# Patient Record
Sex: FEMALE | Race: WHITE | NOT HISPANIC OR LATINO | Employment: PART TIME | URBAN - METROPOLITAN AREA
[De-identification: names, ages, dates, MRNs, and addresses within clinical notes are randomized per-mention and may not be internally consistent; named-entity substitution may affect disease eponyms.]

---

## 2017-09-14 ENCOUNTER — CONVERSION ENCOUNTER (OUTPATIENT)
Dept: MAMMOGRAPHY | Facility: HOSPITAL | Age: 52
End: 2017-09-14

## 2018-01-17 ENCOUNTER — OFFICE VISIT CONVERTED (OUTPATIENT)
Dept: FAMILY MEDICINE CLINIC | Facility: CLINIC | Age: 53
End: 2018-01-17
Attending: NURSE PRACTITIONER

## 2018-02-21 ENCOUNTER — OFFICE VISIT CONVERTED (OUTPATIENT)
Dept: FAMILY MEDICINE CLINIC | Facility: CLINIC | Age: 53
End: 2018-02-21
Attending: FAMILY MEDICINE

## 2018-02-21 ENCOUNTER — CONVERSION ENCOUNTER (OUTPATIENT)
Dept: FAMILY MEDICINE CLINIC | Facility: CLINIC | Age: 53
End: 2018-02-21

## 2018-03-20 ENCOUNTER — OFFICE VISIT CONVERTED (OUTPATIENT)
Dept: FAMILY MEDICINE CLINIC | Facility: CLINIC | Age: 53
End: 2018-03-20
Attending: FAMILY MEDICINE

## 2018-04-24 ENCOUNTER — OFFICE VISIT CONVERTED (OUTPATIENT)
Dept: FAMILY MEDICINE CLINIC | Facility: CLINIC | Age: 53
End: 2018-04-24
Attending: NURSE PRACTITIONER

## 2018-05-17 ENCOUNTER — OFFICE VISIT CONVERTED (OUTPATIENT)
Dept: FAMILY MEDICINE CLINIC | Facility: CLINIC | Age: 53
End: 2018-05-17
Attending: NURSE PRACTITIONER

## 2018-06-15 ENCOUNTER — OFFICE VISIT CONVERTED (OUTPATIENT)
Dept: FAMILY MEDICINE CLINIC | Facility: CLINIC | Age: 53
End: 2018-06-15
Attending: NURSE PRACTITIONER

## 2018-06-28 ENCOUNTER — OFFICE VISIT CONVERTED (OUTPATIENT)
Dept: FAMILY MEDICINE CLINIC | Facility: CLINIC | Age: 53
End: 2018-06-28
Attending: FAMILY MEDICINE

## 2018-07-16 ENCOUNTER — OFFICE VISIT CONVERTED (OUTPATIENT)
Dept: FAMILY MEDICINE CLINIC | Facility: CLINIC | Age: 53
End: 2018-07-16
Attending: NURSE PRACTITIONER

## 2018-07-16 ENCOUNTER — CONVERSION ENCOUNTER (OUTPATIENT)
Dept: FAMILY MEDICINE CLINIC | Facility: CLINIC | Age: 53
End: 2018-07-16

## 2018-07-24 ENCOUNTER — OFFICE VISIT CONVERTED (OUTPATIENT)
Dept: FAMILY MEDICINE CLINIC | Facility: CLINIC | Age: 53
End: 2018-07-24
Attending: NURSE PRACTITIONER

## 2018-08-21 ENCOUNTER — OFFICE VISIT CONVERTED (OUTPATIENT)
Dept: FAMILY MEDICINE CLINIC | Facility: CLINIC | Age: 53
End: 2018-08-21
Attending: FAMILY MEDICINE

## 2018-09-11 ENCOUNTER — OFFICE VISIT CONVERTED (OUTPATIENT)
Dept: FAMILY MEDICINE CLINIC | Facility: CLINIC | Age: 53
End: 2018-09-11
Attending: FAMILY MEDICINE

## 2018-09-18 ENCOUNTER — OFFICE VISIT CONVERTED (OUTPATIENT)
Dept: FAMILY MEDICINE CLINIC | Facility: CLINIC | Age: 53
End: 2018-09-18
Attending: FAMILY MEDICINE

## 2018-10-06 ENCOUNTER — OFFICE VISIT CONVERTED (OUTPATIENT)
Dept: FAMILY MEDICINE CLINIC | Facility: CLINIC | Age: 53
End: 2018-10-06
Attending: FAMILY MEDICINE

## 2018-10-09 ENCOUNTER — OFFICE VISIT CONVERTED (OUTPATIENT)
Dept: FAMILY MEDICINE CLINIC | Facility: CLINIC | Age: 53
End: 2018-10-09
Attending: FAMILY MEDICINE

## 2018-10-11 ENCOUNTER — OFFICE VISIT CONVERTED (OUTPATIENT)
Dept: GASTROENTEROLOGY | Facility: CLINIC | Age: 53
End: 2018-10-11
Attending: INTERNAL MEDICINE

## 2018-10-15 ENCOUNTER — CONVERSION ENCOUNTER (OUTPATIENT)
Dept: OTHER | Facility: HOSPITAL | Age: 53
End: 2018-10-15

## 2018-10-30 ENCOUNTER — OFFICE VISIT CONVERTED (OUTPATIENT)
Dept: FAMILY MEDICINE CLINIC | Facility: CLINIC | Age: 53
End: 2018-10-30
Attending: FAMILY MEDICINE

## 2018-11-06 ENCOUNTER — OFFICE VISIT CONVERTED (OUTPATIENT)
Dept: FAMILY MEDICINE CLINIC | Facility: CLINIC | Age: 53
End: 2018-11-06
Attending: FAMILY MEDICINE

## 2018-11-19 ENCOUNTER — OFFICE VISIT CONVERTED (OUTPATIENT)
Dept: FAMILY MEDICINE CLINIC | Facility: CLINIC | Age: 53
End: 2018-11-19
Attending: NURSE PRACTITIONER

## 2018-12-04 ENCOUNTER — OFFICE VISIT CONVERTED (OUTPATIENT)
Dept: FAMILY MEDICINE CLINIC | Facility: CLINIC | Age: 53
End: 2018-12-04
Attending: NURSE PRACTITIONER

## 2018-12-20 ENCOUNTER — CONVERSION ENCOUNTER (OUTPATIENT)
Dept: FAMILY MEDICINE CLINIC | Facility: CLINIC | Age: 53
End: 2018-12-20

## 2018-12-20 ENCOUNTER — OFFICE VISIT CONVERTED (OUTPATIENT)
Dept: FAMILY MEDICINE CLINIC | Facility: CLINIC | Age: 53
End: 2018-12-20
Attending: NURSE PRACTITIONER

## 2019-01-02 ENCOUNTER — HOSPITAL ENCOUNTER (OUTPATIENT)
Dept: URGENT CARE | Facility: CLINIC | Age: 54
Discharge: HOME | End: 2019-01-02
Attending: NURSE PRACTITIONER

## 2019-01-04 LAB — BACTERIA SPEC AEROBE CULT: NORMAL

## 2019-01-08 ENCOUNTER — OFFICE VISIT CONVERTED (OUTPATIENT)
Dept: FAMILY MEDICINE CLINIC | Facility: CLINIC | Age: 54
End: 2019-01-08
Attending: FAMILY MEDICINE

## 2019-01-30 ENCOUNTER — HOSPITAL ENCOUNTER (OUTPATIENT)
Dept: PHYSICAL THERAPY | Facility: CLINIC | Age: 54
Setting detail: RECURRING SERIES
Discharge: HOME | End: 2019-01-31
Attending: FAMILY MEDICINE

## 2019-02-14 ENCOUNTER — OFFICE VISIT CONVERTED (OUTPATIENT)
Dept: FAMILY MEDICINE CLINIC | Facility: CLINIC | Age: 54
End: 2019-02-14
Attending: NURSE PRACTITIONER

## 2019-02-19 ENCOUNTER — OFFICE VISIT CONVERTED (OUTPATIENT)
Dept: FAMILY MEDICINE CLINIC | Facility: CLINIC | Age: 54
End: 2019-02-19
Attending: NURSE PRACTITIONER

## 2019-02-19 ENCOUNTER — HOSPITAL ENCOUNTER (OUTPATIENT)
Dept: FAMILY MEDICINE CLINIC | Facility: CLINIC | Age: 54
Discharge: HOME | End: 2019-02-19
Attending: NURSE PRACTITIONER

## 2019-02-19 ENCOUNTER — CONVERSION ENCOUNTER (OUTPATIENT)
Dept: FAMILY MEDICINE CLINIC | Facility: CLINIC | Age: 54
End: 2019-02-19

## 2019-03-25 ENCOUNTER — OFFICE VISIT CONVERTED (OUTPATIENT)
Dept: FAMILY MEDICINE CLINIC | Facility: CLINIC | Age: 54
End: 2019-03-25
Attending: FAMILY MEDICINE

## 2019-03-25 ENCOUNTER — HOSPITAL ENCOUNTER (OUTPATIENT)
Dept: FAMILY MEDICINE CLINIC | Facility: CLINIC | Age: 54
Discharge: HOME | End: 2019-03-25
Attending: FAMILY MEDICINE

## 2019-03-25 ENCOUNTER — CONVERSION ENCOUNTER (OUTPATIENT)
Dept: FAMILY MEDICINE CLINIC | Facility: CLINIC | Age: 54
End: 2019-03-25

## 2019-03-26 LAB
ALBUMIN SERPL-MCNC: 4.3 G/DL (ref 3.5–5)
ALBUMIN/GLOB SERPL: 1.4 {RATIO} (ref 1.4–2.6)
ALP SERPL-CCNC: 46 U/L (ref 53–141)
ALT SERPL-CCNC: 33 U/L (ref 10–40)
ANION GAP SERPL CALC-SCNC: 22 MMOL/L (ref 8–19)
AST SERPL-CCNC: 29 U/L (ref 15–50)
BILIRUB SERPL-MCNC: 0.38 MG/DL (ref 0.2–1.3)
BUN SERPL-MCNC: 12 MG/DL (ref 5–25)
BUN/CREAT SERPL: 19 {RATIO} (ref 6–20)
CALCIUM SERPL-MCNC: 9.6 MG/DL (ref 8.7–10.4)
CHLORIDE SERPL-SCNC: 102 MMOL/L (ref 99–111)
CONV CO2: 22 MMOL/L (ref 22–32)
CONV TOTAL PROTEIN: 7.4 G/DL (ref 6.3–8.2)
CREAT UR-MCNC: 0.62 MG/DL (ref 0.5–0.9)
GFR SERPLBLD BASED ON 1.73 SQ M-ARVRAT: >60 ML/MIN/{1.73_M2}
GLOBULIN UR ELPH-MCNC: 3.1 G/DL (ref 2–3.5)
GLUCOSE SERPL-MCNC: 73 MG/DL (ref 65–99)
OSMOLALITY SERPL CALC.SUM OF ELEC: 284 MOSM/KG (ref 273–304)
POTASSIUM SERPL-SCNC: 7.5 MMOL/L (ref 3.5–5.3)
SODIUM SERPL-SCNC: 138 MMOL/L (ref 135–147)
TSH SERPL-ACNC: 1.41 M[IU]/L (ref 0.27–4.2)

## 2019-04-01 ENCOUNTER — OFFICE VISIT CONVERTED (OUTPATIENT)
Dept: FAMILY MEDICINE CLINIC | Facility: CLINIC | Age: 54
End: 2019-04-01
Attending: NURSE PRACTITIONER

## 2019-04-09 ENCOUNTER — HOSPITAL ENCOUNTER (OUTPATIENT)
Dept: OTHER | Facility: HOSPITAL | Age: 54
Discharge: HOME | End: 2019-04-09
Attending: FAMILY MEDICINE

## 2019-04-11 ENCOUNTER — OFFICE VISIT CONVERTED (OUTPATIENT)
Dept: FAMILY MEDICINE CLINIC | Facility: CLINIC | Age: 54
End: 2019-04-11
Attending: FAMILY MEDICINE

## 2019-04-11 ENCOUNTER — HOSPITAL ENCOUNTER (OUTPATIENT)
Dept: SLEEP MEDICINE | Facility: HOSPITAL | Age: 54
Discharge: HOME | End: 2019-04-11
Attending: INTERNAL MEDICINE

## 2019-04-18 ENCOUNTER — HOSPITAL ENCOUNTER (OUTPATIENT)
Dept: SLEEP MEDICINE | Facility: HOSPITAL | Age: 54
Discharge: HOME | End: 2019-04-18
Attending: INTERNAL MEDICINE

## 2019-04-25 ENCOUNTER — HOSPITAL ENCOUNTER (OUTPATIENT)
Dept: OTHER | Facility: HOSPITAL | Age: 54
Discharge: HOME | End: 2019-04-25
Attending: FAMILY MEDICINE

## 2019-05-02 ENCOUNTER — HOSPITAL ENCOUNTER (OUTPATIENT)
Dept: FAMILY MEDICINE CLINIC | Facility: CLINIC | Age: 54
Discharge: HOME | End: 2019-05-02
Attending: FAMILY MEDICINE

## 2019-05-02 ENCOUNTER — OFFICE VISIT CONVERTED (OUTPATIENT)
Dept: FAMILY MEDICINE CLINIC | Facility: CLINIC | Age: 54
End: 2019-05-02
Attending: FAMILY MEDICINE

## 2019-05-03 LAB
ALBUMIN SERPL-MCNC: 4.2 G/DL (ref 3.5–5)
ALBUMIN/GLOB SERPL: 1.5 {RATIO} (ref 1.4–2.6)
ALP SERPL-CCNC: 45 U/L (ref 53–141)
ALT SERPL-CCNC: 25 U/L (ref 10–40)
ANION GAP SERPL CALC-SCNC: 14 MMOL/L (ref 8–19)
AST SERPL-CCNC: 26 U/L (ref 15–50)
BASOPHILS # BLD AUTO: 0.06 10*3/UL (ref 0–0.2)
BASOPHILS NFR BLD AUTO: 0.8 % (ref 0–3)
BILIRUB SERPL-MCNC: 0.37 MG/DL (ref 0.2–1.3)
BUN SERPL-MCNC: 11 MG/DL (ref 5–25)
BUN/CREAT SERPL: 14 {RATIO} (ref 6–20)
CALCIUM SERPL-MCNC: 9.2 MG/DL (ref 8.7–10.4)
CHLORIDE SERPL-SCNC: 102 MMOL/L (ref 99–111)
CONV ABS IMM GRAN: 0.01 10*3/UL (ref 0–0.2)
CONV CO2: 27 MMOL/L (ref 22–32)
CONV IMMATURE GRAN: 0.1 % (ref 0–1.8)
CONV TOTAL PROTEIN: 7 G/DL (ref 6.3–8.2)
CREAT UR-MCNC: 0.77 MG/DL (ref 0.5–0.9)
DEPRECATED RDW RBC AUTO: 44.2 FL (ref 36.4–46.3)
EOSINOPHIL # BLD AUTO: 0.15 10*3/UL (ref 0–0.7)
EOSINOPHIL # BLD AUTO: 2 % (ref 0–7)
ERYTHROCYTE [DISTWIDTH] IN BLOOD BY AUTOMATED COUNT: 13.7 % (ref 11.7–14.4)
GFR SERPLBLD BASED ON 1.73 SQ M-ARVRAT: >60 ML/MIN/{1.73_M2}
GLOBULIN UR ELPH-MCNC: 2.8 G/DL (ref 2–3.5)
GLUCOSE SERPL-MCNC: 83 MG/DL (ref 65–99)
HBA1C MFR BLD: 13.8 G/DL (ref 12–16)
HCT VFR BLD AUTO: 44.1 % (ref 37–47)
LYMPHOCYTES # BLD AUTO: 3.17 10*3/UL (ref 1–5)
MCH RBC QN AUTO: 27.6 PG (ref 27–31)
MCHC RBC AUTO-ENTMCNC: 31.3 G/DL (ref 33–37)
MCV RBC AUTO: 88.2 FL (ref 81–99)
MONOCYTES # BLD AUTO: 0.65 10*3/UL (ref 0.2–1.2)
MONOCYTES NFR BLD AUTO: 8.6 % (ref 3–10)
NEUTROPHILS # BLD AUTO: 3.55 10*3/UL (ref 2–8)
NEUTROPHILS NFR BLD AUTO: 46.7 % (ref 30–85)
NRBC CBCN: 0 % (ref 0–0.7)
OSMOLALITY SERPL CALC.SUM OF ELEC: 287 MOSM/KG (ref 273–304)
PLATELET # BLD AUTO: 228 10*3/UL (ref 130–400)
PMV BLD AUTO: 12.8 FL (ref 9.4–12.3)
POTASSIUM SERPL-SCNC: 3.9 MMOL/L (ref 3.5–5.3)
RBC # BLD AUTO: 5 10*6/UL (ref 4.2–5.4)
SODIUM SERPL-SCNC: 139 MMOL/L (ref 135–147)
VARIANT LYMPHS NFR BLD MANUAL: 41.8 % (ref 20–45)
WBC # BLD AUTO: 7.59 10*3/UL (ref 4.8–10.8)

## 2019-05-20 ENCOUNTER — HOSPITAL ENCOUNTER (OUTPATIENT)
Dept: FAMILY MEDICINE CLINIC | Facility: CLINIC | Age: 54
Discharge: HOME | End: 2019-05-20
Attending: FAMILY MEDICINE

## 2019-05-20 ENCOUNTER — OFFICE VISIT CONVERTED (OUTPATIENT)
Dept: FAMILY MEDICINE CLINIC | Facility: CLINIC | Age: 54
End: 2019-05-20
Attending: FAMILY MEDICINE

## 2019-05-20 ENCOUNTER — CONVERSION ENCOUNTER (OUTPATIENT)
Dept: FAMILY MEDICINE CLINIC | Facility: CLINIC | Age: 54
End: 2019-05-20

## 2019-05-28 ENCOUNTER — HOSPITAL ENCOUNTER (OUTPATIENT)
Dept: ONCOLOGY | Facility: HOSPITAL | Age: 54
Discharge: HOME | End: 2019-05-28
Attending: THORACIC SURGERY (CARDIOTHORACIC VASCULAR SURGERY)

## 2019-05-28 ENCOUNTER — OFFICE VISIT CONVERTED (OUTPATIENT)
Dept: ONCOLOGY | Facility: HOSPITAL | Age: 54
End: 2019-05-28
Attending: THORACIC SURGERY (CARDIOTHORACIC VASCULAR SURGERY)

## 2019-05-29 ENCOUNTER — OFFICE VISIT CONVERTED (OUTPATIENT)
Dept: GASTROENTEROLOGY | Facility: CLINIC | Age: 54
End: 2019-05-29
Attending: PHYSICIAN ASSISTANT

## 2019-06-03 ENCOUNTER — OFFICE VISIT CONVERTED (OUTPATIENT)
Dept: FAMILY MEDICINE CLINIC | Facility: CLINIC | Age: 54
End: 2019-06-03
Attending: FAMILY MEDICINE

## 2019-06-03 ENCOUNTER — HOSPITAL ENCOUNTER (OUTPATIENT)
Dept: FAMILY MEDICINE CLINIC | Facility: CLINIC | Age: 54
Discharge: HOME | End: 2019-06-03
Attending: FAMILY MEDICINE

## 2019-06-06 LAB — BACTERIA SPEC AEROBE CULT: NORMAL

## 2019-06-10 ENCOUNTER — HOSPITAL ENCOUNTER (OUTPATIENT)
Dept: CARDIOLOGY | Facility: HOSPITAL | Age: 54
Discharge: HOME | End: 2019-06-10
Attending: THORACIC SURGERY (CARDIOTHORACIC VASCULAR SURGERY)

## 2019-06-19 ENCOUNTER — OFFICE VISIT CONVERTED (OUTPATIENT)
Dept: ORTHOPEDIC SURGERY | Facility: CLINIC | Age: 54
End: 2019-06-19
Attending: ORTHOPAEDIC SURGERY

## 2019-06-20 ENCOUNTER — HOSPITAL ENCOUNTER (OUTPATIENT)
Dept: SLEEP MEDICINE | Facility: HOSPITAL | Age: 54
Discharge: HOME | End: 2019-06-20
Attending: INTERNAL MEDICINE

## 2019-07-12 ENCOUNTER — HOSPITAL ENCOUNTER (OUTPATIENT)
Dept: OTHER | Facility: HOSPITAL | Age: 54
Discharge: HOME | End: 2019-07-12
Attending: THORACIC SURGERY (CARDIOTHORACIC VASCULAR SURGERY)

## 2019-07-16 ENCOUNTER — HOSPITAL ENCOUNTER (OUTPATIENT)
Dept: ONCOLOGY | Facility: HOSPITAL | Age: 54
Discharge: HOME | End: 2019-07-16
Attending: THORACIC SURGERY (CARDIOTHORACIC VASCULAR SURGERY)

## 2019-07-16 ENCOUNTER — OFFICE VISIT CONVERTED (OUTPATIENT)
Dept: ONCOLOGY | Facility: HOSPITAL | Age: 54
End: 2019-07-16
Attending: THORACIC SURGERY (CARDIOTHORACIC VASCULAR SURGERY)

## 2019-08-24 ENCOUNTER — OFFICE VISIT CONVERTED (OUTPATIENT)
Dept: FAMILY MEDICINE CLINIC | Facility: CLINIC | Age: 54
End: 2019-08-24
Attending: FAMILY MEDICINE

## 2019-08-24 ENCOUNTER — HOSPITAL ENCOUNTER (OUTPATIENT)
Dept: FAMILY MEDICINE CLINIC | Facility: CLINIC | Age: 54
Discharge: HOME | End: 2019-08-24

## 2019-08-24 LAB
ALBUMIN SERPL-MCNC: 4.4 G/DL (ref 3.5–5)
ALBUMIN/GLOB SERPL: 1.5 {RATIO} (ref 1.4–2.6)
ALP SERPL-CCNC: 53 U/L (ref 53–141)
ALT SERPL-CCNC: 26 U/L (ref 10–40)
ANION GAP SERPL CALC-SCNC: 14 MMOL/L (ref 8–19)
AST SERPL-CCNC: 25 U/L (ref 15–50)
BILIRUB SERPL-MCNC: 0.68 MG/DL (ref 0.2–1.3)
BUN SERPL-MCNC: 9 MG/DL (ref 5–25)
BUN/CREAT SERPL: 16 {RATIO} (ref 6–20)
CALCIUM SERPL-MCNC: 9.6 MG/DL (ref 8.7–10.4)
CHLORIDE SERPL-SCNC: 104 MMOL/L (ref 99–111)
CONV CO2: 26 MMOL/L (ref 22–32)
CONV TOTAL PROTEIN: 7.4 G/DL (ref 6.3–8.2)
CREAT UR-MCNC: 0.56 MG/DL (ref 0.5–0.9)
GFR SERPLBLD BASED ON 1.73 SQ M-ARVRAT: >60 ML/MIN/{1.73_M2}
GLOBULIN UR ELPH-MCNC: 3 G/DL (ref 2–3.5)
GLUCOSE SERPL-MCNC: 82 MG/DL (ref 65–99)
OSMOLALITY SERPL CALC.SUM OF ELEC: 288 MOSM/KG (ref 273–304)
POTASSIUM SERPL-SCNC: 3.7 MMOL/L (ref 3.5–5.3)
SODIUM SERPL-SCNC: 140 MMOL/L (ref 135–147)

## 2019-09-09 ENCOUNTER — OFFICE VISIT CONVERTED (OUTPATIENT)
Dept: FAMILY MEDICINE CLINIC | Facility: CLINIC | Age: 54
End: 2019-09-09
Attending: NURSE PRACTITIONER

## 2019-09-09 ENCOUNTER — HOSPITAL ENCOUNTER (OUTPATIENT)
Dept: FAMILY MEDICINE CLINIC | Facility: CLINIC | Age: 54
Discharge: HOME | End: 2019-09-09
Attending: NURSE PRACTITIONER

## 2019-09-18 ENCOUNTER — HOSPITAL ENCOUNTER (OUTPATIENT)
Dept: URGENT CARE | Facility: CLINIC | Age: 54
Discharge: HOME | End: 2019-09-18

## 2019-09-20 LAB — BACTERIA SPEC AEROBE CULT: NORMAL

## 2019-10-10 ENCOUNTER — HOSPITAL ENCOUNTER (OUTPATIENT)
Dept: FAMILY MEDICINE CLINIC | Facility: CLINIC | Age: 54
Discharge: HOME | End: 2019-10-10
Attending: NURSE PRACTITIONER

## 2019-10-10 ENCOUNTER — OFFICE VISIT CONVERTED (OUTPATIENT)
Dept: FAMILY MEDICINE CLINIC | Facility: CLINIC | Age: 54
End: 2019-10-10
Attending: NURSE PRACTITIONER

## 2019-10-10 ENCOUNTER — CONVERSION ENCOUNTER (OUTPATIENT)
Dept: FAMILY MEDICINE CLINIC | Facility: CLINIC | Age: 54
End: 2019-10-10

## 2019-10-11 ENCOUNTER — HOSPITAL ENCOUNTER (OUTPATIENT)
Dept: FAMILY MEDICINE CLINIC | Facility: CLINIC | Age: 54
Discharge: HOME | End: 2019-10-11
Attending: NURSE PRACTITIONER

## 2019-10-11 LAB
ALBUMIN SERPL-MCNC: 4.6 G/DL (ref 3.5–5)
ALBUMIN/GLOB SERPL: 1.6 {RATIO} (ref 1.4–2.6)
ALP SERPL-CCNC: 53 U/L (ref 53–141)
ALT SERPL-CCNC: 46 U/L (ref 10–40)
ANION GAP SERPL CALC-SCNC: 18 MMOL/L (ref 8–19)
AST SERPL-CCNC: 36 U/L (ref 15–50)
BASOPHILS # BLD AUTO: 0.07 10*3/UL (ref 0–0.2)
BASOPHILS NFR BLD AUTO: 1.1 % (ref 0–3)
BILIRUB SERPL-MCNC: 0.86 MG/DL (ref 0.2–1.3)
BUN SERPL-MCNC: 7 MG/DL (ref 5–25)
BUN/CREAT SERPL: 11 {RATIO} (ref 6–20)
CALCIUM SERPL-MCNC: 9.9 MG/DL (ref 8.7–10.4)
CHLORIDE SERPL-SCNC: 103 MMOL/L (ref 99–111)
CHOLEST SERPL-MCNC: 184 MG/DL (ref 107–200)
CHOLEST/HDLC SERPL: 4.8 {RATIO} (ref 3–6)
CONV ABS IMM GRAN: 0.02 10*3/UL (ref 0–0.2)
CONV CO2: 25 MMOL/L (ref 22–32)
CONV IMMATURE GRAN: 0.3 % (ref 0–1.8)
CONV TOTAL PROTEIN: 7.5 G/DL (ref 6.3–8.2)
CREAT UR-MCNC: 0.66 MG/DL (ref 0.5–0.9)
DEPRECATED RDW RBC AUTO: 41.4 FL (ref 36.4–46.3)
EOSINOPHIL # BLD AUTO: 0.19 10*3/UL (ref 0–0.7)
EOSINOPHIL # BLD AUTO: 2.9 % (ref 0–7)
ERYTHROCYTE [DISTWIDTH] IN BLOOD BY AUTOMATED COUNT: 13.3 % (ref 11.7–14.4)
GFR SERPLBLD BASED ON 1.73 SQ M-ARVRAT: >60 ML/MIN/{1.73_M2}
GLOBULIN UR ELPH-MCNC: 2.9 G/DL (ref 2–3.5)
GLUCOSE SERPL-MCNC: 92 MG/DL (ref 65–99)
HCT VFR BLD AUTO: 45.4 % (ref 37–47)
HDLC SERPL-MCNC: 38 MG/DL (ref 40–60)
HGB BLD-MCNC: 14.7 G/DL (ref 12–16)
LDLC SERPL CALC-MCNC: 83 MG/DL (ref 70–100)
LYMPHOCYTES # BLD AUTO: 2.05 10*3/UL (ref 1–5)
LYMPHOCYTES NFR BLD AUTO: 30.9 % (ref 20–45)
MCH RBC QN AUTO: 27.5 PG (ref 27–31)
MCHC RBC AUTO-ENTMCNC: 32.4 G/DL (ref 33–37)
MCV RBC AUTO: 85 FL (ref 81–99)
MONOCYTES # BLD AUTO: 0.61 10*3/UL (ref 0.2–1.2)
MONOCYTES NFR BLD AUTO: 9.2 % (ref 3–10)
NEUTROPHILS # BLD AUTO: 3.69 10*3/UL (ref 2–8)
NEUTROPHILS NFR BLD AUTO: 55.6 % (ref 30–85)
NRBC CBCN: 0 % (ref 0–0.7)
OSMOLALITY SERPL CALC.SUM OF ELEC: 292 MOSM/KG (ref 273–304)
PLATELET # BLD AUTO: 234 10*3/UL (ref 130–400)
PMV BLD AUTO: 11.4 FL (ref 9.4–12.3)
POTASSIUM SERPL-SCNC: 3.9 MMOL/L (ref 3.5–5.3)
RBC # BLD AUTO: 5.34 10*6/UL (ref 4.2–5.4)
SODIUM SERPL-SCNC: 142 MMOL/L (ref 135–147)
T4 FREE SERPL-MCNC: 1.3 NG/DL (ref 0.9–1.8)
TRIGL SERPL-MCNC: 316 MG/DL (ref 40–150)
TSH SERPL-ACNC: 0.39 M[IU]/L (ref 0.27–4.2)
VLDLC SERPL-MCNC: 63 MG/DL (ref 5–37)
WBC # BLD AUTO: 6.63 10*3/UL (ref 4.8–10.8)

## 2019-10-12 LAB — HCV AB SER DONR QL: 0.2 S/CO RATIO (ref 0–0.9)

## 2019-10-15 LAB
CONV LAST MENSTURAL PERIOD: NORMAL
SPECIMEN SOURCE: NORMAL
SPECIMEN SOURCE: NORMAL
THIN PREP CVX: NORMAL

## 2019-10-17 LAB — HPV HYBRID CAPTURE HIGH RISK: NEGATIVE

## 2019-10-23 ENCOUNTER — OFFICE VISIT CONVERTED (OUTPATIENT)
Dept: FAMILY MEDICINE CLINIC | Facility: CLINIC | Age: 54
End: 2019-10-23
Attending: NURSE PRACTITIONER

## 2019-10-24 ENCOUNTER — HOSPITAL ENCOUNTER (OUTPATIENT)
Dept: OTHER | Facility: HOSPITAL | Age: 54
Discharge: HOME | End: 2019-10-24
Attending: NURSE PRACTITIONER

## 2019-10-31 ENCOUNTER — HOSPITAL ENCOUNTER (OUTPATIENT)
Dept: OTHER | Facility: HOSPITAL | Age: 54
Discharge: HOME | End: 2019-10-31
Attending: NURSE PRACTITIONER

## 2019-11-07 ENCOUNTER — HOSPITAL ENCOUNTER (OUTPATIENT)
Dept: OTHER | Facility: HOSPITAL | Age: 54
Discharge: HOME | End: 2019-11-07
Attending: FAMILY MEDICINE

## 2019-11-14 ENCOUNTER — CONVERSION ENCOUNTER (OUTPATIENT)
Dept: FAMILY MEDICINE CLINIC | Facility: CLINIC | Age: 54
End: 2019-11-14

## 2019-11-14 ENCOUNTER — OFFICE VISIT CONVERTED (OUTPATIENT)
Dept: FAMILY MEDICINE CLINIC | Facility: CLINIC | Age: 54
End: 2019-11-14
Attending: NURSE PRACTITIONER

## 2019-12-19 ENCOUNTER — OFFICE VISIT CONVERTED (OUTPATIENT)
Dept: GASTROENTEROLOGY | Facility: CLINIC | Age: 54
End: 2019-12-19
Attending: PHYSICIAN ASSISTANT

## 2019-12-19 ENCOUNTER — CONVERSION ENCOUNTER (OUTPATIENT)
Dept: GASTROENTEROLOGY | Facility: CLINIC | Age: 54
End: 2019-12-19

## 2019-12-19 ENCOUNTER — HOSPITAL ENCOUNTER (OUTPATIENT)
Dept: SLEEP MEDICINE | Facility: HOSPITAL | Age: 54
Discharge: HOME | End: 2019-12-19
Attending: INTERNAL MEDICINE

## 2019-12-19 ENCOUNTER — HOSPITAL ENCOUNTER (OUTPATIENT)
Dept: OTHER | Facility: HOSPITAL | Age: 54
Discharge: HOME | End: 2019-12-19
Attending: PHYSICIAN ASSISTANT

## 2019-12-19 LAB
ALBUMIN SERPL-MCNC: 4.6 G/DL (ref 3.5–5)
ALBUMIN/GLOB SERPL: 1.3 {RATIO} (ref 1.4–2.6)
ALP SERPL-CCNC: 61 U/L (ref 53–141)
ALT SERPL-CCNC: 41 U/L (ref 10–40)
ANION GAP SERPL CALC-SCNC: 15 MMOL/L (ref 8–19)
AST SERPL-CCNC: 32 U/L (ref 15–50)
BASOPHILS # BLD AUTO: 0.05 10*3/UL (ref 0–0.2)
BASOPHILS NFR BLD AUTO: 0.8 % (ref 0–3)
BILIRUB SERPL-MCNC: 0.51 MG/DL (ref 0.2–1.3)
BUN SERPL-MCNC: 8 MG/DL (ref 5–25)
BUN/CREAT SERPL: 12 {RATIO} (ref 6–20)
CALCIUM SERPL-MCNC: 10 MG/DL (ref 8.7–10.4)
CHLORIDE SERPL-SCNC: 101 MMOL/L (ref 99–111)
CONV ABS IMM GRAN: 0.01 10*3/UL (ref 0–0.2)
CONV CO2: 27 MMOL/L (ref 22–32)
CONV IMMATURE GRAN: 0.2 % (ref 0–1.8)
CONV TOTAL PROTEIN: 8.2 G/DL (ref 6.3–8.2)
CREAT UR-MCNC: 0.66 MG/DL (ref 0.5–0.9)
DEPRECATED RDW RBC AUTO: 38.7 FL (ref 36.4–46.3)
EOSINOPHIL # BLD AUTO: 0.14 10*3/UL (ref 0–0.7)
EOSINOPHIL # BLD AUTO: 2.2 % (ref 0–7)
ERYTHROCYTE [DISTWIDTH] IN BLOOD BY AUTOMATED COUNT: 13 % (ref 11.7–14.4)
FERRITIN SERPL-MCNC: 79 NG/ML (ref 10–200)
GFR SERPLBLD BASED ON 1.73 SQ M-ARVRAT: >60 ML/MIN/{1.73_M2}
GLOBULIN UR ELPH-MCNC: 3.6 G/DL (ref 2–3.5)
GLUCOSE SERPL-MCNC: 93 MG/DL (ref 65–99)
HCT VFR BLD AUTO: 48.2 % (ref 37–47)
HGB BLD-MCNC: 16 G/DL (ref 12–16)
IRON SATN MFR SERPL: 19 % (ref 20–55)
IRON SERPL-MCNC: 78 UG/DL (ref 60–170)
LYMPHOCYTES # BLD AUTO: 2.11 10*3/UL (ref 1–5)
LYMPHOCYTES NFR BLD AUTO: 33.3 % (ref 20–45)
MCH RBC QN AUTO: 27.5 PG (ref 27–31)
MCHC RBC AUTO-ENTMCNC: 33.2 G/DL (ref 33–37)
MCV RBC AUTO: 82.8 FL (ref 81–99)
MONOCYTES # BLD AUTO: 0.38 10*3/UL (ref 0.2–1.2)
MONOCYTES NFR BLD AUTO: 6 % (ref 3–10)
NEUTROPHILS # BLD AUTO: 3.65 10*3/UL (ref 2–8)
NEUTROPHILS NFR BLD AUTO: 57.5 % (ref 30–85)
NRBC CBCN: 0 % (ref 0–0.7)
OSMOLALITY SERPL CALC.SUM OF ELEC: 286 MOSM/KG (ref 273–304)
PLATELET # BLD AUTO: 256 10*3/UL (ref 130–400)
PMV BLD AUTO: 10.6 FL (ref 9.4–12.3)
POTASSIUM SERPL-SCNC: 4.1 MMOL/L (ref 3.5–5.3)
RBC # BLD AUTO: 5.82 10*6/UL (ref 4.2–5.4)
SODIUM SERPL-SCNC: 139 MMOL/L (ref 135–147)
TIBC SERPL-MCNC: 405 UG/DL (ref 245–450)
TRANSFERRIN SERPL-MCNC: 283 MG/DL (ref 250–380)
WBC # BLD AUTO: 6.34 10*3/UL (ref 4.8–10.8)

## 2019-12-20 LAB
A1AT SERPL-MCNC: 130 MG/DL (ref 101–187)
CERULOPLASMIN SERPL-MCNC: 26.2 MG/DL (ref 19–39)
CONV HEPATITIS B SURFACE AG W CONFIRMATION RE: NEGATIVE
DEPRECATED MITOCHONDRIA M2 IGG SER-ACNC: <20 UNITS (ref 0–20)
DSDNA AB SER-ACNC: NEGATIVE [IU]/ML
ENA AB SER IA-ACNC: NEGATIVE {RATIO}
HAV IGM SERPL QL IA: NEGATIVE
HBV CORE IGM SERPL QL IA: NEGATIVE
HCV AB SER DONR QL: 0.2 S/CO RATIO (ref 0–0.9)
SMOOTH MUSCLE F-ACTIN AB IGG: 16 UNITS (ref 0–19)

## 2020-01-06 ENCOUNTER — OFFICE VISIT CONVERTED (OUTPATIENT)
Dept: NEUROSURGERY | Facility: CLINIC | Age: 55
End: 2020-01-06
Attending: PHYSICIAN ASSISTANT

## 2020-02-04 ENCOUNTER — CONVERSION ENCOUNTER (OUTPATIENT)
Dept: FAMILY MEDICINE CLINIC | Facility: CLINIC | Age: 55
End: 2020-02-04

## 2020-02-04 ENCOUNTER — OFFICE VISIT CONVERTED (OUTPATIENT)
Dept: FAMILY MEDICINE CLINIC | Facility: CLINIC | Age: 55
End: 2020-02-04
Attending: NURSE PRACTITIONER

## 2020-02-29 ENCOUNTER — HOSPITAL ENCOUNTER (OUTPATIENT)
Dept: URGENT CARE | Facility: CLINIC | Age: 55
Discharge: HOME | End: 2020-02-29

## 2020-03-02 LAB — BACTERIA SPEC AEROBE CULT: NORMAL

## 2020-03-09 ENCOUNTER — OFFICE VISIT CONVERTED (OUTPATIENT)
Dept: NEUROSURGERY | Facility: CLINIC | Age: 55
End: 2020-03-09
Attending: PHYSICIAN ASSISTANT

## 2020-03-19 ENCOUNTER — OFFICE VISIT CONVERTED (OUTPATIENT)
Dept: FAMILY MEDICINE CLINIC | Facility: CLINIC | Age: 55
End: 2020-03-19
Attending: NURSE PRACTITIONER

## 2020-03-20 ENCOUNTER — HOSPITAL ENCOUNTER (OUTPATIENT)
Dept: FAMILY MEDICINE CLINIC | Facility: CLINIC | Age: 55
Discharge: HOME | End: 2020-03-20
Attending: NURSE PRACTITIONER

## 2020-03-20 LAB
ALBUMIN SERPL-MCNC: 4.4 G/DL (ref 3.5–5)
ALBUMIN/GLOB SERPL: 1.4 {RATIO} (ref 1.4–2.6)
ALP SERPL-CCNC: 48 U/L (ref 53–141)
ALT SERPL-CCNC: 27 U/L (ref 10–40)
ANION GAP SERPL CALC-SCNC: 17 MMOL/L (ref 8–19)
AST SERPL-CCNC: 24 U/L (ref 15–50)
BILIRUB SERPL-MCNC: 0.47 MG/DL (ref 0.2–1.3)
BUN SERPL-MCNC: 13 MG/DL (ref 5–25)
BUN/CREAT SERPL: 20 {RATIO} (ref 6–20)
CALCIUM SERPL-MCNC: 9.5 MG/DL (ref 8.7–10.4)
CHLORIDE SERPL-SCNC: 105 MMOL/L (ref 99–111)
CHOLEST SERPL-MCNC: 210 MG/DL (ref 107–200)
CHOLEST/HDLC SERPL: 5.1 {RATIO} (ref 3–6)
CONV CO2: 23 MMOL/L (ref 22–32)
CONV TOTAL PROTEIN: 7.6 G/DL (ref 6.3–8.2)
CREAT UR-MCNC: 0.66 MG/DL (ref 0.5–0.9)
GFR SERPLBLD BASED ON 1.73 SQ M-ARVRAT: >60 ML/MIN/{1.73_M2}
GLOBULIN UR ELPH-MCNC: 3.2 G/DL (ref 2–3.5)
GLUCOSE SERPL-MCNC: 94 MG/DL (ref 65–99)
HDLC SERPL-MCNC: 41 MG/DL (ref 40–60)
LDLC SERPL CALC-MCNC: 120 MG/DL (ref 70–100)
OSMOLALITY SERPL CALC.SUM OF ELEC: 292 MOSM/KG (ref 273–304)
POTASSIUM SERPL-SCNC: 4.3 MMOL/L (ref 3.5–5.3)
SODIUM SERPL-SCNC: 141 MMOL/L (ref 135–147)
T4 FREE SERPL-MCNC: 1.2 NG/DL (ref 0.9–1.8)
TRIGL SERPL-MCNC: 243 MG/DL (ref 40–150)
TSH SERPL-ACNC: 2.29 M[IU]/L (ref 0.27–4.2)
VLDLC SERPL-MCNC: 49 MG/DL (ref 5–37)

## 2020-04-03 ENCOUNTER — OFFICE VISIT CONVERTED (OUTPATIENT)
Dept: FAMILY MEDICINE CLINIC | Facility: CLINIC | Age: 55
End: 2020-04-03
Attending: NURSE PRACTITIONER

## 2020-04-09 ENCOUNTER — TELEMEDICINE CONVERTED (OUTPATIENT)
Dept: FAMILY MEDICINE CLINIC | Facility: CLINIC | Age: 55
End: 2020-04-09
Attending: NURSE PRACTITIONER

## 2020-05-08 ENCOUNTER — OFFICE VISIT CONVERTED (OUTPATIENT)
Dept: FAMILY MEDICINE CLINIC | Facility: CLINIC | Age: 55
End: 2020-05-08
Attending: NURSE PRACTITIONER

## 2020-06-15 ENCOUNTER — HOSPITAL ENCOUNTER (OUTPATIENT)
Dept: SURGERY | Facility: CLINIC | Age: 55
Discharge: HOME | End: 2020-06-15
Attending: UROLOGY

## 2020-06-15 ENCOUNTER — OFFICE VISIT CONVERTED (OUTPATIENT)
Dept: UROLOGY | Facility: CLINIC | Age: 55
End: 2020-06-15
Attending: UROLOGY

## 2020-06-17 LAB — BACTERIA UR CULT: NORMAL

## 2020-06-22 ENCOUNTER — HOSPITAL ENCOUNTER (OUTPATIENT)
Dept: PREADMISSION TESTING | Facility: HOSPITAL | Age: 55
Discharge: HOME | End: 2020-06-22
Attending: UROLOGY

## 2020-06-24 ENCOUNTER — HOSPITAL ENCOUNTER (OUTPATIENT)
Dept: PERIOP | Facility: HOSPITAL | Age: 55
Setting detail: HOSPITAL OUTPATIENT SURGERY
Discharge: HOME | End: 2020-06-24
Attending: UROLOGY

## 2020-06-24 LAB — SARS-COV-2 RNA SPEC QL NAA+PROBE: NOT DETECTED

## 2020-07-09 ENCOUNTER — HOSPITAL ENCOUNTER (OUTPATIENT)
Dept: OTHER | Facility: HOSPITAL | Age: 55
Discharge: HOME | End: 2020-07-09
Attending: FAMILY MEDICINE

## 2020-07-09 LAB
CREAT BLD-MCNC: 0.7 MG/DL (ref 0.6–1.4)
GFR SERPLBLD BASED ON 1.73 SQ M-ARVRAT: >60 ML/MIN/{1.73_M2}

## 2020-07-17 ENCOUNTER — HOSPITAL ENCOUNTER (OUTPATIENT)
Dept: ULTRASOUND IMAGING | Facility: HOSPITAL | Age: 55
Discharge: HOME | End: 2020-07-17
Attending: UROLOGY

## 2020-07-21 ENCOUNTER — HOSPITAL ENCOUNTER (OUTPATIENT)
Dept: URGENT CARE | Facility: CLINIC | Age: 55
Discharge: HOME | End: 2020-07-21

## 2020-07-27 ENCOUNTER — TELEPHONE CONVERTED (OUTPATIENT)
Dept: UROLOGY | Facility: CLINIC | Age: 55
End: 2020-07-27
Attending: UROLOGY

## 2020-09-10 ENCOUNTER — HOSPITAL ENCOUNTER (OUTPATIENT)
Dept: SLEEP MEDICINE | Facility: HOSPITAL | Age: 55
Discharge: HOME | End: 2020-09-10
Attending: INTERNAL MEDICINE

## 2020-09-24 ENCOUNTER — OFFICE VISIT CONVERTED (OUTPATIENT)
Dept: FAMILY MEDICINE CLINIC | Facility: CLINIC | Age: 55
End: 2020-09-24
Attending: NURSE PRACTITIONER

## 2020-10-19 ENCOUNTER — CONVERSION ENCOUNTER (OUTPATIENT)
Dept: FAMILY MEDICINE CLINIC | Facility: CLINIC | Age: 55
End: 2020-10-19

## 2020-10-19 ENCOUNTER — HOSPITAL ENCOUNTER (OUTPATIENT)
Dept: FAMILY MEDICINE CLINIC | Facility: CLINIC | Age: 55
Discharge: HOME | End: 2020-10-19
Attending: NURSE PRACTITIONER

## 2020-10-19 ENCOUNTER — OFFICE VISIT CONVERTED (OUTPATIENT)
Dept: FAMILY MEDICINE CLINIC | Facility: CLINIC | Age: 55
End: 2020-10-19
Attending: NURSE PRACTITIONER

## 2020-10-20 LAB
ALBUMIN SERPL-MCNC: 4.2 G/DL (ref 3.5–5)
ALBUMIN/GLOB SERPL: 1.4 {RATIO} (ref 1.4–2.6)
ALP SERPL-CCNC: 50 U/L (ref 53–141)
ALT SERPL-CCNC: 28 U/L (ref 10–40)
ANION GAP SERPL CALC-SCNC: 14 MMOL/L (ref 8–19)
AST SERPL-CCNC: 27 U/L (ref 15–50)
BASOPHILS # BLD AUTO: 0.05 10*3/UL (ref 0–0.2)
BASOPHILS NFR BLD AUTO: 0.7 % (ref 0–3)
BILIRUB SERPL-MCNC: 0.61 MG/DL (ref 0.2–1.3)
BUN SERPL-MCNC: 8 MG/DL (ref 5–25)
BUN/CREAT SERPL: 11 {RATIO} (ref 6–20)
CALCIUM SERPL-MCNC: 9.4 MG/DL (ref 8.7–10.4)
CHLORIDE SERPL-SCNC: 103 MMOL/L (ref 99–111)
CHOLEST SERPL-MCNC: 177 MG/DL (ref 107–200)
CHOLEST/HDLC SERPL: 4.3 {RATIO} (ref 3–6)
CONV ABS IMM GRAN: 0.02 10*3/UL (ref 0–0.2)
CONV CO2: 26 MMOL/L (ref 22–32)
CONV IMMATURE GRAN: 0.3 % (ref 0–1.8)
CONV TOTAL PROTEIN: 7.2 G/DL (ref 6.3–8.2)
CREAT UR-MCNC: 0.75 MG/DL (ref 0.5–0.9)
DEPRECATED RDW RBC AUTO: 40.8 FL (ref 36.4–46.3)
EOSINOPHIL # BLD AUTO: 0.14 10*3/UL (ref 0–0.7)
EOSINOPHIL # BLD AUTO: 2 % (ref 0–7)
ERYTHROCYTE [DISTWIDTH] IN BLOOD BY AUTOMATED COUNT: 13.3 % (ref 11.7–14.4)
GFR SERPLBLD BASED ON 1.73 SQ M-ARVRAT: >60 ML/MIN/{1.73_M2}
GLOBULIN UR ELPH-MCNC: 3 G/DL (ref 2–3.5)
GLUCOSE SERPL-MCNC: 89 MG/DL (ref 65–99)
HCT VFR BLD AUTO: 43.6 % (ref 37–47)
HDLC SERPL-MCNC: 41 MG/DL (ref 40–60)
HGB BLD-MCNC: 14.2 G/DL (ref 12–16)
LDLC SERPL CALC-MCNC: 88 MG/DL (ref 70–100)
LYMPHOCYTES # BLD AUTO: 2.44 10*3/UL (ref 1–5)
LYMPHOCYTES NFR BLD AUTO: 34.6 % (ref 20–45)
MCH RBC QN AUTO: 27.4 PG (ref 27–31)
MCHC RBC AUTO-ENTMCNC: 32.6 G/DL (ref 33–37)
MCV RBC AUTO: 84 FL (ref 81–99)
MONOCYTES # BLD AUTO: 0.65 10*3/UL (ref 0.2–1.2)
MONOCYTES NFR BLD AUTO: 9.2 % (ref 3–10)
NEUTROPHILS # BLD AUTO: 3.75 10*3/UL (ref 2–8)
NEUTROPHILS NFR BLD AUTO: 53.2 % (ref 30–85)
NRBC CBCN: 0 % (ref 0–0.7)
OSMOLALITY SERPL CALC.SUM OF ELEC: 286 MOSM/KG (ref 273–304)
PLATELET # BLD AUTO: 259 10*3/UL (ref 130–400)
PMV BLD AUTO: 11.3 FL (ref 9.4–12.3)
POTASSIUM SERPL-SCNC: 3.9 MMOL/L (ref 3.5–5.3)
RBC # BLD AUTO: 5.19 10*6/UL (ref 4.2–5.4)
SODIUM SERPL-SCNC: 139 MMOL/L (ref 135–147)
T4 FREE SERPL-MCNC: 1.3 NG/DL (ref 0.9–1.8)
TRIGL SERPL-MCNC: 239 MG/DL (ref 40–150)
TSH SERPL-ACNC: 2.22 M[IU]/L (ref 0.27–4.2)
VLDLC SERPL-MCNC: 48 MG/DL (ref 5–37)
WBC # BLD AUTO: 7.05 10*3/UL (ref 4.8–10.8)

## 2020-10-26 ENCOUNTER — HOSPITAL ENCOUNTER (OUTPATIENT)
Dept: OTHER | Facility: HOSPITAL | Age: 55
Discharge: HOME | End: 2020-10-26
Attending: NURSE PRACTITIONER

## 2020-10-27 LAB
CONV LAST MENSTURAL PERIOD: NORMAL
HPV HYBRID CAPTURE HIGH RISK: NEGATIVE
SPECIMEN SOURCE: NORMAL
SPECIMEN SOURCE: NORMAL
THIN PREP CVX: NORMAL

## 2020-11-09 ENCOUNTER — OFFICE VISIT CONVERTED (OUTPATIENT)
Dept: NEUROSURGERY | Facility: CLINIC | Age: 55
End: 2020-11-09
Attending: PHYSICIAN ASSISTANT

## 2020-11-10 ENCOUNTER — OFFICE VISIT CONVERTED (OUTPATIENT)
Dept: NEUROLOGY | Facility: CLINIC | Age: 55
End: 2020-11-10
Attending: NURSE PRACTITIONER

## 2020-11-13 ENCOUNTER — HOSPITAL ENCOUNTER (OUTPATIENT)
Dept: OTHER | Facility: HOSPITAL | Age: 55
Discharge: HOME | End: 2020-11-13
Attending: PHYSICIAN ASSISTANT

## 2020-11-23 ENCOUNTER — OFFICE VISIT CONVERTED (OUTPATIENT)
Dept: OTOLARYNGOLOGY | Facility: CLINIC | Age: 55
End: 2020-11-23
Attending: NURSE PRACTITIONER

## 2020-11-23 ENCOUNTER — CONVERSION ENCOUNTER (OUTPATIENT)
Dept: OTOLARYNGOLOGY | Facility: CLINIC | Age: 55
End: 2020-11-23

## 2020-11-30 ENCOUNTER — OFFICE VISIT CONVERTED (OUTPATIENT)
Dept: FAMILY MEDICINE CLINIC | Facility: CLINIC | Age: 55
End: 2020-11-30
Attending: FAMILY MEDICINE

## 2020-11-30 ENCOUNTER — HOSPITAL ENCOUNTER (OUTPATIENT)
Dept: FAMILY MEDICINE CLINIC | Facility: CLINIC | Age: 55
Discharge: HOME | End: 2020-11-30
Attending: FAMILY MEDICINE

## 2020-11-30 LAB — VIT B12 SERPL-MCNC: 570 PG/ML (ref 211–911)

## 2020-11-30 PROCEDURE — 82746 ASSAY OF FOLIC ACID SERUM: CPT

## 2020-12-01 ENCOUNTER — LAB REQUISITION (OUTPATIENT)
Dept: LAB | Facility: HOSPITAL | Age: 55
End: 2020-12-01

## 2020-12-01 DIAGNOSIS — Z00.00 ROUTINE GENERAL MEDICAL EXAMINATION AT A HEALTH CARE FACILITY: ICD-10-CM

## 2020-12-01 LAB — FOLATE SERPL-MCNC: >20 NG/ML (ref 4.78–24.2)

## 2020-12-08 ENCOUNTER — HOSPITAL ENCOUNTER (OUTPATIENT)
Dept: GENERAL RADIOLOGY | Facility: HOSPITAL | Age: 55
Discharge: HOME | End: 2020-12-08
Attending: NURSE PRACTITIONER

## 2020-12-08 LAB
CREAT BLD-MCNC: 0.8 MG/DL (ref 0.6–1.4)
GFR SERPLBLD BASED ON 1.73 SQ M-ARVRAT: >60 ML/MIN/{1.73_M2}

## 2020-12-14 ENCOUNTER — OFFICE VISIT CONVERTED (OUTPATIENT)
Dept: ORTHOPEDIC SURGERY | Facility: CLINIC | Age: 55
End: 2020-12-14
Attending: ORTHOPAEDIC SURGERY

## 2020-12-16 ENCOUNTER — CONVERSION ENCOUNTER (OUTPATIENT)
Dept: OTHER | Facility: HOSPITAL | Age: 55
End: 2020-12-16

## 2020-12-28 ENCOUNTER — OFFICE VISIT CONVERTED (OUTPATIENT)
Dept: NEUROLOGY | Facility: CLINIC | Age: 55
End: 2020-12-28
Attending: PSYCHIATRY & NEUROLOGY

## 2021-01-04 ENCOUNTER — OFFICE VISIT CONVERTED (OUTPATIENT)
Dept: ORTHOPEDIC SURGERY | Facility: CLINIC | Age: 56
End: 2021-01-04
Attending: PHYSICIAN ASSISTANT

## 2021-01-21 ENCOUNTER — HOSPITAL ENCOUNTER (OUTPATIENT)
Dept: OTHER | Facility: HOSPITAL | Age: 56
Discharge: HOME | End: 2021-01-21
Attending: FAMILY MEDICINE

## 2021-02-10 ENCOUNTER — OFFICE VISIT CONVERTED (OUTPATIENT)
Dept: NEUROLOGY | Facility: CLINIC | Age: 56
End: 2021-02-10
Attending: NURSE PRACTITIONER

## 2021-02-24 ENCOUNTER — OFFICE VISIT CONVERTED (OUTPATIENT)
Dept: FAMILY MEDICINE CLINIC | Facility: CLINIC | Age: 56
End: 2021-02-24
Attending: NURSE PRACTITIONER

## 2021-03-10 ENCOUNTER — HOSPITAL ENCOUNTER (OUTPATIENT)
Dept: FAMILY MEDICINE CLINIC | Facility: CLINIC | Age: 56
Discharge: HOME | End: 2021-03-10
Attending: FAMILY MEDICINE

## 2021-03-10 ENCOUNTER — OFFICE VISIT CONVERTED (OUTPATIENT)
Dept: FAMILY MEDICINE CLINIC | Facility: CLINIC | Age: 56
End: 2021-03-10
Attending: FAMILY MEDICINE

## 2021-03-18 ENCOUNTER — OFFICE VISIT CONVERTED (OUTPATIENT)
Dept: NEUROSURGERY | Facility: CLINIC | Age: 56
End: 2021-03-18
Attending: PHYSICIAN ASSISTANT

## 2021-04-02 ENCOUNTER — OFFICE VISIT CONVERTED (OUTPATIENT)
Dept: FAMILY MEDICINE CLINIC | Facility: CLINIC | Age: 56
End: 2021-04-02
Attending: NURSE PRACTITIONER

## 2021-04-02 ENCOUNTER — HOSPITAL ENCOUNTER (OUTPATIENT)
Dept: FAMILY MEDICINE CLINIC | Facility: CLINIC | Age: 56
Discharge: HOME | End: 2021-04-02
Attending: NURSE PRACTITIONER

## 2021-04-02 LAB
25(OH)D3 SERPL-MCNC: 24.4 NG/ML (ref 30–100)
ALBUMIN SERPL-MCNC: 4.2 G/DL (ref 3.5–5)
ALBUMIN/GLOB SERPL: 1.3 {RATIO} (ref 1.4–2.6)
ALP SERPL-CCNC: 62 U/L (ref 53–141)
ALT SERPL-CCNC: 28 U/L (ref 10–40)
ANION GAP SERPL CALC-SCNC: 15 MMOL/L (ref 8–19)
AST SERPL-CCNC: 28 U/L (ref 15–50)
BASOPHILS # BLD AUTO: 0.08 10*3/UL (ref 0–0.2)
BASOPHILS NFR BLD AUTO: 1.1 % (ref 0–3)
BILIRUB SERPL-MCNC: 0.37 MG/DL (ref 0.2–1.3)
BUN SERPL-MCNC: 12 MG/DL (ref 5–25)
BUN/CREAT SERPL: 16 {RATIO} (ref 6–20)
CALCIUM SERPL-MCNC: 9.6 MG/DL (ref 8.7–10.4)
CHLORIDE SERPL-SCNC: 104 MMOL/L (ref 99–111)
CONV ABS IMM GRAN: 0.01 10*3/UL (ref 0–0.2)
CONV CO2: 24 MMOL/L (ref 22–32)
CONV IMMATURE GRAN: 0.1 % (ref 0–1.8)
CONV TOTAL PROTEIN: 7.4 G/DL (ref 6.3–8.2)
CREAT UR-MCNC: 0.74 MG/DL (ref 0.5–0.9)
DEPRECATED RDW RBC AUTO: 41.2 FL (ref 36.4–46.3)
EOSINOPHIL # BLD AUTO: 0.18 10*3/UL (ref 0–0.7)
EOSINOPHIL # BLD AUTO: 2.5 % (ref 0–7)
ERYTHROCYTE [DISTWIDTH] IN BLOOD BY AUTOMATED COUNT: 13.3 % (ref 11.7–14.4)
FOLATE SERPL-MCNC: >20 NG/ML (ref 4.8–20)
GFR SERPLBLD BASED ON 1.73 SQ M-ARVRAT: >60 ML/MIN/{1.73_M2}
GLOBULIN UR ELPH-MCNC: 3.2 G/DL (ref 2–3.5)
GLUCOSE SERPL-MCNC: 106 MG/DL (ref 65–99)
HCT VFR BLD AUTO: 47.3 % (ref 37–47)
HGB BLD-MCNC: 15.5 G/DL (ref 12–16)
LYMPHOCYTES # BLD AUTO: 2.48 10*3/UL (ref 1–5)
LYMPHOCYTES NFR BLD AUTO: 34.6 % (ref 20–45)
MCH RBC QN AUTO: 27.5 PG (ref 27–31)
MCHC RBC AUTO-ENTMCNC: 32.8 G/DL (ref 33–37)
MCV RBC AUTO: 83.9 FL (ref 81–99)
MONOCYTES # BLD AUTO: 0.63 10*3/UL (ref 0.2–1.2)
MONOCYTES NFR BLD AUTO: 8.8 % (ref 3–10)
NEUTROPHILS # BLD AUTO: 3.79 10*3/UL (ref 2–8)
NEUTROPHILS NFR BLD AUTO: 52.9 % (ref 30–85)
NRBC CBCN: 0 % (ref 0–0.7)
OSMOLALITY SERPL CALC.SUM OF ELEC: 288 MOSM/KG (ref 273–304)
PLATELET # BLD AUTO: 307 10*3/UL (ref 130–400)
PMV BLD AUTO: 11.3 FL (ref 9.4–12.3)
POTASSIUM SERPL-SCNC: 4.1 MMOL/L (ref 3.5–5.3)
RBC # BLD AUTO: 5.64 10*6/UL (ref 4.2–5.4)
SODIUM SERPL-SCNC: 139 MMOL/L (ref 135–147)
TSH SERPL-ACNC: 1.37 M[IU]/L (ref 0.27–4.2)
VIT B12 SERPL-MCNC: 644 PG/ML (ref 211–911)
WBC # BLD AUTO: 7.17 10*3/UL (ref 4.8–10.8)

## 2021-04-04 LAB
AMPICILLIN SUSC ISLT: <=0.25
BACTERIA UR CULT: ABNORMAL
CEFOTAXIME SUSC ISLT: <=0.12
CEFTRIAXONE SUSC ISLT: <=0.12
LEVOFLOXACIN SUSC ISLT: 1
PENICILLIN G SUSC ISLT: <=0.06
TETRACYCLINE SUSC ISLT: >=16
TIGECYCLINE SUSC ISLT: <=0.06
VANCOMYCIN SUSC ISLT: 0.5

## 2021-05-07 NOTE — PROGRESS NOTES
Progress Note      Patient Name: Valentina Stevens   Patient ID: [id number]   Sex: Female   YOB: 1965    Referring Provider: Rosalva BENZ    Visit Date: January 8, 2019    Provider: Eduardo Adames MD   Location: [facility]   Location Address: [address] [place] [address]  [id number]   Location Phone: [phone]          Chief Complaint     cough, congestion, headache, fever, started yesterday.       History Of Present Illness  Valentina Stevens is a 53 year old /White female who presents for evaluation and treatment of:      cold symptoms x 2 days- sudden onset- worsening symptoms- otc meds not helping       Past Medical History  Disease Name Date Onset Notes   Depression --  --    Diverticulitis Of Colon --  --    Dysuria --  --    External hemorrhoid --  --    GERD (gastroesophageal reflux disease) --  --    Hearing loss --  --    Hematuria --  --    Hypothyroidism --  --    Menopause --  --    Murmur --  --    Osteoporosis --  --    Rash of Skin --  --    Scoliosis --  --    Vitamin D Deficiency --  --          Past Surgical History  Procedure Name Date Notes   Tubal ligation --  --          Medication List  Name Date Started Instructions   Calcium 600 + D(3) 600 mg(1,500mg) -400 unit oral tablet 07/16/2018 take 1 tablet by oral route 2 times a day   ferrous sulfate 325 mg (65 mg iron) oral tablet 09/18/2018 take 1 tablet (325 mg) by oral route once daily for 30 days   levothyroxine 75 mcg oral tablet 11/13/2018 TAKE ONE TABLET BY MOUTH DAILY   Metamucil oral  --    metaxalone 400 mg oral tablet 07/20/2018 TAKE ONE TO TWO TABLETS BY MOUTH THREE TIMES A DAY AFTER MEALS   metronidazole 500 mg oral tablet  take 1 tablet (500 mg) by oral route 2 times per day for 7 days   multivitamin oral tablet  --    Voltaren 1 % topical gel 11/06/2018 apply small amount to affected area TID PRN pain         Allergy List  Allergen Name Date Reaction Notes   Cephalexin  adverse reaction --  --  --    PENICILLINS --  --  --    SULFA (SULFONAMIDES) --  --  --          Social History  Finding Status Start/Stop Quantity Notes   Alcohol use --  --/-- --  --    Tobacco Never --/-- --  NEVER SMOKER         Immunizations  NameDate Admin Mfg Trade Name Lot Number Route Inj VIS Given VIS Publication   HepA12/20/2018 MSD VAQTA Adult [id number] IM RA 12/20/2018 01/01/2016   Comments: 1058-8852-13   HepA05/17/2018 SKB Havrix Adult [id number] IM  05/17/2018 07/21/2016   Comments: [id number]   Vbidngijb95/09/2018 PMC Fluzone Quadrivalent [id number] IM  10/09/2018 08/07/2015   Comments: NDC [id number]   Ekvobqhbl65/17/2018 UNK Unknown TradeName [id number] IM  01/17/2018 08/07/2015   Comments: ndc [id number]   Tdap03/20/2018 PMC ADACEL [id number] IM  03/20/2018 02/24/2015   Comments: ndc [id number]         Review of Systems  · Constitutional  o Admits  o : fever, chills, body aches  · HENT  o Admits  o : nasal congestion, nasal discharge, sore throat, ear pain  · Cardiovascular  o Denies  o : chest pain, palpitations  · Respiratory  o Admits  o : cough  o Denies  o : shortness of breath  · Gastrointestinal  o Denies  o : nausea, vomiting, diarrhea      Vitals  Date Time BP Position Site L\R Cuff Size HR RR TEMP(F) WT  HT  BMI kg/m2 BSA m2 O2 Sat        01/08/2019 02:47 /72 Sitting    124 - R  99.2 141lbs 2oz    97 %           Physical Examination  · Constitutional  o Appearance  o : well developed, well-nourished, in no acute distress  · Head and Face  o HEENT  o : erythema of throat, uvula midline, throat open, no abscesses seen  · Respiratory  o Respiratory Effort  o : breathing unlabored  o Auscultation of Lungs  o : clear to ascultation  · Cardiovascular  o Heart  o :   § Auscultation of Heart  § : regular rate and rhythm  o Peripheral Vascular System  o :   § Extremities  § : no edema  · Gastrointestinal  o Abdomen  o : soft, non-tender, non-distended, + bowel sounds, no hepatosplenomegaly, no  masses palpated  · Musculoskeletal  o General  o :   § General Musculoskeletal  § : No joint swelling or deformity., Muscle tone, strength, and development grossly normal.  · Neurologic  o Gait and Station  o :   § Gait Screening  § : normal gait  · Psychiatric  o Mood and Affect  o : mood normal, affect appropriate          Assessment  · URI (upper respiratory infection)     465.9/J06.9      Plan  · Orders  o ACO-39: Current medications updated and reviewed () - - 01/08/2019  o IOP - Influenza A/B Test (27705) - 465.9/J06.9 - 01/08/2019   NEGATIVE  · Medications  o doxycycline monohydrate 100 mg oral tablet   SIG: take 1 tablet (100 mg) by oral route 2 times per day for 7 days   DISP: (14) tablets with 0 refills  Prescribed on 01/08/2019     o Medrol (Pablo) 4 mg oral tablets,dose pack   SIG: take as directed for 5 days   DISP: (1) 21 ct dose-pack with 0 refills  Prescribed on 01/08/2019     · Instructions  o Patient was educated/instructed on their diagnosis, treatment and medications prior to discharge from the clinic today.            Electronically Signed by: Eduardo Adames MD -Author on January 8, 2019 03:28:29 PM

## 2021-05-07 NOTE — PROGRESS NOTES
Progress Note      Patient Name: Valentina Stevens   Patient ID: [id number]   Sex: Female   YOB: 1965    Referring Provider: Rosalva BENZ    Visit Date: February 14, 2019    Provider: DEMAR Waggoner   Location: [facility]   Location Address: [address] [place] [address]  [id number]   Location Phone: [phone]          Chief Complaint     Waking herself up snoring x 3 weeks       History Of Present Illness  Valentina Stevens is a 53 year old /White female who presents for evaluation and treatment of:      waking up snoring x 3 weeks - her  said once while she was pregnant (21 years ago) she stopped breathing while sleeping - denies recent weight gain - sister & brother with hx of sleep apnea - denies waking up feeling tired, but states she will wake every 1-2 hours while sleeping       Past Medical History  Disease Name Date Onset Notes   Depression --  --    Diverticulitis Of Colon --  --    Dysuria --  --    External hemorrhoid --  --    GERD (gastroesophageal reflux disease) --  --    Hearing loss --  --    Hematuria --  --    Hypothyroidism --  --    Menopause --  --    Murmur --  --    Osteoporosis --  --    Rash of Skin --  --    Scoliosis --  --    Vitamin D Deficiency --  --          Past Surgical History  Procedure Name Date Notes   Tubal ligation --  --          Medication List  Name Date Started Instructions   Calcium 600 + D(3) 600 mg(1,500mg) -400 unit oral tablet 07/16/2018 take 1 tablet by oral route 2 times a day   ferrous sulfate 325 mg (65 mg iron) oral tablet 09/18/2018 take 1 tablet (325 mg) by oral route once daily for 30 days   levothyroxine 75 mcg oral tablet 11/13/2018 TAKE ONE TABLET BY MOUTH DAILY   Metamucil oral  --    metaxalone 400 mg oral tablet 07/20/2018 TAKE ONE TO TWO TABLETS BY MOUTH THREE TIMES A DAY AFTER MEALS   metronidazole 500 mg oral tablet  take 1 tablet (500 mg) by oral route 2 times per day for 7  days   multivitamin oral tablet  --    Voltaren 1 % topical gel 11/06/2018 apply small amount to affected area TID PRN pain         Allergy List  Allergen Name Date Reaction Notes   Cephalexin adverse reaction --  --  --    PENICILLINS --  --  --    SULFA (SULFONAMIDES) --  --  --          Social History  Finding Status Start/Stop Quantity Notes   Alcohol use --  --/-- --  --    Tobacco Never --/-- --  NEVER SMOKER         Immunizations  NameDate Admin Mfg Trade Name Lot Number Route Inj VIS Given VIS Publication   HepA12/20/2018 MSD VAQTA Adult [id number] IM RA 12/20/2018 01/01/2016   Comments: 9184-5233-82   HepA05/17/2018 SKB Havrix Adult [id number] IM  05/17/2018 07/21/2016   Comments: [id number]   Iifqgfouf65/09/2018 [facility] Fluzone Quadrivalent [id number] IM  10/09/2018 08/07/2015   Comments: NDC [id number]   Eqszmbxmo65/17/2018 UNK Unknown TradeName [id number] IM  01/17/2018 08/07/2015   Comments: ndc 92377-285-44   Tdap03/20/2018 PMC ADACEL [id number] IM  03/20/2018 02/24/2015   Comments: ndc [id number]         Review of Systems  · Constitutional  o Denies  o : fever, chills, body aches  · HENT  o Denies  o : nasal congestion, nasal discharge  · Cardiovascular  o Denies  o : chest pain, palpitations  · Respiratory  o Denies  o : shortness of breath, wheezing, cough      Vitals  Date Time BP Position Site L\R Cuff Size HR RR TEMP(F) WT  HT  BMI kg/m2 BSA m2 O2 Sat        02/14/2019 03:56 /70 Sitting    110 - R  98.2 144lbs 0oz    97 %           Physical Examination  · Constitutional  o Appearance  o : well developed, well-nourished, in no acute distress  · Head and Face  o HEENT  o : narrow posterior oropharynx  · Eyes  o Conjunctivae  o : conjunctivae normal  o Pupils and Irises  o : pupils equal and round, pupils reactive to light bilaterally  · Neck  o Inspection/Palpation  o : supple  o Thyroid  o : no thyromegaly  · Respiratory  o Respiratory Effort  o : breathing unlabored  o Auscultation of Lungs  o : clear to  ascultation  · Cardiovascular  o Heart  o :   § Auscultation of Heart  § : regular rate and rhythm  o Peripheral Vascular System  o :   § Extremities  § : no edema  · Lymphatic  o Neck  o : no lymphadenopathy present  · Musculoskeletal  o General  o :   § General Musculoskeletal  § : No joint swelling or deformity. Muscle tone, strength, and development grossly normal.  · Skin and Subcutaneous Tissue  o General Inspection  o : NL tone  · Neurologic  o Gait and Station  o :   § Gait Screening  § : normal gait  · Psychiatric  o Mood and Affect  o : mood normal, affect appropriate              Assessment  · Snoring     786.09/R06.83      Plan  · Orders  o ACO-39: Current medications updated and reviewed () - - 02/14/2019  o Sleep Disorder Clinic Consultation (SLEEP) - 786.09/R06.83 - 02/14/2019  · Instructions  o Patient was educated/instructed on their diagnosis, treatment and medications prior to discharge from the clinic today.  · Disposition  o Follow up as needed.            Electronically Signed by: DEMAR Waggoner -Author on February 14, 2019 04:12:38 PM

## 2021-05-07 NOTE — PROGRESS NOTES
"   Progress Note      Patient Name: Valentina Stevens   Patient ID: [id number]   Sex: Female   YOB: 1965        Visit Date: February 21, 2018    Provider: Jaymie Castaneda MD   Location: [facility]   Location Address: [address] [place] [address]  [id number]   Location Phone: [phone]          Chief Complaint     Follow up from [facility] needs referral to Cardiology       History Of Present Illness  Valentina Stevens is a 52 year old /White female who presents for evaluation and treatment of:      She has had SOB and was seen in the ER.  She hurt between her shoulder blades.  They recommended that she see Dr. Otto.    She gets SOB when she sweeps the floor.  She has to sit down and rest.   She doesn't usually get chest pain but she gets the pain between her shoulder blades.  This happened on 2/11/18. She had a CBC, CXR and EKG which showed NSR.  She did not show any lab abnormalties.  FH-Mom had a MI and had a series of strokes and bled out internally  Dad-HTN  She doesn't think she is depressed right now.  Life is going better than it has in awhile.  She is able to pay her bills.  She thinks her daughter is doing better.  Since she was at the ER she has avoided heavy lifting.  Mentions that she thinks there is a ghost in her house because one day she heard someone say \"mama\" and she rolled over in the bed and saw a little girl.  She had already put her children on the bus so she just rolled back over and went to sleep. Her children also think they see things in there house at times.       Past Medical History  Disease Name Date Onset Notes   Depression --  --    Diverticulitis Of Colon --  --    Dysuria --  --    External hemorrhoid --  --    GERD (gastroesophageal reflux disease) --  --    Hearing loss --  --    Hematuria --  --    Hypothyroidism --  --    Menopause --  --    Murmur --  --    Osteoporosis --  --    Rash of Skin --  --    Scoliosis --  --  "   Vitamin D Deficiency --  --          Past Surgical History  Procedure Name Date Notes   Tubal ligation --  --          Medication List  Name Date Started Instructions   alendronate 70 mg oral tablet  take 1 tablet (70 mg) by oral route once weekly in the morning, at least 30 min before first food, beverage, or medication of day   levothyroxine 50 mcg oral tablet 01/29/2018 TAKE ONE TABLET BY MOUTH EVERY MORNING ON AN EMPTY STOMACH FOR THYROID   metaxalone 400 mg oral tablet  take 2 tablets (800 mg) by oral route 3 times per day as needed   naproxen 500 mg oral tablet  take 1 tablet (500 mg) by oral route 2 times per day with food   ondansetron 4 mg oral tablet,disintegrating 01/17/2018 Dissolve 1-2 tablets by oral route every 6-8 hours as needed for nausea/vomiting         Allergy List  Allergen Name Date Reaction Notes   Cephalexin adverse reaction --  --  --    PENICILLINS --  --  --    SULFA (SULFONAMIDES) --  --  --          Social History  Finding Status Start/Stop Quantity Notes   Alcohol use --  --/-- --  --    Tobacco Never --/-- --  --          Immunizations  NameDate Admin Mfg Trade Name Lot Number Route Inj VIS Given VIS Publication   Zbepcuffh91/17/2018 UNK Unknown TradeName [id number] IM  01/17/2018 08/07/2015   Comments: ndc 62412-213-91         Vitals  Date Time BP Position Site L\R Cuff Size HR RR TEMP(F) WT  HT  BMI kg/m2 BSA m2 O2 Sat HC       02/21/2018 04:10 /70 Sitting    73 - R  97.3 141lbs 0oz    99 %           Physical Examination  · Constitutional  o Appearance  o : well developed, well-nourished, she looks tired Her color is a little pale  · Eyes  o Conjunctivae  o : conjunctivae normal  · Neck  o Inspection/Palpation  o : supple  o Thyroid  o : no thyromegaly  · Respiratory  o Respiratory Effort  o : breathing unlabored  o Auscultation of Lungs  o : clear to ascultation  · Cardiovascular  o Heart  o :   § Auscultation of Heart  § : regular rate and rhythm  o Peripheral Vascular  System  o :   § Extremities  § : no edema  · Lymphatic  o Neck  o : no lymphadenopathy present  · Musculoskeletal  o General  o :   § General Musculoskeletal  § : No joint swelling or deformit  · Neurologic  o Gait and Station  o :   § Gait Screening  § : normal gait  · Psychiatric  o Mood and Affect  o : mood normal, affect appropriate          Assessment  · Dyspnea on exertion     786.09/R06.09      Plan  · Orders  o ACO-39: Current medications updated and reviewed () - - 02/21/2018  o CARDIOLOGY CONSULTATION (CARDI) - - 02/21/2018  · Instructions  o Patient was educated/instructed on their diagnosis, treatment and medications prior to discharge from the clinic today.            Electronically Signed by: Jaymie Castaneda MD -Author on February 21, 2018 05:01:47 PM

## 2021-05-07 NOTE — PROGRESS NOTES
Progress Note      Patient Name: Valentina Stevens   Patient ID: [id number]   Sex: Female   YOB: 1965    Referring Provider: Rosalva BENZ    Visit Date: April 24, 2018    Provider: DEMAR Maldonado   Location: [facility]   Location Address: [address] [place] [address]  [id number]   Location Phone: [phone]          Chief Complaint     headache for 3 days.       History Of Present Illness  Valentina Stevens is a 52 year old /White female who presents for evaluation and treatment of:      headaches for 3 days.     She states she woke up with the headache on Friday. She has taken Aleve and IBU, alternating them. The headache started over the left eye. At their worst her headaches are a 10--when it is like that she can't even move without puking--this one hasn't been more than a 2 or 3. No sensitivity to light or sound. No loss of balance. It is just there--and now the back of her neck hurts as well. The neck started hurting yesterday--like it is tight. On a 1-10 scale the neck pain is a 4. She can handle the headache, but not the neck.       Past Medical History  Disease Name Date Onset Notes   Depression --  --    Diverticulitis Of Colon --  --    Dysuria --  --    External hemorrhoid --  --    GERD (gastroesophageal reflux disease) --  --    Hearing loss --  --    Hematuria --  --    Hypothyroidism --  --    Menopause --  --    Murmur --  --    Osteoporosis --  --    Rash of Skin --  --    Scoliosis --  --    Vitamin D Deficiency --  --          Past Surgical History  Procedure Name Date Notes   Tubal ligation --  --          Medication List  Name Date Started Instructions   alendronate 70 mg oral tablet  take 1 tablet (70 mg) by oral route once weekly in the morning, at least 30 min before first food, beverage, or medication of day   levothyroxine 50 mcg oral tablet 03/20/2018 TAKE ONE TABLET BY MOUTH EVERY MORNING ON AN EMPTY STOMACH FOR THYROID  for 30 days   metaxalone 400 mg oral tablet  take 2 tablets (800 mg) by oral route 3 times per day as needed   naproxen 500 mg oral tablet  take 1 tablet (500 mg) by oral route 2 times per day with food   ondansetron 4 mg oral tablet,disintegrating 01/17/2018 Dissolve 1-2 tablets by oral route every 6-8 hours as needed for nausea/vomiting         Allergy List  Allergen Name Date Reaction Notes   Cephalexin adverse reaction --  --  --    PENICILLINS --  --  --    SULFA (SULFONAMIDES) --  --  --          Social History  Finding Status Start/Stop Quantity Notes   Alcohol use --  --/-- --  --    Tobacco Never --/-- --  --          Immunizations  NameDate Admin Mfg Trade Name Lot Number Route Inj VIS Given VIS Publication   Htjowgalz78/17/2018 UNK Unknown TradeName [id number] IM  01/17/2018 08/07/2015   Comments: ndc [id number]   Tdap03/20/2018 [facility] ADACEL [id number] IM  03/20/2018 02/24/2015   Comments: ndc [id number]         Review of Systems  · Constitutional  o Admits  o : good general health lately  o Denies  o : fatigue, fever, chills  · Eyes  o Admits  o : eye pain--over the left eye  o Denies  o : double vision, blurred vision  · HENT  o Admits  o : headaches, neck stiffness, neck pain  o Denies  o : vertigo, lightheadedness  · Cardiovascular  o Denies  o : chest pain, irregular heart beats, syncope, dyspnea on exertion  · Respiratory  o Denies  o : shortness of breath, wheezing, cough  · Gastrointestinal  o Denies  o : nausea, vomiting, diarrhea, abdominal pain  · Integument  o Denies  o : pigmentation changes  · Neurologic  o Admits  o : seizures--hx of in childhood  o Denies  o : altered mental status, incoordination, memory difficulties, speech difficulties, loss of balance      Vitals  Date Time BP Position Site L\R Cuff Size HR RR TEMP(F) WT  HT  BMI kg/m2 BSA m2 O2 Sat        04/24/2018 03:14 /66 Sitting    88 - R  97.8 142lbs 4oz    97 %           Physical Examination  · Constitutional  o Appearance  o :  well developed, well-nourished, in no acute distress  · Eyes  o Conjunctivae  o : conjunctivae normal, no exudates present  o Pupils and Irises  o : pupils equal and round, pupils reactive to light bilaterally  · Respiratory  o Respiratory Effort  o : breathing unlabored  o Auscultation of Lungs  o : clear to ascultation  · Cardiovascular  o Heart  o :   § Auscultation of Heart  § : regular rate and rhythm  o Peripheral Vascular System  o :   § Extremities  § : no edema  · Lymphatic  o Neck  o : no lymphadenopathy present  · Musculoskeletal  o General  o :   § General Musculoskeletal  § : No joint swelling or deformity. Muscle tone, strength, and development grossly normal.  · Skin and Subcutaneous Tissue  o General Inspection  o : no lesions present, no areas of discoloration, skin turgor normal, texture normal  · Neurologic  o Motor Examination  o :   § RUE Strength  § : strength normal  § RUE Motor Function  § : tone normal, muscle bulk normal, no abnormal movements noted  § LUE Strength  § : strength normal  § LUE Motor Function  § : tone normal, muscle bulk normal, no abnormal movements noted  § RLE Strength  § : strength normal, no atrophy of limb muscles  § RLE Motor Function  § : tone normal, no atrophy, no abnormal movements noted  § LLE Strength  § : strength normal, no atrophy of limb muscles  § LLE Motor Function  § : tone normal, no atrophy, no abnormal movements noted  o Gait and Station  o :   § Gait Screening  § : normal gait  o Cerebellar Function  o : finger-to-nose-to-finger testing normal bilaterally, heel-shin cerebellar test within normal limits bilaterally, heel-to-toe straight line walking normal  · Psychiatric  o Mood and Affect  o : mood normal, affect appropriate              Assessment  · Increased frequency of headaches     784.0/R51  · Musculoskeletal neck pain     723.1/M54.2      Plan  · Orders  o ACO-39: Current medications updated and reviewed () - - 04/24/2018  o CT Head/Brain  without IV Contrast [facility] ([id number]) - 784.0/R51 - 04/24/2018   Would like at [facility] diagnostic CTR on Tuesday--Tuesday of next week okay.  o IM - Injection Fee [facility] ([id number]) - 784.0/R51, 723.1/M54.2 - 04/24/2018  o 2.00 - Toradol Injection 30mg (-1) - 784.0/R51, 723.1/M54.2 - 04/24/2018   Injection - Ketorolac 30mg; Dose: 1mL; Site: Right Gluteus; Route: intramuscular; Date: 04/24/2018 16:15:08; Exp: 11/28/2019; Lot: [id number]; Mfg: [id number]; TradeName: Toradol; Location: [facility]; Administered By: Yanique Hawley MA; Comment: ndc# [id number]  o NEUROLOGY CONSULTATION. (NEURO) - 784.0/R51 - 04/24/2018   Abbie only, please  · Instructions  o Patient was educated/instructed on their diagnosis, treatment and medications prior to discharge from the clinic today. She has a history of chronic headaches, as well as history of head trauma as a child with epilepsy--headaches are seemingly increasing in frequency and not responding to her standard treatment. Will give Toradol today and order CT head, as well as neuro consult. Follow up if no improvement; consider ER eval with worsening symptoms.   · Disposition  o Call or Return if symptoms worsen or persist.            Electronically Signed by: DEMAR Maldonado -Author on April 27, 2018 08:22:55 AM

## 2021-05-07 NOTE — PROGRESS NOTES
Progress Note      Patient Name: Valentina Stevens   Patient ID: [id number]   Sex: Female   YOB: 1965    Referring Provider: Rosalva BENZ    Visit Date: April 1, 2019    Provider: DEMAR Tillman   Location: [facility]   Location Address: [address] [place] [address]  [id number]   Location Phone: [phone]          Chief Complaint     right knee pain, some swelling, started 2 days ago       History Of Present Illness  Valentina Stevens is a 53 year old /White female who presents for evaluation and treatment of:      right knee pain x 3 days    Denies injury or increase in activity. Has not been using topical voltaren given for hands. has noticed swelling after work- works at [employer]. Standing 7-8 hours 5 days week.  Hurts ,most to walk or stans for extended time. Has not given out- sometimes pops.       Past Medical History  Disease Name Date Onset Notes   Depression --  --    Diverticulitis Of Colon --  --    Dysuria --  --    External hemorrhoid --  --    GERD (gastroesophageal reflux disease) --  --    Hearing loss --  --    Hematuria --  --    Hypothyroidism --  --    Menopause --  --    Murmur --  --    Osteoporosis --  --    Rash of Skin --  --    Scoliosis --  --    Vitamin D Deficiency --  --          Past Surgical History  Procedure Name Date Notes   Tubal ligation --  --          Medication List  Name Date Started Instructions   Calcium 600 + D(3) 600 mg(1,500mg) -400 unit oral tablet 07/16/2018 take 1 tablet by oral route 2 times a day   ferrous sulfate 325 mg (65 mg iron) oral tablet 09/18/2018 take 1 tablet (325 mg) by oral route once daily for 30 days   levothyroxine 75 mcg oral tablet 03/18/2019 TAKE ONE TABLET BY MOUTH DAILY   Metamucil oral  --    metaxalone 400 mg oral tablet 07/20/2018 TAKE ONE TO TWO TABLETS BY MOUTH THREE TIMES A DAY AFTER MEALS   metronidazole 500 mg oral tablet  take 1 tablet (500 mg) by oral route 2  "times per day for 7 days   multivitamin oral tablet  --    Probiotic 20 billion cell oral capsule 03/25/2019 take 1 capsule by oral route daily for 10 days   Voltaren 1 % topical gel 11/06/2018 apply small amount to affected area TID PRN pain   Zofran 4 mg oral tablet 03/25/2019 take 1 tablet by oral route 4 times a day as needed for 7 days nausea         Allergy List  Allergen Name Date Reaction Notes   Cephalexin adverse reaction --  --  --    PENICILLINS --  --  --    SULFA (SULFONAMIDES) --  --  --          Social History  Finding Status Start/Stop Quantity Notes   Alcohol use --  --/-- --  --    Tobacco Never --/-- --  NEVER SMOKER         Immunizations  NameDate Admin Mfg Trade Name Lot Number Route Inj VIS Given VIS Publication   HepA12/20/2018 MSD VAQTA-ADULT [id number] [facility] 12/20/2018 01/01/2016   Comments: 2935-9024-80   HepA05/17/2018 SKB HAVRIX-ADULT [id number] IM  05/17/2018 07/21/2016   Comments: [id number]   Ylovdhkjf20/09/2018 PMC FLUARIX [id number]   10/09/2018 08/07/2015   Comments: NDC [id number]   Wzmpmywys90/17/2018 UNK Unknown TradeName [id number]   01/17/2018 08/07/2015   Comments: ndc 74025-976-63   Tdap03/20/2018 PMC ADACEL [id number] IM  03/20/2018 02/24/2015   Comments: ndc [id number]         Review of Systems  · Constitutional  o Admits  o : good general health lately  · Cardiovascular  o Denies  o : chest pain, palpitations  · Respiratory  o Denies  o : shortness of breath  · Gastrointestinal  o Denies  o : abdominal pain  · Musculoskeletal  o Admits  o : knee pain      Vitals  Date Time BP Position Site L\R Cuff Size HR RR TEMP (F) WT  HT  BMI kg/m2 BSA m2 O2 Sat        04/01/2019 11:39 /80 Sitting    105 - R  98.5 145lbs 4oz 5'  3\" 25.73 1.71 98 %          Physical Examination  · Constitutional  o Appearance  o : well developed, well-nourished, in no acute distress  · Head and Face  o HEENT  o : Unremarkable  · Eyes  o Conjunctivae  o : conjunctivae normal  o Pupils and Irises  o : pupils " equal and round  · Ears, Nose, Mouth and Throat  o Ears  o :   § External Ears  § : appearance within normal limits, no lesions present  o Nose  o :   § External Nose  § : appearance normal  o Oral Cavity  o :   § Oral Mucosa  § : oral mucosa normal  § Lips  § : lip appearance normal  · Neck  o Inspection/Palpation  o : supple  · Respiratory  o Respiratory Effort  o : breathing unlabored  o Auscultation of Lungs  o : clear to ascultation  · Cardiovascular  o Heart  o :   § Auscultation of Heart  § : regular rate and rhythm  · Musculoskeletal  o General  o :   § General Musculoskeletal  § : Muscle tone, strength, and development grossly normal.  o Extremeties/Joint  o : Right knee: no swelling, tenderness, + crepitus.  · Skin and Subcutaneous Tissue  o General Inspection  o : warm and dry, not obvious abnormal lesions  · Neurologic  o Gait and Station  o :   § Gait Screening  § : normal gait  · Psychiatric  o Mood and Affect  o : mood normal, affect appropriate          Assessment  · Right knee pain     719.46/M25.561      Plan  · Orders  o ACO-39: Current medications updated and reviewed () - - 04/01/2019  · Medications  o ibuprofen 800 mg oral tablet   SIG: take 1 tablet (800 mg) by oral route 3 times per day with food for 14 days   DISP: (42) tablets with 0 refills  Prescribed on 04/01/2019     o Probiotic 20 billion cell oral capsule   SIG: take 1 capsule by oral route daily for 10 days   DISP: (10) capsules with 0 refills  Discontinued on 04/01/2019     o Voltaren 1 % topical gel   SIG: apply small amount to affected area TID PRN pain   DISP: (1) Tube with 1 refills  Discontinued on 04/01/2019     o Zofran 4 mg oral tablet   SIG: take 1 tablet by oral route 4 times a day as needed for 7 days nausea   DISP: (28) tablets with 0 refills  Discontinued on 04/01/2019     · Instructions  o Take all medications as prescribed/directed.  o Patient was educated/instructed on their diagnosis, treatment and medications  prior to discharge from the clinic today.  · Disposition  o Call or Return if symptoms worsen or persist.            Electronically Signed by: DEMAR Tillman -Author on April 1, 2019 12:03:50 PM

## 2021-05-07 NOTE — PROGRESS NOTES
Progress Note      Patient Name: Valentina Stevens   Patient ID: [id number]   Sex: Female   YOB: 1965    Referring Provider: Rosalva BENZ    Visit Date: December 4, 2018    Provider: DEMAR Waggoner   Location: [facility]   Location Address: [address] [place] [address]  [id number]   Location Phone: [phone]          Chief Complaint     Follow up from [facility] for abdominal pain       History Of Present Illness  Valentina Stevens is a 53 year old /White female who presents for evaluation and treatment of:      ER follow-up; she was having abdominal pain and went to [facility] ER and had a CT abd & pelvis and was dx with diverticulitis, pt has hx of diverticulitis - Cipro & Flagyl prescribed, pt states that her pain is improving, pain is about 20% improved - she has not taking anything for the pain - stool is looser than her normal and decreased frequency  Denies n/v       Past Medical History  Disease Name Date Onset Notes   Depression --  --    Diverticulitis Of Colon --  --    Dysuria --  --    External hemorrhoid --  --    GERD (gastroesophageal reflux disease) --  --    Hearing loss --  --    Hematuria --  --    Hypothyroidism --  --    Menopause --  --    Murmur --  --    Osteoporosis --  --    Rash of Skin --  --    Scoliosis --  --    Vitamin D Deficiency --  --          Past Surgical History  Procedure Name Date Notes   Tubal ligation --  --          Medication List  Name Date Started Instructions   Calcium 600 + D(3) 600 mg(1,500mg) -400 unit oral tablet 07/16/2018 take 1 tablet by oral route 2 times a day   Cipro  mg oral tablet, ER multiphase 24 hr  take 1 tablet (500 mg) by oral route once daily   ferrous sulfate 325 mg (65 mg iron) oral tablet 09/18/2018 take 1 tablet (325 mg) by oral route once daily for 30 days   levothyroxine 75 mcg oral tablet 11/13/2018 TAKE ONE TABLET BY MOUTH DAILY   Medrol (Pablo) 4 mg oral tablets,dose pack 11/19/2018  take as directed   Metamucil oral  --    metaxalone 400 mg oral tablet 07/20/2018 TAKE ONE TO TWO TABLETS BY MOUTH THREE TIMES A DAY AFTER MEALS   metronidazole 500 mg oral tablet  take 1 tablet (500 mg) by oral route 2 times per day for 7 days   multivitamin oral tablet  --    Voltaren 1 % topical gel 11/06/2018 apply small amount to affected area TID PRN pain         Allergy List  Allergen Name Date Reaction Notes   Cephalexin adverse reaction --  --  --    PENICILLINS --  --  --    SULFA (SULFONAMIDES) --  --  --          Social History  Finding Status Start/Stop Quantity Notes   Alcohol use --  --/-- --  --    Tobacco Never --/-- --  NEVER SMOKER         Immunizations  NameDate Admin Mfg Trade Name Lot Number Route Inj VIS Given VIS Publication   HepA05/17/2018 SKB Havrix Adult [id number]  05/17/2018 07/21/2016   Comments: [id number]   Tfyrzooro40/09/2018 [facility] Fluzone Quadrivalent [id number]   10/09/2018 08/07/2015   Comments: NDC [id number]   Fadhzikcn64/17/2018 UNK Unknown TradeName [id number] IM  01/17/2018 08/07/2015   Comments: ndc 09847-757-17   Tdap03/20/2018 [facility] ADACEL [id number] IM  03/20/2018 02/24/2015   Comments: ndc [id number]         Review of Systems  · Constitutional  o Admits  o : headache  o Denies  o : fever, chills, body aches  · Cardiovascular  o Denies  o : chest pain, palpitations  · Gastrointestinal  o Admits  o : abdominal pain  o Denies  o : heartburn, reflux  · Genitourinary  o Denies  o : urgency, frequency, dysuria, urinary retention      Vitals  Date Time BP Position Site L\R Cuff Size HR RR TEMP(F) WT  HT  BMI kg/m2 BSA m2 O2 Sat HC       12/04/2018 11:36 /80 Sitting    95 - R  98.2 139lbs 0oz    99 %           Physical Examination  · Constitutional  o Appearance  o : well developed, well-nourished, in no acute distress  · Eyes  o Conjunctivae  o : conjunctivae normal  o Pupils and Irises  o : pupils equal and round, pupils reactive to light  bilaterally  · Neck  o Inspection/Palpation  o : supple  o Thyroid  o : no thyromegaly  · Respiratory  o Respiratory Effort  o : breathing unlabored  o Auscultation of Lungs  o : clear to ascultation  · Cardiovascular  o Heart  o :   § Auscultation of Heart  § : regular rate and rhythm  o Peripheral Vascular System  o :   § Extremities  § : no edema  · Gastrointestinal  o Abdominal Examination  o :   § Abdomen  § : soft, bowel sounds hyperactive, left mid lateral tenderness with palpation; negative rebound tenderness.   · Lymphatic  o Neck  o : no lymphadenopathy present  · Musculoskeletal  o General  o :   § General Musculoskeletal  § : No joint swelling or deformity. Muscle tone, strength, and development grossly normal.  · Skin and Subcutaneous Tissue  o General Inspection  o : NL tone  · Neurologic  o Gait and Station  o :   § Gait Screening  § : normal gait  · Psychiatric  o Mood and Affect  o : mood normal, affect appropriate              Assessment  · Diverticulitis     562.11/K57.92      Plan  · Orders  o ACO-39: Current medications updated and reviewed () - - 12/04/2018  · Medications  o Medrol (Pablo) 4 mg oral tablets,dose pack   SIG: take as directed   DISP: (1) 21 ct dose-pack with 0 refills  Discontinued on 12/04/2018     · Instructions  o Take all medications as prescribed/directed.  o Patient was educated/instructed on their diagnosis, treatment and medications prior to discharge from the clinic today.  o May return to work tomorrow as planned.   · Disposition  o Follow up as needed.            Electronically Signed by: DEMAR Waggoner -Author on December 4, 2018 05:48:02 PM

## 2021-05-07 NOTE — PROGRESS NOTES
Progress Note      Patient Name: Valentina Stevens   Patient ID: [id number]   Sex: Female   YOB: 1965    Referring Provider: Rosalva BENZ    Visit Date: February 4, 2020    Provider: DEMAR Waggoner   Location: [facility]   Location Address: [address] [place] [address]   [place]  [id number]   Location Phone: [phone]          Chief Complaint     spot on left collar bone, sometimes it itches       History Of Present Illness  Valentina Stevens is a 54 year old /White female who presents for evaluation and treatment of:      area to left clavicle that itches intermittently - pt states it has been there for awhile, she thought it was a skin tag initially but will bother her - it is scabbed today - denies other rashes or painful lesions       Past Medical History  Disease Name Date Onset Notes   Depression --  --    Diverticulitis Of Colon --  --    Dysuria --  --    External hemorrhoid --  --    GERD (gastroesophageal reflux disease) --  --    Hearing loss --  --    Hematuria --  --    Hypothyroidism --  --    Menopause --  --    Murmur --  --    Osteoporosis --  --    Rash of Skin --  --    Scoliosis --  --    Vitamin D Deficiency --  --          Past Surgical History  Procedure Name Date Notes   Thymectomy --  --    Tubal ligation --  --          Medication List  Name Date Started Instructions   calcium carbonate-vitamin D3 600 mg(1,500mg) -400 unit oral tablet 10/10/2019 TAKE ONE TABLET BY MOUTH TWICE A DAY   levothyroxine 75 mcg oral tablet 10/10/2019 TAKE ONE TABLET BY MOUTH DAILY   multivitamin oral tablet  --    Tylenol 325 mg oral capsule  --          Allergy List  Allergen Name Date Reaction Notes   Cephalexin adverse reaction --  --  --    indomethacin --  --  --    PENICILLINS --  --  --    SULFA (SULFONAMIDES) --  --  --          Social History  Finding Status Start/Stop Quantity Notes   Alcohol use --  --/-- --  --    Tobacco Never --/-- --  NEVER  "SMOKER         Immunizations  NameDate Admin Mfg Trade Name Lot Number Route Inj VIS Given VIS Publication   HepA12/20/2018 MSD VAQTA-ADULT [id number] IM RA 12/20/2018 01/01/2016   Comments: [id number]   HepA05/17/2018 SKB HAVRIX-ADULT [id number] IM  05/17/2018 07/21/2016   Comments: [id number]   Rhysrkrkw33/11/2019 PMC Fluzone, quadrivalent, preservative free [id number] IM [place] 10/11/2019    Comments: NDC: 56857-136-20   Ccwquoanp16/09/2018 PMC FLUARIX [id number] IM  10/09/2018 08/07/2015   Comments: NDC [id number]   Kjqajlwsj17/17/2018 UNK Unknown TradeName [id number] IM  01/17/2018 08/07/2015   Comments: ndc 22445-324-54   Tdap03/20/2018 PMC ADACEL [id number] IM  03/20/2018 02/24/2015   Comments: ndc [id number]         Review of Systems  · Constitutional  o Denies  o : fever, chills, body aches  · Cardiovascular  o Denies  o : chest pain, palpitations  · Integument  o * See HPI      Vitals  Date Time BP Position Site L\R Cuff Size HR RR TEMP (F) WT  HT  BMI kg/m2 BSA m2 O2 Sat        02/04/2020 10:42 /78 Sitting    89 - R  97.9 144lbs 6oz 5'  3.5\" 25.17 1.71 98 %          Physical Examination  · Constitutional  o Appearance  o : well developed, well-nourished, in no acute distress  · Eyes  o Conjunctivae  o : conjunctivae normal  o Pupils and Irises  o : pupils equal and round, pupils reactive to light bilaterally  · Respiratory  o Respiratory Effort  o : breathing unlabored  o Auscultation of Lungs  o : clear to ascultation  · Cardiovascular  o Heart  o :   § Auscultation of Heart  § : regular rate and rhythm  · Musculoskeletal  o General  o :   § General Musculoskeletal  § : No joint swelling or deformity. Muscle tone, strength, and development grossly normal.  · Skin and Subcutaneous Tissue  o General Inspection  o : small black scab from a flesh colored dry scaling 2mm lesion  · Neurologic  o Gait and Station  o :   § Gait Screening  § : normal gait  · Psychiatric  o Mood and Affect  o : mood normal, affect " appropriate              Assessment  · Skin lesion     709.9/L98.9    Problems Reconciled  Plan  · Orders  o ACO-39: Current medications updated and reviewed () - - 02/04/2020  · Medications  o triamcinolone acetonide 0.1 % topical cream   SIG: apply a thin layer to the affected area(s) by topical route 3 times per day for 30 days   DISP: (1) 15 gm tube with 0 refills  Prescribed on 02/04/2020     o Medications have been Reconciled  o Transition of Care or Provider Policy  · Instructions  o Patient was educated/instructed on their diagnosis, treatment and medications prior to discharge from the clinic today.  o Use steroid cream - will cryo the area with no improvement.   · Disposition  o Follow up as needed.            Electronically Signed by: DEMAR Waggoner -Author on February 4, 2020 10:57:47 AM

## 2021-05-07 NOTE — PROGRESS NOTES
Quick Note      Patient Name: Valentina Stevens   Patient ID: [id number]   Sex: Female   YOB: 1965    Referring Provider: Rosalva BENZ    Visit Date: April 9, 2020    Provider: DEMAR Grey   Location: [facility]   Location Address: [address] [place] [address] [place]  [address]   Location Phone: [phone]          History Of Present Illness  TELEHEALTH VISIT  Chief Complaint: refills   Valentina Stevens is a 54 year old /White female who is presenting for evaluation via telephone telehealth visit. Verbal consent obtained before beginning visit.   Provider spent 16 minutes with the patient during telephone telehealth visit.   The following staff were present during this visit: working from home, myself only; and in pt's home, reported: patient and 1.4 y/o granddaughter   Past Medical History/Overview of Patient Symptoms     visit start time:851am  visit end time:907am     pt needs refills today--    thyroid--stable -just did labs last month    HTN--stable pt monitors BID and remains 113-130's/60-70's--just did labs for this as well last month     this was just a F/U on the BP     also we reviewed her labs as well just done last month     cholesterol was elevated--but pt is working on the diet first              Physical Examination     ROS:  no flushing no HAs no CP no SOB-  no cough--no flu-like s/s     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--taking all the precautions-pt only taking the orders on the headset     PE:  A&OX3  no breathlessness during the visit  good historian  answers the questions appropriately           Assessment  · Essential hypertension     401.9/I10  · Hyperlipidemia     272.4/E78.5  · Hypothyroidism     244.9/E03.9  · Follow up     V67.9/Z09    Problems Reconciled  Plan  · Medications  o calcium carbonate-vitamin D3 600 mg(1,500mg) -400 unit oral tablet   SIG: TAKE ONE TABLET BY MOUTH TWICE A DAY    DISP: (60) Tablet with 5 refills  Adjusted on 04/09/2020     o levothyroxine 75 mcg oral tablet   SIG: TAKE ONE TABLET BY MOUTH DAILY   DISP: (30) Tablet with 5 refills  Adjusted on 04/09/2020     o lisinopril 2.5 mg oral tablet   SIG: take 1 tablet (2.5 mg) by oral route once daily for 30 days   DISP: (30) tablets with 5 refills  Adjusted on 04/09/2020     o Medications have been Reconciled  o Transition of Care or Provider Policy  · Instructions  o Patient advised to monitor blood pressure (B/P) at home and journal readings. Patient informed that a B/P reading at home of more than 130/80 is considered hypertension. For readings greater hzvo389/90 or higher patient is advised to follow up in the office with readings for management. Patient advised to limit sodium intake.  o Recommended exercise program to assist with cholesterol, weight loss and overall health improvement.  o Advised that cheeses and other sources of dairy fats, animal fats, fast food, and the extras (candy, pastries, pies, doughnuts and cookies) all contain LDL raising nutrients. Advised to increase fruits, vegetables, whole grains, and to monitor portion sizes.   o Plan Of Care: med refills and the chronic co-morbids  o Chronic conditions reviewed and taken into consideration for today's treatment plan.  o Patient instructed to seek medical attention urgently for new or worsening symptoms.  o Patient was educated/instructed on their diagnosis, treatment and medications prior to discharge from the telephone telehealth today.  o Patient is taking medications as prescribed and doing well.   o Take all medications as prescribed/directed.  o Call the office with any concerns or questions.  o Risks, benefits, and alternatives were discussed with the patient. The patient is aware of risks associated with:  · Disposition  o Call or Return if symptoms worsen or persist.  o Return Visit Request in/on 5 months +/- 2 days (6106).            Electronically  Signed by: Sera Loza, APRN -Author on April 9, 2020 09:11:34 AM

## 2021-05-07 NOTE — PROGRESS NOTES
Progress Note      Patient Name: Valentina Stevens   Patient ID: [id number]   Sex: Female   YOB: 1965    Referring Provider: Rosalva BENZ    Visit Date: May 20, 2019    Provider: Eduardo Adames MD   Location: [facility]   Location Address: [address] [place] [address]  [id number]   Location Phone: [phone]          Chief Complaint     Rt knee is still hurting, but now it is burning also. hurts all the time now. wearing the brace. No recent injury.       History Of Present Illness  Valentina Stevens is a 53 year old /White female who presents for evaluation and treatment of:      right knee pain x 1+ months- pain is worsening with burning now, sudden onset- no known injury- knee sleeve not helping, meds not helping    xrays:no fxs       Past Medical History  Disease Name Date Onset Notes   Depression --  --    Diverticulitis Of Colon --  --    Dysuria --  --    External hemorrhoid --  --    GERD (gastroesophageal reflux disease) --  --    Hearing loss --  --    Hematuria --  --    Hypothyroidism --  --    Menopause --  --    Murmur --  --    Osteoporosis --  --    Rash of Skin --  --    Scoliosis --  --    Vitamin D Deficiency --  --          Past Surgical History  Procedure Name Date Notes   Tubal ligation --  --          Medication List  Name Date Started Instructions   Calcium 600 + D(3) 600 mg(1,500mg) -400 unit oral tablet 07/16/2018 take 1 tablet by oral route 2 times a day   levothyroxine 75 mcg oral tablet 03/18/2019 TAKE ONE TABLET BY MOUTH DAILY   multivitamin oral tablet  --    Tylenol 325 mg oral capsule  --          Allergy List  Allergen Name Date Reaction Notes   Cephalexin adverse reaction --  --  --    PENICILLINS --  --  --    SULFA (SULFONAMIDES) --  --  --          Social History  Finding Status Start/Stop Quantity Notes   Alcohol use --  --/-- --  --    Tobacco Never --/-- --  NEVER SMOKER         Immunizations  NameDate Admin [facility]  "Trade Name Lot Number Route Inj VIS Given VIS Publication   HepA12/20/2018 MSD VAQTA-ADULT [id number] IM RA 12/20/2018 01/01/2016   Comments: [id number]   HepA05/17/2018 SKB HAVRIX-ADULT [id number] IM  05/17/2018 07/21/2016   Comments: [id number]   Jmvsmhyfe37/09/2018 PMC FLUARIX [id number] IM  10/09/2018 08/07/2015   Comments: NDC [id number]   Dwbyhbbtw17/17/2018 UNK Unknown TradeName [id number] IM  01/17/2018 08/07/2015   Comments: ndc 65266-187-58   Tdap03/20/2018 PMC ADACEL [id number] IM  03/20/2018 02/24/2015   Comments: ndc [id number]         Review of Systems  · Constitutional  o Denies  o : fatigue, fever  · Cardiovascular  o Denies  o : chest pain, palpitations  · Respiratory  o Denies  o : shortness of breath, cough  · Musculoskeletal  o Admits  o : knee pain      Vitals  Date Time BP Position Site L\R Cuff Size HR RR TEMP (F) WT  HT  BMI kg/m2 BSA m2 O2 Sat        05/20/2019 06:02 /80 Sitting    88 - R  98 146lbs 0oz 5'  3\" 25.86 1.72 98 %          Physical Examination  · Constitutional  o Appearance  o : well developed, well-nourished, in no acute distress  · Respiratory  o Respiratory Effort  o : breathing unlabored  o Auscultation of Lungs  o : clear to auscultation  · Cardiovascular  o Heart  o :   § Auscultation of Heart  § : regular rate and rhythm  · Musculoskeletal  o General  o :   § General Musculoskeletal  § : No joint swelling or deformity., Muscle tone, strength, and development grossly normal. Right medial knee tenderness, mild swelling, normal ROM, stable, pos crepitus, no redness, no warmth.  · Skin and Subcutaneous Tissue  o General Inspection  o : normal tone  · Neurologic  o Mental Status Examination  o :   § Orientation  § : grossly oriented to person, place and time  · Psychiatric  o General  o : normal affect and calm          Assessment  · Right knee pain     719.46/M25.561      Plan  · Orders  o ACO-39: Current medications updated and reviewed () - - 05/20/2019  o Xray knee right " [facility] ([id number]) - 719.46/M25.561 - 05/20/2019   right knee pain x 1+ months- pain is worsening with burning now, sudden onset- no known injury- knee sleeve not helping, meds not helping  o ORTHOPEDIC CONSULTATION (ORTHO) - 719.46/M25.561 - 05/20/2019  · Medications  o Mobic 15 mg oral tablet   SIG: take 1 tablet (15 mg) by oral route once daily for 10 days   DISP: (10) tablets with 0 refills  Prescribed on 05/20/2019     · Instructions  o Patient was educated/instructed on their diagnosis, treatment and medications prior to discharge from the clinic today.  o Gave script for hinged brace.            Electronically Signed by: Eduardo Adames MD -Author on May 20, 2019 06:48:47 PM

## 2021-05-07 NOTE — PROGRESS NOTES
Progress Note      Patient Name: Valentina Stevens   Patient ID: [id number]   Sex: Female   YOB: 1965    Referring Provider: Rosalva BENZ    Visit Date: November 19, 2018    Provider: DEMAR Tillman   Location: [facility]   Location Address: [address] [place] [address]  [id number]   Location Phone: [phone]          Chief Complaint     left hip pain x 3 days, no injury       History Of Present Illness  Valentina Stevens is a 53 year old /White female who presents for evaluation and treatment of:      left hip pain  Reports left hip pain for 3 days. No increased exercise/ activity, no fall. Hurts most to stand- Works as  at [employer].  Used muscle relaxant and Naproxen. No improvement. Sleeping only slightly disturbed.       Past Medical History  Disease Name Date Onset Notes   Depression --  --    Diverticulitis Of Colon --  --    Dysuria --  --    External hemorrhoid --  --    GERD (gastroesophageal reflux disease) --  --    Hearing loss --  --    Hematuria --  --    Hypothyroidism --  --    Menopause --  --    Murmur --  --    Osteoporosis --  --    Rash of Skin --  --    Scoliosis --  --    Vitamin D Deficiency --  --          Past Surgical History  Procedure Name Date Notes   Tubal ligation --  --          Medication List  Name Date Started Instructions   Calcium 600 + D(3) 600 mg(1,500mg) -400 unit oral tablet 07/16/2018 take 1 tablet by oral route 2 times a day   ferrous sulfate 325 mg (65 mg iron) oral tablet 09/18/2018 take 1 tablet (325 mg) by oral route once daily for 30 days   levothyroxine 75 mcg oral tablet 11/13/2018 TAKE ONE TABLET BY MOUTH DAILY   Metamucil oral  --    metaxalone 400 mg oral tablet 07/20/2018 TAKE ONE TO TWO TABLETS BY MOUTH THREE TIMES A DAY AFTER MEALS   multivitamin oral tablet  --    Voltaren 1 % topical gel 11/06/2018 apply small amount to affected area TID PRN pain         Allergy List  Allergen  "Name Date Reaction Notes   Cephalexin adverse reaction --  --  --    PENICILLINS --  --  --    SULFA (SULFONAMIDES) --  --  --          Social History  Finding Status Start/Stop Quantity Notes   Alcohol use --  --/-- --  --    Tobacco Never --/-- --  NEVER SMOKER         Immunizations  NameDate Admin Mfg Trade Name Lot Number Route Inj VIS Given VIS Publication   HepA05/17/2018 SKB Havrix Adult [id number] IM  05/17/2018 07/21/2016   Comments: [id number]   Tblrdkpnv75/09/2018 PMC Fluzone Quadrivalent [id number] IM  10/09/2018 08/07/2015   Comments: NDC [id number]   Zfmtptfwr87/17/2018 UNK Unknown TradeName [id number] IM  01/17/2018 08/07/2015   Comments: ndc 12243-563-56   Tdap03/20/2018 [facility] ADACEL [id number] IM  03/20/2018 02/24/2015   Comments: ndc [id number]         Review of Systems  · Constitutional  o Denies  o : fever  · HENT  o Denies  o : headaches  · Cardiovascular  o Denies  o : chest pain, palpitations  · Respiratory  o Denies  o : cough  · Gastrointestinal  o Denies  o : nausea or vomiting  · Musculoskeletal  o Admits  o : hip pain      Vitals  Date Time BP Position Site L\R Cuff Size HR RR TEMP(F) WT  HT  BMI kg/m2 BSA m2 O2 Sat        11/19/2018 03:45 /70 Sitting    101 - R  97.8 147lbs 8oz 5'  4.5\" 24.93 1.74 98 %           Physical Examination  · Constitutional  o Appearance  o : well developed, well-nourished, in no acute distress  · Head and Face  o HEENT  o : Unremarkable  · Eyes  o Conjunctivae  o : conjunctivae normal  o Pupils and Irises  o : pupils equal and round  · Ears, Nose, Mouth and Throat  o Ears  o :   § External Ears  § : appearance within normal limits, no lesions present  o Nose  o :   § External Nose  § : appearance normal  o Oral Cavity  o :   § Oral Mucosa  § : oral mucosa normal  § Lips  § : lip appearance normal  · Neck  o Inspection/Palpation  o : supple  · Respiratory  o Respiratory Effort  o : breathing unlabored  o Auscultation of Lungs  o : clear to " ascultation  · Cardiovascular  o Heart  o :   § Auscultation of Heart  § : regular rate and rhythm  · Musculoskeletal  o General  o :   § General Musculoskeletal  § : Muscle tone, strength, and development grossly normal.  o Left Upper Extremity  o :   § Inspection/Palpation  § : no tenderness to palpation, no edema present, no tenderness or discomfort to ROM noted  § Range of Motion  § : range of motion normal  · Skin and Subcutaneous Tissue  o General Inspection  o : warm and dry, not obvious abnormal lesions  · Neurologic  o Gait and Station  o :   § Gait Screening  § : normal gait  · Psychiatric  o Mood and Affect  o : mood normal, affect appropriate                Assessment  · Hip pain, left     719.45/M25.552      Plan  · Orders  o ACO-39: Current medications updated and reviewed () - - 11/19/2018  · Medications  o Medrol (Pablo) 4 mg oral tablets,dose pack   SIG: take as directed   DISP: (1) 21 ct dose-pack with 0 refills  Prescribed on 11/19/2018     · Instructions  o Patient was educated/instructed on their diagnosis, treatment and medications prior to discharge from the clinic today.  o Continue NSAID as needed, heat.  · Disposition  o Call or Return if symptoms worsen or persist.            Electronically Signed by: DEMAR Tillman -Author on November 19, 2018 04:16:36 PM

## 2021-05-07 NOTE — PROGRESS NOTES
Progress Note      Patient Name: Valentina Stevens   Patient ID: [id number]   Sex: Female   YOB: 1965    Referring Provider: Eduardo Adames MD    Visit Date: June 28, 2018    Provider: Eduardo Adames MD   Location: [facility]   Location Address: [address] [place] [address]  [id number]   Location Phone: [phone]          Chief Complaint     Diverticulitis issue       History Of Present Illness  Valentina Stevens is a 52 year old /White female who presents for evaluation and treatment of:      diverticulitis  symptoms x 3 days- worsening symptoms- sudden onset- pt has had constipation    CBC machine is down in office       Past Medical History  Disease Name Date Onset Notes   Depression --  --    Diverticulitis Of Colon --  --    Dysuria --  --    External hemorrhoid --  --    GERD (gastroesophageal reflux disease) --  --    Hearing loss --  --    Hematuria --  --    Hypothyroidism --  --    Menopause --  --    Murmur --  --    Osteoporosis --  --    Rash of Skin --  --    Scoliosis --  --    Vitamin D Deficiency --  --          Past Surgical History  Procedure Name Date Notes   Tubal ligation --  --          Medication List  Name Date Started Instructions   buspirone 5 mg oral tablet 06/13/2018 TAKE ONE TABLET BY MOUTH TWICE A DAY FOR ANXIETY   levothyroxine 50 mcg oral tablet 03/20/2018 TAKE ONE TABLET BY MOUTH EVERY MORNING ON AN EMPTY STOMACH FOR THYROID for 30 days   naproxen sodium 550 mg oral tablet 06/15/2018 take 1 tablet (550 mg) by oral route every 12 hours as needed for 30 days         Allergy List  Allergen Name Date Reaction Notes   Cephalexin adverse reaction --  --  --    PENICILLINS --  --  --    SULFA (SULFONAMIDES) --  --  --          Social History  Finding Status Start/Stop Quantity Notes   Alcohol use --  --/-- --  --    Tobacco Never --/-- --  NEVER SMOKER         Immunizations  NameDate Admin Mfg Trade Name Lot Number Route Inj VIS Given  VIS Publication   HepA05/17/2018 SKB Havrix Adult [id number] IM  05/17/2018 07/21/2016   Comments: [id number]   Ygrmukwdq16/17/2018 UNK Unknown TradeName [id number] IM  01/17/2018 08/07/2015   Comments: ndc 88369-483-69   Tdap03/20/2018 PMC ADACEL [id number] IM  03/20/2018 02/24/2015   Comments: ndc [id number]         Review of Systems  · Constitutional  o Denies  o : fatigue, fever  · Cardiovascular  o Denies  o : chest pain, palpitations  · Respiratory  o Denies  o : shortness of breath, cough  · Gastrointestinal  o Admits  o : abdominal pain  o Denies  o : nausea, vomiting, diarrhea      Vitals  Date Time BP Position Site L\R Cuff Size HR RR TEMP(F) WT  HT  BMI kg/m2 BSA m2 O2 Sat        06/28/2018 06:30 /76 Sitting    96 - R  97.8 144lbs 0oz    97 %           Physical Examination  · Constitutional  o Appearance  o : well developed, well-nourished, in no acute distress  · Respiratory  o Respiratory Effort  o : breathing unlabored  o Auscultation of Lungs  o : clear to ascultation  · Cardiovascular  o Heart  o :   § Auscultation of Heart  § : regular rate and rhythm  · Gastrointestinal  o Abdomen  o : soft, non-tender, non-distended, + bowel sounds, no hepatosplenomegaly, no masses palpated, no CVA tenderness, no guarding or rebound tenderness.  · Musculoskeletal  o General  o :   § General Musculoskeletal  § : No joint swelling or deformity., Muscle tone, strength, and development grossly normal.  · Neurologic  o Mental Status Examination  o :   § Orientation  § : grossly oriented to person, place and time  o Gait and Station  o :   § Gait Screening  § : normal gait              Assessment  · Abdominal pain     789.00/R10.9  · Diverticulitis     562.11/K57.92      Plan  · Orders  o CBC with Auto Diff [facility] ([id number]) - 789.00/R10.9 - 06/28/2018  o CMP [facility] (51980) - 789.00/R10.9 - 06/28/2018  o IOP - Urinalysis without Microscopy (Clinitek) [facility] ([id number]) - 789.00/R10.9 - 06/28/2018   GLU-NEG DAVI-NEG KET-NEG SG-1.020  BLO-TRACE-LYSED PH-6.0 PRO-NEG URO-0.2 NIT-NEG AGUSTIN-NEG   o ACO-39: Current medications updated and reviewed () - - 06/28/2018  · Medications  o Flagyl 500 mg oral tablet   SIG: take 1 tablet by oral route 3 times a day for 7 days   DISP: (21) tablets with 0 refills  Prescribed on 06/28/2018     · Instructions  o Instructed to seek medical attention urgently for new or worsening symptoms.  o Patient was educated/instructed on their diagnosis, treatment and medications prior to discharge from the clinic today.            Electronically Signed by: Eduardo Adames MD -Author on July 3, 2018 04:26:22 PM

## 2021-05-07 NOTE — PROGRESS NOTES
Progress Note      Patient Name: Valentina Stevens   Patient ID: [id number]   Sex: Female   YOB: 1965    Referring Provider: Rosalva BENZ    Visit Date: March 19, 2020    Provider: DEMAR Grey   Location: [facility]   Location Address: [address] [place] [address]   [place]  [id number]   Location Phone: [phone]          Chief Complaint     follow up on blood pressure.       History Of Present Illness  Valentina Stevens is a 54 year old /White female who presents for evaluation and treatment of:      pt is here for the F/U on her BP--pt has been monitoring since [facility] reported to her it was 144/80--and pt never had Hx of HTN--    since then pt been monitoring BID and then running from 116/72 to 147/88--then today is 140/80    father with the Hx of HTN as well pt just started noticing the HAs   no dizziness no lightheadedness and no blurred vision   no SOA no CP     and pt uses her CPAP for DEVENDRA nightly--faithfully     pt also was told to start a 400IU Vit E daily for the fatty liver--and they thought the trigs were elevated BC of that       Past Medical History  Disease Name Date Onset Notes   Depression --  --    Diverticulitis Of Colon --  --    Dysuria --  --    External hemorrhoid --  --    GERD (gastroesophageal reflux disease) --  --    Hearing loss --  --    Hematuria --  --    Hypothyroidism --  --    Menopause --  --    Murmur --  --    Osteoporosis --  --    Rash of Skin --  --    Scoliosis --  --    Vitamin D Deficiency --  --          Past Surgical History  Procedure Name Date Notes   Thymectomy --  --    Tubal ligation --  --          Medication List  Name Date Started Instructions   calcium carbonate-vitamin D3 600 mg(1,500mg) -400 unit oral tablet 10/10/2019 TAKE ONE TABLET BY MOUTH TWICE A DAY   levothyroxine 75 mcg oral tablet 10/10/2019 TAKE ONE TABLET BY MOUTH DAILY   multivitamin oral tablet  --    triamcinolone acetonide 0.1 %  topical cream 02/04/2020 apply a thin layer to the affected area(s) by topical route 3 times per day for 30 days   Tylenol 325 mg oral capsule  --          Allergy List  Allergen Name Date Reaction Notes   Cephalexin adverse reaction --  --  --    indomethacin --  --  --    PENICILLINS --  --  --    SULFA (SULFONAMIDES) --  --  --          Social History  Finding Status Start/Stop Quantity Notes   Alcohol use --  --/-- --  --    Tobacco Never --/-- --  NEVER SMOKER         Immunizations  NameDate Admin Mfg Trade Name Lot Number Route Inj VIS Given VIS Publication   HepA12/20/2018 MSD VAQTA-ADULT [id number] IM RA 12/20/2018 01/01/2016   Comments: [id number]   HepA05/17/2018 SKB HAVRIX-ADULT [id number] IM  05/17/2018 07/21/2016   Comments: [id number]   Aqstxcsfc39/11/2019 [facility] Fluzone, quadrivalent, preservative free [id number] [facility] 10/11/2019    Comments: NDC: [id number]   Qzrkmuoyt23/09/2018 PMC FLUARIX [id number] IM  10/09/2018 08/07/2015   Comments: NDC [id number]   Pohqgjxau93/17/2018 UNK Unknown TradeName [id number] IM  01/17/2018 08/07/2015   Comments: ndc 28612-720-36   Tdap03/20/2018 [facility] ADACEL [id number] IM  03/20/2018 02/24/2015   Comments: ndc [id number]         Review of Systems  · Constitutional  o Admits  o : fatigue, headache  o Denies  o : fever  · HENT  o Admits  o : headaches  o Denies  o : vertigo, lightheadedness, hearing loss or ringing, chronic sinus problem, swollen glands in neck  · Cardiovascular  o Denies  o : chest pain, palpitations (fast, fluttering, or skipping beats), swelling (feet, ankles, hands), shortness of breath while walking or lying flat  · Respiratory  o Denies  o : shortness of breath, asthma or wheezing, COPD  · Gastrointestinal  o Denies  o : ulcers, nausea or vomiting  · Musculoskeletal  o Admits  o : back pain  o Denies  o : joint pain  · Heme-Lymph  o Denies  o : bleeding or bruising tendency, anemia      Vitals  Date Time BP Position Site L\R Cuff Size HR RR TEMP (F) WT  HT  BMI  kg/m2 BSA m2 O2 Sat        03/19/2020 12:46 /80 Sitting    108 - R  98 150lbs 9oz    98 %          Physical Examination  · Constitutional  o Appearance  o : well developed, well-nourished, in no acute distress  · Head and Face  o HEENT  o : Unremarkable  · Eyes  o Conjunctivae  o : conjunctivae normal  · Neck  o Inspection/Palpation  o : supple  o Thyroid  o : no thyromegaly  · Respiratory  o Respiratory Effort  o : breathing unlabored  o Auscultation of Lungs  o : clear to ascultation  · Cardiovascular  o Heart  o :   § Auscultation of Heart  § : regular rate and rhythm  o Peripheral Vascular System  o :   § Extremities  § : no edema  · Gastrointestinal  o Abdominal Examination  o :   § Abdomen  § : soft  · Lymphatic  o Neck  o : no lymphadenopathy present  · Musculoskeletal  o General  o :   § General Musculoskeletal  § : No joint swelling or deformity., Muscle tone, strength, and development grossly normal.  · Skin and Subcutaneous Tissue  o General Inspection  o : skin turgor normal, texture normal  · Neurologic  o Gait and Station  o :   § Gait Screening  § : normal gait  · Psychiatric  o Mood and Affect  o : mood normal, affect appropriate          Results     reviewed the BP readings BID pt had on her phone       Assessment  · Essential hypertension     401.9/I10  · Hyperlipidemia     272.4/E78.5  · Hypothyroidism     244.9/E03.9    Problems Reconciled  Plan  · Orders  o HTN/Lipid Panel (CMP, Lipid) [facility] (10644, 19703) - 401.9/I10 - 03/19/2020  o Thyroid Profile (THYII) - 244.9/E03.9 - 03/19/2020  o ACO-39: Current medications updated and reviewed () - - 03/19/2020  · Medications  o lisinopril 2.5 mg oral tablet   SIG: take 1 tablet (2.5 mg) by oral route once daily for 30 days   DISP: (30) tablets with 0 refills  Prescribed on 03/19/2020     o Medications have been Reconciled  o Transition of Care or Provider Policy  · Instructions  o Patient advised to monitor blood pressure (B/P) at home and  journal readings. Patient informed that a B/P reading at home of more than 135/85 is considered hypertension. For readings greater idpw181/90 or higher patient is advised to follow up in the office with readings for management. Patient advised to limit sodium intake.  o Recommended exercise program to assist with cholesterol, weight loss and overall health improvement.  o Advised that cheeses and other sources of dairy fats, animal fats, fast food, and the extras (candy, pasteries, pies, doughnuts and cookies) all contain LDL raising nutrients. Advised to increase fruits, vegetables, whole grains, and to monitor portion sizes.   o Take all medications as prescribed/directed.  o Patient was educated/instructed on their diagnosis, treatment and medications prior to discharge from the clinic today.  o Patient instructed to seek medical attention urgently for new or worsening symptoms.  o Call the office with any concerns or questions.  o Risks, benefits, and alternatives were discussed with the patient. The patient is aware of risks associated with: ACEI   o Chronic conditions reviewed and taken into consideration for today's treatment plan.  · Disposition  o Call or Return if symptoms worsen or persist.  o Return Visit Request in/on 3 weeks +/- 2 days (6809).            Electronically Signed by: DEMAR Grey -Author on March 19, 2020 01:07:34 PM

## 2021-05-07 NOTE — PROGRESS NOTES
Progress Note      Patient Name: Valentina Stevens   Patient ID: [id number]   Sex: Female   YOB: 1965    Referring Provider: Rosalva BENZ    Visit Date: July 24, 2018    Provider: DEMAR Waggoner   Location: [facility]   Location Address: [address] [place] [address]  [id number]   Location Phone: [phone]          Chief Complaint     she thinks she has divirticulitis, started a few days ago, it is getting worse       History Of Present Illness  Valentina Stevens is a 52 year old /White female who presents for evaluation and treatment of:      left lower quadrant pain - pt states she is having bowel movements with pain - pain x 3 days and worsening today - pt had similar symptoms about 1 month ago and symptoms improved but have returned - pt states she follows the diverticulosis diet - does not take any fiber supplements.     left arm tenderness       Past Medical History  Disease Name Date Onset Notes   Depression --  --    Diverticulitis Of Colon --  --    Dysuria --  --    External hemorrhoid --  --    GERD (gastroesophageal reflux disease) --  --    Hearing loss --  --    Hematuria --  --    Hypothyroidism --  --    Menopause --  --    Murmur --  --    Osteoporosis --  --    Rash of Skin --  --    Scoliosis --  --    Vitamin D Deficiency --  --          Past Surgical History  Procedure Name Date Notes   Tubal ligation --  --          Medication List  Name Date Started Instructions   buspirone 5 mg oral tablet 07/14/2018 TAKE ONE TABLET BY MOUTH TWICE A DAY FOR ANXIETY   Calcium 600 + D(3) 600 mg(1,500mg) -400 unit oral tablet 07/16/2018 take 1 tablet by oral route 2 times a day   levothyroxine 50 mcg oral tablet 07/16/2018 TAKE ONE TABLET BY MOUTH EVERY MORNING ON AN EMPTY STOMACH FOR THYROID for 30 days   metaxalone 400 mg oral tablet 07/20/2018 TAKE ONE TO TWO TABLETS BY MOUTH THREE TIMES A DAY AFTER MEALS   naproxen sodium 550 mg oral tablet  "06/15/2018 take 1 tablet (550 mg) by oral route every 12 hours as needed for 30 days   Wart Remover 40 % topical adhesive patch,medicated 07/16/2018 apply as directed 1 patch by transdermal route Q24-48H for 14 days         Allergy List  Allergen Name Date Reaction Notes   Cephalexin adverse reaction --  --  --    PENICILLINS --  --  --    SULFA (SULFONAMIDES) --  --  --          Social History  Finding Status Start/Stop Quantity Notes   Alcohol use --  --/-- --  --    Tobacco Never --/-- --  NEVER SMOKER         Immunizations  NameDate Admin Mfg Trade Name Lot Number Route Inj VIS Given VIS Publication   HepA05/17/2018 SKB Havrix Adult [id number] IM  05/17/2018 07/21/2016   Comments: [id number]   Lfmwbezxx97/17/2018 UNK Unknown TradeName [id number]   01/17/2018 08/07/2015   Comments: ndc [id number]   Tdap03/20/2018 PMC ADACEL [id number] IM  03/20/2018 02/24/2015   Comments: ndc [id number]         Review of Systems  · Constitutional  o Denies  o : fever, chills, body aches  · Cardiovascular  o Denies  o : chest pain, palpitations  · Respiratory  o Denies  o : shortness of breath, wheezing, cough  · Gastrointestinal  o Admits  o : constipation, abdominal pain  o Denies  o : nausea, vomiting, diarrhea, blood in stools      Vitals  Date Time BP Position Site L\R Cuff Size HR RR TEMP(F) WT  HT  BMI kg/m2 BSA m2 O2 Sat        07/24/2018 10:44 /80 Sitting    101 - R  98.4 144lbs 4oz 5'  5\" 24 1.73 97 %           Physical Examination  · Constitutional  o Appearance  o : well developed, well-nourished, in no acute distress  · Eyes  o Conjunctivae  o : conjunctivae normal  o Pupils and Irises  o : pupils equal and round, pupils reactive to light bilaterally  · Neck  o Inspection/Palpation  o : supple  o Thyroid  o : no thyromegaly  · Respiratory  o Respiratory Effort  o : breathing unlabored  o Auscultation of Lungs  o : clear to ascultation  · Cardiovascular  o Heart  o :   § Auscultation of Heart  § : regular rate and " rhythm  o Peripheral Vascular System  o :   § Extremities  § : no edema  · Gastrointestinal  o Abdominal Examination  o :   § Abdomen  § : NL bowel sounds, soft, LLQ tenderness, negative rebound tenderness to RLQ  · Lymphatic  o Neck  o : no lymphadenopathy present  · Musculoskeletal  o General  o :   § General Musculoskeletal  § : No joint swelling or deformity. Muscle tone, strength, and development grossly normal.  · Skin and Subcutaneous Tissue  o General Inspection  o : NL tone  · Neurologic  o Gait and Station  o :   § Gait Screening  § : normal gait  · Psychiatric  o Mood and Affect  o : mood normal, affect appropriate              Assessment  · Abdominal pain     789.00/R10.9  · Diverticulitis     562.11/K57.92  · Leukocytes in urine     791.7/R82.99      Plan  · Orders  o Amylase and lipase panel (22061, 73522) - 789.00/R10.9 - 07/24/2018  o CBC with Auto Diff [facility] (19442) - 789.00/R10.9 - 07/24/2018  o CMP [facility] (51827) - 789.00/R10.9 - 07/24/2018  o Gastroenterology Consultation (GASTR) - 789.00/R10.9, 562.11/K57.92 - 07/24/2018  o IOP - Urinalysis without Microscopy (Clinitek) [facility] (76341) - 789.00/R10.9 - 07/24/2018   GLU-NEG DAVI-SMALL KET-TRACE SG-1.020 BLO-SMALL pH-6.0 PRO-NEG URO-0.2 NIT-NEG AGUSTIN-TRACE  o Urine culture (59796, 10470) - 791.7/R82.99 - 07/24/2018  o ACO-39: Current medications updated and reviewed () - - 07/24/2018  o CT ABD&PELV with and without contrast (50657) - 789.00/R10.9, 562.11/K57.92 - 07/24/2018  · Medications  o Flagyl 500 mg oral tablet   SIG: take 1 tablet by oral route 3 times a day for 10 days   DISP: (30) tablets with 0 refills  Prescribed on 07/24/2018     o Cipro 500 mg oral tablet   SIG: take 1 tablet (500 mg) by oral route 2 times per day for 10 days   DISP: (20) tablets with 0 refills  Prescribed on 07/24/2018     · Instructions  o Instructed to seek medical attention urgently for new or worsening symptoms.  o Patient was educated/instructed on their diagnosis,  treatment and medications prior to discharge from the clinic today.  o Pt to have CT abd/pelvis - pt to follow up with GI and treat for diverticulitis.  · Disposition  o Call or Return if symptoms worsen or persist.            Electronically Signed by: DEMAR Waggoner -Author on July 24, 2018 12:55:24 PM

## 2021-05-07 NOTE — PROGRESS NOTES
Progress Note      Patient Name: Valentina Stevens   Patient ID: [id number]   Sex: Female   YOB: 1965    Referring Provider: Rosalva BENZ    Visit Date: September 9, 2019    Provider: DEMAR Waggoner   Location: [facility]   Location Address: [address] [place] [address] [place]  [id number]   Location Phone: [phone]          Chief Complaint     PATIENT IS HERE BECAUSE 5 DAYS A GO SHE POPPED HER NECK AND SHE HAS HAD A DULL HEADACHE EVER SINCE IT HAPPENED.  SHE JUST WANTS TO MAKE SURE EVERYTHING IS OK.       History Of Present Illness  Valentina Stevens is a 53 year old /White female who presents for evaluation and treatment of:      headache and neck pain x 5 days following popping her neck - wants to make sure everything is okay - she was at work and she turned to look at the screen and her right side of the neck popped - states prior to the neck popping she had a shooting pain in the right side of the head -     She has not taken any Tylenol or Ibuprofen - states it usually doesn't help       Past Medical History  Disease Name Date Onset Notes   Depression --  --    Diverticulitis Of Colon --  --    Dysuria --  --    External hemorrhoid --  --    GERD (gastroesophageal reflux disease) --  --    Hearing loss --  --    Hematuria --  --    Hypothyroidism --  --    Menopause --  --    Murmur --  --    Osteoporosis --  --    Rash of Skin --  --    Scoliosis --  --    Vitamin D Deficiency --  --          Past Surgical History  Procedure Name Date Notes   Thymectomy --  --    Tubal ligation --  --          Medication List  Name Date Started Instructions   Calcium 600 + D(3) 600 mg(1,500mg) -400 unit oral tablet 07/16/2018 take 1 tablet by oral route 2 times a day   calcium carbonate-vitamin D3 600 mg(1,500mg) -400 unit oral tablet 07/16/2019 TAKE ONE TABLET BY MOUTH TWICE A DAY   indomethacin 25 mg oral capsule 08/24/2019 take 1 capsule (25 mg) by oral route 3  "times per day with food for 10 days   levothyroxine 75 mcg oral tablet 06/17/2019 TAKE ONE TABLET BY MOUTH DAILY   multivitamin oral tablet  --    Tylenol 325 mg oral capsule  --          Allergy List  Allergen Name Date Reaction Notes   Cephalexin adverse reaction --  --  --    PENICILLINS --  --  --    SULFA (SULFONAMIDES) --  --  --          Social History  Finding Status Start/Stop Quantity Notes   Alcohol use --  --/-- --  --    Tobacco Never --/-- --  NEVER SMOKER         Immunizations  NameDate Admin Mfg Trade Name Lot Number Route Inj VIS Given VIS Publication   HepA12/20/2018 MSD VAQTA-ADULT [id number] IM  12/20/2018 01/01/2016   Comments: [id number]   HepA05/17/2018 SKB HAVRIX-ADULT [id number] IM  05/17/2018 07/21/2016   Comments: [id number]   Kooskcwdm56/09/2018 PMC FLUARIX [id number] IM  10/09/2018 08/07/2015   Comments: NDC [id number]   Kkacyyejj49/17/2018 UNK Unknown TradeName [id number] IM  01/17/2018 08/07/2015   Comments: ndc [id number]   Tdap03/20/2018 PMC ADACEL [id number] IM  03/20/2018 02/24/2015   Comments: ndc [id number]         Review of Systems  · Constitutional  o Admits  o : headache  · HENT  o Admits  o : neck pain  o Denies  o : nasal congestion, nasal discharge, sore throat  · Cardiovascular  o Denies  o : chest pain, palpitations  · Respiratory  o Denies  o : shortness of breath, wheezing, cough  · Gastrointestinal  o Denies  o : nausea, vomiting, diarrhea  · Neurologic  o Admits  o : headaches  o Denies  o : dizziness      Vitals  Date Time BP Position Site L\R Cuff Size HR RR TEMP (F) WT  HT  BMI kg/m2 BSA m2 O2 Sat        09/09/2019 10:57 /70 Sitting    87 - R  97.9 141lbs 5oz 5'  3\" 25.03 1.69 96 %          Physical Examination  · Constitutional  o Appearance  o : well developed, well-nourished, in no acute distress  · Eyes  o Conjunctivae  o : conjunctivae normal  o Pupils and Irises  o : pupils equal and round, pupils reactive to light " bilaterally  · Neck  o Inspection/Palpation  o : supple  o Thyroid  o : no thyromegaly  · Respiratory  o Respiratory Effort  o : breathing unlabored  o Auscultation of Lungs  o : clear to ascultation  · Cardiovascular  o Heart  o :   § Auscultation of Heart  § : regular rate and rhythm  o Peripheral Vascular System  o :   § Extremities  § : no edema  · Lymphatic  o Neck  o : no lymphadenopathy present  · Musculoskeletal  o General  o :   § General Musculoskeletal  § : Neck pain with looking down and increased headache with looking up. NL ROM. No joint swelling or deformity. Muscle tone, strength, and development grossly normal.  · Skin and Subcutaneous Tissue  o General Inspection  o : NL tone  · Neurologic  o Gait and Station  o :   § Gait Screening  § : normal gait  · Psychiatric  o Mood and Affect  o : mood normal, affect appropriate              Assessment  · Headache     784.0/R51  · Neck pain     723.1/M54.2      Plan  · Orders  o ACO-39: Current medications updated and reviewed () - - 09/09/2019  o Cervical Spine Complete [facility] (25751) - 784.0/R51, 723.1/M54.2 - 09/09/2019  · Medications  o Zanaflex 4 mg oral capsule   SIG: take 1 capsule (4 mg) by oral route every 8 hours as needed for 10 days   DISP: (30) capsules with 0 refills  Prescribed on 09/09/2019     o diclofenac sodium 75 mg oral tablet,delayed release (DR/EC)   SIG: take 1 tablet (75 mg) by oral route 2 times per day for 10 days   DISP: (20) tablets with 0 refills  Prescribed on 09/09/2019     o indomethacin 50 mg oral capsule   SIG: take 1 capsule (50 mg) by oral route 2 times per day with food for 10 days   DISP: (20) capsules with 0 refills  Discontinued on 09/09/2019     · Instructions  o Patient was educated/instructed on their diagnosis, treatment and medications prior to discharge from the clinic today.            Electronically Signed by: DEMAR Waggoner -Author on September 9, 2019 11:51:09 AM

## 2021-05-07 NOTE — PROGRESS NOTES
Progress Note      Patient Name: Valentina Stevens   Patient ID: [id number]   Sex: Female   YOB: 1965    Referring Provider: Rosalva BENZ    Visit Date: February 19, 2019    Provider: DEMAR Maldonado   Location: [facility]   Location Address: [address] [place] [address]  [id number]   Location Phone: [phone]          Chief Complaint     KNOT ON BACK       History Of Present Illness  Valentina Stevens is a 53 year old /White female who presents for evaluation and treatment of:      knot on her back--it was kind of tender on Saturday; had her daughter look at it on Sunday and told her there was a knot, then on Monday she looked and it was reportedly bigger.       Past Medical History  Disease Name Date Onset Notes   Depression --  --    Diverticulitis Of Colon --  --    Dysuria --  --    External hemorrhoid --  --    GERD (gastroesophageal reflux disease) --  --    Hearing loss --  --    Hematuria --  --    Hypothyroidism --  --    Menopause --  --    Murmur --  --    Osteoporosis --  --    Rash of Skin --  --    Scoliosis --  --    Vitamin D Deficiency --  --          Past Surgical History  Procedure Name Date Notes   Tubal ligation --  --          Medication List  Name Date Started Instructions   Calcium 600 + D(3) 600 mg(1,500mg) -400 unit oral tablet 07/16/2018 take 1 tablet by oral route 2 times a day   ferrous sulfate 325 mg (65 mg iron) oral tablet 09/18/2018 take 1 tablet (325 mg) by oral route once daily for 30 days   levothyroxine 75 mcg oral tablet 11/13/2018 TAKE ONE TABLET BY MOUTH DAILY   Metamucil oral  --    metaxalone 400 mg oral tablet 07/20/2018 TAKE ONE TO TWO TABLETS BY MOUTH THREE TIMES A DAY AFTER MEALS   metronidazole 500 mg oral tablet  take 1 tablet (500 mg) by oral route 2 times per day for 7 days   multivitamin oral tablet  --    Voltaren 1 % topical gel 11/06/2018 apply small amount to affected area TID PRN pain  "        Allergy List  Allergen Name Date Reaction Notes   Cephalexin adverse reaction --  --  --    PENICILLINS --  --  --    SULFA (SULFONAMIDES) --  --  --          Social History  Finding Status Start/Stop Quantity Notes   Alcohol use --  --/-- --  --    Tobacco Never --/-- --  NEVER SMOKER         Immunizations  NameDate Admin Mfg Trade Name Lot Number Route Inj VIS Given VIS Publication   HepA12/20/2018 MSD VAQTA Adult [id number] IM RA 12/20/2018 01/01/2016   Comments: [id number]   HepA05/17/2018 SKB Havrix Adult [id number] IM  05/17/2018 07/21/2016   Comments: [id number]   Tkjgoajak09/09/2018 PMC Fluzone Quadrivalent [id number] IM  10/09/2018 08/07/2015   Comments: NDC [id number]   Vpynzgixq09/17/2018 UNK Unknown TradeName [id number] IM  01/17/2018 08/07/2015   Comments: ndc [id number]   Tdap03/20/2018 PMC ADACEL [id number] IM  03/20/2018 02/24/2015   Comments: ndc [id number]         Review of Systems  · Constitutional  o Admits  o : good general health lately  · Cardiovascular  o Denies  o : chest pain, palpitations  · Respiratory  o Denies  o : shortness of breath, cough  · Gastrointestinal  o Denies  o : nausea, vomiting, diarrhea, abdominal pain  · Integument  o Denies  o : pigmentation changes  · Neurologic  o Denies  o : tingling or numbness  · Musculoskeletal  o Admits  o : back pain--mid-back, left side  o Denies  o : limitation of motion      Vitals  Date Time BP Position Site L\R Cuff Size HR RR TEMP(F) WT  HT  BMI kg/m2 BSA m2 O2 Sat        02/19/2019 09:09 /80 Sitting    83 - R  97.6 145lbs 0oz 5'  3\" 25.69 1.71 99 %           Physical Examination  · Constitutional  o Appearance  o : well developed, well-nourished, in no acute distress  · Respiratory  o Respiratory Effort  o : breathing unlabored  o Auscultation of Lungs  o : clear to ascultation  · Cardiovascular  o Heart  o :   § Auscultation of Heart  § : regular rate and rhythm  o Peripheral Vascular System  o :   § Extremities  § : no " "edema  · Musculoskeletal  o General  o :   § General Musculoskeletal  § : Muscle tone, strength, and development grossly normal. There is normal spinal alignment--the midline spine is NTTP--there is a fullness about the left paraspinal region that is not apparent on the right--this area it TTP. It does not appear fluctuant like an abscess. No erythema noted.  · Skin and Subcutaneous Tissue  o General Inspection  o : no lesions present, no areas of discoloration, skin turgor normal, texture normal  · Neurologic  o Gait and Station  o :   § Gait Screening  § : normal gait  · Psychiatric  o Mood and Affect  o : mood normal, affect appropriate              Assessment  · Thoracic back pain     724.1/M54.6      Plan  · Orders  o ACO-39: Current medications updated and reviewed () - - 02/19/2019  o Thoracic Spine (3 view) [facility] ([id number]) - 724.1/M54.6 - 02/19/2019   Dextrocurvature in the mid thoracic spine and levo curvature at the lower thoracic and upper lumbar spine.  · Instructions  o Take all medications as prescribed/directed.  o Patient was educated/instructed on their diagnosis, treatment and medications prior to discharge from the clinic today. She has a muscle relaxer at home that she is going to use in conjunction with Tylenol. Recommended OTC Aleve PRN--sparingly--for pain. She doesn't use NSAIDS d/t \"kidney s/e\". Follow up if no improvement. consider PT referral d/t spine curvature.  o Chronic conditions reviewed and taken into consideration for today's treatment plan.  · Disposition  o Call or Return if symptoms worsen or persist.            Electronically Signed by: DEMAR Maldonado -Author on February 19, 2019 09:54:15 AM  "

## 2021-05-07 NOTE — PROGRESS NOTES
Progress Note      Patient Name: Valentina Stevens   Patient ID: [id number]   Sex: Female   YOB: 1965    Referring Provider: Rosalva BENZ    Visit Date: October 6, 2018    Provider: Jaymie Castaneda MD   Location: [facility]   Location Address: [address] [place] [address]  [id number]   Location Phone: [phone]          Chief Complaint     PATIENT IS HERE WITH DIARRHEA, VOMITING, TIRED, THIS ALL STARTED YESTERDAY, SHE SAID IT IS PROBABLY JUST A VIRUS, SOME PEOPLE AT HER WORK HAVE HAD THIS.  HER EMPLOYER TOLD HER IF SHE MISSED WORK SHE HAD TO BE CHECKED OUT.       History Of Present Illness  Valentina Stevens is a 52 year old /White female who presents for evaluation and treatment of:      Her daughter had the virus that is going around.  Her diarrhea started yesterday. Sometimes she has been to the [place] and has to turn around and go back.  She was throwing up and having diarrhea at the same time.  She had a low grade fever like 99.  No cramps.  She has had blood in her stools for awhile.  She has an appointment with GI on 10/11/18.    She has a little cough when she takes a deep breath.       Past Medical History  Disease Name Date Onset Notes   Depression --  --    Diverticulitis Of Colon --  --    Dysuria --  --    External hemorrhoid --  --    GERD (gastroesophageal reflux disease) --  --    Hearing loss --  --    Hematuria --  --    Hypothyroidism --  --    Menopause --  --    Murmur --  --    Osteoporosis --  --    Rash of Skin --  --    Scoliosis --  --    Vitamin D Deficiency --  --          Past Surgical History  Procedure Name Date Notes   Tubal ligation --  --          Medication List  Name Date Started Instructions   Calcium 600 + D(3) 600 mg(1,500mg) -400 unit oral tablet 07/16/2018 take 1 tablet by oral route 2 times a day   capsaicin 0.025 % topical cream 09/18/2018 apply to the affected area(s) by topical route 3 times per day for 14 days  "  ferrous sulfate 325 mg (65 mg iron) oral tablet 09/18/2018 take 1 tablet (325 mg) by oral route once daily for 30 days   levothyroxine 75 mcg oral tablet 09/18/2018 take 1 tablet (75 mcg) by oral route once daily for 30 days   metaxalone 400 mg oral tablet 07/20/2018 TAKE ONE TO TWO TABLETS BY MOUTH THREE TIMES A DAY AFTER MEALS   naproxen sodium 550 mg oral tablet 06/15/2018 take 1 tablet (550 mg) by oral route every 12 hours as needed for 30 days   Zofran 4 mg oral tablet 09/18/2018 take 1 tablet by oral route 4 times a day as needed for 7 days nausea         Allergy List  Allergen Name Date Reaction Notes   Cephalexin adverse reaction --  --  --    PENICILLINS --  --  --    SULFA (SULFONAMIDES) --  --  --          Social History  Finding Status Start/Stop Quantity Notes   Alcohol use --  --/-- --  --    Tobacco Never --/-- --  NEVER SMOKER         Immunizations  NameDate Admin Mfg Trade Name Lot Number Route Inj VIS Given VIS Publication   HepA05/17/2018 SKB Havrix Adult [id number] IM  05/17/2018 07/21/2016   Comments: [id number]   Gxhfwueod75/17/2018 UNK Unknown TradeName [id number] IM  01/17/2018 08/07/2015   Comments: ndc [id number]   Tdap03/20/2018 [facility] ADACEL [id number] IM  03/20/2018 02/24/2015   Comments: ndc [id number]         Vitals  Date Time BP Position Site L\R Cuff Size HR RR TEMP(F) WT  HT  BMI kg/m2 BSA m2 O2 Sat        10/06/2018 11:25 /58 Sitting    91 - R  97.6 143lbs 6oz 5'  5\" 23.86 1.73 98 %           Physical Examination  · Constitutional  o Appearance  o : well developed, well-nourished, in no acute distress  · Head and Face  o HEENT  o : TM's clear Throat ok Tongue geographic tongue small area Her mouth is a little dry  · Eyes  o Conjunctivae  o : conjunctivae normal  · Neck  o Inspection/Palpation  o : supple  o Thyroid  o : no thyromegaly  · Respiratory  o Respiratory Effort  o : breathing unlabored  o Auscultation of Lungs  o : clear to ascultation  · Cardiovascular  o Heart  o : "   § Auscultation of Heart  § : regular rate and rhythm I may hear a very mild murmur  o Peripheral Vascular System  o :   § Extremities  § : no edema  · Gastrointestinal  o Abdominal Examination  o :   § Abdomen  § : soft non-tender normal bowel sounds  · Lymphatic  o Neck  o : no lymphadenopathy present  · Musculoskeletal  o General  o :   § General Musculoskeletal  § : grossly normal.  · Skin and Subcutaneous Tissue  o General Inspection  o : normal  · Neurologic  o Gait and Station  o :   § Gait Screening  § : normal gait  · Psychiatric  o Mood and Affect  o : mood normal, affect appropriate          Assessment  · Gastroenteritis     558.9/K52.9      Plan  · Orders  o ACO-39: Current medications updated and reviewed () - - 10/06/2018  · Medications  o Zofran 4 mg oral tablet   SIG: take 1 tablet by oral route 4 times a day as needed for 7 days nausea   DISP: (21) tablets with 1 refills  Adjusted on 10/06/2018     · Instructions  o Patient was educated/instructed on their diagnosis, treatment and medications prior to discharge from the clinic today.            Electronically Signed by: Jaymie Castaneda MD -Author on October 6, 2018 11:54:27 AM

## 2021-05-07 NOTE — PROGRESS NOTES
Progress Note      Patient Name: Valentina Stevens   Patient ID: [id number]   Sex: Female   YOB: 1965    Referring Provider: Rosalva BENZ    Visit Date: March 25, 2019    Provider: Eduardo Adames MD   Location: [facility]   Location Address: [address] [place] [address]  [id number]   Location Phone: [phone]          Chief Complaint     Diarrhea yesterday, constipation today, vomited right before she came here. now she has a headache.       History Of Present Illness  Valentina Stevens is a 53 year old /White female who presents for evaluation and treatment of:      symptoms x 2 days- sudden onset- worsening symptoms- otc meds not helping- has had intermittent watery brown diarrhea yesterday, today constipation, nausea, vomiting x 1 time       Past Medical History  Disease Name Date Onset Notes   Depression --  --    Diverticulitis Of Colon --  --    Dysuria --  --    External hemorrhoid --  --    GERD (gastroesophageal reflux disease) --  --    Hearing loss --  --    Hematuria --  --    Hypothyroidism --  --    Menopause --  --    Murmur --  --    Osteoporosis --  --    Rash of Skin --  --    Scoliosis --  --    Vitamin D Deficiency --  --          Past Surgical History  Procedure Name Date Notes   Tubal ligation --  --          Medication List  Name Date Started Instructions   Calcium 600 + D(3) 600 mg(1,500mg) -400 unit oral tablet 07/16/2018 take 1 tablet by oral route 2 times a day   ferrous sulfate 325 mg (65 mg iron) oral tablet 09/18/2018 take 1 tablet (325 mg) by oral route once daily for 30 days   levothyroxine 75 mcg oral tablet 03/18/2019 TAKE ONE TABLET BY MOUTH DAILY   Metamucil oral  --    metaxalone 400 mg oral tablet 07/20/2018 TAKE ONE TO TWO TABLETS BY MOUTH THREE TIMES A DAY AFTER MEALS   metronidazole 500 mg oral tablet  take 1 tablet (500 mg) by oral route 2 times per day for 7 days   multivitamin oral tablet  --    Voltaren 1 %  "topical gel 11/06/2018 apply small amount to affected area TID PRN pain         Allergy List  Allergen Name Date Reaction Notes   Cephalexin adverse reaction --  --  --    PENICILLINS --  --  --    SULFA (SULFONAMIDES) --  --  --          Social History  Finding Status Start/Stop Quantity Notes   Alcohol use --  --/-- --  --    Tobacco Never --/-- --  NEVER SMOKER         Immunizations  NameDate Admin Mfg Trade Name Lot Number Route Inj VIS Given VIS Publication   HepA12/20/2018 MSD VAQTA-ADULT [id number] IM RA 12/20/2018 01/01/2016   Comments: [id number]   HepA05/17/2018 SKB HAVRIX-ADULT [id number] IM  05/17/2018 07/21/2016   Comments: [id number]   Djuhvgian58/09/2018 PMC FLUARIX [id number] IM  10/09/2018 08/07/2015   Comments: NDC [id number]   Borqwwhti18/17/2018 UNK Unknown TradeName [id number] IM  01/17/2018 08/07/2015   Comments: ndc [id number]   Tdap03/20/2018 PMC ADACEL [id number] IM  03/20/2018 02/24/2015   Comments: ndc [id number]         Review of Systems  · Constitutional  o Denies  o : fatigue, fever  · HENT  o Denies  o : nasal congestion, nasal discharge, sore throat  · Cardiovascular  o Denies  o : chest pain, palpitations  · Respiratory  o Denies  o : shortness of breath, cough  · Gastrointestinal  o Admits  o : nausea, vomiting, diarrhea, constipation  o Denies  o : abdominal pain      Vitals  Date Time BP Position Site L\R Cuff Size HR RR TEMP (F) WT  HT  BMI kg/m2 BSA m2 O2 Sat HC       03/25/2019 07:03 /84 Sitting    72 - R  98 145lbs 8oz 5'  3\" 25.77 1.71 99 %          Physical Examination  · Constitutional  o Appearance  o : well developed, well-nourished, in no acute distress  · Head and Face  o HEENT  o : unremarkable, MMM  · Respiratory  o Respiratory Effort  o : breathing unlabored  o Auscultation of Lungs  o : clear to auscultation  · Cardiovascular  o Heart  o :   § Auscultation of Heart  § : regular rate and rhythm  · Gastrointestinal  o Abdomen  o : soft, non-tender, non-distended, + bowel " sounds, no hepatosplenomegaly, no masses palpated, no CVA tenderness  · Skin and Subcutaneous Tissue  o General Inspection  o : normal tone  · Neurologic  o Mental Status Examination  o :   § Orientation  § : grossly oriented to person, place and time  · Psychiatric  o General  o : normal affect and calm          Assessment  · Nausea and vomiting     787.01/R11.2  · Constipation     564.00/K59.00      Plan  · Orders  o IOP - CBC (60002) - 787.01/R11.2 - 03/25/2019  o CMP [facility] (86961) - 787.01/R11.2 - 03/25/2019  o TSH [facility] (00174) - 787.01/R11.2, 564.00/K59.00 - 03/25/2019  o ACO-39: Current medications updated and reviewed () - - 03/25/2019  o CT Abdomen and Pelvis with IV Contrast [facility]; suggest Oral Prep (45872) - 787.01/R11.2, 564.00/K59.00 - 03/25/2019  · Medications  o Zofran 4 mg oral tablet   SIG: take 1 tablet by oral route 4 times a day as needed for 7 days nausea   DISP: (28) tablets with 0 refills  Prescribed on 03/25/2019     o Probiotic 20 billion cell oral capsule   SIG: take 1 capsule by oral route daily for 10 days   DISP: (10) capsules with 0 refills  Prescribed on 03/25/2019     · Instructions  o Patient was educated/instructed on their diagnosis, treatment and medications prior to discharge from the clinic today.            Electronically Signed by: Eduardo Adames MD -Author on March 25, 2019 08:30:26 PM

## 2021-05-07 NOTE — PROGRESS NOTES
Progress Note      Patient Name: Valentina Stevens   Patient ID: [id number]   Sex: Female   YOB: 1965    Referring Provider: Ami BENZ    Visit Date: December 20, 2018    Provider: DEMAR Waggoner   Location: [facility]   Location Address: [address] [place] [address]  [id number]   Location Phone: [phone]          Chief Complaint     needs 2nd hep A       History Of Present Illness  Valentina Stevens is a 53 year old /White female who presents for evaluation and treatment of:      2nd Hep A immunization - no issues with first immunization           Past Medical History  Disease Name Date Onset Notes   Depression --  --    Diverticulitis Of Colon --  --    Dysuria --  --    External hemorrhoid --  --    GERD (gastroesophageal reflux disease) --  --    Hearing loss --  --    Hematuria --  --    Hypothyroidism --  --    Menopause --  --    Murmur --  --    Osteoporosis --  --    Rash of Skin --  --    Scoliosis --  --    Vitamin D Deficiency --  --          Past Surgical History  Procedure Name Date Notes   Tubal ligation --  --          Medication List  Name Date Started Instructions   Calcium 600 + D(3) 600 mg(1,500mg) -400 unit oral tablet 07/16/2018 take 1 tablet by oral route 2 times a day   Cipro  mg oral tablet, ER multiphase 24 hr  take 1 tablet (500 mg) by oral route once daily   ferrous sulfate 325 mg (65 mg iron) oral tablet 09/18/2018 take 1 tablet (325 mg) by oral route once daily for 30 days   levothyroxine 75 mcg oral tablet 11/13/2018 TAKE ONE TABLET BY MOUTH DAILY   Metamucil oral  --    metaxalone 400 mg oral tablet 07/20/2018 TAKE ONE TO TWO TABLETS BY MOUTH THREE TIMES A DAY AFTER MEALS   metronidazole 500 mg oral tablet  take 1 tablet (500 mg) by oral route 2 times per day for 7 days   multivitamin oral tablet  --    Voltaren 1 % topical gel 11/06/2018 apply small amount to affected area TID PRN pain         Allergy  List  Allergen Name Date Reaction Notes   Cephalexin adverse reaction --  --  --    PENICILLINS --  --  --    SULFA (SULFONAMIDES) --  --  --          Social History  Finding Status Start/Stop Quantity Notes   Alcohol use --  --/-- --  --    Tobacco Never --/-- --  NEVER SMOKER         Immunizations  NameDate Admin Mfg Trade Name Lot Number Route Inj VIS Given VIS Publication   HepA05/17/2018 SKB Havrix Adult [id number] IM  05/17/2018 07/21/2016   Comments: [id number]   Wzrkprsog12/09/2018 PMC Fluzone Quadrivalent [id number] IM  10/09/2018 08/07/2015   Comments: NDC [id number]   Bkogqjmkt42/17/2018 UNK Unknown TradeName [id number] IM  01/17/2018 08/07/2015   Comments: ndc [id number]   Tdap03/20/2018 [facility] ADACEL [id number] IM  03/20/2018 02/24/2015   Comments: ndc [id number]         Review of Systems  · Constitutional  o Denies  o : fever, chills, body aches  · HENT  o Admits  o : nasal congestion, nasal discharge, sore throat, ear pain  · Respiratory  o Denies  o : wheezing, cough  · Gastrointestinal  o Denies  o : nausea, vomiting, diarrhea      Vitals  Date Time BP Position Site L\R Cuff Size HR RR TEMP(F) WT  HT  BMI kg/m2 BSA m2 O2 Sat        12/20/2018 05:06 /82 Sitting    103 - R  97.3 146lbs 0oz    98 %           Physical Examination  · Constitutional  o Appearance  o : well developed, well-nourished, in no acute distress  · Head and Face  o HEENT  o : Unremarkable  · Eyes  o Conjunctivae  o : conjunctivae normal  o Pupils and Irises  o : pupils equal and round, pupils reactive to light bilaterally  · Neck  o Inspection/Palpation  o : supple  o Thyroid  o : no thyromegaly  · Respiratory  o Respiratory Effort  o : breathing unlabored  o Auscultation of Lungs  o : clear to ascultation  · Cardiovascular  o Heart  o :   § Auscultation of Heart  § : regular rate and rhythm  o Peripheral Vascular System  o :   § Extremities  § : no edema  · Lymphatic  o Neck  o : no lymphadenopathy  present  · Musculoskeletal  o General  o :   § General Musculoskeletal  § : No joint swelling or deformity. Muscle tone, strength, and development grossly normal.  · Skin and Subcutaneous Tissue  o General Inspection  o : NL tone  · Neurologic  o Gait and Station  o :   § Gait Screening  § : normal gait  · Psychiatric  o Mood and Affect  o : mood normal, affect appropriate              Assessment  · Immunization due     V05.9/Z23  · Allergic rhinitis     477.9/J30.9      Plan  · Orders  o ACO-39: Current medications updated and reviewed () - - 12/20/2018  o VAQTA vaccine (68822) - V05.9/Z23 - 12/20/2018  · Instructions  o Patient was educated/instructed on their diagnosis, treatment and medications prior to discharge from the clinic today.  · Disposition  o Follow up as needed.            Electronically Signed by: DEMAR Waggoner -Author on December 27, 2018 09:15:41 PM

## 2021-05-07 NOTE — PROGRESS NOTES
Progress Note      Patient Name: Valentina Stevens   Patient ID: [id number]   Sex: Female   YOB: 1965        Visit Date: March 20, 2018    Provider: Jaymie Castaneda MD   Location: [facility] [place]   Location Address: [address] [place] [address]  [id number]   Location Phone: [phone]          Chief Complaint     Refills, lab work and tdap       History Of Present Illness  Valentina Stevens is a 52 year old /White female who presents for evaluation and treatment of:      She is getting ready for a new grandbaby and needs a Tdap.  She gets tired when she is walking at [employer] and takes a walker with her so that she can sit down.  Her knees hurt .  Both have been broken before and she had to wear a long cast.  She had falls both times.  Her knees and her neck both pop.  She doesn't hurt in her neck. She hurts in the R lower back.    She takes the calcium QD and will increase to BID.  She is here for her thyroid check.    After work she is pretty much done.  She is tired.  She had the flu vaccine.  She gets SOB when she walks up the hill which is her driveway.  Denies chest pain.  She gets the SOB.    She occasionally get chest pain when she is at work when she is running around quickly.  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  She has had a colonoscopy.       Past Medical History  Disease Name Date Onset Notes   Depression --  --    Diverticulitis Of Colon --  --    Dysuria --  --    External hemorrhoid --  --    GERD (gastroesophageal reflux disease) --  --    Hearing loss --  --    Hematuria --  --    Hypothyroidism --  --    Menopause --  --    Murmur --  --    Osteoporosis --  --    Rash of Skin --  --    Scoliosis --  --    Vitamin D Deficiency --  --          Past Surgical History  Procedure Name Date Notes   Tubal ligation --  --          Medication List  Name Date Started Instructions   alendronate 70 mg oral tablet   take 1 tablet (70 mg) by oral route once weekly in the morning, at least 30 min before first food, beverage, or medication of day   levothyroxine 50 mcg oral tablet 01/29/2018 TAKE ONE TABLET BY MOUTH EVERY MORNING ON AN EMPTY STOMACH FOR THYROID   metaxalone 400 mg oral tablet  take 2 tablets (800 mg) by oral route 3 times per day as needed   naproxen 500 mg oral tablet  take 1 tablet (500 mg) by oral route 2 times per day with food   ondansetron 4 mg oral tablet,disintegrating 01/17/2018 Dissolve 1-2 tablets by oral route every 6-8 hours as needed for nausea/vomiting         Allergy List  Allergen Name Date Reaction Notes   Cephalexin adverse reaction --  --  --    PENICILLINS --  --  --    SULFA (SULFONAMIDES) --  --  --          Social History  Finding Status Start/Stop Quantity Notes   Alcohol use --  --/-- --  --    Tobacco Never --/-- --  --          Immunizations  NameDate Admin Mfg Trade Name Lot Number Route Inj VIS Given VIS Publication   Sghgoezez74/17/2018 UNK Unknown TradeName [id number] IM  01/17/2018 08/07/2015   Comments: ndc [id number]         Vitals  Date Time BP Position Site L\R Cuff Size HR RR TEMP(F) WT  HT  BMI kg/m2 BSA m2 O2 Sat        03/20/2018 08:33 /76 Sitting    81 - R  97.5 141lbs 0oz    100 %           Physical Examination  · Constitutional  o Appearance  o : well developed, well-nourished, in no acute distress  · Eyes  o Conjunctivae  o : conjunctivae normal  · Neck  o Inspection/Palpation  o : supple  o Thyroid  o : no thyromegaly  · Respiratory  o Respiratory Effort  o : breathing unlabored  o Auscultation of Lungs  o : clear to ascultation  · Cardiovascular  o Heart  o :   § Auscultation of Heart  § : regular rate and rhythm  o Peripheral Vascular System  o :   § Extremities  § : no edema  · Lymphatic  o Neck  o : no lymphadenopathy present  · Musculoskeletal  o General  o :   § General Musculoskeletal  § : Knee with crepitus and pops  · Neurologic  o Gait and Station  o :    § Gait Screening  § : normal gait  · Psychiatric  o Mood and Affect  o : mood normal, affect appropriate          Assessment  · Hypothyroidism     244.9/E03.9  · Screening for cardiovascular condition     V81.2/Z13.6      Plan  · Orders  o CBC with Auto Diff [facility] (68572) - - 03/20/2018  o CMP [facility] (36837) - V81.2/Z13.6 - 03/20/2018  o Lipid Panel [facility] (07623) - V81.2/Z13.6 - 03/20/2018  o TSH [facility] (27300) - 244.9/E03.9 - 03/20/2018  o ACO-39: Current medications updated and reviewed () - - 03/20/2018  o TDaP Vaccine - Age 7+ (16890) - - 03/20/2018   Vaccine - Tdap; Dose: 0.5; Site: Left Upper Arm; Route: Intramuscular; Date: 03/20/2018 09:41:00; Exp: 12/15/2019; Lot: [id number]; Mfg: sanofi pasteur; TradeName: ADACEL; Administered By: Lizzy Guevara MA; Comment: ndc [id number]  · Medications  o levothyroxine 50 mcg oral tablet   SIG: TAKE ONE TABLET BY MOUTH EVERY MORNING ON AN EMPTY STOMACH FOR THYROID for 30 days   DISP: (30) Tablet with 5 refills  Refilled on 03/20/2018     · Instructions  o Patient was educated/instructed on their diagnosis, treatment and medications prior to discharge from the clinic today.            Electronically Signed by: Jaymie Castaneda MD -Author on March 20, 2018 07:42:15 PM

## 2021-05-07 NOTE — PROGRESS NOTES
Progress Note      Patient Name: Valentina Stevens   Patient ID: [id number]   Sex: Female   YOB: 1965    Referring Provider: Rosalva BENZ    Visit Date: March 10, 2021    Provider: Louie Flaherty DO   Location: [facility]   Location Address: [address] [place] [address]   [place]  [id number]   Location Phone: [phone]          Chief Complaint     Fell on ice in Feb 2021, right shoulder pain ever since       History Of Present Illness  Valentina Stevens is a 55 year old /White female who presents for evaluation and treatment of:      1.) RIGHT SHOULDER PAIN : Onset - 2/21 - after fall on ice, while pulling trash can. Patient reports that she did not fall on her shoulder - but has been experiencing pain with pressure, i.e. lying on her right side, since that fall. She denies UE weakness. She denies UE numbness and tingling. She has not tried any remedies at home.       Past Medical History  Disease Name Date Onset Notes   Depression --  --    Diverticulitis Of Colon --  --    Dysuria --  --    External hemorrhoid --  --    GERD (gastroesophageal reflux disease) --  --    Hearing loss --  --    Hematuria --  --    Hypothyroidism --  --    Menopause --  --    Murmur --  --    Osteoporosis --  --    Rash of Skin --  --    Scoliosis --  --    Vitamin D Deficiency --  --          Past Surgical History  Procedure Name Date Notes   Thymectomy --  --    Tubal ligation --  --          Medication List  Name Date Started Instructions   amitriptyline oral  --    calcium carbonate-vitamin D3 600 mg(1,500mg) -400 unit oral tablet 10/19/2020 TAKE ONE TABLET BY MOUTH TWICE A DAY   hydrocodone-acetaminophen 5-325 mg oral tablet  take 1 tablet by oral route every 6 hours as needed for pain   levothyroxine 75 mcg oral tablet 10/19/2020 TAKE ONE TABLET BY MOUTH DAILY   lisinopril 2.5 mg oral tablet 10/19/2020 TAKE ONE TABLET BY MOUTH DAILY FOR 30 DAYS   multivitamin oral tablet  --     pramipexole 0.125 mg oral tablet 10/27/2020 TAKE 1 TABLET BY MOUTH EVERY NIGHT AT BEDTIME 2-3 HOURS BEFORE BED   TOPIRAMATE 25MG TABLETS 11/16/2020 TAKE 1 TABLET(25 MG) BY MOUTH EVERY DAY AT BEDTIME         Allergy List  Allergen Name Date Reaction Notes   Cephalexin adverse reaction --  --  --    indomethacin --  --  --    PENICILLINS --  --  --    SULFA (SULFONAMIDES) --  --  --        Allergies Reconciled  Social History  Finding Status Start/Stop Quantity Notes   Alcohol use --  --/-- --  --    Tobacco Never --/-- --  NEVER SMOKER         Immunizations  NameDate Admin Mfg Trade Name Lot Number Route Inj VIS Given VIS Publication   HepA12/20/2018 MSD VAQTA-ADULT [id number] IM RA 12/20/2018 01/01/2016   Comments: [id number]   HepA05/17/2018 SKB HAVRIX-ADULT [id number] IM  05/17/2018 07/21/2016   Comments: [id number]   Qysaahhrj26/19/2020 PMC FLUARIX [id number] IM RD 10/19/2020 08/15/2019   Comments: NDC [id number]   Tdap10/19/2020 PMC ADACEL [id number] IM LD 10/19/2020    Comments: NDC [id number]   Tdap03/20/2018 PMC ADACEL [id number] IM  03/20/2018 02/24/2015   Comments: ndc [id number]         Review of Systems  · Constitutional  o Denies  o : fatigue, night sweats  · Eyes  o Denies  o : double vision, blurred vision  · HENT  o Denies  o : vertigo, recent head injury  · Cardiovascular  o Denies  o : chest pain, irregular heart beats  · Respiratory  o Denies  o : shortness of breath, productive cough  · Gastrointestinal  o Denies  o : nausea, vomiting  · Genitourinary  o Denies  o : dysuria, urinary retention  · Integument  o Denies  o : hair growth change, new skin lesions  · Neurologic  o Denies  o : altered mental status, seizures  · Musculoskeletal  o Admits  o : right shoulder pain   o Denies  o : joint swelling, limitation of motion  · Endocrine  o Denies  o : cold intolerance, heat intolerance  · Heme-Lymph  o Denies  o : petechiae, lymph node enlargement or tenderness  · Allergic-Immunologic  o Denies  o : frequent  illnesses      Vitals  Date Time BP Position Site L\R Cuff Size HR RR TEMP (F) WT  HT  BMI kg/m2 BSA m2 O2 Sat FR L/min FiO2 HC       03/10/2021 12:37 /80 Sitting    80 - R  97.2 155lbs 16oz    95 %  21%          Physical Examination  · Constitutional  o Appearance  o : alert, in no acute distress  · Eyes  o Conjunctivae  o : conjunctivae normal  · Neck  o Inspection/Palpation  o : supple  · Respiratory  o Respiratory Effort  o : breathing unlabored  · Cardiovascular  o Peripheral Vascular System  o :   § Extremities  § : no edema  · Musculoskeletal  o General  o :   § General Musculoskeletal  § : EXAMINATION OF RIGHT SHOULDER - unremarkable   · Skin and Subcutaneous Tissue  o General Inspection  o : no rash appreciated   · Neurologic  o Gait and Station  o :   § Gait Screening  § : normal gait  · Psychiatric  o Mood and Affect  o : mood normal, affect appropriate          Assessment  · Right shoulder pain     719.41/M25.511    A.) Likely muscle strain, recommend topical NSAID, provided with at home shoulder rehab handout, follow up if no relief.         Plan  · Orders  o ACO-39: Current medications updated and reviewed (1159F, ) - - 03/10/2021  · Medications  o diclofenac sodium 1 % topical gel   SIG: apply 2 grams to the affected area(s) by topical route 2 times per day   DISP: (100) Gram with 0 refills  Prescribed on 03/10/2021     o Medications have been Reconciled  o Transition of Care or Provider Policy  · Instructions  o Take all medications as prescribed/directed.  o Patient was educated/instructed on their diagnosis, treatment and medications prior to discharge from the clinic today.            Electronically Signed by: Louie Flaherty DO - on March 10, 2021 01:06:51 PM

## 2021-05-07 NOTE — PROGRESS NOTES
Progress Note      Patient Name: Valentina Stevens   Patient ID: [id number]   Sex: Female   YOB: 1965    Referring Provider: Rosalva BENZ    Visit Date: October 30, 2018    Provider: Eduardo Adames MD   Location: [facility]   Location Address: [address] [place] [address]  [id number]   Location Phone: [phone]          Chief Complaint     right lower leg hurts and burns, was swollen. X 1 day. hurting is normal after work, burning new.       History Of Present Illness  Valentina Stevens is a 53 year old /White female who presents for evaluation and treatment of:      right lower leg pain and swelling x 2 days- occurred while working- today still has swelling and pain- has had muscle cramps       Past Medical History  Disease Name Date Onset Notes   Depression --  --    Diverticulitis Of Colon --  --    Dysuria --  --    External hemorrhoid --  --    GERD (gastroesophageal reflux disease) --  --    Hearing loss --  --    Hematuria --  --    Hypothyroidism --  --    Menopause --  --    Murmur --  --    Osteoporosis --  --    Rash of Skin --  --    Scoliosis --  --    Vitamin D Deficiency --  --          Past Surgical History  Procedure Name Date Notes   Tubal ligation --  --          Medication List  Name Date Started Instructions   Calcium 600 + D(3) 600 mg(1,500mg) -400 unit oral tablet 07/16/2018 take 1 tablet by oral route 2 times a day   ferrous sulfate 325 mg (65 mg iron) oral tablet 09/18/2018 take 1 tablet (325 mg) by oral route once daily for 30 days   levothyroxine 75 mcg oral tablet 09/18/2018 take 1 tablet (75 mcg) by oral route once daily for 30 days   Metamucil oral  --    metaxalone 400 mg oral tablet 07/20/2018 TAKE ONE TO TWO TABLETS BY MOUTH THREE TIMES A DAY AFTER MEALS   multivitamin oral tablet  --    naproxen sodium 550 mg oral tablet 06/15/2018 take 1 tablet (550 mg) by oral route every 12 hours as needed for 30 days         Allergy  "List  Allergen Name Date Reaction Notes   Cephalexin adverse reaction --  --  --    PENICILLINS --  --  --    SULFA (SULFONAMIDES) --  --  --          Social History  Finding Status Start/Stop Quantity Notes   Alcohol use --  --/-- --  --    Tobacco Never --/-- --  NEVER SMOKER         Immunizations  NameDate Admin Mfg Trade Name Lot Number Route Inj VIS Given VIS Publication   HepA05/17/2018 SKB Havrix Adult [id number] IM  05/17/2018 07/21/2016   Comments: [id number]   Lpbyibgnp49/09/2018 PMC Fluzone Quadrivalent [id number] IM  10/09/2018 08/07/2015   Comments: NDC [id number]   Myzjodhyx99/17/2018 UNK Unknown TradeName [id number] IM  01/17/2018 08/07/2015   Comments: ndc 79895-512-36   Tdap03/20/2018 [facility] ADACEL [id number] IM  03/20/2018 02/24/2015   Comments: ndc [id number]         Review of Systems  · Constitutional  o Denies  o : fatigue, fever  · Cardiovascular  o Admits  o : swelling (feet, ankles, hands)  o Denies  o : chest pain, shortness of breath, palpitations  · Respiratory  o Denies  o : shortness of breath, cough  · Musculoskeletal  o Admits  o : muscle pain      Vitals  Date Time BP Position Site L\R Cuff Size HR RR TEMP(F) WT  HT  BMI kg/m2 BSA m2 O2 Sat        10/30/2018 02:12 /86 Sitting    115 - R  98 143lbs 2oz 5'  4.5\" 24.19 1.72 98 %           Physical Examination  · Constitutional  o Appearance  o : well developed, well-nourished, in no acute distress  · Respiratory  o Respiratory Effort  o : breathing unlabored  o Auscultation of Lungs  o : clear to ascultation  · Cardiovascular  o Heart  o :   § Auscultation of Heart  § : regular rate and rhythm  o Peripheral Vascular System  o :   § Extremities  § : no edema  · Gastrointestinal  o Abdomen  o : soft, non-tender, non-distended, + bowel sounds, no hepatosplenomegaly, no masses palpated  · Musculoskeletal  o General  o :   § General Musculoskeletal  § : No joint swelling or deformity., Muscle tone, strength, and development grossly normal. right " lower leg swollen, tender, no redness or warmth, normal ROM, stable, pulses intact, CR,2sec  · Neurologic  o Gait and Station  o :   § Gait Screening  § : normal gait  · Psychiatric  o Mood and Affect  o : mood normal, affect appropriate          Assessment  · Muscle cramps     729.82/R25.2  · Edema     782.3/R60.9  · Leg pain     729.5/M79.606      Plan  · Orders  o CBC with Auto Diff [facility] (05987) - 729.82/R25.2, 782.3/R60.9, 729.5/M79.606 - 10/30/2018  o CMP [facility] (03915) - 729.82/R25.2, 782.3/R60.9, 729.5/M79.606 - 10/30/2018  o ACO-14: Influenza immunization administered or previously received () - - 10/30/2018  o ACO-39: Current medications updated and reviewed () - - 10/30/2018  o US venous duplex scan extremity lower right (34484) - 782.3/R60.9, 729.5/M79.606 - 10/30/2018  o Magnesium level (53230) - 729.82/R25.2, 782.3/R60.9, 729.5/M79.606 - 10/30/2018  o CPK Total H (78673) - 729.82/R25.2, 782.3/R60.9, 729.5/M79.606 - 10/30/2018  · Medications  o Medrol (Pablo) 4 mg oral tablets,dose pack   SIG: take as directed for 5 days   DISP: (1) 21 ct dose-pack with 0 refills  Prescribed on 10/30/2018     · Instructions  o Patient was educated/instructed on their diagnosis, treatment and medications prior to discharge from the clinic today.            Electronically Signed by: Eduardo Adames MD -Author on October 30, 2018 02:36:21 PM

## 2021-05-07 NOTE — PROGRESS NOTES
Progress Note      Patient Name: Valentina Stevens   Patient ID: [id number]   Sex: Female   YOB: 1965    Referring Provider: Rosalva BENZ    Visit Date: November 30, 2020    Provider: Eduardo Adames MD   Location: [facility] [place] [place]   Location Address: [address] [place] [address]   [place]  [id number]   Location Phone: [phone]          Chief Complaint     3 WKS BILATERAL HAND NUMBNESS, LEFT WORSE, NOW DROPPING THINGS.    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       History Of Present Illness  Valentina Stevens is a 55 year old /White female who presents for evaluation and treatment of:      bilateral hand numbness and intermittent weakness x 3 weeks- no recent injury left greater than right- pt has generalized numbness and weakness intermittently in her entire hands only, no swelling, redness, warmth, or bruising, normal movement today  pt sees neurology for her headaches- they recently did brain scan on her    xrays:no fxs       Past Medical History  Disease Name Date Onset Notes   Depression --  --    Diverticulitis Of Colon --  --    Dysuria --  --    External hemorrhoid --  --    GERD (gastroesophageal reflux disease) --  --    Hearing loss --  --    Hematuria --  --    Hypothyroidism --  --    Menopause --  --    Murmur --  --    Osteoporosis --  --    Rash of Skin --  --    Scoliosis --  --    Vitamin D Deficiency --  --          Past Surgical History  Procedure Name Date Notes   Thymectomy --  --    Tubal ligation --  --          Medication List  Name Date Started Instructions   calcium carbonate-vitamin D3 600 mg(1,500mg) -400 unit oral tablet 10/19/2020 TAKE ONE TABLET BY MOUTH TWICE A DAY   levothyroxine 75 mcg oral tablet 10/19/2020 TAKE ONE TABLET BY MOUTH DAILY   lisinopril 2.5 mg oral tablet 10/19/2020 TAKE ONE TABLET BY MOUTH DAILY FOR 30 DAYS    multivitamin oral tablet  --    pramipexole 0.125 mg oral tablet 10/27/2020 TAKE 1 TABLET BY MOUTH EVERY NIGHT AT BEDTIME 2-3 HOURS BEFORE BED   Topamax 25 mg oral tablet 10/19/2020 take 1 tablet (25 mg) by oral route once daily at bedtime for 30 days   TOPIRAMATE 25MG TABLETS 11/16/2020 TAKE 1 TABLET(25 MG) BY MOUTH EVERY DAY AT BEDTIME         Allergy List  Allergen Name Date Reaction Notes   Cephalexin adverse reaction --  --  --    indomethacin --  --  --    PENICILLINS --  --  --    SULFA (SULFONAMIDES) --  --  --        Allergies Reconciled  Social History  Finding Status Start/Stop Quantity Notes   Alcohol use --  --/-- --  --    Tobacco Never --/-- --  NEVER SMOKER         Immunizations  NameDate Admin Mfg Trade Name Lot Number Route Inj VIS Given VIS Publication   HepA12/20/2018 MSD VAQTA-ADULT [id number] IM RA 12/20/2018 01/01/2016   Comments: 7058-9403-20   HepA05/17/2018 SKB HAVRIX-ADULT [id number] IM  05/17/2018 07/21/2016   Comments: [id number]   Wbcaisnfh27/19/2020 PMC FLUARIX [id number] IM RD 10/19/2020 08/15/2019   Comments: NDC [id number]   Tdap10/19/2020 PMC ADACEL [id number] IM LD 10/19/2020    Comments: NDC [id number]   Tdap03/20/2018 PMC ADACEL [id number] IM  03/20/2018 02/24/2015   Comments: ndc [id number]         Review of Systems  · Constitutional  o Denies  o : fatigue, fever  · Cardiovascular  o Denies  o : chest pain, palpitations  · Respiratory  o Denies  o : shortness of breath, cough  · Musculoskeletal  o Denies  o : wrist pain      Vitals  Date Time BP Position Site L\R Cuff Size HR RR TEMP (F) WT  HT  BMI kg/m2 BSA m2 O2 Sat FR L/min FiO2 HC       11/30/2020 01:07 /76 Sitting    106 - R  97.3 155lbs 4oz    94 %            Physical Examination  · Constitutional  o Appearance  o : well developed, well-nourished, in no acute distress  · Respiratory  o Respiratory Effort  o : breathing unlabored  o Auscultation of Lungs  o : clear to ascultation  · Cardiovascular  o Heart  o :    § Auscultation of Heart  § : regular rate and rhythm  o Peripheral Vascular System  o :   § Extremities  § : no edema  · Musculoskeletal  o General  o :   § General Musculoskeletal  § : No joint swelling or deformity., Muscle tone, strength, and development grossly normal.  · Neurologic  o Gait and Station  o :   § Gait Screening  § : normal gait  · Psychiatric  o Mood and Affect  o : mood normal, affect appropriate          Assessment  · Hand numbness     782.0/R20.0      Plan  · Orders  o ACO-39: Current medications updated and reviewed (1159F, ) - - 11/30/2020  o Xray wrist 3v left [facility] (75424-DR) - 782.0/R20.0 - 11/30/2020  o Xray wrist 3v right [facility] (06206-LG) - 782.0/R20.0 - 11/30/2020  o ORTHOPEDIC CONSULTATION (ORTHO) - 782.0/R20.0 - 11/30/2020  o NEUROLOGY CONSULTATION. (NEURO) - 782.0/R20.0 - 11/30/2020  o Vitamin B12 and folate measurement (34480, 00382) - 782.0/R20.0 - 11/30/2020  · Medications  o prednisone 20 mg oral tablet   SIG: take 2 tablets by oral route daily for 5 days   DISP: (10) Tablet with 0 refills  Prescribed on 11/30/2020     o Medications have been Reconciled  o Transition of Care or Provider Policy  · Instructions  o Patient was educated/instructed on their diagnosis, treatment and medications prior to discharge from the clinic today.            Electronically Signed by: Eduardo Adames MD -Author on November 30, 2020 02:22:30 PM

## 2021-05-07 NOTE — PROGRESS NOTES
Progress Note      Patient Name: Valentina Stevens   Patient ID: [id number]   Sex: Female   YOB: 1965    Referring Provider: Rosalva BENZ    Visit Date: May 2, 2019    Provider: Eduardo Adames MD   Location: [facility]   Location Address: [address] [place] [address]  [id number]   Location Phone: [phone]          Chief Complaint     PATIENT IS HERE TO GO OVER HER TESTS RESULTS.       History Of Present Illness  Valentina Stevens is a 53 year old /White female who presents for evaluation and treatment of:      discussed CT chest- pt has 2 right lung nodules 6mm, 7mm, and left lung nodule 3mm and larger area in area of thymus that is hyperplasia versus lymphoma on CT chest       Past Medical History  Disease Name Date Onset Notes   Depression --  --    Diverticulitis Of Colon --  --    Dysuria --  --    External hemorrhoid --  --    GERD (gastroesophageal reflux disease) --  --    Hearing loss --  --    Hematuria --  --    Hypothyroidism --  --    Menopause --  --    Murmur --  --    Osteoporosis --  --    Rash of Skin --  --    Scoliosis --  --    Vitamin D Deficiency --  --          Past Surgical History  Procedure Name Date Notes   Tubal ligation --  --          Medication List  Name Date Started Instructions   Calcium 600 + D(3) 600 mg(1,500mg) -400 unit oral tablet 07/16/2018 take 1 tablet by oral route 2 times a day   ferrous sulfate 325 mg (65 mg iron) oral tablet 09/18/2018 take 1 tablet (325 mg) by oral route once daily for 30 days   ibuprofen 800 mg oral tablet 04/01/2019 take 1 tablet (800 mg) by oral route 3 times per day with food for 14 days   levothyroxine 75 mcg oral tablet 03/18/2019 TAKE ONE TABLET BY MOUTH DAILY   Metamucil oral  --    metaxalone 400 mg oral tablet 07/20/2018 TAKE ONE TO TWO TABLETS BY MOUTH THREE TIMES A DAY AFTER MEALS   metronidazole 500 mg oral tablet  take 1 tablet (500 mg) by oral route 2 times per day for 7  "days   multivitamin oral tablet  --          Allergy List  Allergen Name Date Reaction Notes   Cephalexin adverse reaction --  --  --    PENICILLINS --  --  --    SULFA (SULFONAMIDES) --  --  --          Social History  Finding Status Start/Stop Quantity Notes   Alcohol use --  --/-- --  --    Tobacco Never --/-- --  NEVER SMOKER         Immunizations  NameDate Admin Mfg Trade Name Lot Number Route Inj VIS Given VIS Publication   HepA12/20/2018 MSD VAQTA-ADULT [id number] [facility] 12/20/2018 01/01/2016   Comments: 6285-8425-64   HepA05/17/2018 SKB HAVRIX-ADULT [id number] IM  05/17/2018 07/21/2016   Comments: [id number]   Dfzgjyhzh46/09/2018 PMC FLUARIX [id number]   10/09/2018 08/07/2015   Comments: NDC [id number]   Iczvogjuz75/17/2018 UNK Unknown TradeName [id number] IM  01/17/2018 08/07/2015   Comments: ndc 71291-976-72   Tdap03/20/2018 PMC ADACEL [id number]   03/20/2018 02/24/2015   Comments: ndc [id number]         Review of Systems  · Constitutional  o Denies  o : fatigue, fever  · Cardiovascular  o Denies  o : chest pain, palpitations  · Respiratory  o Denies  o : shortness of breath, cough  · Gastrointestinal  o Denies  o : nausea, vomiting, diarrhea  · Psychiatric  o Denies  o : anxiety, depression      Vitals  Date Time BP Position Site L\R Cuff Size HR RR TEMP (F) WT  HT  BMI kg/m2 BSA m2 O2 Sat        05/02/2019 05:23 /62 Sitting    88 - R  98.4 144lbs 2oz 5'  3\" 25.53 1.7 98 %          Physical Examination  · Constitutional  o Appearance  o : well developed, well-nourished, in no acute distress  · Respiratory  o Respiratory Effort  o : breathing unlabored  o Auscultation of Lungs  o : clear to ascultation  · Cardiovascular  o Heart  o :   § Auscultation of Heart  § : regular rate and rhythm  o Peripheral Vascular System  o :   § Extremities  § : no edema  · Musculoskeletal  o General  o :   § General Musculoskeletal  § : No joint swelling or deformity., Muscle tone, strength, and development grossly " normal.  · Neurologic  o Gait and Station  o :   § Gait Screening  § : normal gait  · Psychiatric  o Mood and Affect  o : mood normal, affect appropriate          Assessment  · Chest mass     786.6/R22.2  · Lung nodules     793.19/R91.8      Plan  · Orders  o CBC with Auto Diff [facility] (02021) - 786.6/R22.2, 793.19/R91.8 - 05/02/2019  o CMP [facility] (29760) - 786.6/R22.2, 793.19/R91.8 - 05/02/2019  o ACO-39: Current medications updated and reviewed () - - 05/02/2019  o CT Chest with IV Contrast [facility] (94863) - 793.19/R91.8 - 11/02/2019   discussed CT chest- pt has 2 right lung nodules 6mm, 7mm, and left lung nodule 3mm and larger area in area of thymus that is hyperplasia versus lymphoma on CT chest  o CARDIOTHORACIC SURGERY CONSULTATION ([facility]) - 786.6/R22.2 - 05/02/2019   discussed CT chest- pt has 2 right lung nodules 6mm, 7mm, and left lung nodule 3mm and larger area in area of thymus that is hyperplasia versus lymphoma on CT chest- need eval and tx  · Instructions  o Patient was educated/instructed on their diagnosis, treatment and medications prior to discharge from the clinic today.            Electronically Signed by: Eduardo Adames MD -Author on May 2, 2019 06:19:33 PM

## 2021-05-07 NOTE — PROGRESS NOTES
Progress Note      Patient Name: Valentina Stevens   Patient ID: [id number]   Sex: Female   YOB: 1965    Referring Provider: Rosalva BENZ    Visit Date: April 11, 2019    Provider: Eduardo Adames MD   Location: [facility]   Location Address: [address] [place] [address]  [id number]   Location Phone: [phone]          Chief Complaint     CT follow up       History Of Present Illness  Valentina Stevens is a 53 year old /White female who presents for evaluation and treatment of:      CT abdomen discussed- pt had some abdominal pain at time of CT but this has now fully resolved- diverticulitis likely resolved  fatty liver  pelvic congestion- pt had recent pap smear in oct 2018- normal  right middle lobe lung nodule- will get CT chest- if nodule still present and there no other nodules, farida repeat CT chest in 12 months       Past Medical History  Disease Name Date Onset Notes   Depression --  --    Diverticulitis Of Colon --  --    Dysuria --  --    External hemorrhoid --  --    GERD (gastroesophageal reflux disease) --  --    Hearing loss --  --    Hematuria --  --    Hypothyroidism --  --    Menopause --  --    Murmur --  --    Osteoporosis --  --    Rash of Skin --  --    Scoliosis --  --    Vitamin D Deficiency --  --          Past Surgical History  Procedure Name Date Notes   Tubal ligation --  --          Medication List  Name Date Started Instructions   Calcium 600 + D(3) 600 mg(1,500mg) -400 unit oral tablet 07/16/2018 take 1 tablet by oral route 2 times a day   ferrous sulfate 325 mg (65 mg iron) oral tablet 09/18/2018 take 1 tablet (325 mg) by oral route once daily for 30 days   ibuprofen 800 mg oral tablet 04/01/2019 take 1 tablet (800 mg) by oral route 3 times per day with food for 14 days   levothyroxine 75 mcg oral tablet 03/18/2019 TAKE ONE TABLET BY MOUTH DAILY   Metamucil oral  --    metaxalone 400 mg oral tablet 07/20/2018 TAKE ONE TO TWO  "TABLETS BY MOUTH THREE TIMES A DAY AFTER MEALS   metronidazole 500 mg oral tablet  take 1 tablet (500 mg) by oral route 2 times per day for 7 days   multivitamin oral tablet  --          Allergy List  Allergen Name Date Reaction Notes   Cephalexin adverse reaction --  --  --    PENICILLINS --  --  --    SULFA (SULFONAMIDES) --  --  --          Social History  Finding Status Start/Stop Quantity Notes   Alcohol use --  --/-- --  --    Tobacco Never --/-- --  NEVER SMOKER         Immunizations  NameDate Admin Mfg Trade Name Lot Number Route Inj VIS Given VIS Publication   HepA12/20/2018 MSD VAQTA-ADULT [id number]  RA 12/20/2018 01/01/2016   Comments: 2956-7095-66   HepA05/17/2018 SKB HAVRIX-ADULT [id number] IM  05/17/2018 07/21/2016   Comments: [id number]   Yszizived89/09/2018 PMC FLUARIX [id number]   10/09/2018 08/07/2015   Comments: NDC [id number]   Ydzzybpai44/17/2018 UNK Unknown TradeName [id number] IM  01/17/2018 08/07/2015   Comments: ndc 21470-909-27   Tdap03/20/2018 PMC ADACEL [id number] IM  03/20/2018 02/24/2015   Comments: ndc [id number]         Review of Systems  · Constitutional  o Denies  o : fatigue, fever  · Cardiovascular  o Denies  o : chest pain, palpitations  · Respiratory  o Denies  o : shortness of breath, cough  · Gastrointestinal  o Denies  o : nausea, vomiting, diarrhea, abdominal pain  · Genitourinary  o Denies  o : dysuria, urinary retention, vaginal discharge      Vitals  Date Time BP Position Site L\R Cuff Size HR RR TEMP (F) WT  HT  BMI kg/m2 BSA m2 O2 Sat        04/11/2019 05:54 /52 Sitting    76 - R  98.4 145lbs 6oz 5'  3\" 25.75 1.71 97 %          Physical Examination  · Constitutional  o Appearance  o : well developed, well-nourished, in no acute distress  · Respiratory  o Respiratory Effort  o : breathing unlabored  o Auscultation of Lungs  o : clear to ascultation  · Cardiovascular  o Heart  o :   § Auscultation of Heart  § : regular rate and rhythm  o Peripheral Vascular System  o : "   § Extremities  § : no edema  · Gastrointestinal  o Abdomen  o : soft, non-tender, non-distended, + bowel sounds, no hepatosplenomegaly, no masses palpated  · Musculoskeletal  o General  o :   § General Musculoskeletal  § : No joint swelling or deformity., Muscle tone, strength, and development grossly normal.  · Neurologic  o Gait and Station  o :   § Gait Screening  § : normal gait  · Psychiatric  o Mood and Affect  o : mood normal, affect appropriate          Assessment  · Abnormal abdominal CT scan     793.6/R93.5  · Fatty liver     571.8/K76.0  · Lung nodule     793.11/R91.1      Plan  · Orders  o ACO-18: Depression screening not completed due to current diagnosis of depression or bipolar disorder () - - 04/11/2019  o ACO-39: Current medications updated and reviewed () - - 04/11/2019  o Gastroenterology Consultation ([facility]) - 571.8/K76.0 - 04/11/2019  o US transvaginal (06741) - 793.6/R93.5 - 04/11/2019   pelvic congestion on CT abd/pelvis- U/S recommended  o CT Chest with IV Contrast [facility] (46872) - 793.11/R91.1 - 04/11/2019   0.5cm right middle lobe nodule on CT abd/pelvis  · Instructions  o Handouts were given to patient: diet.  o Patient instructed/educated on their diet and exercise program.  o Patient was educated/instructed on their diagnosis, treatment and medications prior to discharge from the clinic today.            Electronically Signed by: Eduardo Adames MD -Author on April 11, 2019 06:47:59 PM

## 2021-05-07 NOTE — PROGRESS NOTES
Progress Note      Patient Name: Valentina Stevens   Patient ID: [id number]   Sex: Female   YOB: 1965    Referring Provider: Rosalva BENZ    Visit Date: September 24, 2020    Provider: DEMAR Maldonado   Location: [facility]   Location Address: [address] [place] [address]   [place]  [address]   Location Phone: [phone]          Chief Complaint     trouble with legs qhs, has to keep moving them  trouble hearing out of rt ear       History Of Present Illness  Valentina Stevens is a 54 year old /White female who presents for evaluation and treatment of:      she is having trouble with nocturnal leg movements - just can't keep her legs still - she has had a sleep study and has DEVENDRA now on CPAP but was not told that she had RLS - she sometimes has problems during the day keeping her legs still - no muscle pain or cramping, just restless and crawling feeling    She is also having trouble hearing out of the right ear - has a history of hearing loss out of the left, but the right is new. No ear pain, no sensation of fullness or tinnitus, just hard of hearing.       Past Medical History  Disease Name Date Onset Notes   Depression --  --    Diverticulitis Of Colon --  --    Dysuria --  --    External hemorrhoid --  --    GERD (gastroesophageal reflux disease) --  --    Hearing loss --  --    Hematuria --  --    Hypothyroidism --  --    Menopause --  --    Murmur --  --    Osteoporosis --  --    Rash of Skin --  --    Scoliosis --  --    Vitamin D Deficiency --  --          Past Surgical History  Procedure Name Date Notes   Thymectomy --  --    Tubal ligation --  --          Medication List  Name Date Started Instructions   calcium carbonate-vitamin D3 600 mg(1,500mg) -400 unit oral tablet 04/09/2020 TAKE ONE TABLET BY MOUTH TWICE A DAY   levothyroxine 75 mcg oral tablet 04/09/2020 TAKE ONE TABLET BY MOUTH DAILY   lisinopril 2.5 mg oral tablet 04/16/2020 TAKE ONE TABLET BY MOUTH  DAILY FOR 30 DAYS   multivitamin oral tablet  --          Allergy List  Allergen Name Date Reaction Notes   Cephalexin adverse reaction --  --  --    indomethacin --  --  --    PENICILLINS --  --  --    SULFA (SULFONAMIDES) --  --  --          Social History  Finding Status Start/Stop Quantity Notes   Alcohol use --  --/-- --  --    Tobacco Never --/-- --  NEVER SMOKER         Immunizations  NameDate Admin Mfg Trade Name Lot Number Route Inj VIS Given VIS Publication   HepA12/20/2018 MSD VAQTA-ADULT [id number] IM RA 12/20/2018 01/01/2016   Comments: 3398-8558-01   HepA05/17/2018 SKB HAVRIX-ADULT [id number] IM  05/17/2018 07/21/2016   Comments: [id number]   Rvzctvjas06/11/2019 PMC Fluzone, quadrivalent, preservative free [id number]  [place] 10/11/2019    Comments: NDC: 32327-713-52   Qemlnkznl41/09/2018 PMC FLUARIX [id number] IM  10/09/2018 08/07/2015   Comments: NDC [id number]   Xhewywnna82/17/2018 UNK Unknown TradeName [id number] IM  01/17/2018 08/07/2015   Comments: ndc 12928-784-90   Tdap03/20/2018 PMC ADACEL [id number] IM  03/20/2018 02/24/2015   Comments: ndc [id number]         Review of Systems  · Constitutional  o Admits  o : good general health lately  o Denies  o : fever, chills, body aches  · HENT  o Admits  o : hearing loss  o Denies  o : headaches, vertigo, lightheadedness, tinnitus, ear pain, ear fullness  · Cardiovascular  o Denies  o : chest pain, palpitations  · Respiratory  o Admits  o : DEVENDRA on CPAP  o Denies  o : shortness of breath, cough  · Integument  o Denies  o : pigmentation changes, skin dryness, hair growth change, nail changes  · Musculoskeletal  o Admits  o : restless leg movements  o Denies  o : muscle pain, muscle cramps  · Psychiatric  o Admits  o : difficulty sleeping      Vitals  Date Time BP Position Site L\R Cuff Size HR RR TEMP (F) WT  HT  BMI kg/m2 BSA m2 O2 Sat        09/24/2020 10:55 /70 Sitting    111 - R   151lbs 0oz    98 %          Physical  Examination  · Constitutional  o Appearance  o : well developed, well-nourished, in no acute distress  · Ears, Nose, Mouth and Throat  o Ears  o :   § External Ears  § : auricle appearance normal bilaterally, no auricle tenderness to palpation present, external auditory canal appearance within normal limits, no discharge present  § Otoscopic Examination  § : tympanic membrane appearance within normal limits bilaterally, no tympanic membrane perforations present, no fluid present behind tympanic membranes, tympanic membrane color normal  § Hearing  § : response to sound normal, no tinnitus  · Respiratory  o Respiratory Effort  o : breathing unlabored  o Auscultation of Lungs  o : clear to auscultation  · Cardiovascular  o Heart  o :   § Auscultation of Heart  § : regular rate and rhythm  · Musculoskeletal  o General  o :   § General Musculoskeletal  § : Muscle tone, strength, and development grossly normal.  · Skin and Subcutaneous Tissue  o General Inspection  o : no lesions present, no areas of discoloration, skin turgor normal, texture normal  · Neurologic  o Gait and Station  o :   § Gait Screening  § : normal gait  · Psychiatric  o Mood and Affect  o : mood normal, affect appropriate          Assessment  · Restless leg syndrome     333.94/G25.81  · Obstructive sleep apnea     327.23/G47.33  · Change in hearing of right ear     389.9/H91.91  · Hearing loss of left ear, unspecified hearing loss type     389.9/H91.92    Problems Reconciled  Plan  · Orders  o ACO-39: Current medications updated and reviewed () - - 09/24/2020  o Audiology Consultation (AUDIO) - 389.9/H91.91, 389.9/H91.92 - 09/24/2020  · Medications  o pramipexole 0.125 mg oral tablet   SIG: take 1 tablet (0.125 mg) by oral route 2-3 hours before bedtime   DISP: (30) tablets with 0 refills  Prescribed on 09/24/2020     o Medications have been Reconciled  o Transition of Care or Provider Policy  · Instructions  o Take all medications as  prescribed/directed.  o Patient was educated/instructed on their diagnosis, treatment and medications prior to discharge from the clinic today. Start pramipexole 0.125mg nightly x1 week - may increase to 2 at night if needed. Follow up with me in 3 weeks to reassess - may do so over the phone with call - will refer to audiology d/t concerns for change in hearing on the right with hx of hearing loss in the left ear.   · Disposition  o Call or Return if symptoms worsen or persist.            Electronically Signed by: DEMAR Maldonado -Author on September 24, 2020 12:14:21 PM

## 2021-05-07 NOTE — PROGRESS NOTES
"   Progress Note      Patient Name: Valentina Stevens   Patient ID: [id number]   Sex: Female   YOB: 1965    Referring Provider: Rosalva BENZ    Visit Date: October 23, 2019    Provider: CARMEN Grey   Location: [facility]   Location Address: [address] [place] [address]   [place]  [address]   Location Phone: [phone]          Chief Complaint     FOLLOW UP ON LABS       History Of Present Illness  Valentina Stevens is a 54 year old /White female who presents for evaluation and treatment of:      8000 steps today--averages close to 5000 steps daily\">pt here for the F/U on her abnormal labs-    known fatty liver'  does not use any ETOH    and then pt triglycerides were in the 300'a then the HDL was 38    then the ALT was elevated as well    then pt also been experiencing trouble with her lower mid back --shooting pain and then at times hard to walk up and down steps and then also has known DDD of the spine and scoliosis and then also already did xrays and PT--and nothing really helps and there are times her back hurts so badly hard to stand at work then the pain radiated to the left hip more     pt walked >8000 steps today--averages close to 5000 steps daily       Past Medical History  Disease Name Date Onset Notes   Depression --  --    Diverticulitis Of Colon --  --    Dysuria --  --    External hemorrhoid --  --    GERD (gastroesophageal reflux disease) --  --    Hearing loss --  --    Hematuria --  --    Hypothyroidism --  --    Menopause --  --    Murmur --  --    Osteoporosis --  --    Rash of Skin --  --    Scoliosis --  --    Vitamin D Deficiency --  --          Past Surgical History  Procedure Name Date Notes   Thymectomy --  --    Tubal ligation --  --          Medication List  Name Date Started Instructions   calcium carbonate-vitamin D3 600 mg(1,500mg) -400 unit oral tablet 10/10/2019 TAKE ONE TABLET BY MOUTH TWICE A DAY   levothyroxine 75 mcg " "oral tablet 10/10/2019 TAKE ONE TABLET BY MOUTH DAILY   multivitamin oral tablet  --    Tylenol 325 mg oral capsule  --          Allergy List  Allergen Name Date Reaction Notes   Cephalexin adverse reaction --  --  --    PENICILLINS --  --  --    SULFA (SULFONAMIDES) --  --  --          Social History  Finding Status Start/Stop Quantity Notes   Alcohol use --  --/-- --  --    Tobacco Never --/-- --  NEVER SMOKER         Immunizations  NameDate Admin Mfg Trade Name Lot Number Route Inj VIS Given VIS Publication   HepA12/20/2018 MSD VAQTA-ADULT [id number] IM RA 12/20/2018 01/01/2016   Comments: 9614-5484-58   HepA05/17/2018 SKB HAVRIX-ADULT [id number] IM  05/17/2018 07/21/2016   Comments: [id number]   Tddzixcrk84/11/2019 PMC Fluzone, quadrivalent, preservative free [id number]  [place] 10/11/2019    Comments: NDC: 69656-835-24   Axamacbrr89/09/2018 PMC FLUARIX [id number] IM  10/09/2018 08/07/2015   Comments: NDC [id number]   Rpbkgxhaw02/17/2018 UNK Unknown TradeName [id number] IM  01/17/2018 08/07/2015   Comments: ndc 40569-113-69   Tdap03/20/2018 PMC ADACEL [id number] IM  03/20/2018 02/24/2015   Comments: ndc [id number]         Review of Systems  · Constitutional  o Denies  o : fatigue, fever  · HENT  o Denies  o : vertigo, recent head injury  · Gastrointestinal  o Denies  o : nausea, vomiting, abdominal pain  · Genitourinary  o Denies  o : dysuria, urinary retention  · Musculoskeletal  o Admits  o : joint pain, muscle pain, limitation of motion, back pain, additional musculoskeletal symptoms except as noted in the HPI  · Heme-Lymph  o Denies  o : petechiae, lymph node enlargement or tenderness  · Allergic-Immunologic  o Denies  o : frequent illnesses      Vitals  Date Time BP Position Site L\R Cuff Size HR RR TEMP (F) WT  HT  BMI kg/m2 BSA m2 O2 Sat HC       10/23/2019 05:09 /70 Sitting    100 - R  97.6 147lbs 6oz 5'  3.5\" 25.7 1.73 99 %          Physical Examination  · Constitutional  o Appearance  o : well developed, " well-nourished, in no acute distress  · Eyes  o Conjunctivae  o : conjunctivae normal  · Neck  o Inspection/Palpation  o : supple  o Thyroid  o : no thyromegaly  · Respiratory  o Respiratory Effort  o : breathing unlabored  · Cardiovascular  o Peripheral Vascular System  o :   § Extremities  § : no edema  · Gastrointestinal  o Abdominal Examination  o :   § Abdomen  § : soft  · Lymphatic  o Neck  o : no lymphadenopathy present  · Musculoskeletal  o General  o :   § General Musculoskeletal  § : (+) PTTP of the Lspine and then tot he left SI area   · Skin and Subcutaneous Tissue  o General Inspection  o : skin turgor normal, texture normal  · Neurologic  o Gait and Station  o :   § Gait Screening  § : normal gait  · Psychiatric  o Mood and Affect  o : mood normal, affect appropriate          Results     reviewed the labs and the prior xrays with pt       Assessment  · Hyperlipidemia     272.4/E78.5  · Fatty liver     571.8/K76.0  · Elevated ALT measurement     790.4/R74.0  · Follow up     V67.9/Z09  · DDD (degenerative disc disease), lumbar     722.52/M51.36  · Scoliosis     737.30/M41.9  · Lumbar radicular pain     724.4/M54.16      Plan  · Orders  o ACO-39: Current medications updated and reviewed () - - 10/23/2019  o GASTROENTEROLOGY (GASTR) - 571.8/K76.0, 790.4/R74.0 - 10/23/2019   pt sees Dr Hurt for the diverticulitis-when it flares up and he does her cscopes as well   o MRI lumbar spine w/o contrast (27424) - 722.52/M51.36, 737.30/M41.9, 724.4/M54.16 - 10/23/2019   back pain for few yrs and then getting worse and already did conservative treatment with Xrays and PT and meds and pt pain and symptoms worsening radiates tot he left buttock and SI area  · Medications  o Medications have been Reconciled  o Transition of Care or Provider Policy  · Instructions  o Recommended exercise program to assist with cholesterol, weight loss and overall health improvement.  o Advised that cheeses and other sources of  dairy fats, animal fats, fast food, and the extras (candy, pasteries, pies, doughnuts and cookies) all contain LDL raising nutrients. Advised to increase fruits, vegetables, whole grains, and to monitor portion sizes.   o Handouts were given to patient: low cholesterol diet   o Patient was educated/instructed on their diagnosis, treatment and medications prior to discharge from the clinic today.  o Patient instructed to seek medical attention urgently for new or worsening symptoms.  o Call the office with any concerns or questions.  o Risks, benefits, and alternatives were discussed with the patient. The patient is aware of risks associated with: fatty liver  o Chronic conditions reviewed and taken into consideration for today's treatment plan.  o Electronically Identified Patient Education Materials Provided Electronically  · Disposition  o Call or Return if symptoms worsen or persist.            Electronically Signed by: CARMEN Grey -Author on October 23, 2019 05:40:50 PM

## 2021-05-07 NOTE — PROGRESS NOTES
Progress Note      Patient Name: Valentina Stevens   Patient ID: [id number]   Sex: Female   YOB: 1965    Referring Provider: Rosalva BENZ    Visit Date: September 11, 2018    Provider: Eduardo Adames MD   Location: [facility]   Location Address: [address] [place] [address]  [id number]   Location Phone: [phone]          Chief Complaint     PATIENT IS HERE BECAUSE HER LEGS ARE HURTING  A LOT A NIGHT.  SHE IS NOT SURE IF IT IS RESTLESS LEG OR WHAT.       History Of Present Illness  Valentina Stevens is a 52 year old /White female who presents for evaluation and treatment of:      pt having leg cramps x several months- worsening over last 1-2 weeks- no swelling- walking does not help symptoms- worse at night       Past Medical History  Disease Name Date Onset Notes   Depression --  --    Diverticulitis Of Colon --  --    Dysuria --  --    External hemorrhoid --  --    GERD (gastroesophageal reflux disease) --  --    Hearing loss --  --    Hematuria --  --    Hypothyroidism --  --    Menopause --  --    Murmur --  --    Osteoporosis --  --    Rash of Skin --  --    Scoliosis --  --    Vitamin D Deficiency --  --          Past Surgical History  Procedure Name Date Notes   Tubal ligation --  --          Medication List  Name Date Started Instructions   Calcium 600 + D(3) 600 mg(1,500mg) -400 unit oral tablet 07/16/2018 take 1 tablet by oral route 2 times a day   levothyroxine 50 mcg oral tablet 07/16/2018 TAKE ONE TABLET BY MOUTH EVERY MORNING ON AN EMPTY STOMACH FOR THYROID for 30 days   metaxalone 400 mg oral tablet 07/20/2018 TAKE ONE TO TWO TABLETS BY MOUTH THREE TIMES A DAY AFTER MEALS   naproxen sodium 550 mg oral tablet 06/15/2018 take 1 tablet (550 mg) by oral route every 12 hours as needed for 30 days   prednisone 20 mg oral tablet 08/21/2018 take 2 tablets by oral route daily for 5 days         Allergy List  Allergen Name Date Reaction Notes  "  Cephalexin adverse reaction --  --  --    PENICILLINS --  --  --    SULFA (SULFONAMIDES) --  --  --          Social History  Finding Status Start/Stop Quantity Notes   Alcohol use --  --/-- --  --    Tobacco Never --/-- --  NEVER SMOKER         Immunizations  NameDate Admin Mfg Trade Name Lot Number Route Inj VIS Given VIS Publication   HepA05/17/2018 SKB Havrix Adult [id number] IM  05/17/2018 07/21/2016   Comments: [id number]   Aykqdmnkp33/17/2018 UNK Unknown TradeName [id number] IM  01/17/2018 08/07/2015   Comments: ndc 76514-437-63   Tdap03/20/2018 PMC ADACEL [id number] IM  03/20/2018 02/24/2015   Comments: ndc [id number]         Review of Systems  · Constitutional  o Denies  o : fatigue, fever  · Cardiovascular  o Denies  o : chest pain, palpitations  · Respiratory  o Denies  o : shortness of breath, cough  · Integument  o Denies  o : rash  · Musculoskeletal  o Admits  o : muscle pain, muscle cramps      Vitals  Date Time BP Position Site L\R Cuff Size HR RR TEMP(F) WT  HT  BMI kg/m2 BSA m2 O2 Sat        09/11/2018 06:38 /58 Sitting    95 - R  98 143lbs 6oz 5'  5\" 23.86 1.73 98 %           Physical Examination  · Constitutional  o Appearance  o : well developed, well-nourished, in no acute distress  · Respiratory  o Respiratory Effort  o : breathing unlabored  o Auscultation of Lungs  o : clear to ascultation  · Cardiovascular  o Heart  o :   § Auscultation of Heart  § : regular rate and rhythm  o Peripheral Vascular System  o :   § Extremities  § : no edema  · Musculoskeletal  o General  o :   § General Musculoskeletal  § : No joint swelling or deformity., Muscle tone, strength, and development grossly normal.  · Neurologic  o Gait and Station  o :   § Gait Screening  § : normal gait  · Psychiatric  o Mood and Affect  o : mood normal, affect appropriate          Assessment  · Leg pain     729.5/M79.606      Plan  · Orders  o CBC with Auto Diff [facility] (72540) - 729.5/M79.606 - 09/11/2018  o CMP [facility] (91402) - " 729.5/M79.606 - 09/11/2018  o TSH [facility] (60298) - 729.5/M79.606 - 09/11/2018  o ACO-39: Current medications updated and reviewed () - - 09/11/2018  o CPK Total [facility] (65986) - 729.5/M79.606 - 09/11/2018  o Vitamin B-12 (88546) with Folate(07221) (22824\VF) - 729.5/M79.606 - 09/11/2018  o Iron + transferrin (TIBC, calculated % saturation) (37349) - 729.5/M79.606 - 09/11/2018  o Magnesium level (06721) - 729.5/M79.606 - 09/11/2018  · Instructions  o Patient was educated/instructed on their diagnosis, treatment and medications prior to discharge from the clinic today.            Electronically Signed by: Eduardo Adames MD -Author on September 11, 2018 07:01:01 PM

## 2021-05-07 NOTE — PROGRESS NOTES
Progress Note      Patient Name: Valentina Stevens   Patient ID: [id number]   Sex: Female   YOB: 1965    Referring Provider: Sera MORALES    Visit Date: October 10, 2019    Provider: CARMEN Grey   Location: [facility]   Location Address: [address] [place] [address] [place]  [address]   Location Phone: [phone]          Chief Complaint  · Annual Exam  · PAP exam  · (Health Maintainence Information Reviewed Under Results)      History Of Present Illness  Last PAP Smear: 10/2018.   Date of Last Mammogram: 10/16/18.   Date of Last Colonoscopy: 11/13/18   No current complaints.   Valentina Stevens is a 53 year old /White female who presents for evaluation and treatment of:      was on antibiotics last week for OM and pt reports still having problems--right ear     and pt on the CPAP machine for DEVENDRA     no periods for 10 yrs       Past Medical History  Disease Name Date Onset Notes   Depression --  --    Diverticulitis Of Colon --  --    Dysuria --  --    External hemorrhoid --  --    GERD (gastroesophageal reflux disease) --  --    Hearing loss --  --    Hematuria --  --    Hypothyroidism --  --    Menopause --  --    Murmur --  --    Osteoporosis --  --    Rash of Skin --  --    Scoliosis --  --    Vitamin D Deficiency --  --          Past Surgical History  Procedure Name Date Notes   Thymectomy --  --    Tubal ligation --  --          Medication List  Name Date Started Instructions   calcium carbonate-vitamin D3 600 mg(1,500mg) -400 unit oral tablet 10/10/2019 TAKE ONE TABLET BY MOUTH TWICE A DAY   cyclobenzaprine 5 mg oral tablet 09/17/2019 take 1 tablet by oral route 3 times a day as needed for 7 days   diclofenac sodium 75 mg oral tablet,delayed release (/EC) 09/09/2019 take 1 tablet (75 mg) by oral route 2 times per day for 10 days   levothyroxine 75 mcg oral tablet 10/10/2019 TAKE ONE TABLET BY MOUTH DAILY   multivitamin oral tablet  --     Tylenol 325 mg oral capsule  --          Allergy List  Allergen Name Date Reaction Notes   Cephalexin adverse reaction --  --  --    PENICILLINS --  --  --    SULFA (SULFONAMIDES) --  --  --        Allergies Reconciled  Social History  Finding Status Start/Stop Quantity Notes   Alcohol use --  --/-- --  --    Tobacco Never --/-- --  NEVER SMOKER         Immunizations  NameDate Admin Mfg Trade Name Lot Number Route Inj VIS Given VIS Publication   HepA12/20/2018 MSD VAQTA-ADULT [id number] IM RA 12/20/2018 01/01/2016   Comments: 3821-4228-70   HepA05/17/2018 SKB HAVRIX-ADULT [id number] IM  05/17/2018 07/21/2016   Comments: [id number]   Inqqykvby60/11/2019 [facility] Fluzone, quadrivalent, preservative free [id number]  LA 10/11/2019    Comments: NDC: 29003-590-31   Qwggstogb98/09/2018 PMC FLUARIX [id number] IM  10/09/2018 08/07/2015   Comments: NDC [id number]   Tollmaxpc37/17/2018 UNK Unknown TradeName [id number] IM  01/17/2018 08/07/2015   Comments: ndc 29989-887-63   Tdap03/20/2018 [facility] ADACEL [id number] IM  03/20/2018 02/24/2015   Comments: ndc [id number]         Review of Systems  · Constitutional  o Admits  o : fatigue  o Denies  o : appetite change, night sweats, weight gain, weight loss  · HENT  o Admits  o : ear pain  o Denies  o : hearing loss, tinnitus, vertigo, nasal discharge, nose bleeding, dental problems, oral lesions, sore throat  · Breasts  o Denies  o : lumps, tenderness, swelling, nipple discharge  · Cardiovascular  o Denies  o : chest pain, decreased exercise tolerance, dyspnea on exertion, palpitations  · Respiratory  o Denies  o : cough, shortness of breath, wheezing, snoring, apneas  · Gastrointestinal  o Denies  o : abdominal pain, nausea, vomiting, dysphagia, heartburn, changes in bowel habits, constipation, diarrhea  · Genitourinary  o Denies  o : dysuria, hematuria, incontinence, nocturia, frequency, urgency, sexual dysfunction  · Integument  o Denies  o : hair growth change, new skin  "lesions  · Neurologic  o Denies  o : abnormal gait, facial weakness, headache, memory difficulties, tingling or numbness, seizures, tremors  · Musculoskeletal  o Admits  o : muscle pain, back pain  o Denies  o : joint swelling, limitation of motion  · Endocrine  o Denies  o : cold intolerance, heat intolerance  · Psychiatric  o Denies  o : anxiety, decreased concentration, irritability, panic attacks, sleep distrubances, sadness/tearfulness  · Heme-Lymph  o Denies  o : petechiae, lymph node enlargement or tenderness  · Allergic-Immunologic  o Denies  o : frequent illnesses      Vitals  Date Time BP Position Site L\R Cuff Size HR RR TEMP (F) WT  HT  BMI kg/m2 BSA m2 O2 Sat HC       10/10/2019 04:20 /68 Sitting    99 - R  97.6 147lbs 4oz 5'  3.5\" 25.67 1.73 98 %          Physical Examination  · Constitutional  o Appearance  o : well developed, well-nourished, in no acute distress  · Head and Face  o HEENT  o : Unremarkable deafness of left ear   · Eyes  o Conjunctivae  o : conjunctivae normal  · Neck  o Inspection/Palpation  o : supple  o Range of Motion  o : cervical range of motion within normal limits  o Thyroid  o : no thyromegaly  · Respiratory  o Respiratory Effort  o : breathing unlabored  o Inspection of Chest  o : normal appearance  o Auscultation of Lungs  o : clear to ascultation  · Cardiovascular  o Heart  o :   § Auscultation of Heart  § : regular rate and rhythm  o Peripheral Vascular System  o :   § Extremities  § : no edema  · Breasts  o Palpation of Breasts, Axillae  o : no masses present on palpation, no breast tenderness  · Gastrointestinal  o Abdominal Examination  o :   § Abdomen  § : spft nontender  · Genitourinary  o External Genitalia  o : no inflammation, no lesions present  o Vagina  o : normal vaginal vault, no discharge present, no inflammatory lesions present, no masses present  o Urethra  o :   § Urethral Meatus  § : no inflammation present  § Urethral Body  § : urethra palpation " normal  o Bladder  o : nontender to palpation  o Cervix  o : appearance healthy, no lesions present, nontender to palpation, no discharges, no bleeding present, normal midline position  o Uterus  o : nontender to palpation, no masses present, position midline/midplane  o Adnexa  o : no tenderness or masses present on bimanual examination  o Anus  o : no inflammation or lesions present  o Perineum  o : perineum within normal limits  · Lymphatic  o Neck  o : no lymphadenopathy present  o Axilla  o : no lymphadenopathy present  o Groin  o : no lymphadenopathy present  · Musculoskeletal  o General  o :   § General Musculoskeletal  § : No joint swelling or deformity., Muscle tone, strength, and development grossly normal.--(+) scoliosis and LBP chronically   · Skin and Subcutaneous Tissue  o General Inspection  o : no lesions present, no areas of discoloration, skin turgor normal, texture normal  · Neurologic  o Mental Status Examination  o :   § Orientation  § : grossly oriented to person, place and time  o Gait and Station  o :   § Gait Screening  § : normal gait  · Psychiatric  o Judgement and Insight  o : judgment and insight intact  o Mood and Affect  o : mood normal, affect appropriate          Assessment  · Normal Pap Smear       Encounter for gynecological examination (general) (routine) without abnormal findings     V76.2/Z01.419  · Routine gynecological examination     V72.31/Z01.419  · Screening Mammogram     V76.10/Z12.39  · Need for influenza vaccination     V04.81/Z23  · Fatigue     780.79/R53.83  · Hypothyroidism     244.9/E03.9  · Osteoporosis     733.00/M81.0  · Screening, lipid     V77.91/Z13.220  · Post-menopausal     V49.81/Z78.0  · Encounter for hepatitis C screening test for low risk patient     V73.89/Z11.59  · Osteopenia     733.90/M85.80      Plan  · Orders  o Pap smear (08109) - V76.2/Z01.419 - 10/10/2019  o Screening Mammogram 2D Bilateral (36078, ) - V76.10/Z12.39 - 10/10/2019  o Thyroid  Profile (THYII) - 244.9/E03.9, V76.2/Z01.419 - 10/10/2019  o DEXA Bone Density, 1 or more sites, axial skeleton [facility] (17308) - 733.00/M81.0, V76.2/Z01.419, V49.81/Z78.0, 733.90/M85.80 - 10/10/2019  o CBC with Auto Diff [facility] (86813) - V76.2/Z01.419, 780.79/R53.83 - 10/10/2019  o CMP [facility] (94638) - V76.2/Z01.419, 780.79/R53.83, 244.9/E03.9 - 10/10/2019  o Lipid Panel [facility] (77448) - V76.2/Z01.419, V77.91/Z13.220 - 10/10/2019  o Immunization Admin Fee (Single) ([facility]) (05827) - V04.81/Z23 - 10/10/2019  o ACO-13: Fall Risk Screening with no falls in past year or only one fall without injury in the past year (1101F) - - 10/10/2019  o ACO-14: Influenza immunization administered or previously received () - - 10/10/2019  o ACO-19: Colorectal cancer screening results documented and reviewed (3017F) - - 10/10/2019   2018  o ACO-20: Screening Mammography documented and reviewed (3014F) - - 10/10/2019   2018  o ACO-39: Current medications updated and reviewed () - - 10/10/2019  o Fluzone Quadrivalent Vaccine, age 3+ (63829) - V04.81/Z23 - 10/10/2019   Vaccine - Influenza; Dose: 0.5; Site: Left Arm; Route: Intramuscular; Date: 10/11/2019 08:55:00; Exp: 06/30/2020; Lot: [id number]; Mfg: [employer]i pasteur; TradeName: Fluzone, quadrivalent, preservative free; Administered By: Katey Adames MA; Comment: ND: 77547-430-12  o Hepatitis C antibody (88109) - V73.89/Z11.59 - 10/10/2019  · Medications  o calcium carbonate-vitamin D3 600 mg(1,500mg) -400 unit oral tablet   SIG: TAKE ONE TABLET BY MOUTH TWICE A DAY   DISP: (60) Tablet with 5 refills  Adjusted on 10/10/2019     o levothyroxine 75 mcg oral tablet   SIG: TAKE ONE TABLET BY MOUTH DAILY   DISP: (30) Tablet with 5 refills  Adjusted on 10/10/2019     o Medications have been Reconciled  o Transition of Care or Provider Policy  · Instructions  o Stop taking calcium supplements for at least 48 hours prior to your exam. Failure to stop supplements could alter results, and the  radiologists will require you to reschedule your test.  o Counseled on monthly breast self exams.   o Counseled on STD prevention.  o Counseled on diet and exercise.   o Counseled on weight-bearing exercise.  o Recommended Calcium with Vitamin D twice daily.  o Used cytobrush to obtain Pap smear specimen. Sent to pathology for testing and review.  o Take all medications as prescribed/directed.  o Patient was educated/instructed on their diagnosis, treatment and medications prior to discharge from the clinic today.  o Patient instructed to seek medical attention urgently for new or worsening symptoms.  o Call the office with any concerns or questions.  o Chronic conditions reviewed and taken into consideration for today's treatment plan.  · Disposition  o Call or Return if symptoms worsen or persist.            Electronically Signed by: CARMEN Grey -Author on October 12, 2019 08:04:08 AM

## 2021-05-07 NOTE — PROGRESS NOTES
Progress Note      Patient Name: Valentina Stevens   Patient ID: [id number]   Sex: Female   YOB: 1965    Referring Provider: Rosalva BENZ    Visit Date: February 24, 2021    Provider: DEMAR Maldonado   Location: [facility]   Location Address: [address] [place] [address]   [place]  [address]   Location Phone: [phone]          Chief Complaint     increased heart rate       History Of Present Illness  Valentina Stevens is a 55 year old /White female who presents for evaluation and treatment of:      patient here today to discuss heart rate - she is not having any symptoms of increased heart rate - denies any chest pain or palpitations, but wants to discuss evidence of increased heart rate when she checks her blood pressure at home.     Patient has home blood pressure monitor - checks her BP at least once per day, but usually twice - usually AM and PM - she has noticed in the past 12 days, out of approximately 24 blood pressure checks, her heart rate has been elevated over 100 eight times. Her HR has been as low as 73 and as high as 110. She wears an apple watch and her average heart rate is less than 100.       Past Medical History  Disease Name Date Onset Notes   Depression --  --    Diverticulitis Of Colon --  --    Dysuria --  --    External hemorrhoid --  --    GERD (gastroesophageal reflux disease) --  --    Hearing loss --  --    Hematuria --  --    Hypothyroidism --  --    Menopause --  --    Murmur --  --    Osteoporosis --  --    Rash of Skin --  --    Scoliosis --  --    Vitamin D Deficiency --  --          Past Surgical History  Procedure Name Date Notes   Thymectomy --  --    Tubal ligation --  --          Medication List  Name Date Started Instructions   amitriptyline oral  --    calcium carbonate-vitamin D3 600 mg(1,500mg) -400 unit oral tablet 10/19/2020 TAKE ONE TABLET BY MOUTH TWICE A DAY   levothyroxine 75 mcg oral tablet 10/19/2020 TAKE ONE  TABLET BY MOUTH DAILY   lisinopril 2.5 mg oral tablet 10/19/2020 TAKE ONE TABLET BY MOUTH DAILY FOR 30 DAYS   multivitamin oral tablet  --    pramipexole 0.125 mg oral tablet 10/27/2020 TAKE 1 TABLET BY MOUTH EVERY NIGHT AT BEDTIME 2-3 HOURS BEFORE BED   prednisone 20 mg oral tablet 11/30/2020 take 2 tablets by oral route daily for 5 days   TOPIRAMATE 25MG TABLETS 11/16/2020 TAKE 1 TABLET(25 MG) BY MOUTH EVERY DAY AT BEDTIME         Allergy List  Allergen Name Date Reaction Notes   Cephalexin adverse reaction --  --  --    indomethacin --  --  --    PENICILLINS --  --  --    SULFA (SULFONAMIDES) --  --  --        Allergies Reconciled  Social History  Finding Status Start/Stop Quantity Notes   Alcohol use --  --/-- --  --    Tobacco Never --/-- --  NEVER SMOKER         Immunizations  NameDate Admin Mfg Trade Name Lot Number Route Inj VIS Given VIS Publication   HepA12/20/2018 MSD VAQTA-ADULT [id number] IM RA 12/20/2018 01/01/2016   Comments: 2917-8565-66   HepA05/17/2018 SKB HAVRIX-ADULT [id number] IM  05/17/2018 07/21/2016   Comments: [id number]   Izdbddxaq08/19/2020 PMC FLUARIX [id number] IM RD 10/19/2020 08/15/2019   Comments: NDC [id number]   Tdap10/19/2020 PMC ADACEL [id number] IM LD 10/19/2020    Comments: NDC [id number]   Tdap03/20/2018 PMC ADACEL [id number] IM  03/20/2018 02/24/2015   Comments: ndc [id number]         Review of Systems  · Constitutional  o Admits  o : good general health lately  o Denies  o : fatigue, fever, chills  · Eyes  o Denies  o : double vision, blurred vision  · HENT  o Denies  o : headaches  · Cardiovascular  o Denies  o : chest pain, irregular heart beats, syncope, dyspnea on exertion, lower extremity edema, lightheadedness, palpitations  · Respiratory  o Denies  o : shortness of breath, cough  · Gastrointestinal  o Denies  o : nausea, vomiting  · Integument  o Denies  o : pigmentation changes  · Neurologic  o Denies  o : loss of balance, dizziness      Vitals  Date Time BP Position Site L\R Cuff  "Size HR RR TEMP (F) WT  HT  BMI kg/m2 BSA m2 O2 Sat FR L/min FiO2 HC       02/24/2021 02:38 /80 Sitting    106 - R   153lbs 0oz 5'  2.5\" 27.54 1.75 100 %            Physical Examination  · Constitutional  o Appearance  o : well developed, well-nourished, in no acute distress  · Head and Face  o HEENT  o : Unremarkable - wearing a mask  · Respiratory  o Respiratory Effort  o : breathing unlabored  o Auscultation of Lungs  o : clear to auscultation  · Cardiovascular  o Heart  o :   § Auscultation of Heart  § : regular rate, normal rhythm, no murmurs present  o Peripheral Vascular System  o :   § Carotid Arteries  § : normal pulses bilaterally, no bruits present  § Pedal Pulses  § : bilateral pulse strength 2+  § Extremities  § : no edema  · Musculoskeletal  o General  o :   § General Musculoskeletal  § : Muscle tone, strength, and development grossly normal.  · Skin and Subcutaneous Tissue  o General Inspection  o : no lesions present, no areas of discoloration, skin turgor normal, texture normal  · Neurologic  o Gait and Station  o :   § Gait Screening  § : normal gait  · Psychiatric  o Mood and Affect  o : mood normal, affect appropriate          Assessment  · Heart rate fast     785.0/R00.0      Plan  · Orders  o ACO-39: Current medications updated and reviewed (1159F, ) - - 02/24/2021  · Medications  o Medications have been Reconciled  o Transition of Care or Provider Policy  · Instructions  o Patient was educated/instructed on their diagnosis, treatment and medications prior to discharge from the clinic today. Discussed her heart rate findings with her from her BP monitor - patient is completely asymptomatic. She declined further evaluation with EKG today - just wanted to discuss - advised that she continue to monitor her heart rate and should she have any symptoms or concerns to follow back up in the office.             Electronically Signed by: DEMAR Maldonado -Author on February 24, 2021 " 03:55:32 PM

## 2021-05-07 NOTE — PROGRESS NOTES
Progress Note      Patient Name: Valentina Stevens   Patient ID: [id number]   Sex: Female   YOB: 1965    Referring Provider: Rosalva BENZ    Visit Date: April 3, 2020    Provider: DEMAR Maldonado   Location: [facility]   Location Address: [address] [place] [address] [place]  [address]   Location Phone: [phone]          Chief Complaint     PATIENT IS HERE FOR POISON AD.  THIS CAME UP ON BOTH ARMS YESTERDAY BUT IS SPREADING MORE AND GETTING WORSE.       History Of Present Illness  Valentina Stevens is a 54 year old /White female who presents for evaluation and treatment of:      acute onset of itchy rash - also oozing clear fluid - she thinks it is poison ivy. They were out killing pigs, and she thought that maybe she came into contact with it while trying to get the pig.       Past Medical History  Disease Name Date Onset Notes   Depression --  --    Diverticulitis Of Colon --  --    Dysuria --  --    External hemorrhoid --  --    GERD (gastroesophageal reflux disease) --  --    Hearing loss --  --    Hematuria --  --    Hypothyroidism --  --    Menopause --  --    Murmur --  --    Osteoporosis --  --    Rash of Skin --  --    Scoliosis --  --    Vitamin D Deficiency --  --          Past Surgical History  Procedure Name Date Notes   Thymectomy --  --    Tubal ligation --  --          Medication List  Name Date Started Instructions   calcium carbonate-vitamin D3 600 mg(1,500mg) -400 unit oral tablet 10/10/2019 TAKE ONE TABLET BY MOUTH TWICE A DAY   levothyroxine 75 mcg oral tablet 10/10/2019 TAKE ONE TABLET BY MOUTH DAILY   lisinopril 2.5 mg oral tablet 03/19/2020 take 1 tablet (2.5 mg) by oral route once daily for 30 days   multivitamin oral tablet  --    triamcinolone acetonide 0.1 % topical cream 04/03/2020 apply a thin layer to the affected area(s) by topical route 3 times per day for 30 days   Tylenol 325 mg oral capsule  --          Allergy  "List  Allergen Name Date Reaction Notes   Cephalexin adverse reaction --  --  --    indomethacin --  --  --    PENICILLINS --  --  --    SULFA (SULFONAMIDES) --  --  --        Allergies Reconciled  Social History  Finding Status Start/Stop Quantity Notes   Alcohol use --  --/-- --  --    Tobacco Never --/-- --  NEVER SMOKER         Immunizations  NameDate Admin Mfg Trade Name Lot Number Route Inj VIS Given VIS Publication   HepA12/20/2018 MSD VAQTA-ADULT [id number] IM RA 12/20/2018 01/01/2016   Comments: 4254-9055-44   HepA05/17/2018 SKB HAVRIX-ADULT [id number] IM  05/17/2018 07/21/2016   Comments: [id number]   Grvmbuaqa73/11/2019 PMC Fluzone, quadrivalent, preservative free [id number]  LA 10/11/2019    Comments: NDC: 85092-303-84   Osotgzhpp21/09/2018 PMC FLUARIX [id number] IM  10/09/2018 08/07/2015   Comments: NDC [id number]   Wkbxoakqc97/17/2018 UNK Unknown TradeName [id number] IM  01/17/2018 08/07/2015   Comments: ndc 65345-196-38   Tdap03/20/2018 PMC ADACEL [id number] IM  03/20/2018 02/24/2015   Comments: ndc [id number]         Review of Systems  · Integument  o Admits  o : rash, itching      Vitals  Date Time BP Position Site L\R Cuff Size HR RR TEMP (F) WT  HT  BMI kg/m2 BSA m2 O2 Sat        04/03/2020 01:41 /70 Sitting    82 - R    5'  3.5\"   97 %          Physical Examination  · Constitutional  o Appearance  o : well developed, well-nourished, in no acute distress  · Respiratory  o Respiratory Effort  o : breathing unlabored  o Auscultation of Lungs  o : clear to auscultation   · Cardiovascular  o Heart  o :   § Auscultation of Heart  § : regular rate and rhythm  o Peripheral Vascular System  o :   § Extremities  § : no edema  · Lymphatic  o Neck  o : no lymphadenopathy present  · Musculoskeletal  o General  o :   § General Musculoskeletal  § : Muscle tone, strength, and development grossly normal.  · Skin and Subcutaneous Tissue  o General Inspection  o : linear vesicular rash distribution on the bilateral upper " extremities - more on the anterior aspect of each arm than posterior - there is some mild oozing of serous drainage - there is also some yellow crusting present  · Neurologic  o Gait and Station  o :   § Gait Screening  § : normal gait  · Psychiatric  o Mood and Affect  o : mood normal, affect appropriate          Assessment  · Contact dermatitis     692.9/L25.9    Problems Reconciled  Plan  · Orders  o ACO-39: Current medications updated and reviewed () - - 04/03/2020  o Dexamethasone Sod. Phos To 4MG () - 692.9/L25.9 - 04/03/2020   6mg IM   Injection - Dexamethasone 4mg; Dose: 1.5ML; Site: Left Gluteus; Route: intramuscular; Date: 04/03/2020 14:04:39; Exp: 08/01/2018; Lot: [id number]; Mfg: [employer]; TradeName: dexamethasone sodium phosphate; Location: [facility]; Administered By: Lindsey Maharaj MA; Comment: NDC [id number]  · Medications  o triamcinolone acetonide 0.1 % topical cream   SIG: apply a thin layer to the affected area(s) by topical route 3 times per day for 30 days   DISP: (1) 15 gm tube with 0 refills  Refilled on 04/03/2020     o Medications have been Reconciled  o Transition of Care or Provider Policy  · Instructions  o Patient was educated/instructed on their diagnosis, treatment and medications prior to discharge from the clinic today. Will give IM steroid today and then have her apply triamcinolone TID PRN - avoid picking or scratching. Benadryl PRN. Call or RTC if no improvement.   · Disposition  o Call or Return if symptoms worsen or persist.            Electronically Signed by: DEMAR Maldonado -Author on April 6, 2020 10:46:21 AM

## 2021-05-07 NOTE — PROGRESS NOTES
Progress Note      Patient Name: Valentina Stevens   Patient ID: [id number]   Sex: Female   YOB: 1965    Referring Provider: Rosalva BENZ    Visit Date: September 18, 2018    Provider: Eduardo Adames MD   Location: [facility]   Location Address: [address] [place] [address]  [id number]   Location Phone: [phone]          Chief Complaint     1 week follow up       History Of Present Illness  Valentina Stevens is a 52 year old /White female who presents for evaluation and treatment of:      discussed labs  iron deficiency anemia  RLS  hypothyroidism uncontrolled- pt having symptoms    pt said that frontal migraine headache began today- typical migraine- has had some nausea and vomiting- headache is 8/10       Past Medical History  Disease Name Date Onset Notes   Depression --  --    Diverticulitis Of Colon --  --    Dysuria --  --    External hemorrhoid --  --    GERD (gastroesophageal reflux disease) --  --    Hearing loss --  --    Hematuria --  --    Hypothyroidism --  --    Menopause --  --    Murmur --  --    Osteoporosis --  --    Rash of Skin --  --    Scoliosis --  --    Vitamin D Deficiency --  --          Past Surgical History  Procedure Name Date Notes   Tubal ligation --  --          Medication List  Name Date Started Instructions   Calcium 600 + D(3) 600 mg(1,500mg) -400 unit oral tablet 07/16/2018 take 1 tablet by oral route 2 times a day   levothyroxine 50 mcg oral tablet 07/16/2018 TAKE ONE TABLET BY MOUTH EVERY MORNING ON AN EMPTY STOMACH FOR THYROID for 30 days   metaxalone 400 mg oral tablet 07/20/2018 TAKE ONE TO TWO TABLETS BY MOUTH THREE TIMES A DAY AFTER MEALS   naproxen sodium 550 mg oral tablet 06/15/2018 take 1 tablet (550 mg) by oral route every 12 hours as needed for 30 days         Allergy List  Allergen Name Date Reaction Notes   Cephalexin adverse reaction --  --  --    PENICILLINS --  --  --    SULFA (SULFONAMIDES) --  --   --          Social History  Finding Status Start/Stop Quantity Notes   Alcohol use --  --/-- --  --    Tobacco Never --/-- --  NEVER SMOKER         Immunizations  NameDate Admin Mfg Trade Name Lot Number Route Inj VIS Given VIS Publication   HepA05/17/2018 SKB Havrix Adult [id number]   05/17/2018 07/21/2016   Comments: [id number]   Mjxotfrin09/17/2018 UNK Unknown TradeName [id number]   01/17/2018 08/07/2015   Comments: ndc [id number]   Tdap03/20/2018 PMC ADACEL [id number] IM  03/20/2018 02/24/2015   Comments: ndc [id number]         Review of Systems  · Constitutional  o Admits  o : fatigue  o Denies  o : fever  · Eyes  o Denies  o : blurred or double vision, impaired vision, blurred vision, changes in vision  · Cardiovascular  o Denies  o : chest pain, palpitations  · Respiratory  o Denies  o : shortness of breath, cough  · Psychiatric  o Denies  o : anxiety, depression      Vitals  Date Time BP Position Site L\R Cuff Size HR RR TEMP(F) WT  HT  BMI kg/m2 BSA m2 O2 Sat        09/18/2018 12:51 /82 Sitting    94 - R  97.2 143lbs 0oz    98 %           Physical Examination  · Constitutional  o Appearance  o : well developed, well-nourished, in no acute distress  · Eyes  o Conjunctivae  o : conjunctivae normal no drainage  o Pupils and Irises  o : pupils equal, round, and reactive to light bilaterally  · Neck  o Inspection/Palpation  o : supple  o Thyroid  o : no thyromegaly  · Respiratory  o Respiratory Effort  o : breathing unlabored  o Auscultation of Lungs  o : clear to ascultation  · Cardiovascular  o Heart  o :   § Auscultation of Heart  § : regular rate and rhythm  · Gastrointestinal  o Abdomen  o : soft, non-tender, non-distended, + bowel sounds, no hepatosplenomegaly, no masses palpated  · Musculoskeletal  o General  o :   § General Musculoskeletal  § : No joint swelling or deformity., Muscle tone, strength, and development grossly normal.  · Neurologic  o Mental Status Examination  o :   § Orientation  § :  grossly oriented to person, place and time  o Gait and Station  o :   § Gait Screening  § : normal gait          Assessment  · Hypothyroidism     244.9/E03.9  · Iron deficiency anemia     280.9/D50.9  · Arthritis     716.90/M19.90  · Migraine headache     346.90/G43.909      Plan  · Orders  o ACO-39: Current medications updated and reviewed () - - 09/18/2018  o 4.00 - Toradol Injection 60mg (-8) - 346.90/G43.909 - 09/18/2018   Injection - Ketorolac 60mg; Dose: 2mL; Site: Right Gluteus; Route: intramuscular; Date: 09/18/2018 13:41:39; Exp: 02/28/2020; Lot: [id number]; Mfg: HOSPIRA; TradeName: ketorolac; Location: [facility]; Administered By: Yanique Hawley MA; Comment: ndc# [id number]  o Stool occult blood screen by immunoassay (04799) - 280.9/D50.9 - 09/18/2018  o IM - Injection Fee [facility] ([id number]) - - 09/18/2018  · Medications  o ferrous sulfate 325 mg (65 mg iron) oral tablet   SIG: take 1 tablet (325 mg) by oral route once daily for 30 days   DISP: (30) tablets with 2 refills  Prescribed on 09/18/2018     o Zofran 4 mg oral tablet   SIG: take 1 tablet by oral route 4 times a day as needed for 7 days nausea   DISP: (21) tablets with 1 refills  Prescribed on 09/18/2018     o capsaicin 0.025 % topical cream   SIG: apply to the affected area(s) by topical route 3 times per day for 14 days   DISP: (1) 45 gm tube with 0 refills  Prescribed on 09/18/2018     o levothyroxine 75 mcg oral tablet   SIG: take 1 tablet (75 mcg) by oral route once daily for 30 days   DISP: (30) tablets with 1 refills  Adjusted on 09/18/2018     · Instructions  o Patient was educated/instructed on their diagnosis, treatment and medications prior to discharge from the clinic today.            Electronically Signed by: Eduardo Adames MD -Author on September 20, 2018 11:50:22 AM

## 2021-05-07 NOTE — PROGRESS NOTES
Progress Note      Patient Name: Valentina Stevens   Patient ID: [id number]   Sex: Female   YOB: 1965    Referring Provider: Rosalva BENZ    Visit Date: July 16, 2018    Provider: DEMAR Maldonado   Location: [facility]   Location Address: [address] [place] [address]  [id number]   Location Phone: [phone]          Chief Complaint     thyroid check  tick bit       History Of Present Illness  Valentina Stevens is a 52 year old /White female who presents for evaluation and treatment of:      tick bite--happened 2 weeks ago and she still has a red spot. It was streaky when it first happened. She denies any appearance of a bulls-eye. She denies any flu-like symptoms--no fever or chills. No nausea, vomiting, or body aches.     Hypothyroidism--her TSH was normal at the last check--that was in march. She called the pharmacy for her prescription refill, but they told her she didn't have any. (When I looked in the system it looks like the RX was entered but was never sent to the pharmacy).     She has a wart on the right hand, 3rd finger. She can't afford the OTC Compound W. She has to pay all of her bills first. She is very limited on her income.     She is also having right lower back/hip pain--she has chronic lower back pain and takes Naproxen, but this is not improving. she had a bone density in September of last year--showed osteopenia of the l-spine with osteoporosis at L-4, and osteopenia of the right hip with osteoporosis of the left hip. Her fracture risk assessment was not high enough to start her on medication. She was getting Roland + D supplements, but for some reason we stopped refilling them for her so she stopped taking them.       Past Medical History  Disease Name Date Onset Notes   Depression --  --    Diverticulitis Of Colon --  --    Dysuria --  --    External hemorrhoid --  --    GERD (gastroesophageal reflux disease) --  --    Hearing  loss --  --    Hematuria --  --    Hypothyroidism --  --    Menopause --  --    Murmur --  --    Osteoporosis --  --    Rash of Skin --  --    Scoliosis --  --    Vitamin D Deficiency --  --          Past Surgical History  Procedure Name Date Notes   Tubal ligation --  --          Medication List  Name Date Started Instructions   buspirone 5 mg oral tablet 07/14/2018 TAKE ONE TABLET BY MOUTH TWICE A DAY FOR ANXIETY   levothyroxine 50 mcg oral tablet 03/20/2018 TAKE ONE TABLET BY MOUTH EVERY MORNING ON AN EMPTY STOMACH FOR THYROID for 30 days   naproxen sodium 550 mg oral tablet 06/15/2018 take 1 tablet (550 mg) by oral route every 12 hours as needed for 30 days         Allergy List  Allergen Name Date Reaction Notes   Cephalexin adverse reaction --  --  --    PENICILLINS --  --  --    SULFA (SULFONAMIDES) --  --  --          Social History  Finding Status Start/Stop Quantity Notes   Alcohol use --  --/-- --  --    Tobacco Never --/-- --  NEVER SMOKER         Immunizations  NameDate Admin Mfg Trade Name Lot Number Route Inj VIS Given VIS Publication   HepA05/17/2018 SKB Havrix Adult [id number] IM  05/17/2018 07/21/2016   Comments: [id number]   Yaxryhuod65/17/2018 UNK Unknown TradeName [id number]   01/17/2018 08/07/2015   Comments: ndc [id number]   Tdap03/20/2018 [facility] ADACEL [id number] IM  03/20/2018 02/24/2015   Comments: ndc [id number]         Review of Systems  · Constitutional  o Admits  o : good general health lately  o Denies  o : fatigue, fever, chills, body aches  · HENT  o Denies  o : headaches, vertigo, lightheadedness  · Cardiovascular  o Denies  o : chest pain, dyspnea on exertion, lower extremity edema, palpitations  · Respiratory  o Denies  o : shortness of breath, wheezing, cough  · Gastrointestinal  o Denies  o : nausea, vomiting, diarrhea, abdominal pain  · Integument  o Admits  o : new skin lesions--tick bite to left breast, and wart to right middle finger  · Neurologic  o Denies  o : incoordination,  tingling or numbness, loss of balance  · Musculoskeletal  o Admits  o : back pain, hip pain  · Heme-Lymph  o Denies  o : easy bleeding, easy bruising, lymph node enlargement or tenderness      Vitals  Date Time BP Position Site L\R Cuff Size HR RR TEMP(F) WT  HT  BMI kg/m2 BSA m2 O2 Sat        07/16/2018 03:10 /80 Sitting    82 - R  96.6 145lbs 0oz    98 %           Physical Examination  · Constitutional  o Appearance  o : well developed, well-nourished, in no acute distress  · Eyes  o Conjunctivae  o : conjunctivae normal, no exudates present  o Pupils and Irises  o : pupils equal and round, pupils reactive to light bilaterally  · Neck  o Inspection/Palpation  o : normal appearance, no masses or tenderness, trachea midline  o Thyroid  o : no thyromegaly  · Respiratory  o Respiratory Effort  o : breathing unlabored  o Auscultation of Lungs  o : clear to ascultation  · Cardiovascular  o Heart  o :   § Auscultation of Heart  § : regular rate and rhythm  o Peripheral Vascular System  o :   § Extremities  § : no edema  · Gastrointestinal  o Abdominal Examination  o :   § Abdomen  § : soft, non-tender, non-distended, bowel sounds +  · Lymphatic  o Neck  o : no lymphadenopathy present  · Musculoskeletal  o General  o :   § General Musculoskeletal  § : No joint swelling or deformity. Muscle tone, strength, and development grossly normal.  · Skin and Subcutaneous Tissue  o General Inspection  o : small annular area of erythema on the underside of the left breast--there is no evidence of bulls eye or streaking; no induration or fluctuance noted. The area is NTTP. Then on the right middle finger, DIJ, there is an approx. 2mm there is a flat, wart-like lesion  · Neurologic  o Gait and Station  o :   § Gait Screening  § : normal gait  · Psychiatric  o Mood and Affect  o : mood normal, affect appropriate  · Back  o Inspection  o : no redness, no deformities  o Palpation  o : tednerness present, no swelling  o Range of  Motion  o : flexion normal- 60 to 90 degrees, hyperextension normal 5-15 degrees, lateral bending normal 10-20 degrees, axial rotation normal 5-15 degrees              Assessment  · Hypothyroidism     244.9/E03.9  · Lumbago     724.2/M54.5  · Tick bite of female breast, initial encounter       Insect bite (nonvenomous) of breast, unspecified breast, initial encounter     911.4/S20.169A  Bitten or stung by nonvenomous insect and other nonvenomous arthropods, initial encounter     911.4/W57.XXXA  · Osteopenia determined by x-ray     733.90/M85.80  · Osteoporosis of multiple sites     733.00/M81.0  left hip and L4 of lumbar spine  · Wart     078.10/B07.9      Plan  · Orders  o ACO-19: Colorectal cancer screening results documented and reviewed (3017F) - - 07/16/2018   [facility] 7/6/2016  o ACO-20: Screening Mammography documented and reviewed (3014F) - - 07/16/2018 9/14/17--Benign  o ACO-39: Current medications updated and reviewed () - - 07/16/2018  o Lumbar Spine AP and Lateral X-Ray [facility] ([id number]) - 724.2/M54.5, 733.90/M85.80, 733.00/M81.0 - 07/16/2018   lower back pain with radiation to right hip; hx of osteopenia L4 and of left hip; osteoporosis of L-spine and right hip.   · Medications  o Wart Remover 40 % topical adhesive patch,medicated   SIG: apply as directed 1 patch by transdermal route Q24-48H for 14 days   DISP: (14) patch with 0 refills  Prescribed on 07/16/2018     o Calcium 600 + D(3) 600 mg(1,500mg) -400 unit oral tablet   SIG: take 1 tablet by oral route 2 times a day   DISP: (60) tablets with 11 refills  Prescribed on 07/16/2018     · Instructions  o Take all medications as prescribed/directed.  o Patient was educated/instructed on their diagnosis, treatment and medications prior to discharge from the clinic today.  o Chronic conditions reviewed and taken into consideration for today's treatment plan.  · Disposition  o Call or Return if symptoms worsen or persist.            Electronically Signed  by: DEMAR Maldonado -Author on July 18, 2018 04:22:00 PM

## 2021-05-07 NOTE — PROGRESS NOTES
Progress Note      Patient Name: Valentina Stevens   Patient ID: [id number]   Sex: Female   YOB: 1965    Referring Provider: Rosalva BENZ    Visit Date: Bernadine 15, 2018    Provider: DEMAR Waggoner   Location: [facility]   Location Address: [address] [place] [address]  [id number]   Location Phone: [phone]          Chief Complaint     refill naproxen       History Of Present Illness  Valentina Stevens is a 52 year old /White female who presents for evaluation and treatment of:      diffuse back pain - hx of scoliosis and bulging disc - pt takes Naproxen as needed for her back pain - takes it 1-2x/week and she is out of refills - decreases pain to a tolerable level when she takes it -     right 4th digit pain - hit it about 1 month ago - continues to hurt at the PIP joint       Past Medical History  Disease Name Date Onset Notes   Depression --  --    Diverticulitis Of Colon --  --    Dysuria --  --    External hemorrhoid --  --    GERD (gastroesophageal reflux disease) --  --    Hearing loss --  --    Hematuria --  --    Hypothyroidism --  --    Menopause --  --    Murmur --  --    Osteoporosis --  --    Rash of Skin --  --    Scoliosis --  --    Vitamin D Deficiency --  --          Past Surgical History  Procedure Name Date Notes   Tubal ligation --  --          Medication List  Name Date Started Instructions   buspirone 5 mg oral tablet 06/13/2018 TAKE ONE TABLET BY MOUTH TWICE A DAY FOR ANXIETY   levothyroxine 50 mcg oral tablet 03/20/2018 TAKE ONE TABLET BY MOUTH EVERY MORNING ON AN EMPTY STOMACH FOR THYROID for 30 days         Allergy List  Allergen Name Date Reaction Notes   Cephalexin adverse reaction --  --  --    PENICILLINS --  --  --    SULFA (SULFONAMIDES) --  --  --          Social History  Finding Status Start/Stop Quantity Notes   Alcohol use --  --/-- --  --    Tobacco Never --/-- --  NEVER SMOKER         Immunizations  NameDate Admin  "Mfg Trade Name Lot Number Route Inj VIS Given VIS Publication   HepA05/17/2018 SKB Havrix Adult [id number] IM  05/17/2018 07/21/2016   Comments: [id number]   Xouuhriij46/17/2018 UNK Unknown TradeName [id number] IM  01/17/2018 08/07/2015   Comments: ndc 62083-827-03   Tdap03/20/2018 PMC ADACEL [id number] IM  03/20/2018 02/24/2015   Comments: ndc [id number]         Review of Systems  · Constitutional  o Admits  o : body aches  o Denies  o : fever, chills  · Cardiovascular  o Denies  o : chest pain, palpitations  · Respiratory  o Denies  o : shortness of breath, wheezing, cough  · Gastrointestinal  o Denies  o : nausea, vomiting, diarrhea, blood in stools  · Musculoskeletal  o * See HPI      Vitals  Date Time BP Position Site L\R Cuff Size HR RR TEMP(F) WT  HT  BMI kg/m2 BSA m2 O2 Sat        06/15/2018 03:39 /78 Sitting    103 - R  97.6 145lbs 2oz 5'  5\" 24.15 1.74 99 %           Physical Examination  · Constitutional  o Appearance  o : well developed, well-nourished, in no acute distress  · Eyes  o Conjunctivae  o : conjunctivae normal  o Pupils and Irises  o : pupils equal and round, pupils reactive to light bilaterally  · Neck  o Inspection/Palpation  o : supple  o Thyroid  o : no thyromegaly  · Respiratory  o Respiratory Effort  o : breathing unlabored  o Auscultation of Lungs  o : clear to ascultation  · Cardiovascular  o Heart  o :   § Auscultation of Heart  § : regular rate and rhythm  o Peripheral Vascular System  o :   § Extremities  § : no edema  · Lymphatic  o Neck  o : no lymphadenopathy present  · Musculoskeletal  o General  o :   § General Musculoskeletal  § : No joint swelling or deformity. Muscle tone, strength, and development grossly normal.  · Skin and Subcutaneous Tissue  o General Inspection  o : NL tone  · Neurologic  o Gait and Station  o :   § Gait Screening  § : normal gait  · Psychiatric  o Mood and Affect  o : mood normal, affect appropriate              Assessment  · Back pain, " chronic       Dorsalgia, unspecified     724.5/M54.9  Other chronic pain     724.5/G89.29      Plan  · Orders  o ACO-39: Current medications updated and reviewed () - - 06/15/2018  · Medications  o naproxen sodium 550 mg oral tablet   SIG: take 1 tablet (550 mg) by oral route every 12 hours as needed for 30 days   DISP: (60) tablets with 2 refills  Prescribed on 06/15/2018     · Instructions  o Take all medications as prescribed/directed.  o Patient was educated/instructed on their diagnosis, treatment and medications prior to discharge from the clinic today.  o Take Naproxen as needed for back pain - follow up with worsening.   · Disposition  o Follow up as needed.            Electronically Signed by: DEMAR Waggoner -Author on Bernadine 15, 2018 04:31:00 PM

## 2021-05-07 NOTE — PROGRESS NOTES
Progress Note      Patient Name: Valentina Stevens   Patient ID: [id number]   Sex: Female   YOB: 1965    Referring Provider: Rosalva BENZ    Visit Date: November 14, 2019    Provider: CARMEN Grey   Location: [facility]   Location Address: [address] [place] [address] [place]  [address]   Location Phone: [phone]          Chief Complaint     PATIENT IS HERE TO GO OVER HER TEST RESULTS.   SHE HAS SOME QUESTIONS SHE WANTED TO ASK YOU.       History Of Present Illness  Valentina Stevens is a 54 year old /White female who presents for evaluation and treatment of:      pt here for the F/U on the abnormal MRI--and pt has the chronic LBP and then also DDD of Lspine then also mild stenosis and an annular tear of posterior L4-5    pt has had to quit working full time due tot he pain    then pt already has done 2 different sets of PT  and chiropractor as well     and pt has scoliosis as well     was also at the ER dept approx. 3-4 days ago for neck pain no xrays done and they gave her a muscle relaxer and it's seems to be helping       Past Medical History  Disease Name Date Onset Notes   Depression --  --    Diverticulitis Of Colon --  --    Dysuria --  --    External hemorrhoid --  --    GERD (gastroesophageal reflux disease) --  --    Hearing loss --  --    Hematuria --  --    Hypothyroidism --  --    Menopause --  --    Murmur --  --    Osteoporosis --  --    Rash of Skin --  --    Scoliosis --  --    Vitamin D Deficiency --  --          Past Surgical History  Procedure Name Date Notes   Thymectomy --  --    Tubal ligation --  --          Medication List  Name Date Started Instructions   calcium carbonate-vitamin D3 600 mg(1,500mg) -400 unit oral tablet 10/10/2019 TAKE ONE TABLET BY MOUTH TWICE A DAY   levothyroxine 75 mcg oral tablet 10/10/2019 TAKE ONE TABLET BY MOUTH DAILY   multivitamin oral tablet  --    Tylenol 325 mg oral capsule  --          Allergy  "List  Allergen Name Date Reaction Notes   Cephalexin adverse reaction --  --  --    PENICILLINS --  --  --    SULFA (SULFONAMIDES) --  --  --          Social History  Finding Status Start/Stop Quantity Notes   Alcohol use --  --/-- --  --    Tobacco Never --/-- --  NEVER SMOKER         Immunizations  NameDate Admin Mfg Trade Name Lot Number Route Inj VIS Given VIS Publication   HepA12/20/2018 MSD VAQTA-ADULT [id number] IM RA 12/20/2018 01/01/2016   Comments: [id number]   HepA05/17/2018 SKB HAVRIX-ADULT [id number] IM  05/17/2018 07/21/2016   Comments: [id number]   Iyhgylday65/11/2019 PMC Fluzone, quadrivalent, preservative free [id number]  [place] 10/11/2019    Comments: NDC: 75426-749-66   Dgxalsblt43/09/2018 PMC FLUARIX [id number]   10/09/2018 08/07/2015   Comments: NDC [id number]   Zcocqhext16/17/2018 UNK Unknown TradeName [id number]   01/17/2018 08/07/2015   Comments: ndc 74006-388-56   Tdap03/20/2018 PMC ADACEL [id number] IM  03/20/2018 02/24/2015   Comments: ndc [id number]         Review of Systems  · Constitutional  o Denies  o : fever  · Musculoskeletal  o Admits  o : joint pain, muscle pain, neck pain, back pain      Vitals  Date Time BP Position Site L\R Cuff Size HR RR TEMP (F) WT  HT  BMI kg/m2 BSA m2 O2 Sat        11/14/2019 02:01 /64 Sitting    110 - R  99 141lbs 2oz 5'  3.5\" 24.61 1.69 97 %          Physical Examination  · Constitutional  o Appearance  o : well developed, well-nourished, in no acute distress  · Eyes  o Conjunctivae  o : conjunctivae normal  · Neck  o Inspection/Palpation  o : supple  o Thyroid  o : no thyromegaly  · Respiratory  o Respiratory Effort  o : breathing unlabored  o Auscultation of Lungs  o : clear to ascultation  · Cardiovascular  o Heart  o :   § Auscultation of Heart  § : regular rate and rhythm  o Peripheral Vascular System  o :   § Extremities  § : no edema  · Lymphatic  o Neck  o : no lymphadenopathy present  · Musculoskeletal  o General  o :   § General Musculoskeletal  § : " No joint swelling or deformity., Muscle tone, strength, and development grossly normal. (+) point tenderness tot he Lspine and SI joints and to the right side of the neck as well and tense tight muscles   · Skin and Subcutaneous Tissue  o General Inspection  o : skin turgor normal, texture normal  · Neurologic  o Gait and Station  o :   § Gait Screening  § : normal gait  · Psychiatric  o Mood and Affect  o : mood normal, affect appropriate          Results     reviewed the MRI with pt       Assessment  · Cervical pain (neck)     723.1/M54.2  · Lumbago     724.2/M54.5  · Scoliosis     737.30/M41.9  · DDD (degenerative disc disease), lumbar     722.52/M51.36  · Annular tear of lumbar disc     722.52/M51.36  · Abnormal MRI, lumbar spine     793.7/R93.7    Problems Reconciled  Plan  · Orders  o ACO-39: Current medications updated and reviewed () - - 11/14/2019  o NEUROSURGEON CONSULTATION ([facility]) - 737.30/M41.9, 722.52/M51.36, 793.7/R93.7, 723.1/M54.2 - 11/14/2019   annular tear of the posterior disc at L4-5 and pt has already done the PT twice and chiropractor and massage tx   · Medications  o Medications have been Reconciled  o Transition of Care or Provider Policy  · Instructions  o Take all medications as prescribed/directed.  o Patient was educated/instructed on their diagnosis, treatment and medications prior to discharge from the clinic today.  o Patient instructed to seek medical attention urgently for new or worsening symptoms.  o Call the office with any concerns or questions.  o Chronic conditions reviewed and taken into consideration for today's treatment plan.  · Disposition  o Call or Return if symptoms worsen or persist.            Electronically Signed by: CARMEN Grey -Author on November 14, 2019 02:41:52 PM

## 2021-05-07 NOTE — PROGRESS NOTES
"   Progress Note      Patient Name: Valentina Stevens   Patient ID: [id number]   Sex: Female   YOB: 1965    Referring Provider: Rosalva BENZ    Visit Date: May 8, 2020    Provider: DEMAR Maldonado   Location: [facility]   Location Address: [address] [place] [address] [place]  [address]   Location Phone: [phone]          Chief Complaint     right hip pain since yesterday afternoon       History Of Present Illness  Valentina Stevens is a 54 year old /White female who presents for evaluation and treatment of:      right-sided back pain/hip pain     This seemingly started yesterday - she has been to pain management for \"injections in my back\" - she has only had one. She states that they have been calling her to return for the next injection, but she hasn't made the time to do it. She is working at [employer] right now -supposed to work tomorrow.     MRI in October of last year showed muli-level DDD, mild central canal stenosis L2-L3, and annular tear posterior L4-L5 disc.     She is only taking Tylenol for the pain - states \"with all of this virus stuff I heard you weren't supposed to take Ibuprofen\". She is also taking Flexeril 10mg, which was given to her by pain management. That makes her sleepy.     No loss of bowel or bladder. Denies any falls.       Past Medical History  Disease Name Date Onset Notes   Depression --  --    Diverticulitis Of Colon --  --    Dysuria --  --    External hemorrhoid --  --    GERD (gastroesophageal reflux disease) --  --    Hearing loss --  --    Hematuria --  --    Hypothyroidism --  --    Menopause --  --    Murmur --  --    Osteoporosis --  --    Rash of Skin --  --    Scoliosis --  --    Vitamin D Deficiency --  --          Past Surgical History  Procedure Name Date Notes   Thymectomy --  --    Tubal ligation --  --          Medication List  Name Date Started Instructions   calcium carbonate-vitamin D3 600 mg(1,500mg) -400 " unit oral tablet 04/09/2020 TAKE ONE TABLET BY MOUTH TWICE A DAY   levothyroxine 75 mcg oral tablet 04/09/2020 TAKE ONE TABLET BY MOUTH DAILY   lisinopril 2.5 mg oral tablet 04/16/2020 TAKE ONE TABLET BY MOUTH DAILY FOR 30 DAYS   multivitamin oral tablet  --          Allergy List  Allergen Name Date Reaction Notes   Cephalexin adverse reaction --  --  --    indomethacin --  --  --    PENICILLINS --  --  --    SULFA (SULFONAMIDES) --  --  --          Social History  Finding Status Start/Stop Quantity Notes   Alcohol use --  --/-- --  --    Tobacco Never --/-- --  NEVER SMOKER         Immunizations  NameDate Admin Mfg Trade Name Lot Number Route Inj VIS Given VIS Publication   HepA12/20/2018 MSD VAQTA-ADULT [id number] IM RA 12/20/2018 01/01/2016   Comments: 8348-7157-18   HepA05/17/2018 SKB HAVRIX-ADULT [id number] IM  05/17/2018 07/21/2016   Comments: [id number]   Hebarbbks49/11/2019 PMC Fluzone, quadrivalent, preservative free [id number] [facility] 10/11/2019    Comments: NDC: 16416-493-01   Vcdzrrvss74/09/2018 PMC FLUARIX [id number]   10/09/2018 08/07/2015   Comments: NDC [id number]   Xdaweohed77/17/2018 UNK Unknown TradeName [id number]   01/17/2018 08/07/2015   Comments: ndc 74411-205-72   Tdap03/20/2018 PMC ADACEL [id number]   03/20/2018 02/24/2015   Comments: ndc [id number]         Review of Systems  · Constitutional  o Admits  o : good general health lately  · Cardiovascular  o Denies  o : chest pain, palpitations  · Respiratory  o Denies  o : shortness of breath  · Gastrointestinal  o Denies  o : fecal incontinence  · Genitourinary  o Denies  o : incontinence  · Neurologic  o Admits  o : tingling or numbness - RLE  · Musculoskeletal  o Admits  o : limitation of motion, back pain      Vitals  Date Time BP Position Site L\R Cuff Size HR RR TEMP (F) WT  HT  BMI kg/m2 BSA m2 O2 Sat        05/08/2020 09:23 /86 Sitting    120 - R  98     97 %          Physical Examination  · Constitutional  o Appearance  o : well developed,  well-nourished, in no acute distress  · Respiratory  o Respiratory Effort  o : breathing unlabored  o Auscultation of Lungs  o : clear to auscultation  · Cardiovascular  o Heart  o :   § Auscultation of Heart  § : regular rate and rhythm  o Peripheral Vascular System  o :   § Extremities  § : no edema  · Musculoskeletal  o General  o :   § General Musculoskeletal  § : Muscle tone, strength, and development grossly normal. She is TTP in the proximal aspect of the lumbar spine with paraspinal tenderness on the right which extends over the right buttock - Her ROM is guarded.   · Skin and Subcutaneous Tissue  o General Inspection  o : no lesions present, no areas of discoloration, skin turgor normal, texture normal  · Neurologic  o Gait and Station  o :   § Gait Screening  § : limping gait  · Psychiatric  o Mood and Affect  o : mood normal, affect appropriate          Assessment  · Acute right-sided low back pain with right-sided sciatica       Lumbago with sciatica, right side     724.2/M54.41  · DDD (degenerative disc disease), lumbar     722.52/M51.36  · Spinal stenosis of lumbar region     724.02/M48.061      Plan  · Orders  o ACO-39: Current medications updated and reviewed () - - 05/08/2020  · Medications  o Medications have been Reconciled  o Transition of Care or Provider Policy  · Instructions  o Patient was educated/instructed on their diagnosis, treatment and medications prior to discharge from the clinic today. I have encouraged her to call her pain management in order to continue her injections for managment of symptoms, which are likely related to her spinal stenosis - she is going to call - if it will be several weeks then she will call me and we can send in a steroid burst for her - She is agreeable to try a short course of IBU 800mg (which she will use OTC per her report) and she will use her muscle relaxer BID. Call or RTC with worsening symptoms. Voiced understanding.   o Chronic conditions  reviewed and taken into consideration for today's treatment plan.            Electronically Signed by: DEMAR Maldonado -Author on May 8, 2020 09:51:21 AM

## 2021-05-07 NOTE — PROGRESS NOTES
Progress Note      Patient Name: Valentina Stevens   Patient ID: [id number]   Sex: Female   YOB: 1965    Referring Provider: Rosalva BENZ    Visit Date: November 6, 2018    Provider: Eduardo Adames MD   Location: [facility]   Location Address: [address] [place] [address]  [id number]   Location Phone: [phone]          Chief Complaint     LEGS STILL SWELLING AND PAINFUL       History Of Present Illness  Valentina Stevens is a 53 year old /White female who presents for evaluation and treatment of:      pt still having right calf pain intermittently- worse at night- worse after being on feet during the day- labs have been normal, U/S venous duplex was neg for DVT- pt still getting symptoms       Past Medical History  Disease Name Date Onset Notes   Depression --  --    Diverticulitis Of Colon --  --    Dysuria --  --    External hemorrhoid --  --    GERD (gastroesophageal reflux disease) --  --    Hearing loss --  --    Hematuria --  --    Hypothyroidism --  --    Menopause --  --    Murmur --  --    Osteoporosis --  --    Rash of Skin --  --    Scoliosis --  --    Vitamin D Deficiency --  --          Past Surgical History  Procedure Name Date Notes   Tubal ligation --  --          Medication List  Name Date Started Instructions   Calcium 600 + D(3) 600 mg(1,500mg) -400 unit oral tablet 07/16/2018 take 1 tablet by oral route 2 times a day   ferrous sulfate 325 mg (65 mg iron) oral tablet 09/18/2018 take 1 tablet (325 mg) by oral route once daily for 30 days   levothyroxine 75 mcg oral tablet 09/18/2018 take 1 tablet (75 mcg) by oral route once daily for 30 days   Metamucil oral  --    metaxalone 400 mg oral tablet 07/20/2018 TAKE ONE TO TWO TABLETS BY MOUTH THREE TIMES A DAY AFTER MEALS   multivitamin oral tablet  --    naproxen sodium 550 mg oral tablet 06/15/2018 take 1 tablet (550 mg) by oral route every 12 hours as needed for 30 days         Allergy  "List  Allergen Name Date Reaction Notes   Cephalexin adverse reaction --  --  --    PENICILLINS --  --  --    SULFA (SULFONAMIDES) --  --  --          Social History  Finding Status Start/Stop Quantity Notes   Alcohol use --  --/-- --  --    Tobacco Never --/-- --  NEVER SMOKER         Immunizations  NameDate Admin Mfg Trade Name Lot Number Route Inj VIS Given VIS Publication   HepA05/17/2018 SKB Havrix Adult [id number] IM  05/17/2018 07/21/2016   Comments: [id number]   Ankhfgrde81/09/2018 PMC Fluzone Quadrivalent [id number] IM  10/09/2018 08/07/2015   Comments: NDC [id number]   Ddrruqjjb28/17/2018 UNK Unknown TradeName [id number] IM  01/17/2018 08/07/2015   Comments: ndc 84299-567-13   Tdap03/20/2018 PMC ADACEL [id number] IM  03/20/2018 02/24/2015   Comments: ndc [id number]         Review of Systems  · Constitutional  o Denies  o : fatigue, fever  · Cardiovascular  o Denies  o : chest pain, palpitations  · Respiratory  o Denies  o : shortness of breath, cough  · Musculoskeletal  o Admits  o : muscle pain      Vitals  Date Time BP Position Site L\R Cuff Size HR RR TEMP(F) WT  HT  BMI kg/m2 BSA m2 O2 Sat        11/06/2018 03:24 /72 Sitting    101 - R  97.8 146lbs 2oz 5'  4.5\" 24.69 1.74 97 %           Physical Examination  · Constitutional  o Appearance  o : well developed, well-nourished, in no acute distress  · Respiratory  o Respiratory Effort  o : breathing unlabored  o Auscultation of Lungs  o : clear to ascultation  · Cardiovascular  o Heart  o :   § Auscultation of Heart  § : regular rate and rhythm  o Peripheral Vascular System  o :   § Extremities  § : no edema  · Musculoskeletal  o General  o :   § General Musculoskeletal  § : No joint swelling or deformity., Muscle tone, strength, and development grossly normal. Right calf muscle tenderness only, normal ROM, stable, no redness or warmth  · Neurologic  o Gait and Station  o :   § Gait Screening  § : normal gait  · Psychiatric  o Mood and Affect  o : mood normal, " affect appropriate          Assessment  · Leg pain     729.5/M79.606      Plan  · Orders  o ACO-39: Current medications updated and reviewed () - - 11/06/2018  o PHYSICAL THERAPY CONSULTATION (Merged with [facility]) - 729.5/M79.606 - 11/06/2018  · Medications  o Voltaren 1 % topical gel   SIG: apply small amount to affected area TID PRN pain   DISP: (1) Tube with 1 refills  Prescribed on 11/06/2018     o naproxen sodium 550 mg oral tablet   SIG: take 1 tablet (550 mg) by oral route every 12 hours as needed for 30 days   DISP: (60) tablets with 2 refills  Discontinued on 11/06/2018     · Instructions  o Patient was educated/instructed on their diagnosis, treatment and medications prior to discharge from the clinic today.  o pt prescribed jobst stockings.            Electronically Signed by: Eduardo Adames MD -Author on November 6, 2018 03:44:05 PM

## 2021-05-07 NOTE — PROGRESS NOTES
Progress Note      Patient Name: Valentina Stevens   Patient ID: [id number]   Sex: Female   YOB: 1965    Referring Provider: Rosalva BENZ    Visit Date: August 24, 2019    Provider: Jaymie Castaneda MD   Location: [facility]   Location Address: [address] [place] [address]   [place]  [address]   Location Phone: [phone]          Chief Complaint     back pain that hurt down her legs, worse since yesterday.       History Of Present Illness  Valentina Stevens is a 53 year old /White female who presents for evaluation and treatment of:      She had thymectomy and is back to work after 2 weeks.  She didn't do anything unusual to bother her back but there is some lifting at [employer] and that is all she has done.  She took Motrin 600mg last night.  She has been hurting for years in her back but it got worse yesterday.  The Motrin and tylenol wasn't helping so she quit taking them and she has just been dealing with it.  She says there was some low blood work from the hospital  She has scoliosis.  She has not responded to the muscle relaxers in the past.    She had an abnormal BUN/creatinine ratio and her glucose.  She is fasting  She has been placed on a diet by Dr. Adames and she is losing weight.    She isn't taking Mobic either because it didn't work       Past Medical History  Disease Name Date Onset Notes   Depression --  --    Diverticulitis Of Colon --  --    Dysuria --  --    External hemorrhoid --  --    GERD (gastroesophageal reflux disease) --  --    Hearing loss --  --    Hematuria --  --    Hypothyroidism --  --    Menopause --  --    Murmur --  --    Osteoporosis --  --    Rash of Skin --  --    Scoliosis --  --    Vitamin D Deficiency --  --          Past Surgical History  Procedure Name Date Notes   Thymectomy --  --    Tubal ligation --  --          Medication List  Name Date Started Instructions   Calcium 600 + D(3) 600 mg(1,500mg) -400 unit oral  tablet 07/16/2018 take 1 tablet by oral route 2 times a day   calcium carbonate-vitamin D3 600 mg(1,500mg) -400 unit oral tablet 07/16/2019 TAKE ONE TABLET BY MOUTH TWICE A DAY   levothyroxine 75 mcg oral tablet 06/17/2019 TAKE ONE TABLET BY MOUTH DAILY   multivitamin oral tablet  --    Tylenol 325 mg oral capsule  --          Allergy List  Allergen Name Date Reaction Notes   Cephalexin adverse reaction --  --  --    PENICILLINS --  --  --    SULFA (SULFONAMIDES) --  --  --          Social History  Finding Status Start/Stop Quantity Notes   Alcohol use --  --/-- --  --    Tobacco Never --/-- --  NEVER SMOKER         Immunizations  NameDate Admin Mfg Trade Name Lot Number Route Inj VIS Given VIS Publication   HepA12/20/2018 MSD VAQTA-ADULT [id number] [facility] 12/20/2018 01/01/2016   Comments: 0720-6794-49   HepA05/17/2018 SKB HAVRIX-ADULT [id number] IM  05/17/2018 07/21/2016   Comments: [id number]   Pzuecmjqp43/09/2018 PMC FLUARIX [id number]   10/09/2018 08/07/2015   Comments: NDC [id number]   Pwkbieudx45/17/2018 UNK Unknown TradeName [id number]   01/17/2018 08/07/2015   Comments: ndc 37561-237-34   Tdap03/20/2018 PMC ADACEL [id number] IM  03/20/2018 02/24/2015   Comments: ndc [id number]         Vitals  Date Time BP Position Site L\R Cuff Size HR RR TEMP (F) WT  HT  BMI kg/m2 BSA m2 O2 Sat        08/24/2019 10:54 /80 Sitting    87 - R  98.2 142lbs 0oz    98 %          Physical Examination  · Constitutional  o Appearance  o : well developed, well-nourished, in no acute distress  · Eyes  o Conjunctivae  o : conjunctivae normal  · Neck  o Inspection/Palpation  o : supple  o Thyroid  o : no thyromegaly  · Respiratory  o Respiratory Effort  o : breathing unlabored  o Auscultation of Lungs  o : clear to ascultation  · Cardiovascular  o Heart  o :   § Auscultation of Heart  § : regular rate and rhythm  o Peripheral Vascular System  o :   § Extremities  § : no edema  · Lymphatic  o Neck  o : no lymphadenopathy  present  · Musculoskeletal  o General  o :   § General Musculoskeletal  § : More prominence on the L lower back and some curvature and a little tightness Mild tenderness  · Skin and Subcutaneous Tissue  o General Inspection  o : no lesions present, no areas of discoloration, skin turgor normal, texture normal  · Neurologic  o Gait and Station  o :   § Gait Screening  § : normal gait  · Psychiatric  o Mood and Affect  o : mood normal, affect appropriate          Assessment  · Abnormal creatinine clearance glomerular filtration     794.4/R94.4      Plan  · Orders  o CMP [facility] ([id number]) - 794.4/R94.4 - 08/24/2019  o ACO-39: Current medications updated and reviewed () - - 08/24/2019  · Medications  o indomethacin 25 mg oral capsule   SIG: take 1 capsule (25 mg) by oral route 3 times per day with food for 10 days   DISP: (30) capsules with 0 refills  Prescribed on 08/24/2019     · Instructions  o Patient was educated/instructed on their diagnosis, treatment and medications prior to discharge from the clinic today.            Electronically Signed by: Jaymie Castaneda MD -Author on August 24, 2019 11:32:32 AM

## 2021-05-07 NOTE — PROGRESS NOTES
Progress Note      Patient Name: Valentina Stevens   Patient ID: [id number]   Sex: Female   YOB: 1965    Referring Provider: Ami BENZ    Visit Date: April 2, 2021    Provider: DEMAR Waggoner   Location: [facility]   Location Address: [address] [place] [address] [place]  [address]   Location Phone: [phone]          Chief Complaint     family member thinks she maybe having some memory issues       History Of Present Illness  Valentina Stevens is a 55 year old /White female who presents for evaluation and treatment of:      sister has been helping work on her bathroom for about 1 month and has noted memory loss/changes - sisters states that pt will repeat the same thing later - does not forget where things are located - will go to tell her boss about something that needs to be ordered and will forget before she gets to the back of the store - has not driven to wrong location but has forgotten appointments  - will pass up turns while driving - unknown if she forgets recent events     Personal hx: migraines for years, new medication has reduced to once monthly from 2 weekly - 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 and 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    Family hx: has an older sister (1 year older) with early onset dementia and Alzheimer's and also has brain aneurysm - mother had hx of multiple mini-strokes and then a large stroke, mother with hx of heart attack    Pt is established with Neurology - MRI brain w/&w/o with mild white matter lesions    Pt has sleep apnea    Pt able to name President, Layton Gilbert; able to states month, April - see Mini-mental status exam    HR as high as 115 last night while sitting and resting watching tv    Pt has hx of hypothyroidism and is post-menopausal - pt also due for labs       Past Medical History  Disease Name Date Onset Notes   Depression --  --    Diverticulitis Of Colon --  --    Dysuria --  --     External hemorrhoid --  --    GERD (gastroesophageal reflux disease) --  --    Hearing loss --  --    Hematuria --  --    Hypothyroidism --  --    Menopause --  --    Murmur --  --    Osteoporosis --  --    Rash of Skin --  --    Scoliosis --  --    Vitamin D Deficiency --  --          Past Surgical History  Procedure Name Date Notes   Thymectomy --  --    Tubal ligation --  --          Medication List  Name Date Started Instructions   amitriptyline oral  --    calcium carbonate-vitamin D3 600 mg(1,500mg) -400 unit oral tablet 10/19/2020 TAKE ONE TABLET BY MOUTH TWICE A DAY   diclofenac sodium 1 % topical gel 03/10/2021 apply 2 grams to the affected area(s) by topical route 2 times per day   ergocalciferol (vitamin D2) 1,250 mcg (50,000 unit) oral capsule 04/05/2021 take 1 capsule by oral route once weekly   hydrocodone-acetaminophen 5-325 mg oral tablet  take 1 tablet by oral route every 6 hours as needed for pain   levofloxacin 500 mg oral tablet 04/05/2021 take 1 tablet (500 mg) by oral route once daily for 7 days   levothyroxine 75 mcg oral tablet 10/19/2020 TAKE ONE TABLET BY MOUTH DAILY   lisinopril 2.5 mg oral tablet 10/19/2020 TAKE ONE TABLET BY MOUTH DAILY FOR 30 DAYS   multivitamin oral tablet  --    pramipexole 0.125 mg oral tablet 10/27/2020 TAKE 1 TABLET BY MOUTH EVERY NIGHT AT BEDTIME 2-3 HOURS BEFORE BED   TOPIRAMATE 25MG TABLETS 11/16/2020 TAKE 1 TABLET(25 MG) BY MOUTH EVERY DAY AT BEDTIME         Allergy List  Allergen Name Date Reaction Notes   Cephalexin adverse reaction --  --  --    indomethacin --  --  --    PENICILLINS --  --  --    SULFA (SULFONAMIDES) --  --  --          Social History  Finding Status Start/Stop Quantity Notes   Alcohol use --  --/-- --  --    Tobacco Never --/-- --  NEVER SMOKER         Immunizations  NameDate Admin Mfg Trade Name Lot Number Route Inj VIS Given VIS Publication   HepA12/20/2018 MSD VAQTA-ADULT [id number] IM RA 12/20/2018 01/01/2016   Comments: 6631-0145-02    HepA05/17/2018 SKB HAVRIX-ADULT [id number] IM  05/17/2018 07/21/2016   Comments: [id number]   Ybzcormre00/19/2020 PMC FLUARIX [id number] IM RD 10/19/2020 08/15/2019   Comments: NDC [id number]   Tdap10/19/2020 PMC ADACEL [id number] IM LD 10/19/2020    Comments: NDC [id number]   Tdap03/20/2018 PMC ADACEL [id number] IM  03/20/2018 02/24/2015   Comments: ndc [id number]         Review of Systems  · Constitutional  o Admits  o : headache  · Cardiovascular  o Admits  o : dyspnea on exertion  o Denies  o : chest pain, palpitations  · Respiratory  o Denies  o : wheezing, cough  · Gastrointestinal  o Admits  o : nausea with migraines  o Denies  o : vomiting, diarrhea  · Neurologic  o Admits  o : tingling or numbness of hands with carpal tunnel, memory difficulties, loss of balance, headaches  o Denies  o : speech difficulties, tremors, dizziness      Vitals  Date Time BP Position Site L\R Cuff Size HR RR TEMP (F) WT  HT  BMI kg/m2 BSA m2 O2 Sat FR L/min FiO2        04/02/2021 01:41 /80 Sitting    129 - R  97.7 156lbs 16oz    96 %            Physical Examination  · Constitutional  o Appearance  o : well developed, well-nourished, in no acute distress  · Eyes  o Conjunctivae  o : conjunctivae normal  o Pupils and Irises  o : pupils equal and round, pupils reactive to light bilaterally  · Neck  o Inspection/Palpation  o : supple  o Thyroid  o : no thyromegaly  · Respiratory  o Respiratory Effort  o : breathing unlabored  o Auscultation of Lungs  o : clear to ascultation  · Cardiovascular  o Heart  o :   § Auscultation of Heart  § : regular rate and rhythm  o Peripheral Vascular System  o :   § Extremities  § : no edema  · Lymphatic  o Neck  o : no lymphadenopathy present  · Musculoskeletal  o General  o :   § General Musculoskeletal  § : Muscle tone, strength, and development grossly normal.  · Skin and Subcutaneous Tissue  o General Inspection  o : NL tone  · Neurologic  o Gait and Station  o :   § Gait Screening  § : normal  gait, normal heel and toe walk, negative rhomberg  · Psychiatric  o Mood and Affect  o : mood normal, affect appropriate              Assessment  · Hypothyroidism     244.9/E03.9  · Osteoporosis     733.00/M81.0  · Memory changes     780.93/R41.3  · Menopause     627.2/Z78.0      Plan  · Orders  o CBC with Auto Diff [facility] ([id number]) - 780.93/R41.3 - 04/02/2021  o CMP [facility] ([id number]) - 780.93/R41.3 - 04/02/2021  o TSH [facility] ([id number]) - 780.93/R41.3 - 04/02/2021  o IOP - Urinalysis without Microscopy (Clinitek) [facility] ([id number]) - 780.93/R41.3 - 04/02/2021   GLU-NEG DAVI-NEG KET-NEG SG-1.020 BLO-NEG pH-8.5 PRO-NEG URO-0.2 NIT-NEG AGUSTIN-TRACE  o Vitamin D (25-Hydroxy) Level (76772) - 780.93/R41.3 - 04/02/2021  o ACO-18: Negative screen for clinical depression using a standardized tool () - - 04/02/2021  o ACO-39: Current medications updated and reviewed (1159F, ) - - 04/02/2021  o Vitamin B-12 (28925) with Folate(98431) (40972\VF) - 780.93/R41.3 - 04/02/2021  · Medications  o Medications have been Reconciled  o Transition of Care or Provider Policy  · Instructions  o Patient was educated/instructed on their diagnosis, treatment and medications prior to discharge from the clinic today.  o Time spent with the patient was 30 minutes, more than 50% face to face.  · Disposition  o Follow up as needed.     Mini-mental status exam normal - pt to follow up with Neurology - will fax to Neurology.             Electronically Signed by: DEMAR Waggoner -Author on April 6, 2021 12:40:10 PM

## 2021-05-07 NOTE — PROGRESS NOTES
Progress Note      Patient Name: Valentina Stevens   Patient ID: [id number]   Sex: Female   YOB: 1965    Referring Provider: Eduardo Adames MD    Visit Date: August 21, 2018    Provider: Eduardo Adames MD   Location: [facility] operated [facility]   Location Address: [address] [place] [address]  [id number]   Location Phone: [phone]          Chief Complaint     left shoulder, sharp/stabbing pain started this morning.       History Of Present Illness  Valentina Stevens is a 52 year old /White female who presents for evaluation and treatment of:      pt has left shoulder pain that began today when sitting in house- pain has been constant today- no modifying factors- no known injury- sudden onset- meds not helping- pain is part of chest wall also    EKG: slight bradycardia, no ST or T wave changes  xrays:no fxs, NAD       Past Medical History  Disease Name Date Onset Notes   Depression --  --    Diverticulitis Of Colon --  --    Dysuria --  --    External hemorrhoid --  --    GERD (gastroesophageal reflux disease) --  --    Hearing loss --  --    Hematuria --  --    Hypothyroidism --  --    Menopause --  --    Murmur --  --    Osteoporosis --  --    Rash of Skin --  --    Scoliosis --  --    Vitamin D Deficiency --  --          Past Surgical History  Procedure Name Date Notes   Tubal ligation --  --          Medication List  Name Date Started Instructions   Calcium 600 + D(3) 600 mg(1,500mg) -400 unit oral tablet 07/16/2018 take 1 tablet by oral route 2 times a day   levothyroxine 50 mcg oral tablet 07/16/2018 TAKE ONE TABLET BY MOUTH EVERY MORNING ON AN EMPTY STOMACH FOR THYROID for 30 days   metaxalone 400 mg oral tablet 07/20/2018 TAKE ONE TO TWO TABLETS BY MOUTH THREE TIMES A DAY AFTER MEALS   naproxen sodium 550 mg oral tablet 06/15/2018 take 1 tablet (550 mg) by oral route every 12 hours as needed for 30 days         Allergy List  Allergen Name Date Reaction Notes    Cephalexin adverse reaction --  --  --    PENICILLINS --  --  --    SULFA (SULFONAMIDES) --  --  --          Social History  Finding Status Start/Stop Quantity Notes   Alcohol use --  --/-- --  --    Tobacco Never --/-- --  NEVER SMOKER         Immunizations  NameDate Admin Mfg Trade Name Lot Number Route Inj VIS Given VIS Publication   HepA05/17/2018 SKB Havrix Adult [id number]  05/17/2018 07/21/2016   Comments: [id number]   Lkhdcaplz56/17/2018 UNK Unknown TradeName [id number]   01/17/2018 08/07/2015   Comments: ndc [id number]   Tdap03/20/2018 [facility] ADACEL [id number] IM  03/20/2018 02/24/2015   Comments: ndc [id number]         Review of Systems  · Constitutional  o Denies  o : fatigue, fever  · Cardiovascular  o Denies  o : chest pain, palpitations  · Respiratory  o Denies  o : shortness of breath, cough  · Integument  o Denies  o : rash  · Musculoskeletal  o Admits  o : shoulder pain      Vitals  Date Time BP Position Site L\R Cuff Size HR RR TEMP(F) WT  HT  BMI kg/m2 BSA m2 O2 Sat        08/21/2018 02:15 /66 Sitting    91 - R  97.2 143lbs 8oz    96 %           Physical Examination  · Constitutional  o Appearance  o : well developed, well-nourished, in no acute distress  · Respiratory  o Respiratory Effort  o : breathing unlabored  o Auscultation of Lungs  o : clear to ascultation  · Cardiovascular  o Heart  o :   § Auscultation of Heart  § : regular rate and rhythm  o Peripheral Vascular System  o :   § Extremities  § : no edema  · Musculoskeletal  o General  o :   § General Musculoskeletal  § : No joint swelling or deformity., Muscle tone, strength, and development grossly normal. Left shoulder posterior muscle tenderness, normal ROM, stable.  · Neurologic  o Gait and Station  o :   § Gait Screening  § : normal gait  · Psychiatric  o Mood and Affect  o : mood normal, affect appropriate          Assessment  · Left shoulder pain     719.41/M25.512  · Chest wall  pain     786.52/R07.89      Plan  · Orders  o ACO-39: Current medications updated and reviewed () - - 08/21/2018  o Xray shoulder left [facility] ([id number]) - 719.41/M25.512, 786.52/R07.89 - 08/21/2018  o Xray chest 2 views [facility] (45863) - 719.41/M25.512, 786.52/R07.89 - 08/21/2018  o EKG (35280) - 719.41/M25.512, 786.52/R07.89 - 08/21/2018  o Troponin (84265) - 719.41/M25.512, 786.52/R07.89 - 08/21/2018  · Medications  o prednisone 20 mg oral tablet   SIG: take 2 tablets by oral route daily for 5 days   DISP: (10) tablets with 0 refills  Prescribed on 08/21/2018     · Instructions  o Patient was educated/instructed on their diagnosis, treatment and medications prior to discharge from the clinic today.            Electronically Signed by: Eduardo Adames MD -Author on August 28, 2018 05:17:47 PM

## 2021-05-07 NOTE — PROGRESS NOTES
Progress Note      Patient Name: Valentina Stevens   Patient ID: [id number]   Sex: Female   YOB: 1965        Visit Date: January 17, 2018    Provider: DEMAR Maldonado   Location: [facility]   Location Address: [address] [place] [address]  [id number]   Location Phone: [phone]          Chief Complaint     HA and vomiting       History Of Present Illness  Valentina Stevens is a 52 year old /White female who presents for evaluation and treatment of:      headaches and vomiting.     She states she has always had headaches--but she just never has to come in with them--She has had this headache since last night--started around 1 in the morning--left temporal area--throbbing--right now the pain is a 5 on 1-10 scale. She has taken 1 OTC Aleve for the pain--the pain prior to the Aleve was an 8.  She was vomiting--she has thrown up about 6 times with it--every time she moves.     She has a history of headaches, but never officially diagnosed with migraines. Has a history of seizures--she fell out of tree when she was 10--got a concussion, and then 2 weeks later slipped on ice and hit her head--after that she had a seizure. She saw a neurologist who told her she had epilepsy. She was on epilepsy medications until she was 18--never had a seizure while on medications or since then.       Past Medical History  Disease Name Date Onset Notes   Depression --  --    Diverticulitis Of Colon --  --    Dysuria --  --    External hemorrhoid --  --    GERD (gastroesophageal reflux disease) --  --    Hearing loss --  --    Hematuria --  --    Hypothyroidism --  --    Menopause --  --    Murmur --  --    Osteoporosis --  --    Rash of Skin --  --    Scoliosis --  --    Vitamin D Deficiency --  --          Past Surgical History  Procedure Name Date Notes   Tubal ligation --  --          Medication List  Name Date Started Instructions   alendronate 70 mg oral tablet  take 1 tablet  "(70 mg) by oral route once weekly in the morning, at least 30 min before first food, beverage, or medication of day   levothyroxine 50 mcg oral tablet 01/02/2018 TAKE ONE TABLET BY MOUTH EVERY MORNING ON AN EMPTY STOMACH FOR THYROID   metaxalone 400 mg oral tablet  take 2 tablets (800 mg) by oral route 3 times per day as needed   naproxen 500 mg oral tablet  take 1 tablet (500 mg) by oral route 2 times per day with food         Allergy List  Allergen Name Date Reaction Notes   Cephalexin adverse reaction --  --  --    PENICILLINS --  --  --    SULFA (SULFONAMIDES) --  --  --          Social History  Finding Status Start/Stop Quantity Notes   Alcohol use --  --/-- --  --    Tobacco Never --/-- --  --          Review of Systems  · Constitutional  o Denies  o : fatigue, fever, chills  · Eyes  o Denies  o : double vision, blurred vision  · HENT  o Admits  o : headaches, nasal discharge  o Denies  o : vertigo, lightheadedness, recent head injury  · Cardiovascular  o Denies  o : chest pain, irregular heart beats, syncope, dyspnea on exertion  · Respiratory  o Denies  o : shortness of breath, wheezing, cough  · Gastrointestinal  o Admits  o : nausea, vomiting  o Denies  o : diarrhea, abdominal pain  · Genitourinary  o Denies  o : urgency, frequency, dysuria  · Neurologic  o Denies  o : altered mental status, incoordination, tingling or numbness, difficulty concentrating, memory difficulties, loss of balance  · Musculoskeletal  o Denies  o : joint pain, joint swelling  · Psychiatric  o Denies  o : anxiety, depression, difficulty sleeping, suicidal ideation, homicidal ideation      Vitals  Date Time BP Position Site L\R Cuff Size HR RR TEMP(F) WT  HT  BMI kg/m2 BSA m2 O2 Sat HC       01/17/2018 10:52 /80 Sitting    94 - R  97.3 135lbs 0oz 5'  5\" 22.46 1.68 94 %           Physical Examination  · Constitutional  o Appearance  o : well developed, well-nourished, in no acute distress  · Eyes  o Conjunctivae  o : " conjunctivae normal, no exudates present  o Pupils and Irises  o : pupils equal and round, pupils reactive to light bilaterally  · Ears, Nose, Mouth and Throat  o Ears  o :   § External Ears  § : auricle appearance normal bilaterally, no auricle tenderness to palpation present, external auditory canal appearance within normal limits  § Otoscopic Examination  § : tympanic membrane appearance within normal limits bilaterally  § Hearing  § : response to sound normal, no tinnitus  o Nose  o :   § External Nose  § : appearance normal  § Intranasal Exam  § : mucosa within normal limits  o Oral Cavity  o :   § Oral Mucosa  § : oral mucosa normal  § Lips  § : lip appearance normal  § Teeth  § : normal dentition for age  § Gums  § : gums pink, non-swollen, no bleeding present  § Tongue  § : tongue appearance normal  § Palate  § : hard palate normal, soft palate appearance normal  o Throat  o :   § Oropharynx  § : no inflammation or lesions present, tonsils within normal limits  · Neck  o Inspection/Palpation  o : normal appearance, no masses or tenderness, trachea midline  · Respiratory  o Respiratory Effort  o : breathing unlabored  o Auscultation of Lungs  o : clear to ascultation  · Cardiovascular  o Heart  o :   § Auscultation of Heart  § : regular rate and rhythm  o Peripheral Vascular System  o :   § Extremities  § : no edema  · Gastrointestinal  o Abdominal Examination  o :   § Abdomen  § : soft, non-tender to palpation, bowel sounds +  · Lymphatic  o Neck  o : no lymphadenopathy present  · Musculoskeletal  o General  o :   § General Musculoskeletal  § : No joint swelling or deformity., Muscle tone, strength, and development grossly normal.  · Skin and Subcutaneous Tissue  o General Inspection  o : no lesions present, no areas of discoloration, skin turgor normal, texture normal  · Neurologic  o Gait and Station  o :   § Gait Screening  § : normal gait  · Psychiatric  o Mood and Affect  o : mood normal, affect  appropriate              Assessment  · Need for influenza vaccination     V04.81/Z23  · Headache     784.0/R51  · Vomiting     787.03/R11.10  · Depression screening negative     V79.0/Z13.89      Plan  · Orders  o Flucelvax Quadrivalent Syringes [facility] (66802) - V04.81/Z23 - 01/17/2018   Vaccine - Influenza; Dose: 0.5; Site: Left Upper Arm; Route: Intramuscular; Date: 01/17/2018 13:33:00; Exp: 05/31/2018; Lot: [id number]; Mfg: Unknown ; TradeName: Unknown TradeName; Administered By: Debbie Buitrago MA; Comment: ndc [id number]  o ACO-39: Current medications updated and reviewed () - - 01/17/2018  o ACO-18: Negative screen for clinical depression using a standardized tool () - V79.0/Z13.89 - 01/17/2018  o Influenza immunization was ordered or administered () - V04.81/Z23 - 01/17/2018  o 2 - IM - Injection Fee [facility] (27988) - 784.0/R51 - 01/17/2018  o 2.00 - Toradol Injection 30mg (-2) - 784.0/R51 - 01/17/2018   Injection - Ketorolac 30mg; Dose: 1mL; Site: Not Entered; Route: intramuscular; Date: 01/17/2018 13:22:08; Exp: 05/01/2019; Lot: [id number]; Mfg: HOSPIRA; TradeName: Toradol; Location: [facility]; Administered By: Debbie Buitrago MA; Comment: ndc [id number]  · Medications  o ondansetron 4 mg oral tablet,disintegrating   SIG: Dissolve 1-2 tablets by oral route every 6-8 hours as needed for nausea/vomiting   DISP: (18) tablets with 0 refills  Prescribed on 01/17/2018     · Instructions  o Take all medications as prescribed/directed.  o Patient was educated/instructed on their diagnosis, treatment and medications prior to discharge from the clinic today.  o Chronic conditions reviewed and taken into consideration for today's treatment plan.  · Disposition  o Call or Return if symptoms worsen or persist.            Electronically Signed by: DEMAR Maldonado -Author on January 17, 2018 02:37:24 PM

## 2021-05-07 NOTE — PROGRESS NOTES
"   Progress Note      Patient Name: Valentina Stevens   Patient ID: [id number]   Sex: Female   YOB: 1965    Referring Provider: Rosalva BENZ    Visit Date: May 17, 2018    Provider: CARMEN Grey   Location: [facility]   Location Address: [address] [place] [address]  [id number]   Location Phone: [phone]          Chief Complaint     PATIENT IS HERE FOR HEP A SHOT AND HER VEINS IN HER LEGS ARE HURTING, WITH THE RIGHT ONE IS WORSE.       History Of Present Illness  Valentina Stevens is a 52 year old /White female who presents for evaluation and treatment of:      pt. here for the Hep. A shot-    then also here with c/o \"my veins hurting--the right one worse than the left\"--\"feels like the veins in my legs feel weird like something crawling up the leg at work about 1 hr. before gets off and looks and nothing there so I know it's my veins\"--    not a smoker--    pt wears compression stockings daily at work--    pt states always been SOA--and the cardiologist has now sent her to the pulmonologist--and checking out her lungs--for this SOA     in residential for 5 yrs now--    then can only pay her bills--then only has $20 left over--and her  was awarded disability and unable to receive it due to being in residential--pt under a lot of stress--and admits to some anxious feelings    then also was asking for refill of the IB for her back pain       Past Medical History  Disease Name Date Onset Notes   Depression --  --    Diverticulitis Of Colon --  --    Dysuria --  --    External hemorrhoid --  --    GERD (gastroesophageal reflux disease) --  --    Hearing loss --  --    Hematuria --  --    Hypothyroidism --  --    Menopause --  --    Murmur --  --    Osteoporosis --  --    Rash of Skin --  --    Scoliosis --  --    Vitamin D Deficiency --  --          Past Surgical History  Procedure Name Date Notes   Tubal ligation --  --          Medication " List  Name Date Started Instructions   alendronate 70 mg oral tablet  take 1 tablet (70 mg) by oral route once weekly in the morning, at least 30 min before first food, beverage, or medication of day   levothyroxine 50 mcg oral tablet 03/20/2018 TAKE ONE TABLET BY MOUTH EVERY MORNING ON AN EMPTY STOMACH FOR THYROID for 30 days   metaxalone 400 mg oral tablet  take 2 tablets (800 mg) by oral route 3 times per day as needed   naproxen 500 mg oral tablet  take 1 tablet (500 mg) by oral route 2 times per day with food   ondansetron 4 mg oral tablet,disintegrating 01/17/2018 Dissolve 1-2 tablets by oral route every 6-8 hours as needed for nausea/vomiting         Allergy List  Allergen Name Date Reaction Notes   Cephalexin adverse reaction --  --  --    PENICILLINS --  --  --    SULFA (SULFONAMIDES) --  --  --          Social History  Finding Status Start/Stop Quantity Notes   Alcohol use --  --/-- --  --    Tobacco Never --/-- --  NEVER SMOKER         Immunizations  NameDate Admin Mfg Trade Name Lot Number Route Inj VIS Given VIS Publication   Rcdhyrtic55/17/2018 UNK Unknown TradeName [id number]  01/17/2018 08/07/2015   Comments: ndc [id number]   Tdap03/20/2018 [facility] ADACEL [id number] IM  03/20/2018 02/24/2015   Comments: ndc [id number]         Review of Systems  · Constitutional  o Denies  o : fever  · HENT  o Denies  o : vertigo, recent head injury  · Cardiovascular  o Denies  o : chest pain, irregular heart beats, rapid heart rate, lower extremity edema  · Respiratory  o Admits  o : shortness of breath  o Denies  o : productive cough  · Gastrointestinal  o Denies  o : nausea, vomiting  · Genitourinary  o Denies  o : dysuria, urinary retention  · Integument  o Denies  o : hair growth change, new skin lesions  · Neurologic  o Denies  o : altered mental status, seizures  · Musculoskeletal  o Admits  o : back pain  o Denies  o : joint swelling, limitation of motion  · Endocrine  o Denies  o : cold intolerance, heat  "intolerance  · Heme-Lymph  o Denies  o : petechiae, lymph node enlargement or tenderness  · Allergic-Immunologic  o Denies  o : frequent illnesses      Vitals  Date Time BP Position Site L\R Cuff Size HR RR TEMP(F) WT  HT  BMI kg/m2 BSA m2 O2 Sat HC       05/17/2018 03:22 /70 Sitting    103 - R  97.8 144lbs 5oz 5'  5\" 24.01 1.73 100 %           Physical Examination  · Constitutional  o Appearance  o : well developed, well-nourished, in no acute distress  · Head and Face  o HEENT  o : Unremarkable  · Eyes  o Conjunctivae  o : conjunctivae normal  · Neck  o Inspection/Palpation  o : supple  o Thyroid  o : no thyromegaly  · Respiratory  o Respiratory Effort  o : breathing unlabored  o Auscultation of Lungs  o : clear to ascultation  · Cardiovascular  o Heart  o :   § Auscultation of Heart  § : regular rate and rhythm  o Peripheral Vascular System  o :   § Pedal Pulses  § : bilateral pulse strength 2+--and then the BLE with few varicose veins and small spider veins and then no redness no tenderness no swelling--  § Extremities  § : no edema  · Gastrointestinal  o Abdominal Examination  o :   § Abdomen  § : soft  · Lymphatic  o Neck  o : no lymphadenopathy present  · Musculoskeletal  o General  o :   § General Musculoskeletal  § : No joint swelling or deformity., Muscle tone, strength, and development grossly normal.--pt with chronic LBP but good ROM   · Skin and Subcutaneous Tissue  o General Inspection  o : skin turgor normal, texture normal  · Neurologic  o Gait and Station  o :   § Gait Screening  § : normal gait  · Psychiatric  o Mood and Affect  o : mood normal, affect appropriate              Assessment  · Lumbago     724.2/M54.5  · Hepatitis A vaccination not up to date     V49.89/Z28.3  · Varicose veins of both lower extremities     454.9/I83.93  · Situational anxiety     300.09/F41.8      Plan  · Orders  o ACO-39: Current medications updated and reviewed () - - 05/17/2018  o Havrix vaccine ([id number]) - " V49.89/Z28.3 - 05/17/2018   Vaccine - HepA; Dose: 1; Site: Left Upper Arm; Route: Intramuscular; Date: 05/17/2018 16:12:00; Exp: 11/21/2020; Lot: [id number]; Mfg: [employer]; TradeName: Havrix Adult; Administered By: Lizzy Guevara MA; Comment: [id number]  o IM - Injection Fee ([id number]) - V49.89/Z28.3 - 05/17/2018  · Medications  o buspirone 5 mg oral tablet   SIG: take 1 tablet (5 mg) by oral route 2 times per day for anxiety   DISP: (60) tablets with 0 refills  Prescribed on 05/17/2018     · Instructions  o Take all medications as prescribed/directed.  o Patient was educated/instructed on their diagnosis, treatment and medications prior to discharge from the clinic today.  o Patient instructed to seek medical attention urgently for new or worsening symptoms.  o Call the office with any concerns or questions.  o Risks, benefits, and alternatives were discussed with the patient. The patient is aware of risks associated with: buspar SE and R&B   o Chronic conditions reviewed and taken into consideration for today's treatment plan.  · Disposition  o Call or Return if symptoms worsen or persist.  o Return Visit Request in/on 1 month +/- 2 days (2037).     varicose veins not very bad at this time--and pt to cont with wearing her compression stockings and then she can just F/U should s/s change or anything worsen    then will try the buspar for the anxious feelings and then F/U 1 month     then pt decided to just use the naproxen she has already at home for her back pain             Electronically Signed by: CARMEN Grey -Author on May 17, 2018 06:25:37 PM

## 2021-05-09 VITALS
SYSTOLIC BLOOD PRESSURE: 118 MMHG | OXYGEN SATURATION: 98 % | DIASTOLIC BLOOD PRESSURE: 58 MMHG | WEIGHT: 143.37 LBS | BODY MASS INDEX: 23.89 KG/M2 | HEART RATE: 95 BPM | HEIGHT: 65 IN | TEMPERATURE: 98 F

## 2021-05-09 VITALS
OXYGEN SATURATION: 99 % | HEART RATE: 72 BPM | BODY MASS INDEX: 25.78 KG/M2 | WEIGHT: 145.5 LBS | SYSTOLIC BLOOD PRESSURE: 122 MMHG | TEMPERATURE: 98 F | HEIGHT: 63 IN | DIASTOLIC BLOOD PRESSURE: 84 MMHG

## 2021-05-09 VITALS
SYSTOLIC BLOOD PRESSURE: 120 MMHG | BODY MASS INDEX: 27.21 KG/M2 | HEIGHT: 63 IN | TEMPERATURE: 97.8 F | HEART RATE: 84 BPM | OXYGEN SATURATION: 97 % | WEIGHT: 153.56 LBS | DIASTOLIC BLOOD PRESSURE: 64 MMHG

## 2021-05-09 VITALS
DIASTOLIC BLOOD PRESSURE: 82 MMHG | WEIGHT: 143 LBS | OXYGEN SATURATION: 98 % | SYSTOLIC BLOOD PRESSURE: 130 MMHG | TEMPERATURE: 97.2 F | HEART RATE: 94 BPM

## 2021-05-09 VITALS
TEMPERATURE: 98.5 F | WEIGHT: 145.25 LBS | HEIGHT: 63 IN | BODY MASS INDEX: 25.73 KG/M2 | SYSTOLIC BLOOD PRESSURE: 120 MMHG | HEART RATE: 105 BPM | DIASTOLIC BLOOD PRESSURE: 80 MMHG | OXYGEN SATURATION: 98 %

## 2021-05-09 VITALS
TEMPERATURE: 98 F | OXYGEN SATURATION: 98 % | HEART RATE: 108 BPM | DIASTOLIC BLOOD PRESSURE: 80 MMHG | SYSTOLIC BLOOD PRESSURE: 140 MMHG | WEIGHT: 150.56 LBS

## 2021-05-09 VITALS
BODY MASS INDEX: 25.76 KG/M2 | TEMPERATURE: 98.4 F | HEIGHT: 63 IN | WEIGHT: 145.37 LBS | SYSTOLIC BLOOD PRESSURE: 112 MMHG | HEART RATE: 76 BPM | DIASTOLIC BLOOD PRESSURE: 52 MMHG | OXYGEN SATURATION: 97 %

## 2021-05-09 VITALS
WEIGHT: 141.12 LBS | TEMPERATURE: 99.2 F | DIASTOLIC BLOOD PRESSURE: 72 MMHG | OXYGEN SATURATION: 97 % | HEART RATE: 124 BPM | SYSTOLIC BLOOD PRESSURE: 116 MMHG

## 2021-05-09 VITALS
BODY MASS INDEX: 25.69 KG/M2 | WEIGHT: 145 LBS | OXYGEN SATURATION: 99 % | HEIGHT: 63 IN | HEART RATE: 83 BPM | SYSTOLIC BLOOD PRESSURE: 130 MMHG | DIASTOLIC BLOOD PRESSURE: 80 MMHG | TEMPERATURE: 97.6 F

## 2021-05-09 VITALS
BODY MASS INDEX: 24.18 KG/M2 | HEIGHT: 65 IN | OXYGEN SATURATION: 99 % | TEMPERATURE: 97.6 F | DIASTOLIC BLOOD PRESSURE: 78 MMHG | SYSTOLIC BLOOD PRESSURE: 120 MMHG | WEIGHT: 145.12 LBS | HEART RATE: 103 BPM

## 2021-05-09 VITALS
DIASTOLIC BLOOD PRESSURE: 80 MMHG | HEART RATE: 101 BPM | OXYGEN SATURATION: 97 % | SYSTOLIC BLOOD PRESSURE: 122 MMHG | BODY MASS INDEX: 24.03 KG/M2 | WEIGHT: 144.25 LBS | TEMPERATURE: 98.4 F | HEIGHT: 65 IN

## 2021-05-09 VITALS
HEART RATE: 87 BPM | DIASTOLIC BLOOD PRESSURE: 70 MMHG | HEIGHT: 63 IN | OXYGEN SATURATION: 96 % | BODY MASS INDEX: 25.04 KG/M2 | TEMPERATURE: 97.9 F | WEIGHT: 141.31 LBS | SYSTOLIC BLOOD PRESSURE: 134 MMHG

## 2021-05-09 VITALS
HEART RATE: 115 BPM | HEIGHT: 64 IN | SYSTOLIC BLOOD PRESSURE: 106 MMHG | DIASTOLIC BLOOD PRESSURE: 86 MMHG | OXYGEN SATURATION: 98 % | TEMPERATURE: 98 F | BODY MASS INDEX: 24.43 KG/M2 | WEIGHT: 143.12 LBS

## 2021-05-09 VITALS
SYSTOLIC BLOOD PRESSURE: 140 MMHG | HEART RATE: 111 BPM | OXYGEN SATURATION: 98 % | DIASTOLIC BLOOD PRESSURE: 70 MMHG | WEIGHT: 151 LBS

## 2021-05-09 VITALS
DIASTOLIC BLOOD PRESSURE: 76 MMHG | OXYGEN SATURATION: 94 % | HEART RATE: 106 BPM | TEMPERATURE: 97.3 F | WEIGHT: 155.25 LBS | SYSTOLIC BLOOD PRESSURE: 120 MMHG

## 2021-05-09 VITALS
BODY MASS INDEX: 24.95 KG/M2 | DIASTOLIC BLOOD PRESSURE: 72 MMHG | TEMPERATURE: 97.8 F | OXYGEN SATURATION: 97 % | HEART RATE: 101 BPM | WEIGHT: 146.12 LBS | SYSTOLIC BLOOD PRESSURE: 118 MMHG | HEIGHT: 64 IN

## 2021-05-09 VITALS
DIASTOLIC BLOOD PRESSURE: 80 MMHG | HEIGHT: 62 IN | HEART RATE: 106 BPM | WEIGHT: 153 LBS | BODY MASS INDEX: 28.16 KG/M2 | SYSTOLIC BLOOD PRESSURE: 120 MMHG | OXYGEN SATURATION: 100 %

## 2021-05-09 VITALS
HEART RATE: 91 BPM | DIASTOLIC BLOOD PRESSURE: 58 MMHG | OXYGEN SATURATION: 98 % | BODY MASS INDEX: 23.89 KG/M2 | TEMPERATURE: 97.6 F | HEIGHT: 65 IN | SYSTOLIC BLOOD PRESSURE: 118 MMHG | WEIGHT: 143.37 LBS

## 2021-05-09 VITALS
DIASTOLIC BLOOD PRESSURE: 80 MMHG | OXYGEN SATURATION: 95 % | TEMPERATURE: 97.2 F | WEIGHT: 156 LBS | SYSTOLIC BLOOD PRESSURE: 134 MMHG | HEART RATE: 80 BPM

## 2021-05-09 VITALS
HEART RATE: 110 BPM | OXYGEN SATURATION: 97 % | DIASTOLIC BLOOD PRESSURE: 70 MMHG | TEMPERATURE: 98.2 F | SYSTOLIC BLOOD PRESSURE: 110 MMHG | WEIGHT: 144 LBS

## 2021-05-09 VITALS
TEMPERATURE: 98 F | WEIGHT: 146 LBS | OXYGEN SATURATION: 98 % | SYSTOLIC BLOOD PRESSURE: 120 MMHG | HEIGHT: 63 IN | BODY MASS INDEX: 25.87 KG/M2 | DIASTOLIC BLOOD PRESSURE: 80 MMHG | HEART RATE: 88 BPM

## 2021-05-09 VITALS
HEART RATE: 100 BPM | SYSTOLIC BLOOD PRESSURE: 112 MMHG | DIASTOLIC BLOOD PRESSURE: 70 MMHG | TEMPERATURE: 97.6 F | WEIGHT: 147.37 LBS | BODY MASS INDEX: 26.11 KG/M2 | HEIGHT: 63 IN | OXYGEN SATURATION: 99 %

## 2021-05-09 VITALS
OXYGEN SATURATION: 99 % | DIASTOLIC BLOOD PRESSURE: 80 MMHG | TEMPERATURE: 98.2 F | SYSTOLIC BLOOD PRESSURE: 124 MMHG | HEART RATE: 95 BPM | WEIGHT: 139 LBS

## 2021-05-09 VITALS
HEART RATE: 82 BPM | SYSTOLIC BLOOD PRESSURE: 122 MMHG | DIASTOLIC BLOOD PRESSURE: 70 MMHG | OXYGEN SATURATION: 97 % | HEIGHT: 63 IN

## 2021-05-09 VITALS
HEIGHT: 65 IN | SYSTOLIC BLOOD PRESSURE: 120 MMHG | WEIGHT: 135 LBS | BODY MASS INDEX: 22.49 KG/M2 | HEART RATE: 94 BPM | TEMPERATURE: 97.3 F | OXYGEN SATURATION: 94 % | DIASTOLIC BLOOD PRESSURE: 80 MMHG

## 2021-05-09 VITALS
TEMPERATURE: 98.2 F | SYSTOLIC BLOOD PRESSURE: 130 MMHG | DIASTOLIC BLOOD PRESSURE: 80 MMHG | OXYGEN SATURATION: 98 % | WEIGHT: 142 LBS | HEART RATE: 87 BPM

## 2021-05-09 VITALS
DIASTOLIC BLOOD PRESSURE: 66 MMHG | HEART RATE: 88 BPM | WEIGHT: 142.25 LBS | SYSTOLIC BLOOD PRESSURE: 132 MMHG | OXYGEN SATURATION: 97 % | TEMPERATURE: 97.8 F

## 2021-05-09 VITALS
OXYGEN SATURATION: 98 % | BODY MASS INDEX: 26.09 KG/M2 | HEIGHT: 63 IN | WEIGHT: 147.25 LBS | TEMPERATURE: 97.6 F | HEART RATE: 99 BPM | SYSTOLIC BLOOD PRESSURE: 118 MMHG | DIASTOLIC BLOOD PRESSURE: 68 MMHG

## 2021-05-09 VITALS
DIASTOLIC BLOOD PRESSURE: 70 MMHG | SYSTOLIC BLOOD PRESSURE: 124 MMHG | OXYGEN SATURATION: 99 % | TEMPERATURE: 97.3 F | WEIGHT: 141 LBS | HEART RATE: 73 BPM

## 2021-05-09 VITALS
HEIGHT: 63 IN | WEIGHT: 144.12 LBS | SYSTOLIC BLOOD PRESSURE: 118 MMHG | OXYGEN SATURATION: 98 % | TEMPERATURE: 98.4 F | HEART RATE: 88 BPM | BODY MASS INDEX: 25.54 KG/M2 | DIASTOLIC BLOOD PRESSURE: 62 MMHG

## 2021-05-09 VITALS
WEIGHT: 147.5 LBS | OXYGEN SATURATION: 98 % | TEMPERATURE: 97.8 F | BODY MASS INDEX: 25.18 KG/M2 | HEIGHT: 64 IN | SYSTOLIC BLOOD PRESSURE: 116 MMHG | DIASTOLIC BLOOD PRESSURE: 70 MMHG | HEART RATE: 101 BPM

## 2021-05-09 VITALS
SYSTOLIC BLOOD PRESSURE: 120 MMHG | WEIGHT: 146 LBS | OXYGEN SATURATION: 98 % | DIASTOLIC BLOOD PRESSURE: 82 MMHG | HEART RATE: 103 BPM | TEMPERATURE: 97.3 F

## 2021-05-09 VITALS
TEMPERATURE: 99 F | DIASTOLIC BLOOD PRESSURE: 64 MMHG | SYSTOLIC BLOOD PRESSURE: 124 MMHG | WEIGHT: 141.12 LBS | BODY MASS INDEX: 25 KG/M2 | HEIGHT: 63 IN | OXYGEN SATURATION: 97 % | HEART RATE: 110 BPM

## 2021-05-09 VITALS
OXYGEN SATURATION: 98 % | DIASTOLIC BLOOD PRESSURE: 78 MMHG | HEART RATE: 89 BPM | WEIGHT: 144.37 LBS | TEMPERATURE: 97.9 F | BODY MASS INDEX: 25.58 KG/M2 | SYSTOLIC BLOOD PRESSURE: 120 MMHG | HEIGHT: 63 IN

## 2021-05-09 VITALS
BODY MASS INDEX: 24.04 KG/M2 | HEART RATE: 103 BPM | WEIGHT: 144.31 LBS | SYSTOLIC BLOOD PRESSURE: 116 MMHG | OXYGEN SATURATION: 100 % | HEIGHT: 65 IN | TEMPERATURE: 97.8 F | DIASTOLIC BLOOD PRESSURE: 70 MMHG

## 2021-05-09 VITALS
TEMPERATURE: 96.6 F | WEIGHT: 145 LBS | DIASTOLIC BLOOD PRESSURE: 80 MMHG | OXYGEN SATURATION: 98 % | SYSTOLIC BLOOD PRESSURE: 130 MMHG | HEART RATE: 82 BPM

## 2021-05-09 VITALS
OXYGEN SATURATION: 96 % | HEART RATE: 129 BPM | SYSTOLIC BLOOD PRESSURE: 130 MMHG | TEMPERATURE: 97.7 F | DIASTOLIC BLOOD PRESSURE: 80 MMHG | WEIGHT: 157 LBS

## 2021-05-09 VITALS
WEIGHT: 143 LBS | OXYGEN SATURATION: 95 % | SYSTOLIC BLOOD PRESSURE: 130 MMHG | DIASTOLIC BLOOD PRESSURE: 80 MMHG | TEMPERATURE: 98.3 F | HEART RATE: 102 BPM

## 2021-05-09 VITALS
TEMPERATURE: 97.8 F | DIASTOLIC BLOOD PRESSURE: 76 MMHG | SYSTOLIC BLOOD PRESSURE: 128 MMHG | WEIGHT: 144 LBS | OXYGEN SATURATION: 97 % | HEART RATE: 96 BPM

## 2021-05-09 VITALS
TEMPERATURE: 97.2 F | SYSTOLIC BLOOD PRESSURE: 112 MMHG | WEIGHT: 143.5 LBS | HEART RATE: 91 BPM | DIASTOLIC BLOOD PRESSURE: 66 MMHG | OXYGEN SATURATION: 96 %

## 2021-05-09 VITALS
DIASTOLIC BLOOD PRESSURE: 80 MMHG | WEIGHT: 144 LBS | HEIGHT: 64 IN | TEMPERATURE: 97.8 F | BODY MASS INDEX: 24.59 KG/M2 | HEART RATE: 85 BPM | SYSTOLIC BLOOD PRESSURE: 132 MMHG | OXYGEN SATURATION: 98 %

## 2021-05-09 VITALS
HEART RATE: 120 BPM | SYSTOLIC BLOOD PRESSURE: 140 MMHG | OXYGEN SATURATION: 97 % | TEMPERATURE: 98 F | DIASTOLIC BLOOD PRESSURE: 86 MMHG

## 2021-05-09 VITALS
DIASTOLIC BLOOD PRESSURE: 76 MMHG | TEMPERATURE: 97.5 F | WEIGHT: 141 LBS | HEART RATE: 81 BPM | OXYGEN SATURATION: 100 % | SYSTOLIC BLOOD PRESSURE: 118 MMHG

## 2021-05-10 ENCOUNTER — OFFICE VISIT CONVERTED (OUTPATIENT)
Dept: NEUROLOGY | Facility: CLINIC | Age: 56
End: 2021-05-10
Attending: NURSE PRACTITIONER

## 2021-05-10 NOTE — H&P
History and Physical      Patient Name: Valentina Stevens   Patient ID: [id number]   Sex: Female   YOB: 1965    Primary Care Provider: Sera MORALES   Referring Provider: Rosalva BENZ    Visit Date: November 23, 2020    Provider: DEMAR Lala   [facility]: [facility]   Location Address: [address] [place] [address], [address] [place] [address]  [id number]   Location Phone: [phone]          Chief Complaint     1.  Hearing loss       History Of Present Illness  Valentina Stevens is a 55 year old /White female who presents to the office today as a consult from Rosalva BENZ.      She presents to the clinic today for evaluation of her hearing.  She states that she feels like the hearing in her right ear has decreased over the past 2 to 3 months.  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  She states she has done this job for greater than 30 years.  She will occasionally have bilateral tinnitus that she describes as a low pitched hum.  She denies any issues with recurrent otitis media infections, otalgia or otorrhea.  She has never had any ear surgeries.       Past Medical History  Ankle Sprain/Strain; Arthritis; Epilepsy; Hearing loss; Left Fx-5th Metatarsal; Leg pain; Lumbago/low back pain; Migraines; Mitral Valve Disorders; Muscle cramps; Seizures; Shortness of Breath; Thyroid disease; Thyroid disorder         Past Surgical History  Atrial mass removal; Colonoscopy; Tubal ligation         Medication List  Calcium 500 500 mg calcium (1,250 mg) oral tablet; fiber oral powder; Imitrex 50 mg oral tablet; levothyroxine 50 mcg oral tablet; multivitamin oral tablet; pramipexole 0.125 mg oral tablet; topiramate 50 mg oral tablet; vitamin E oral         Allergy List  Cephalexin adverse reaction; indomethacin; PENICILLINS; SULFA (SULFONAMIDES)         Family Medical History  Family history of colon  "cancer; Family history of Arthritis; Family history of stroke; Family history of diabetes mellitus         Social History  Alcohol (Current some day); Homemaker.; lives with children; ; Recreational Drug Use (Never); Tobacco (Never); Working         Review of Systems  · Constitutional  o Denies  o : fever, night sweats, weight loss  · Eyes  o Denies  o : discharge from eye, impaired vision  · HENT  o Admits  o : *See HPI  · Cardiovascular  o Denies  o : chest pain, irregular heart beats  · Respiratory  o Denies  o : shortness of breath, wheezing, coughing up blood  · Gastrointestinal  o Denies  o : heartburn, reflux, vomiting blood  · Genitourinary  o Denies  o : frequency  · Integument  o Denies  o : rash, skin dryness  · Neurologic  o Denies  o : seizures, loss of balance, loss of consciousness, dizziness  · Endocrine  o Denies  o : cold intolerance, heat intolerance  · Heme-Lymph  o Denies  o : easy bleeding, anemia      Vitals  Date Time BP Position Site L\R Cuff Size HR RR TEMP (F) WT  HT  BMI kg/m2 BSA m2 O2 Sat FR L/min FiO2        11/23/2020 01:09 PM        97.5 153lbs 6oz 5'  3\" 27.17 1.76             Physical Examination  · Constitutional  o Appearance  o : well developed, well-nourished, alert and in no acute distress, voice clear and strong  · Head and Face  o Head  o :   § Inspection  § : no deformities or lesions  o Face  o :   § Inspection  § : No facial lesions; House-Brackmann I/VI bilaterally  § Palpation  § : No TMJ crepitus nor  muscle tenderness bilaterally  · Eyes  o Vision  o :   § Visual Fields  § : Extraocular movements are intact. No spontaneous or gaze-induced nystagmus.  o Conjunctivae  o : clear  o Sclerae  o : clear  o Pupils and Irises  o : pupils equal, round, and reactive to light.   · Ears, Nose, Mouth and Throat  o Ears  o :   § External Ears  § : appearance within normal limits, no lesions present  § Otoscopic Examination  § : tympanic membrane appearance within " normal limits bilaterally without perforations, well-aerated middle ears  § Hearing  § : intact to conversational voice both ears  o Nose  o :   § External Nose  § : appearance normal  § Intranasal Exam  § : mucosa within normal limits, vestibules normal, no intranasal lesions present, septum midline, sinuses non tender to percussion  o Oral Cavity  o :   § Oral Mucosa  § : oral mucosa normal without pallor or cyanosis  § Lips  § : lip appearance normal  § Teeth  § : normal dentition for age  § Gums  § : gums pink, non-swollen, no bleeding present  § Tongue  § : tongue appearance normal; normal mobility  § Palate  § : hard palate normal, soft palate appearance normal with symmetric mobility  o Throat  o :   § Oropharynx  § : no inflammation or lesions present, tonsils within normal limits  · Neck  o Inspection/Palpation  o : normal appearance, no masses or tenderness, trachea midline; thyroid size normal, nontender, no nodules or masses present on palpation  · Respiratory  o Respiratory Effort  o : breathing unlabored  o Inspection of Chest  o : normal appearance, no retractions  · Lymphatic  o Neck  o : no lymphadenopathy present  o Supraclavicular Nodes  o : no lymphadenopathy present  o Preauricular Nodes  o : no lymphadenopathy present  · Skin and Subcutaneous Tissue  o General Inspection  o : Regarding face and neck - there are no rashes present, no lesions present, and no areas of discoloration  · Neurologic  o Cranial Nerves  o : cranial nerves II-XII are grossly intact bilaterally  o Gait and Station  o : normal gait, able to stand without diffculty  · Psychiatric  o Judgement and Insight  o : judgment and insight intact  o Mood and Affect  o : mood normal, affect appropriate          Assessment  · Sensorineural hearing loss (SNHL), bilateral     389.18/H90.3      Plan  · Orders  o Audiometry, pure-tone (threshold); air and bone (06196) - 389.18/H90.3 - 11/23/2020  o Tympanogram (Impedance Testing) [facility]  (23949) - 389.18/H90.3 - 11/23/2020  · Medications  o Medications have been Reconciled  o Transition of Care or Provider Policy  · Instructions  o She presents to the clinic today for evaluation of her hearing. She states that she feels like the hearing in her right ear has decreased over the past 2 to 3 months. 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 She states she has done this job for greater than 30 years. She will occasionally have bilateral tinnitus that she describes as a low pitched hum. She denies any issues with recurrent otitis media infections, otalgia or otorrhea. She has never had any ear surgeries. On examination today bilateral external auditory canals and bilateral tympanic membrane appearance is within normal limits. There are no tympanic membrane perforations and middle ears are well aerated. We did obtain an audiogram and tympanogram. Audiogram shows normal hearing bilaterally through 2000 Hz downsloping to moderate sensorineural hearing loss the recovering to a mild loss at 6000 Hz and then sloping to a moderately severe to severe loss at 8000 Hz bilaterally. Speech reception threshold was at 20 dB on the right and 10 dB on the left. Word discrimination scores were 100% bilaterally at 60 dB. Tympanograms were within normal limits bilaterally. I have gone over the results of the audiogram with the patient and also given her a copy. Given her speech audiometry score she would be a good candidate for hearing aids should she feel these are necessary. She is very bothered that she is having a difficult time hearing at work and she would like to look into hearing aids to see if this would be helpful to her. I will plan to see her back on an as-needed basis.  o Electronically Identified Patient Education Materials Provided Electronically  · Correspondence  o ENT Letter to Referring MD (Rosalva BENZ) -  11/23/2020            Electronically Signed by: DEMAR Lala -Author on November 23, 2020 03:04:59 PM

## 2021-05-10 NOTE — H&P
"   History and Physical      Patient Name: Valentina Stevens   Patient ID: [id number]   Sex: Female   YOB: 1965    Primary Care Provider: Sera MORALES   Referring Provider: Rosalva BENZ    Visit Date: December 28, 2020    Provider: Jaylan Watson MD   Location: [facility]   Location Address: [address] [place] [address]  [id number]   Location Phone: [phone]          Chief Complaint     R\">BUE numbness tingling and weakness L>R       History Of Present Illness  Valentina Stevens is a 55 year old /White female who presents today to [facility] today referred from Rosalva BENZ.      55-year-old woman evaluated for 3 to 4-week history of numbness and tingling of her fingers.  It involves both hands left greater than the right side.  It happens 3-4 times a day lasting for minutes at a time.  She has to squeeze her hand or shake it to get rid of the tingling.  It happens when she is doing activities of daily living including driving.  She states that she wakes up 2-3 times in the morning with her hands numb and tingly that she has to shake and squeeze it.  She states that she is dropping things.  She has been using a wrist splint for the last several weeks and it has helped her symptoms and she has not been waking up in the morning with her hands numb and really.  She is wearing it during the daytime as well when she is doing activities such as working at [employer] the [employer] as well as driving.       Past Medical History  Ankle Sprain/Strain; Arthritis; Bowel Disease; Epilepsy; Hearing loss; Hyperlipemia; Hypertension; Left Fx-5th Metatarsal; Leg pain; Limb Swelling; Lumbago/low back pain; Migraines; Mitral Valve Disorders; Muscle cramps; Seizures; Shortness of Breath; Thyroid disease; Thyroid disorder         Past Surgical History  Atrial mass removal; Colonoscopy; Tubal ligation         Medication List  Calcium " 500 500 mg calcium (1,250 mg) oral tablet; fiber oral powder; Imitrex 50 mg oral tablet; levothyroxine 50 mcg oral tablet; multivitamin oral tablet; pramipexole 0.125 mg oral tablet; topiramate 50 mg oral tablet; vitamin E oral         Allergy List  Cephalexin adverse reaction; indomethacin; PENICILLINS; SULFA (SULFONAMIDES)         Family Medical History  Stroke; Cancer, Unspecified; Family history of colon cancer; Family history of Arthritis; Family history of stroke; Family history of diabetes mellitus         Social History  Alcohol (Current some day); Alcohol Use (Current some day); Homemaker.; lives with children; ; .; Recreational Drug Use (Never); Tobacco (Never); Working         Review of Systems  · Constitutional  o Denies  o : chills, excessive sweating, fatigue, fever, sycope/passing out, weight gain, weight loss  · Eyes  o Denies  o : changes in vision, blurry vision, double vision  · HENT  o Admits  o : loss of hearing  o Denies  o : ringing in the ears, ear aches, sore throat, nasal congestion, sinus pain, nose bleeds, seasonal allergies  · Cardiovascular  o Denies  o : blood clots, swollen legs, anemia, easy burising or bleeding, transfusions  · Respiratory  o Denies  o : shortness of breath, dry cough, productive cough, pneumonia, COPD  · Gastrointestinal  o Denies  o : difficulty swallowing, reflux  · Genitourinary  o Denies  o : incontinence  · Neurologic  o Admits  o : headache, loss of balance  o Denies  o : seizure, stroke, tremor, falls, dizziness/vertigo, difficulty with sleep, numbness/tingling/paresthesia , difficulty with coordination, difficulty with dexterity, weakness  · Musculoskeletal  o Admits  o : low back pain  o Denies  o : neck stiffness/pain, swollen lymph nodes, muscle aches, joint pain, weakness, spasms, sciatica, pain radiating in arm, pain radiating in leg  · Endocrine  o Admits  o : thyroid disorder  o Denies  o : diabetes  · Psychiatric  o Denies  o :  "anxiety, depression      Vitals  Date Time BP Position Site L\R Cuff Size HR RR TEMP (F) WT  HT  BMI kg/m2 BSA m2 O2 Sat FR L/min FiO2 HC       12/28/2020 10:55 /82 Sitting    75 - R 16 96.2 155lbs 0oz 5'  3\" 27.46 1.77             Physical Examination     There is no weakness of the upper extremities on individual muscle testing.  There is no weakness of intrinsic hand muscles.  Phalen sign is positive.  Pressure at the wrist produces tingling in her fingers.           Assessment  · Carpal tunnel syndrome     354.0/G56.00  She has clinical carpal tunnel syndrome. She is to continue wearing the wrist splints that orthopedics has given her. If there is no significant provement she can have steroid injections to her wrist.    10 minutes was spent for the straightforward complexity visit more half the time was spent face-to-face with the patient for examination, counseling, planning and recommendations.  · Numbness and tingling       Anesthesia of skin     782.0/R20.0  Paresthesia of skin     782.0/R20.2  Nerve conduction study is normal and does not show electrophysiologic evidence for median neuropathy at the wrist or ulnar neuropathy at the elbow at this time. Clinically she has carpal tunnel syndrome.      Plan  · Orders  o Nerve conduction studies; 7-8 studies (97641) - 354.0/G56.00, 782.0/R20.0, 782.0/R20.2 - 12/28/2020  · Medications  o Medications have been Reconciled  o Transition of Care or Provider Policy  · Instructions  o Encouraged to follow-up with Primary Care Provider for preventative care.            Electronically Signed by: Jaylan Watson MD -Author on December 28, 2020 12:31:49 PM  "

## 2021-05-10 NOTE — H&P
History and Physical      Patient Name: Valentina Stevens   Patient ID: [id number]   Sex: Female   YOB: 1965    Referring Provider: Sera MORALES    Visit Date: November 10, 2020    Provider: DEMAR Paniagua   Location: [facility]   Location Address: [address] [place] [address]  [id number]   Location Phone: [phone]          Chief Complaint  · Migraines      History Of Present Illness  Valentina Stevens is a 55 year old /White female who presents today to [facility] today referred from Sera MORALES for evaluation of headaches.   She reports that she developed headaches many years ago. Since that time, her headaches have progressively worsened.   Currently, she reports headaches that are located frontal. She characterizes the headaches as 8/10 in severity, throbbing in nature with associated photophobia, phonophobia, and nausea. Her headaches last 8-10 hours. She reports 12 headache days per month. She denies associated aura. She denies focal numbness, weakness, speech, and vision changes.   Triggers: Denies any known triggers   Symptoms improved by: Dark quiet room and Sleep   She states she is sleeping fairly well . Reports getting 7-8 hours of sleep per night. Denies snoring. Reports refreshing sleep. Has DEVENDRA diagnosis, compliant with CPAP   Prior prophylactic medications include: topiramate 25mg qHS   She uses abortive therapy such as: Tylenol, Excedrin   Caffeine Use: minimal   Independently Reviewed: Labs and Prior PCP records   History of Kidney Stones: No   She denies a family history of cerebral aneurysm.      States that with her last severe headache she lost vision for a few hours.  This had never happened before.  PCP started low dose topiramate 3 weeks ago, she does feel like this has helped some.       Past Medical History  Ankle Sprain/Strain; Arthritis; Epilepsy; Left Fx-5th Metatarsal; Leg pain;  Lumbago/low back pain; Migraines; Mitral Valve Disorders; Muscle cramps; Seizures; Shortness of Breath; Thyroid disease; Thyroid disorder         Past Surgical History  Atrial mass removal; Colonoscopy; Tubal ligation         Medication List  Calcium 500 500 mg calcium (1,250 mg) oral tablet; fiber oral powder; levothyroxine 50 mcg oral tablet; multivitamin oral tablet; pramipexole 0.125 mg oral tablet; vitamin E oral         Allergy List  Cephalexin adverse reaction; indomethacin; PENICILLINS; SULFA (SULFONAMIDES)       Allergies Reconciled  Family Medical History  Stroke; Cancer, Unspecified; Colon Cancer; Family history of Arthritis; Family history of cancer; Family history of stroke; Family history of diabetes mellitus         Social History  Alcohol (Current some day); Alcohol Use (Never); Homemaker.; lives with children; ; .; Recreational Drug Use (Never); Tobacco (Never); Working         Review of Systems  · Constitutional  o Denies  o : chills, excessive sweating, fatigue, fever, sycope/passing out, weight gain, weight loss  · Eyes  o Admits  o : blurry vision  o Denies  o : changes in vision, double vision  · HENT  o Admits  o : headaches  o Denies  o : loss of hearing, ringing in the ears, ear aches, sore throat, nasal congestion, sinus pain, nose bleeds, seasonal allergies  · Cardiovascular  o Denies  o : blood clots, swollen legs, anemia, easy burising or bleeding, transfusions  · Respiratory  o Denies  o : shortness of breath, dry cough, productive cough, pneumonia, COPD  · Gastrointestinal  o Denies  o : difficulty swallowing, reflux  · Genitourinary  o Denies  o : incontinence  · Neurologic  o Admits  o : headache  o Denies  o : seizure, stroke, tremor, loss of balance, falls, dizziness/vertigo, difficulty with sleep, numbness/tingling/paresthesia , difficulty with coordination, difficulty with dexterity, weakness  · Musculoskeletal  o Denies  o : neck stiffness/pain, swollen lymph  "nodes, muscle aches, joint pain, weakness, spasms, sciatica, pain radiating in arm, pain radiating in leg, low back pain  · Endocrine  o Denies  o : diabetes, thyroid disorder  · Psychiatric  o Denies  o : anxiety, depression      Vitals  Date Time BP Position Site L\R Cuff Size HR RR TEMP (F) WT  HT  BMI kg/m2 BSA m2 O2 Sat FR L/min FiO2        11/10/2020 12:35 /86 Sitting    74 - R  97.3 154lbs 8oz 5'  3\" 27.37 1.76             Physical Examination  · Constitutional  o Appearance  o : well-nourished, well groomed, in no apparent distress  · Eyes  o Pupils and Irises  o : Pupils equal, round, and reactive to light and accommodation bilaterally  · Respiratory  o Auscultation of Lungs  o : Lungs were clear to ascultation bilaterally. No wheezes, rhonchi or rales were appreciated.  · Cardiovascular  o Heart  o :   § Auscultation of Heart  § : Regular rate and rhythm, no murmurs, gallops or rubs were appreciated.  o Peripheral Vascular System  o :   § Extremities  § : No peripheral edema was appreciated  · Musculoskeletal  o General  o : Normal bulk and normal tone.  · Neurologic  o Mental Status Examination  o :   § Orientation  § : Alert and oriented to person, place, and time,  § Speech/Language  § : Intact naming, comprehension, and repetition. No dysarthria.  § Memory  § : Immediate recall is 3/3. Recall at 5 minutes is 3/3.   § Attention  § : Attention span is intact to serial 7 examination and finger tapping.   § Fund of Knowledge  § : Adequate fund of knowledge  o Cranial Nerves  o : Pupils are equal, round and reactive to light. Extraocular movements are intact. Visual fields are full. Fundoscopic examination reveals sharp disc bilaterally. Sensation in the V1-V3 distribution is intact and symmetric. Muscles of mastication are strong and symmetric. Muscles of facial expression are strong and symmetric. Hearing is intact. Palatal raise is intact and symmetric. Uvula is midline. Shoulder shrug is strong. " Tongue protrudes in the midline.  o Motor Examination  o :   § RUE Strength  § : strength normal  § LUE Strength  § : strength normal  § RLE Strength  § : strength normal  § LLE Strength  § : strength normal  o Reflexes  o : 2+ reflexes throughout and symmetric. Negative Heck. Negative Babinski.   o Sensation  o : Intact sensation to light touch, pinprick, vibration and proprioception throughout.  o Gait and Station  o :   § Gait Screening  § : Normal, narrow based gait, with a normal arm swing.  o Coordination  o : Intact finger to nose and heel to shin. Rapid alternating movements are intact in the upper and lower extremities.          Assessment  · Intractable migraine without aura and without status migrainosus     346.11/G43.019  Will order MRI brain for further evaluation of migraine with visual disturbances. Will increase topiramate for preventative therapy. Will start Imitrex PRN for abortive therapy.   · Visual disturbance     368.9/H53.9      Plan  · Orders  o MRI brain wo then w contrast (85879) - 346.11/G43.019, 368.9/H53.9 - 11/10/2020  · Medications  o topiramate 50 mg oral tablet   SIG: take 1 tablet by oral route 2 times a day for 30 days one tablet qHS for 2 weeks, then BID   DISP: (60) Tablet with 5 refills  Prescribed on 11/10/2020     o Imitrex 50 mg oral tablet   SIG: Take 1 tab at HA onset, may rpt in 2 hrs. No more than 2 tabs/day or 2 days/week   DISP: (9) Tablet with 5 refills  Prescribed on 11/10/2020     o topiramate 25 mg oral capsule, sprinkle   SIG: take 2 capsules (50 mg) by oral route 2 times per day in the morning and evening   DISP: (0) Capsule with 0 refills  Discontinued on 11/10/2020     o Medications have been Reconciled  o Transition of Care or Provider Policy  · Instructions  o Encouraged to follow-up with Primary Care Provider for preventative care.  o Call or Return if symptoms worsen or persist.  o Follow up in 3 months.  o Electronically Identified Patient Education  Materials Provided Electronically            Electronically Signed by: DEMAR Paniagua -Author on November 12, 2020 12:26:24 PM

## 2021-05-10 NOTE — H&P
History and Physical      Patient Name: Valentina Stevens   Patient ID: [id number]   Sex: Female   YOB: 1965    Referring Provider: Sera Loza [facility]    Visit Date: Bernadine 15, 2020    Provider: Felipe García MD   Location[facility]   Location Address: [address] [place] [address]  [id number]   Location Phone: [phone]          Chief Complaint  · Outpatient History & Physical / Surgical Orders      History Of Present Illness  [facility]  Outpatient History and Physical Surgical Orders  Preadmission Location: [facility] Preadmission Time: 01:00 PM   Which Facility: [facility] Surgery Date: 06/24/2020 Preadmission Testing Date: 06/19/2020   Patient's Name: Valentina Stevens YOB: 1965   Chief complaint/history present illness: micohematuria and hydronephrosis   Current Medication List: Calcium 500 500 mg calcium (1,250 mg) oral tablet, cyclobenzaprine 10 mg oral tablet, fiber oral powder, levothyroxine 50 mcg oral tablet, multivitamin oral tablet, and vitamin E oral   Allergies: Cephalexin adverse reaction, PENICILLINS, and SULFA (SULFONAMIDES)   Significant past medical: Ankle Sprain/Strain, Arthritis, Epilepsy, Left Fx-5th Metatarsal, Leg pain, Lumbago/low back pain, Migraines, Mitral Valve Disorders, Muscle cramps, Seizures, Shortness of Breath, Thyroid disease, and Thyroid disorder   Past Surgical History: Atrial mass removal, Colonoscopy, and Tubal ligation   Examination of heart and lungs: Regular rate, rhythm, no murmur, gallop, rub         Past Medical History  Ankle Sprain/Strain; Arthritis; Epilepsy; Left Fx-5th Metatarsal; Leg pain; Lumbago/low back pain; Migraines; Mitral Valve Disorders; Muscle cramps; Seizures; Shortness of Breath; Thyroid disease; Thyroid disorder         Past Surgical History  Atrial mass removal; Colonoscopy; Tubal ligation         Medication List  Calcium 500 500 mg calcium (1,250 mg) oral  "tablet; cyclobenzaprine 10 mg oral tablet; fiber oral powder; levothyroxine 50 mcg oral tablet; multivitamin oral tablet; vitamin E oral         Allergy List  Cephalexin adverse reaction; PENICILLINS; SULFA (SULFONAMIDES)       Allergies Reconciled  Family Medical History  Stroke; Cancer, Unspecified; Colon Cancer; Family history of Arthritis; Family history of cancer; Family history of stroke; Family history of diabetes mellitus         Social History  Alcohol (Current some day); Alcohol Use (Never); Homemaker.; lives with children; ; .; Recreational Drug Use (Never); Tobacco (Never); Working         Review of Systems  · Constitutional  o Denies  o : chills  · Gastrointestinal  o Denies  o : nausea      Vitals  Date Time BP Position Site L\R Cuff Size HR RR TEMP (F) WT  HT  BMI kg/m2 BSA m2 O2 Sat        06/15/2020 09:40 AM       17  150lbs 4oz 5'  3\" 26.62 1.74               Assessment  · Pre-Surgical Orders     V72.84  · Microscopic hematuria     599.72/R31.29  · Hydronephrosis     591/N13.30  · Mitral valve disease     394.9/I05.9    Problems Reconciled  Plan  · Orders  o General Urology Surgery Order ([facility]) - V72.84, 599.72/R31.29, 591/N13.30 - 06/24/2020  o EKG (Recording only) [facility] (00812) - V72.84, 599.72/R31.29, 591/N13.30, 394.9/I05.9 - 06/19/2020  o Covid Testing at Non-[facility] facility (07603) - V72.84, 599.72/R31.29, 591/N13.30 - 06/22/2020   at 145pm  · Medications  o Medications have been Reconciled  o Transition of Care or Provider Policy  · Instructions  o *****Surgical Orders******  o Pre-Operative Orders: Sign permit for Cystoscopy right ureteroscopy with possible biopsy and ureteral stent placement and left retrograde pyleogram  o Outpatient   o Levaquin 500 mg IV OCTOR.  o RISK AND BENEFITS:  o Possible risks/complications, benefits and alternatives to surgical or invasive procedure have been explained to patient and/or legal guardian.            Electronically Signed by: Felipe MORENO" MD Ricky -Author on Bernadine 15, 2020 04:14:39 PM

## 2021-05-10 NOTE — H&P
"   History and Physical      Patient Name: Valentina Stevens   Patient ID: [id number]   Sex: Female   YOB: 1965    Referring Provider: Sera Loza [facility]    Visit Date: Bernadine 15, 2020    Provider: Felipe García MD   Location[facility]   Location Address: [address] [place] [address]  [id number]   Location Phone: [phone]          Chief Complaint  · patient here for urologic issues      History Of Present Illness     58    no gross hematuria, dysuria or recurrent urinary tract infections.  No history of kidney stone.    No urologic family history,   Has never had any urologic surgery.    No cardiopulmonary history.  Patient does not smoke.  Patient does not use blood thinner.      \">54-year-old female here today for right hydronephrosis    No Pain today.  Patient went to the hospital a week ago with right flank pain that was 10/10.    6/7/2020 CT abdomen/pelvis withmoderate right hydronephrosis with right hydroureter without definitive obstruction.    6/20 creatinine 0.7, GFR > 60    no gross hematuria, dysuria or recurrent urinary tract infections.  No history of kidney stone.    No urologic family history,   Has never had any urologic surgery.    No cardiopulmonary history.  Patient does not smoke.  Patient does not use blood thinner.             Past Medical History  Ankle Sprain/Strain; Arthritis; Epilepsy; Left Fx-5th Metatarsal; Leg pain; Lumbago/low back pain; Migraines; Mitral Valve Disorders; Muscle cramps; Seizures; Shortness of Breath; Thyroid disease; Thyroid disorder         Past Surgical History  Atrial mass removal; Colonoscopy; Tubal ligation         Medication List  Calcium 500 500 mg calcium (1,250 mg) oral tablet; cyclobenzaprine 10 mg oral tablet; fiber oral powder; levothyroxine 50 mcg oral tablet; multivitamin oral tablet; vitamin E oral         Allergy List  Cephalexin adverse reaction; PENICILLINS; SULFA (SULFONAMIDES)         Family Medical " "History  Stroke; Cancer, Unspecified; Colon Cancer; Family history of Arthritis; Family history of cancer; Family history of stroke; Family history of diabetes mellitus         Social History  Alcohol (Current some day); Alcohol Use (Never); Homemaker.; lives with children; ; .; Recreational Drug Use (Never); Tobacco (Never); Working         Review of Systems  · Constitutional  o Denies  o : chills, fever  · Gastrointestinal  o Denies  o : nausea, vomiting      Vitals  Date Time BP Position Site L\R Cuff Size HR RR TEMP (F) WT  HT  BMI kg/m2 BSA m2 O2 Sat HC       06/15/2020 09:40 AM       17  150lbs 4oz 5'  3\" 26.62 1.74           Physical Examination  · Constitutional  o Appearance  o : Well-appearing, well-developed, in no acute distress  · Respiratory  o Respiratory Effort  o : Unlabored breathing  o Auscultation of Lungs  o : Unlabored breathing, clear to auscultation bilaterally  · Cardiovascular  o Heart  o :   § Auscultation of Heart  § : Regular rate and rhythm, no murmurs  · Gastrointestinal  o Abdominal Examination  o : Nontender, nondistended, no rigidity or guarding, no hepatosplenomegaly  · Neurologic  o Mental Status Examination  o :   § Orientation  § : Grossly oriented to person, place and time, judgment and insight intact, normal mood and affect              Assessment  · Hydronephrosis     591/N13.30  · Hematuria     599.70/R31.9    Problems Reconciled  Plan  · Orders  o Urine Culture (Clean Catch) [facility] (00433) - 591/N13.30, 599.70/R31.9 - 06/15/2020  · Medications  o Medications have been Reconciled  o Transition of Care or Provider Policy  · Instructions  o Electronically Identified Patient Education Materials Provided Electronically       CT Images and read reviewed with the patient    Records reviewed today and summarized the chart    Patient with hydronephrosis and no stone present on recent CT.  Also with microhematuria.  After discussion I will get her set up for cystoscopy " with right ureteroscopy, diagnostic with possible biopsy and right ureteral stent placement.  Left retrograde pyelogram.  Risks and benefits were discussed including bleeding, infection and damage to the urinary system.  We also discussed the risk of anesthesia up to and including death.  Patient voiced understanding and would like to proceed.     Greater than 20 minutes was used in counseling and coordination of care, with greater than 51% of this in face-to-face counseling             Electronically Signed by: Felipe García MD -Author on Bernadine 15, 2020 04:14:04 PM

## 2021-05-10 NOTE — H&P
History and Physical      Patient Name: Valentina Stevens   Patient ID: [id number]   Sex: Female   YOB: 1965    Primary Care Provider: Sera MORALES   Referring Provider: Rosalva BENZ    Visit Date: December 14, 2020    Provider: Bill Oliveira MD   Location: [facility]   Location Address: [address] [place] [address]  [id number]   Location Phone: [phone]          Chief Complaint  · Bilateral Hand Pain, Numbness and Tingling      History Of Present Illness  Valentina Stevens is a 55 year old /White female who presents today to [facility]. Patient is here for bilateral hand numbness and tingling. Patient states she is a  and it has increased her numbness and tingling and she has started to drop items. Patient states she is right handed, right greater than left.       Past Medical History  Ankle Sprain/Strain; Arthritis; Bowel Disease; Epilepsy; Hearing loss; Hyperlipemia; Hypertension; Left Fx-5th Metatarsal; Leg pain; Limb Swelling; Lumbago/low back pain; Migraines; Mitral Valve Disorders; Muscle cramps; Seizures; Shortness of Breath; Thyroid disease; Thyroid disorder         Past Surgical History  Atrial mass removal; Colonoscopy; Tubal ligation         Medication List  Calcium 500 500 mg calcium (1,250 mg) oral tablet; fiber oral powder; Imitrex 50 mg oral tablet; levothyroxine 50 mcg oral tablet; multivitamin oral tablet; pramipexole 0.125 mg oral tablet; topiramate 50 mg oral tablet; vitamin E oral         Allergy List  Cephalexin adverse reaction; indomethacin; PENICILLINS; SULFA (SULFONAMIDES)         Family Medical History  Stroke; Cancer, Unspecified; Family history of colon cancer; Family history of Arthritis; Family history of stroke; Family history of diabetes mellitus         Social History  Alcohol (Current some day); Alcohol Use (Current some day); Homemaker.; lives with children; ; .; Recreational Drug Use  "(Never); Tobacco (Never); Working         Review of Systems  · Constitutional  o Denies  o : fever, chills, weight loss  · Cardiovascular  o Denies  o : chest pain, shortness of breath  · Gastrointestinal  o Denies  o : liver disease, heartburn, nausea, blood in stools  · Genitourinary  o Denies  o : painful urination, blood in urine  · Integument  o Denies  o : rash, itching  · Neurologic  o Admits  o : headache  o Denies  o : weakness, loss of consciousness  · Musculoskeletal  o Denies  o : painful, swollen joints  · Psychiatric  o Denies  o : drug/alcohol addiction, anxiety, depression      Vitals  Date Time BP Position Site L\R Cuff Size HR RR TEMP (F) WT  HT  BMI kg/m2 BSA m2 O2 Sat FR L/min FiO2        12/14/2020 09:44 AM         153lbs 0oz 5'  3\" 27.1 1.76             Physical Examination  · Constitutional  o Appearance  o : well developed, well-nourished, no obvious deformities present  · Head and Face  o Head  o :   § Inspection  § : normocephalic  o Face  o :   § Inspection  § : no facial lesions  · Eyes  o Conjunctivae  o : conjunctivae normal  o Sclerae  o : sclerae white  · Ears, Nose, Mouth and Throat  o Ears  o :   § External Ears  § : appearance within normal limits  § Hearing  § : intact  o Nose  o :   § External Nose  § : appearance normal  · Neck  o Inspection/Palpation  o : normal appearance  o Range of Motion  o : full range of motion  · Respiratory  o Respiratory Effort  o : breathing unlabored  o Inspection of Chest  o : normal appearance  o Auscultation of Lungs  o : no audible wheezing or rales  · Cardiovascular  o Heart  o : regular rate  · Gastrointestinal  o Abdominal Examination  o : soft and non-tender  · Skin and Subcutaneous Tissue  o General Inspection  o : intact, no rashes  · Psychiatric  o General  o : Alert and oriented x3  o Judgement and Insight  o : judgment and insight intact  o Mood and Affect  o : mood normal, affect appropriate  · Extremities  o Extremities  o : " BILATERAL HANDS: No skin discoloration, atrophy or swelling. Numbness with Tinels and phalens. 2+ radial and ulnar pulses.          Assessment  · Bilateral Carpal Tunnel Syndrome     354.0/G56.00  · Arthralgia of both hands       Pain in joints of right hand     719.44/M25.541  Pain in joints of left hand     719.44/M25.542      Plan  · Medications  o Medications have been Reconciled  o Transition of Care or Provider Policy  · Instructions  o Reviewed the patient's Past Medical, Social, and Family history as well as the ROS at today's visit, no changes.  o Call or return if worsening symptoms.  o Surgery pamphlet given.  o This note is transcribed by Collette gutierrez/brice  o EMG study. Provided braces. Provided home exercises. Follow-up after EMG.             Electronically Signed by: Collette Schrader, -Author on December 15, 2020 12:34:57 PM  Electronically Co-signed by: Roselyn Emmanuel PA-C -Reviewer on December 15, 2020 12:54:13 PM  Electronically Co-signed by: Bill Oliveira MD -Reviewer on December 16, 2020 05:13:23 PM

## 2021-05-13 NOTE — PROGRESS NOTES
Progress Note      Patient Name: Valentina Stevens   Patient ID: [id number]   Sex: Female   YOB: 1965    Referring Provider: Sera MORALES    Visit Date: November 9, 2020    Provider: Delicia Roberts PA-C   Location: [facility]   Location Address: [address] [place] [address]  [id number]   Location Phone: [phone]          Chief Complaint     low back pain and mid back pain       History Of Present Illness     Patient here for a follow up appointment after injections. She states she noticed some mild improvement but is still experiencing pain. Pt has failed PT, LESIs, massage therapy at this point in the past year. She notes that she has to use a walker when walking up trails and can no longer walk for prolonged periods.       Past Medical History  Ankle Sprain/Strain; Arthritis; Epilepsy; Left Fx-5th Metatarsal; Leg pain; Lumbago/low back pain; Migraines; Mitral Valve Disorders; Muscle cramps; Seizures; Shortness of Breath; Thyroid disease; Thyroid disorder         Past Surgical History  Atrial mass removal; Colonoscopy; Tubal ligation         Medication List  Calcium 500 500 mg calcium (1,250 mg) oral tablet; fiber oral powder; levothyroxine 50 mcg oral tablet; multivitamin oral tablet; pramipexole 0.125 mg oral tablet; topiramate 25 mg oral capsule, sprinkle; vitamin E oral         Allergy List  Cephalexin adverse reaction; indomethacin; PENICILLINS; SULFA (SULFONAMIDES)         Family Medical History  Stroke; Cancer, Unspecified; Colon Cancer; Family history of Arthritis; Family history of cancer; Family history of stroke; Family history of diabetes mellitus         Social History  Alcohol (Current some day); Alcohol Use (Never); Homemaker.; lives with children; ; .; Recreational Drug Use (Never); Tobacco (Never); Working         Review of Systems  · Constitutional  o Denies  o : chills, excessive sweating, fatigue, fever,  "sycope/passing out, weight gain, weight loss  · Eyes  o Denies  o : changes in vision, blurry vision, double vision  · HENT  o Denies  o : loss of hearing, ringing in the ears, ear aches, sore throat, nasal congestion, sinus pain, nose bleeds, seasonal allergies  · Cardiovascular  o Denies  o : blood clots, swollen legs, anemia, easy burising or bleeding, transfusions  · Respiratory  o Denies  o : shortness of breath, dry cough, productive cough, pneumonia, COPD  · Gastrointestinal  o Denies  o : difficulty swallowing, reflux  · Genitourinary  o Denies  o : incontinence  · Neurologic  o Admits  o : headache  o Denies  o : seizure, stroke, tremor, loss of balance, falls, dizziness/vertigo, difficulty with sleep, numbness/tingling/paresthesia , difficulty with coordination, difficulty with dexterity, weakness  · Musculoskeletal  o Admits  o : low back pain  o Denies  o : neck stiffness/pain, swollen lymph nodes, muscle aches, joint pain, weakness, spasms, sciatica, pain radiating in arm, pain radiating in leg  · Endocrine  o Denies  o : diabetes, thyroid disorder  · Psychiatric  o Denies  o : anxiety, depression      Vitals  Date Time BP Position Site L\R Cuff Size HR RR TEMP (F) WT  HT  BMI kg/m2 BSA m2 O2 Sat FR L/min FiO2        11/09/2020 03:01 /81 Sitting    79 - R  97.3 155lbs 0oz 5'  3\" 27.46 1.77             Physical Examination  · Constitutional  o Appearance  o : well-nourished, well developed, alert, in no acute distress  · Respiratory  o Respiratory Effort  o : breathing unlabored  · Cardiovascular  o Peripheral Vascular System  o :   § Extremities  § : no edema or cyanosis  · Musculoskeletal  o Spine  o :   § Inspection/Palpation  § : tenderness to palpation present at T11 level.   o Right Lower Extremity  o :   § Inspection/Palpation  § : no joint or limb tenderness to palpation, no edema present, no ecchymosis  § Joint Stability  § : joint stability within normal limits  § Range of Motion  § : " range of motion normal, no joint crepitations present, no pain on motion, Mateo's test negative  o Left Lower Extremity  o :   § Inspection/Palpation  § : no joint or limb tenderness to palpation, no edema present, no ecchymosis  § Joint Stability  § : joint stability within normal limits  § Range of Motion  § : range of motion normal, no joint crepitations present, no pain on motion, Mateo's test negative  · Skin and Subcutaneous Tissue  o Extremities  o :   § Right Lower Extremity  § : no lesions or areas of discoloration  § Left Lower Extremity  § : no lesions or areas of discoloration  o Back  o : no lesions or areas of discoloration  · Neurologic  o Mental Status Examination  o :   § Orientation  § : alert and oriented to time, person, place and events  o Motor Examination  o :   § RLE Strength  § : strength normal  § RLE Motor Function  § : tone normal, no atrophy, no abnormal movements noted  § LLE Strength  § : strength normal  § LLE Motor Function  § : tone normal, no atrophy, no abnormal movements noted  o Reflexes  o :   § RLE  § : knee and ankle reflexes 2/4, SLR negative  § LLE  § : knee and ankle reflexes 2/4, SLR negative  o Sensation  o :   § Light Touch  § : sensation intact to light touch in extremities  o Gait and Station  o :   § Gait Screening  § : slow gait.   · Psychiatric  o Mood and Affect  o : mood normal, affect appropriate              Assessment  · Lumbago/low back pain     724.3/M54.40  · Paresthesia     782.0/R20.2  · Sciatica     724.3/M54.30  · Thoracic back pain     724.1/M54.6      Plan  · Orders  o MRI thoracic spine w/o contrst (01793) - 724.3/M54.30, 782.0/R20.2, 724.1/M54.6 - 11/09/2020  · Medications  o Medications have been Reconciled  o Transition of Care or Provider Policy  · Instructions  o Will order Tspine MRI and will return afterwards, as most of her pain seems to originate from Tspine. She has failed PT, epidurals, and massage therapy.   o Electronically  Identified Patient Education Materials Provided Electronically  · Disposition  o Call or Return if symptoms worsen or persist.            Electronically Signed by: Delicia Roberts PA-C -Author on November 9, 2020 04:40:19 PM

## 2021-05-13 NOTE — PROGRESS NOTES
"   Progress Note      Patient Name: Valentina Stevens   Patient ID: [id number]   Sex: Female   YOB: 1965    Referring Provider: Sera Loza [facility]    Visit Date: July 27, 2020    Provider: Felipe García MD   Location[facility]   Location Address: [address] [place] [address]  [id number]   Location Phone: [phone]          Chief Complaint  · patient here for urologic issues      History Of Present Illness  TELEHEALTH TELEPHONE VISIT  Valentina Stevens is a 54 year old /White female who is presenting for evaluation via telehealth telephone visit. Verbal consent obtained before beginning visit.   Provider spent 10 minutes with the patient during the telehealth visit.   The following staff were present during this visit: mahogany kennedy   Past Medical History/ Overview of Patient Symptoms     58    No history of kidney stone.    No urologic family history    No cardiopulmonary history.  Patient does not smoke.  Patient does not use blood thinner.      \">54-year-old female w right hydronephrosis s/p surgery    Asymptomatic, no gross hematuria    6/24/2020 right ureteroscopy/cystoscopy and stent, left retrograde pyelogramnormal bladder, normal left retrograde, right ureteroscopy is normal.  Tight distal ureter that it had to be dilated but looked normal otherwise.    Stent is out.    Previous    6/7/2020 CT abdomen/pelvis with moderate right hydronephrosis with right hydroureter without definitive obstruction.    6/20 creatinine 0.7, GFR > 60    No history of kidney stone.    No urologic family history    No cardiopulmonary history.  Patient does not smoke.  Patient does not use blood thinner.             Past Medical History  Ankle Sprain/Strain; Arthritis; Epilepsy; Left Fx-5th Metatarsal; Leg pain; Lumbago/low back pain; Migraines; Mitral Valve Disorders; Muscle cramps; Seizures; Shortness of Breath; Thyroid disease; Thyroid disorder         Past Surgical History  Atrial " "mass removal; Colonoscopy; Tubal ligation         Medication List  Calcium 500 500 mg calcium (1,250 mg) oral tablet; cyclobenzaprine 10 mg oral tablet; fiber oral powder; levothyroxine 50 mcg oral tablet; multivitamin oral tablet; vitamin E oral         Allergy List  Cephalexin adverse reaction; PENICILLINS; SULFA (SULFONAMIDES)         Family Medical History  Stroke; Cancer, Unspecified; Colon Cancer; Family history of Arthritis; Family history of cancer; Family history of stroke; Family history of diabetes mellitus         Social History  Alcohol (Current some day); Alcohol Use (Never); Homemaker.; lives with children; ; .; Recreational Drug Use (Never); Tobacco (Never); Working         Review of Systems  · Constitutional  o Denies  o : chills, fever  · Gastrointestinal  o Denies  o : nausea, vomiting      Vitals  Date Time BP Position Site L\R Cuff Size HR RR TEMP (F) WT  HT  BMI kg/m2 BSA m2 O2 Sat HC       06/15/2020 09:40 AM       17  150lbs 4oz 5'  3\" 26.62 1.74               Assessment  · Hydronephrosis     591/N13.30  · Hematuria     599.70/R31.9    Problems Reconciled  Plan  · Orders  o Physican Telephone evaluation, 5-10 min (23752) - 591/N13.30, 599.70/R31.9 - 07/27/2020  · Medications  o Medications have been Reconciled  o Transition of Care or Provider Policy  · Instructions  o Plan Of Care:   o Electronically Identified Patient Education Materials Provided Electronically       Doing well    Normal work-up, patient given reassurance.  Follow-up as needed                 Electronically Signed by: Felipe García MD -Author on July 27, 2020 01:25:49 PM  "

## 2021-05-14 VITALS
HEIGHT: 63 IN | SYSTOLIC BLOOD PRESSURE: 133 MMHG | DIASTOLIC BLOOD PRESSURE: 86 MMHG | HEART RATE: 74 BPM | WEIGHT: 154.5 LBS | BODY MASS INDEX: 27.38 KG/M2 | TEMPERATURE: 97.3 F

## 2021-05-14 VITALS — HEIGHT: 63 IN | OXYGEN SATURATION: 98 % | BODY MASS INDEX: 28.2 KG/M2 | WEIGHT: 159.12 LBS | HEART RATE: 103 BPM

## 2021-05-14 VITALS — HEIGHT: 63 IN | BODY MASS INDEX: 27.18 KG/M2 | WEIGHT: 153.37 LBS | TEMPERATURE: 97.5 F

## 2021-05-14 VITALS
TEMPERATURE: 97.3 F | HEIGHT: 63 IN | BODY MASS INDEX: 27.46 KG/M2 | SYSTOLIC BLOOD PRESSURE: 131 MMHG | DIASTOLIC BLOOD PRESSURE: 81 MMHG | HEART RATE: 79 BPM | WEIGHT: 155 LBS

## 2021-05-14 VITALS — WEIGHT: 153 LBS | HEIGHT: 63 IN | BODY MASS INDEX: 27.11 KG/M2

## 2021-05-14 VITALS — TEMPERATURE: 96.7 F

## 2021-05-14 VITALS — BODY MASS INDEX: 28.02 KG/M2 | TEMPERATURE: 96.9 F | WEIGHT: 158.12 LBS | HEIGHT: 63 IN

## 2021-05-14 VITALS
TEMPERATURE: 96.2 F | SYSTOLIC BLOOD PRESSURE: 130 MMHG | HEIGHT: 63 IN | HEART RATE: 75 BPM | WEIGHT: 155 LBS | BODY MASS INDEX: 27.46 KG/M2 | DIASTOLIC BLOOD PRESSURE: 82 MMHG | RESPIRATION RATE: 16 BRPM

## 2021-05-14 NOTE — PROGRESS NOTES
Progress Note      Patient Name: Valentina Stevens   Patient ID: [id number]   Sex: Female   YOB: 1965    Primary Care Provider: Sera MORALES   Referring Provider: Rosalva BENZ    Visit Date: January 4, 2021    Provider: Roselyn Emmanuel PA-C   Location: [facility]   Location Address: [address] [place] [address]  [id number]   Location Phone: [phone]          Chief Complaint  · bilateral hand pain      History Of Present Illness  Valentina Stevens is a 55 year old /White female who presents today to [facility].      Patient is following up for bilateral carpal tunnel syndrome. Patient states wearing the carpal tunnel braces provides relief of pain. Patient denies numbness or pain today. Patient had an EMG/NCS reviewed normal study.       Past Medical History  Ankle Sprain/Strain; Arthritis; Bowel Disease; Epilepsy; Hearing loss; Hyperlipemia; Hypertension; Left Fx-5th Metatarsal; Leg pain; Limb Swelling; Lumbago/low back pain; Migraines; Mitral Valve Disorders; Muscle cramps; Seizures; Shortness of Breath; Thyroid disease; Thyroid disorder         Past Surgical History  Atrial mass removal; Colonoscopy; Tubal ligation         Medication List  Calcium 500 500 mg calcium (1,250 mg) oral tablet; fiber oral powder; Imitrex 50 mg oral tablet; levothyroxine 50 mcg oral tablet; multivitamin oral tablet; pramipexole 0.125 mg oral tablet; topiramate 50 mg oral tablet; vitamin E oral         Allergy List  Cephalexin adverse reaction; indomethacin; PENICILLINS; SULFA (SULFONAMIDES)         Family Medical History  Stroke; Cancer, Unspecified; Family history of colon cancer; Family history of Arthritis; Family history of stroke; Family history of diabetes mellitus         Social History  Alcohol (Current some day); Alcohol Use (Current some day); Homemaker.; lives with children; ; .; Recreational Drug Use (Never); Tobacco (Never); Working  "        Review of Systems  · Constitutional  o Denies  o : fever, chills, weight loss  · Cardiovascular  o Denies  o : chest pain, shortness of breath  · Gastrointestinal  o Denies  o : liver disease, heartburn, nausea, blood in stools  · Genitourinary  o Denies  o : painful urination, blood in urine  · Integument  o Denies  o : rash, itching  · Neurologic  o Denies  o : headache, weakness, loss of consciousness  · Musculoskeletal  o Denies  o : painful, swollen joints  · Psychiatric  o Denies  o : drug/alcohol addiction, anxiety, depression      Vitals  Date Time BP Position Site L\R Cuff Size HR RR TEMP (F) WT  HT  BMI kg/m2 BSA m2 O2 Sat FR L/min FiO2 HC       01/04/2021 02:55 PM      103 - R   159lbs 2oz 5'  3\" 28.19 1.79 98 %            Physical Examination  · Constitutional  o Appearance  o : well developed, well-nourished, no obvious deformities present  · Head and Face  o Head  o :   § Inspection  § : normocephalic  o Face  o :   § Inspection  § : no facial lesions  · Eyes  o Conjunctivae  o : conjunctivae normal  o Sclerae  o : sclerae white  · Ears, Nose, Mouth and Throat  o Ears  o :   § External Ears  § : appearance within normal limits  § Hearing  § : intact  o Nose  o :   § External Nose  § : appearance normal  · Neck  o Inspection/Palpation  o : normal appearance  o Range of Motion  o : full range of motion  · Respiratory  o Respiratory Effort  o : breathing unlabored  o Inspection of Chest  o : normal appearance  o Auscultation of Lungs  o : no audible wheezing or rales  · Cardiovascular  o Heart  o : regular rate  · Gastrointestinal  o Abdominal Examination  o : soft and non-tender  · Skin and Subcutaneous Tissue  o General Inspection  o : intact, no rashes  · Psychiatric  o General  o : Alert and oriented x3  o Judgement and Insight  o : judgment and insight intact  o Mood and Affect  o : mood normal, affect appropriate  · Right Hand-Street  o Inspection  o : No swelling, no erythema, no " ecchymosis, no thenar atrophy, no mallet deformity, no boutonniere deformity, no heberden's nodes, no joycelyn's nodes  o Palpation  o : No tenderness at distal radius, no tenderness at distal ulna, no tenderness anatomic snuffbox, no tenderness at scaphoid tubercle, tenderness at TFCC, no tenderness of ulnar collateral ligament, no tenderness at metacarpal, tenderness at PIP/DIP joint  o ROM  o : Full wrist extension, full wrist flexion, full ulnar deviation, full raidal deviation, full MCP/PIP/DIP flexion, full MCP/PIP/DIP Extension, full opposition  o Strength  o : full wrist extension, full wrist flexion, full , full thumb oppostion, full PIP flexors, full DIP flexors, full PIP extensors, full finger adduction, full finger abduction  o Special Tests  o : Negative phalen's test, negative tinel's test, negative finkelstein's test, negative kc's test, negative loaded circumduction test, negative compression test, negative shift test  o Neurovascular  o : Full sensation, radial pulse 2+, ulnar pulse 2+  · Left Hand-Street  o Inspection  o : No swelling, no erythema, no ecchymosis, no thenar atrophy, no mallet deformity, no boutonniere deformity, no heberden's nodes, no joycelyn's nodes  o Palpation  o : No tenderness at distal radius, no tenderness at distal ulna, no tenderness anatomic snuffbox, no tenderness at scaphoid tubercle, tenderness at TFCC, no tenderness of ulnar collateral ligament, no tenderness at metacarpal, tenderness at PIP/DIP joint  o ROM  o : Full wrist extension, full wrist flexion, full ulnar deviation, full raidal deviation, full MCP/PIP/DIP flexion, full MCP/PIP/DIP Extension, full opposition  o Strength  o : full wrist extension, full wrist flexion, full , full thumb oppostion, full PIP flexors, full DIP flexors, full PIP extensors, full finger adduction, full finger abduction  o Special Tests  o : Negative phalen's test, negative tinel's test, negative finkelstein's test,  negative kc's test, negative loaded circumduction test, negative compression test, negative shift test  o Neurovascular  o : Full sensation, radial pulse 2+, ulnar pulse 2+          Assessment  · Carpal Tunnel Syndrome     354.0/G56.00  · Pain: Hand     719.44/M79.643      Plan  · Medications  o Medications have been Reconciled  o Transition of Care or Provider Policy  · Instructions  o Reviewed the patient's Past Medical, Social, and Family history as well as the ROS at today's visit, no changes.  o Call or return if worsening symptoms.  o Exercise handout given.  o Follow Up PRN.  o Electronically Identified Patient Education Materials Provided Electronically            Electronically Signed by: Roselyn Emmanuel PA-C -Author on January 4, 2021 03:03:08 PM

## 2021-05-14 NOTE — PROGRESS NOTES
Progress Note      Patient Name: Valentina Stevens   Patient ID: [id number]   Sex: Female   YOB: 1965    Primary Care Provider: Sera MORALES   Referring Provider: Rosalva BENZ    Visit Date: March 18, 2021    Provider: Yoko Scott PA-C   Location: [facility]   Location Address: [address] [place] [address]  [id number]   Location Phone: [phone]          Chief Complaint     Follow up MRI t-spine done at [facility].       History Of Present Illness     Here for f/u to discuss MRI thoracic spine from November 2020 that showed scoliosis but no acute disc herniation.  She has pain at the thoracolumbar junction and between the scapulas. She attends pain management and taking pain medication, which helps some.  Mobic hasn't helped.       Past Medical History  Ankle Sprain/Strain; Arthritis; Bowel Disease; Epilepsy; Hearing loss; Hyperlipemia; Hypertension; Intractable migraine without aura and without status migrainosus; Left Fx-5th Metatarsal; Leg pain; Limb Swelling; Lumbago/low back pain; Migraines; Mitral Valve Disorders; Muscle cramps; Seizures; Shortness of Breath; Thyroid disease; Thyroid disorder         Past Surgical History  Atrial mass removal; Colonoscopy; Tubal ligation         Medication List  amitriptyline 25 mg oral tablet; Calcium 500 500 mg calcium (1,250 mg) oral tablet; fiber oral powder; hydrocodone-acetaminophen 5-325 mg oral tablet; Imitrex 50 mg oral tablet; levothyroxine 50 mcg oral tablet; multivitamin oral tablet; pramipexole 0.125 mg oral tablet; topiramate 50 mg oral tablet; vitamin E oral         Allergy List  Cephalexin adverse reaction; indomethacin; PENICILLINS; SULFA (SULFONAMIDES)       Allergies Reconciled  Family Medical History  Stroke; Cancer, Unspecified; Family history of colon cancer; Family history of Arthritis; Family history of stroke; Family history of diabetes mellitus         Social History  Alcohol  "(Current some day); Alcohol Use (Current some day); Homemaker.; lives with children; ; .; Recreational Drug Use (Never); Tobacco (Never); Working         Review of Systems  · Constitutional  o Denies  o : chills, excessive sweating, fatigue, fever, sycope/passing out, weight gain, weight loss  · Eyes  o Denies  o : changes in vision, blurry vision, double vision  · HENT  o Admits  o : loss of hearing, ringing in the ears  o Denies  o : ear aches, sore throat, nasal congestion, sinus pain, nose bleeds, seasonal allergies  · Cardiovascular  o Denies  o : blood clots, swollen legs, anemia, easy burising or bleeding, transfusions  · Respiratory  o Admits  o : shortness of breath  o Denies  o : dry cough, productive cough, pneumonia, COPD  · Gastrointestinal  o Denies  o : difficulty swallowing, reflux  · Genitourinary  o Denies  o : incontinence  · Neurologic  o Admits  o : headache, loss of balance, falls  o Denies  o : seizure, stroke, tremor, dizziness/vertigo, difficulty with sleep, numbness/tingling/paresthesia , difficulty with coordination, difficulty with dexterity, weakness  · Musculoskeletal  o Admits  o : low back pain, mid back pain  o Denies  o : neck stiffness/pain, swollen lymph nodes, muscle aches, joint pain, weakness, spasms, sciatica, pain radiating in arm, pain radiating in leg  · Endocrine  o Admits  o : thyroid disorder  o Denies  o : diabetes  · Psychiatric  o Denies  o : anxiety, depression  · All Others Negative      Vitals  Date Time BP Position Site L\R Cuff Size HR RR TEMP (F) WT  HT  BMI kg/m2 BSA m2 O2 Sat FR L/min FiO2 HC       03/18/2021 08:14 AM        96.9 158lbs 2oz 5'  3\" 28.01 1.79             Physical Examination     TTP at the thoracolumbar junction    gait and station are wnl           Assessment  · Thoracic back pain     724.1/M54.6  · Scoliosis     737.30/M41.9      Plan  · Medications  o Medications have been Reconciled  o Transition of Care or Provider " Policy  · Instructions  o She has thoracic scoliosis. Surgery not advised. She will continue with pain management for medication management. F/U as needed.   · Associate Tasks  o Task ID [id number] General Task: send MRI thoracic spine and today's office note to CPA please            Electronically Signed by: Yoko Scott PA-C -Author on March 18, 2021 08:34:14 AM

## 2021-05-14 NOTE — PROGRESS NOTES
Progress Note      Patient Name: Valentina Stevens   Patient ID: [id number]   Sex: Female   YOB: 1965    Primary Care Provider: Sera MORALES   Referring Provider: Rosalva BENZ    Visit Date: February 10, 2021    Provider: DEMAR Paniagua   Location: [facility]   Location Address: [address] [place] [address]  [id number]   Location Phone: [phone]          Chief Complaint  · Migraines      History Of Present Illness  Valentina Stevens is a 55 year old /White female who presents today to [facility] today referred from Rosalva BENZ for follow up. Following up for migraines. Continuing to have 4 headache days per week. Severity is some improved on topiramate.      Previous prophylactic migraine medications: topiramate, lisinopril  Previous abortive migraine medications: Imitrex       Past Medical History  Ankle Sprain/Strain; Arthritis; Bowel Disease; Epilepsy; Hearing loss; Hyperlipemia; Hypertension; Left Fx-5th Metatarsal; Leg pain; Limb Swelling; Lumbago/low back pain; Migraines; Mitral Valve Disorders; Muscle cramps; Seizures; Shortness of Breath; Thyroid disease; Thyroid disorder         Past Surgical History  Atrial mass removal; Colonoscopy; Tubal ligation         Medication List  Calcium 500 500 mg calcium (1,250 mg) oral tablet; fiber oral powder; Imitrex 50 mg oral tablet; levothyroxine 50 mcg oral tablet; multivitamin oral tablet; pramipexole 0.125 mg oral tablet; topiramate 50 mg oral tablet; vitamin E oral         Allergy List  Cephalexin adverse reaction; indomethacin; PENICILLINS; SULFA (SULFONAMIDES)         Family Medical History  Stroke; Cancer, Unspecified; Family history of colon cancer; Family history of Arthritis; Family history of stroke; Family history of diabetes mellitus         Social History  Alcohol (Current some day); Alcohol Use (Current some day); Homemaker.; lives with children;  ; .; Recreational Drug Use (Never); Tobacco (Never); Working         Review of Systems  · Constitutional  o Denies  o : chills, excessive sweating, fatigue, fever, sycope/passing out, weight gain, weight loss  · Eyes  o Denies  o : changes in vision, blurry vision, double vision  · HENT  o Denies  o : loss of hearing, ringing in the ears, ear aches, sore throat, nasal congestion, sinus pain, nose bleeds, seasonal allergies  · Cardiovascular  o Denies  o : blood clots, swollen legs, anemia, easy burising or bleeding, transfusions  · Respiratory  o Denies  o : shortness of breath, dry cough, productive cough, pneumonia, COPD  · Gastrointestinal  o Denies  o : difficulty swallowing, reflux  · Genitourinary  o Denies  o : incontinence  · Neurologic  o Denies  o : headache, seizure, stroke, tremor, loss of balance, falls, dizziness/vertigo, difficulty with sleep, numbness/tingling/paresthesia , difficulty with coordination, difficulty with dexterity, weakness  · Musculoskeletal  o Denies  o : neck stiffness/pain, swollen lymph nodes, muscle aches, joint pain, weakness, spasms, sciatica, pain radiating in arm, pain radiating in leg, low back pain  · Endocrine  o Denies  o : diabetes, thyroid disorder  · Psychiatric  o Denies  o : anxiety, depression      Physical Examination  · Constitutional  o Appearance  o : well-nourished, well groomed, in no apparent distress  · Eyes  o Pupils and Irises  o : Pupils equal, round, and reactive to light and accommodation bilaterally  · Respiratory  o Auscultation of Lungs  o : Lungs were clear to ascultation bilaterally. No wheezes, rhonchi or rales were appreciated.  · Cardiovascular  o Heart  o :   § Auscultation of Heart  § : Regular rate and rhythm, no murmurs, gallops or rubs were appreciated.  o Peripheral Vascular System  o :   § Extremities  § : No peripheral edema was appreciated  · Musculoskeletal  o General  o : Normal bulk and normal tone throughout. 5/5 motor  strength throughout and symmetric. Normal gait and station.  · Neurologic  o Mental Status Examination  o :   § Orientation  § : Alert and oriented to person, place, and time,  § Speech/Language  § : Intact naming, comprehension, and repetition. No dysarthria.  § Memory  § : Immediate recall is 3/3. Recall at 5 minutes is 3/3.   § Attention  § : Attention span is intact to serial 7 examination and finger tapping.   § Fund of Knowledge  § : Adequate fund of knowledge  o Cranial Nerves  o : Pupils are equal, round and reactive to light. Extraocular movements are intact. Visual fields are full. Fundoscopic examination reveals sharp disc bilaterally. Sensation in the V1-V3 distribution is intact and symmetric. Muscles of mastication are strong and symmetric. Muscles of facial expression are strong and symmetric. Hearing is intact. Palatal raise is intact and symmetric. Uvula is midline. Shoulder shrug is strong. Tongue protrudes in the midline.  o Motor Examination  o :   § RUE Strength  § : strength normal  § LUE Strength  § : strength normal  § RLE Strength  § : strength normal  § LLE Strength  § : strength normal  o Reflexes  o : 2+ reflexes throughout and symmetric. Negative Heck. Negative Babinski.   o Sensation  o : Intact sensation to light touch, pinprick, vibration and proprioception throughout.  o Gait and Station  o :   § Gait Screening  § : Normal, narrow based gait, with a normal arm swing.  o Coordination  o : Intact finger to nose and heel to shin. Rapid alternating movements are intact in the upper and lower extremities.          Assessment  · Intractable migraine without aura and without status migrainosus     346.11/G43.019  Continue topiramate and start amitriptyline for preventative therapy of migraine. Potential side effects discussed. Continue imitrex for abortive therapy.       Plan  · Medications  o amitriptyline 25 mg oral tablet   SIG: take 1 tablet (25 mg) by oral route once daily at  bedtime for 30 days   DISP: (30) Tablet with 3 refills  Prescribed on 02/10/2021     o Imitrex 50 mg oral tablet   SIG: Take 1 tab at HA onset, may rpt in 2 hrs. No more than 2 tabs/day or 2 days/week   DISP: (9) Tablet with 5 refills  Refilled on 02/10/2021     o topiramate 50 mg oral tablet   SIG: take 1 tablet by oral route 2 times a day for 30 days one tablet qHS for 2 weeks, then BID   DISP: (60) Tablet with 5 refills  Refilled on 02/10/2021     o Medications have been Reconciled  o Transition of Care or Provider Policy  · Instructions  o Encouraged to follow-up with Primary Care Provider for preventative care.  o Call or Return if symptoms persist.  o Follow up in 3 months.  · Disposition  o Call or Return if symptoms worsen or persist.            Electronically Signed by: Lizzy Lindo APRN -Author on February 10, 2021 02:57:45 PM

## 2021-05-15 VITALS — HEART RATE: 77 BPM | WEIGHT: 140 LBS | HEIGHT: 64 IN | BODY MASS INDEX: 23.9 KG/M2

## 2021-05-15 VITALS
DIASTOLIC BLOOD PRESSURE: 76 MMHG | HEIGHT: 63 IN | BODY MASS INDEX: 26.42 KG/M2 | SYSTOLIC BLOOD PRESSURE: 127 MMHG | WEIGHT: 149.12 LBS

## 2021-05-15 VITALS
HEART RATE: 61 BPM | WEIGHT: 144 LBS | BODY MASS INDEX: 24.59 KG/M2 | OXYGEN SATURATION: 98 % | HEIGHT: 64 IN | SYSTOLIC BLOOD PRESSURE: 129 MMHG | DIASTOLIC BLOOD PRESSURE: 72 MMHG

## 2021-05-15 VITALS
HEIGHT: 64 IN | DIASTOLIC BLOOD PRESSURE: 68 MMHG | HEART RATE: 76 BPM | SYSTOLIC BLOOD PRESSURE: 123 MMHG | WEIGHT: 139 LBS | OXYGEN SATURATION: 99 % | RESPIRATION RATE: 16 BRPM | BODY MASS INDEX: 23.73 KG/M2

## 2021-05-15 VITALS — BODY MASS INDEX: 24.31 KG/M2 | OXYGEN SATURATION: 98 % | HEIGHT: 64 IN | HEART RATE: 78 BPM | WEIGHT: 142.37 LBS

## 2021-05-15 VITALS — BODY MASS INDEX: 26.62 KG/M2 | WEIGHT: 150.25 LBS | RESPIRATION RATE: 17 BRPM | HEIGHT: 63 IN

## 2021-05-16 VITALS
HEIGHT: 66 IN | SYSTOLIC BLOOD PRESSURE: 125 MMHG | BODY MASS INDEX: 23.04 KG/M2 | WEIGHT: 143.37 LBS | RESPIRATION RATE: 12 BRPM | OXYGEN SATURATION: 99 % | HEART RATE: 83 BPM | DIASTOLIC BLOOD PRESSURE: 58 MMHG

## 2021-05-26 ENCOUNTER — OFFICE VISIT CONVERTED (OUTPATIENT)
Dept: NEUROLOGY | Facility: CLINIC | Age: 56
End: 2021-05-26
Attending: NURSE PRACTITIONER

## 2021-05-26 ENCOUNTER — HOSPITAL ENCOUNTER (OUTPATIENT)
Dept: OTHER | Facility: HOSPITAL | Age: 56
Discharge: HOME | End: 2021-05-26
Attending: NURSE PRACTITIONER

## 2021-05-26 LAB
ALBUMIN SERPL-MCNC: 4.5 G/DL (ref 3.5–5)
ALBUMIN/GLOB SERPL: 1.5 {RATIO} (ref 1.4–2.6)
ALP SERPL-CCNC: 57 U/L (ref 53–141)
ALT SERPL-CCNC: 32 U/L (ref 10–40)
ANION GAP SERPL CALC-SCNC: 13 MMOL/L (ref 8–19)
AST SERPL-CCNC: 28 U/L (ref 15–50)
BASOPHILS # BLD AUTO: 0.06 10*3/UL (ref 0–0.2)
BASOPHILS NFR BLD AUTO: 0.8 % (ref 0–3)
BILIRUB SERPL-MCNC: 0.45 MG/DL (ref 0.2–1.3)
BUN SERPL-MCNC: 10 MG/DL (ref 5–25)
BUN/CREAT SERPL: 13 {RATIO} (ref 6–20)
CALCIUM SERPL-MCNC: 9.4 MG/DL (ref 8.7–10.4)
CHLORIDE SERPL-SCNC: 102 MMOL/L (ref 99–111)
CONV ABS IMM GRAN: 0.02 10*3/UL (ref 0–0.2)
CONV CO2: 24 MMOL/L (ref 22–32)
CONV IMMATURE GRAN: 0.3 % (ref 0–1.8)
CONV TOTAL PROTEIN: 7.6 G/DL (ref 6.3–8.2)
CREAT UR-MCNC: 0.75 MG/DL (ref 0.5–0.9)
DEPRECATED RDW RBC AUTO: 41 FL (ref 36.4–46.3)
EOSINOPHIL # BLD AUTO: 0.1 10*3/UL (ref 0–0.7)
EOSINOPHIL # BLD AUTO: 1.4 % (ref 0–7)
ERYTHROCYTE [DISTWIDTH] IN BLOOD BY AUTOMATED COUNT: 13.5 % (ref 11.7–14.4)
FOLATE SERPL-MCNC: >20 NG/ML (ref 4.8–20)
GFR SERPLBLD BASED ON 1.73 SQ M-ARVRAT: >60 ML/MIN/{1.73_M2}
GLOBULIN UR ELPH-MCNC: 3.1 G/DL (ref 2–3.5)
GLUCOSE SERPL-MCNC: 93 MG/DL (ref 65–99)
HCT VFR BLD AUTO: 45 % (ref 37–47)
HCYS SERPL-SCNC: 7.8 UMOL/L (ref 3.7–13.9)
HGB BLD-MCNC: 14.7 G/DL (ref 12–16)
LYMPHOCYTES # BLD AUTO: 2.55 10*3/UL (ref 1–5)
LYMPHOCYTES NFR BLD AUTO: 35.7 % (ref 20–45)
MCH RBC QN AUTO: 27.3 PG (ref 27–31)
MCHC RBC AUTO-ENTMCNC: 32.7 G/DL (ref 33–37)
MCV RBC AUTO: 83.6 FL (ref 81–99)
MONOCYTES # BLD AUTO: 0.56 10*3/UL (ref 0.2–1.2)
MONOCYTES NFR BLD AUTO: 7.8 % (ref 3–10)
NEUTROPHILS # BLD AUTO: 3.86 10*3/UL (ref 2–8)
NEUTROPHILS NFR BLD AUTO: 54 % (ref 30–85)
NRBC CBCN: 0 % (ref 0–0.7)
OSMOLALITY SERPL CALC.SUM OF ELEC: 279 MOSM/KG (ref 273–304)
PLATELET # BLD AUTO: 285 10*3/UL (ref 130–400)
PMV BLD AUTO: 11.2 FL (ref 9.4–12.3)
POTASSIUM SERPL-SCNC: 4.1 MMOL/L (ref 3.5–5.3)
RBC # BLD AUTO: 5.38 10*6/UL (ref 4.2–5.4)
SODIUM SERPL-SCNC: 135 MMOL/L (ref 135–147)
VIT B12 SERPL-MCNC: 632 PG/ML (ref 211–911)
WBC # BLD AUTO: 7.15 10*3/UL (ref 4.8–10.8)

## 2021-05-28 VITALS
DIASTOLIC BLOOD PRESSURE: 59 MMHG | SYSTOLIC BLOOD PRESSURE: 122 MMHG | TEMPERATURE: 97.9 F | BODY MASS INDEX: 24.06 KG/M2 | WEIGHT: 140.21 LBS | TEMPERATURE: 98.7 F | SYSTOLIC BLOOD PRESSURE: 133 MMHG | HEIGHT: 65 IN | WEIGHT: 144.4 LBS | OXYGEN SATURATION: 100 % | HEART RATE: 54 BPM | BODY MASS INDEX: 23.36 KG/M2 | HEART RATE: 64 BPM | OXYGEN SATURATION: 97 % | RESPIRATION RATE: 16 BRPM | DIASTOLIC BLOOD PRESSURE: 58 MMHG | HEIGHT: 65 IN | RESPIRATION RATE: 16 BRPM

## 2021-05-28 NOTE — PROGRESS NOTES
Patient: MELVIN ZULETA     Acct: [id number]     Report: #[id number]  UNIT #: [id number]     : 1965    Encounter Date:2019  PRIMARY CARE: ONDINA SOTOMAYOR  ***Signed***  --------------------------------------------------------------------------------------------------------------------  Encounter Date      2019      mass resection            History of Present Illness      This is a pleasant 53-year-old female that presents today for follow-up after     undergoing a right VATS radical thymectomy.  She had an uneventful hospital stay    and was discharged home.  Since discharge she states that she is overall been     doing well and offers no complaints.  She denies pain, fever, chills, shortness     of breath, dyspnea on exertion.  She is anxious to go back to work            Procedure: Right VATS radical thymectomy 2019            Allergies      Coded Allergies:             CEPHALEXIN (Verified  Allergy, Severe, RASH, 19)           PENICILLINS (Verified  Allergy, Severe, 19)                  TROUBLE BREATHING, ITCHING, HIVES           SULFA (SULFONAMIDE ANTIBIOTICS) (Verified  Allergy, Severe, HIVES, 19)            Yes: Bowel Surgery (COLONOSCOPY), Oral Surgery (TEETH REMOVED AS A CHILD)      Smoking status:  Never smoker      Medical History:  Yes: Arthritis, Seizures (10 YEARS OLD), Hemorrhoids/Rectal     Prob (DIVERTICULITIS); No: Blood Disease, Chemotherapy/Cancer, Deafness or     Ringing Ears, Shortness Of Breath, Miscellaneous Medical/oth            Medications      Last Reconciled on 19 10:13 by LULY PRIEST, DO      CPAP Compressor (CPAP) 1 Each Each      EACH XX ONCE, #1 0 Refills         Reported         19       Meloxicam (Meloxicam*) 15 Mg Tablet      15 MG PO QDAY, #30 TAB 0 Refills         Reported         19       Multivitamins (Multi-Vitamin) 1 Each Tablet      1 TAB PO QDAY, #30 TAB 0 Refills         Reported         18        Calcium Carbonate/Vitamin D3 (OYSCO 500-VIT D3 200 TABLET) 1 Each Tablet      1 EACH PO HS, TABLET         Reported         11/9/18       Levothyroxine (Synthroid) 0.075 Mg      0.075 MG PO QDAY@07, #30 TAB 0 Refills         Reported         11/9/18            Vitals      Height 5 ft 4.57 in / 164 cm      Weight 140 lbs 3.401 oz / 63.6 kg      BSA 1.69 m2      BMI 23.6 kg/m2      Temperature 97.9 F / 36.61 C - Temporal      Pulse 64      Respirations 16      Blood Pressure 122/58 Sitting, Left Arm      Pulse Oximetry 100%, rm air            General Appearance:  Alert, Oriented X3      HEENT:  Orophraynx clear, No Erythema, No Exudates      Neck:  Supple, No Masses or JVD      Respiratory:  CTAB, Other (Incisions well-healed)      Abdomen:  Soft, No NABS, No Masses, No Hernias, No Hepatosplenomegaly      Cardiovascular:  No Chest Tenderness; Regular Rate and Rhythm      Lymphatic:  No Neck      Extremeties:  Pulses Positive all 4 Ext      Neurological:  Mental Status WNL      Musculoskeletal:  Normal Bulk Strength      Skin:  No Rash, No Cellulitis      Psychiatric:  Appropriate Affect            Imaging/Interpretation      Chest x-ray shows no acute cardiopulmonary disease there is no significant     pneumothorax or right-sided pleural effusion            Pathology      Thymic tissue with benign ciliated cyst            Ms. Stevens is doing well and we are pleased with her progress her pathology     demonstrated a benign thymic cyst.  She is healing well and her post procedure     chest x-ray showed no evidence of acute cardiopulmonary disease.  We will plan     to see her on an as-needed basis.  She is cleared to return to work without     restrictions            PREVENTION      Hx Influenza Vaccination:  No      2 or More Falls Past Year?:  No      Fall Past Year with Injury?:  No      Hx Pneumococcal Vaccination:  No      Encouraged to follow-up with:  PCP regarding preventative exams.      Chart initiated by       nadya oswald ma                 Disclaimer: Converted document may not contain table formatting or lab diagrams. Please see [employer] for the authenticated document.

## 2021-05-28 NOTE — PROGRESS NOTES
Patient: MELVIN ZULETA     Acct: [id number]     Report: #[id number]  UNIT #: [id number]     : 1965    Encounter Date:2019  PRIMARY CARE: ONDINA SOTOMAYOR  ***Signed***  --------------------------------------------------------------------------------------------------------------------  Encounter Date      May 28, 2019      LUNG NODULES            History of Present Illness      This is a 53-year-old female who presents to the thoracic surgical clinic today     for evaluation of pulmonary nodules as well as an anterior medial these were     discovered incidentally during a work-up for abdominal pain she states that she     initially underwent imaging that demonstrated the pulmonary nodules that     subsequently prompted the CT of the chest.  She does state that she does become     dyspneic on exertion while cleaning her house.  She otherwise has no problems     performing her activities of daily living or working.  She also denies     neurologic findings double vision blurry vision progressive weakness throughout     the course of the day.  She denies fever, chills, cough            Past surgical history: Tooth extraction, colonoscopy, tubal ligation            Allergies      Coded Allergies:             CEPHALEXIN (Verified  Allergy, Severe, RASH, 19)           PENICILLINS (Verified  Allergy, Severe, 19)                  TROUBLE BREATHING, ITCHING, HIVES           SULFA (SULFONAMIDE ANTIBIOTICS) (Verified  Allergy, Severe, HIVES, 19)            Yes: Bowel Surgery (COLONOSCOPY), Oral Surgery (TEETH REMOVED AS A CHILD)      Smoking status:  Never smoker      Medical History:  Yes: Arthritis, Seizures (10 YEARS OLD), Hemorrhoids/Rectal     Prob (DIVERTICULITIS); No: Blood Disease, Chemotherapy/Cancer, Deafness or     Ringing Ears, Shortness Of Breath, Miscellaneous Medical/oth            Medications      Last Reconciled on 19 14:31 by LULY PRIEST DO      CPAP Compressor (CPAP)  1 Each Each      EACH XX ONCE, #1 0 Refills         Reported         5/28/19       Meloxicam (Meloxicam*) 15 Mg Tablet      15 MG PO QDAY, #30 TAB 0 Refills         Reported         5/28/19       Multivitamins (Multi-Vitamin) 1 Each Tablet      1 TAB PO QDAY, #30 TAB 0 Refills         Reported         11/9/18       Calcium Carbonate/Vitamin D3 (OYSCO 500-VIT D3 200 TABLET) 1 Each Tablet      1 EACH PO HS, TABLET         Reported         11/9/18       Levothyroxine (Synthroid) 0.075 Mg      0.075 MG PO QDAY@07, #30 TAB 0 Refills         Reported         11/9/18            Vitals      Height 5 ft 4.57 in / 164 cm      Weight 144 lbs 6.421 oz / 65.5 kg      BSA 1.71 m2      BMI 24.4 kg/m2      Temperature 98.7 F / 37.06 C - Temporal      Pulse 54      Respirations 16      Blood Pressure 133/59 Sitting, Right Arm      Pulse Oximetry 97%, RM AIR            General Appearance:  Alert, Oriented X3      HEENT:  Orophraynx clear, No Erythema, No Exudates      Neck:  Supple, No Masses or JVD      Respiratory:  CTAB      Abdomen:  Soft, No NABS, No Masses, No Hernias, No Hepatosplenomegaly      Cardiovascular:  No Chest Tenderness; Regular Rate and Rhythm      Lymphatic:  No Neck      Extremeties:  Pulses Positive all 4 Ext      Neurological:  Mental Status WNL      Skin:  No Rash, No Cellulitis      Psychiatric:  Appropriate Affect            Imaging/Interpretation      I personally reviewed the CT of the thorax:The examination again demonstrates a     7 mm x 5 mm ovoid nodule in the superior aspect of the right       middle lobe adjacent to the minor fissure.  This is best seen on the sagittal     images on image       number 54. There is an additional 6 mm noncalcified nodule in the superior right    lower lobe       adjacent to the major fissure seen best on image number 23 of the axial series.     There is mild       scarring in the lung apices.  There is an additional 3 mm nodule in the     subpleural aspect of the        base of the left lingula seen best on image number 51 of the axial series.      There is mild probable       dependent atelectasis in the lung bases.  The lungs are otherwise clear.             No mediastinal or hilar adenopathy is seen.  There is a 1.5 cm triangular soft     tissue nodular       density in the anterior mediastinal fat, which is nonspecific.  This can be due     to thymic       hyperplasia/reactivation.  Neoplasm cannot be entirely excluded.             There is mild degenerative change in the spine.  Limited imaging through the     upper abdomen is       grossly unremarkable.             CONCLUSION:                1. There is a 7 mm ovoid nodule in the superior aspect of the right upper lobe     adjacent to the       minor fissure.  Follow-up chest CT recommended at 6-12 months for both low and     high risk patient       per current Fleischner guidelines.             2. There is an additional 6 mm noncalcified nodule in the superior right lower     lobe adjacent to the       major fissure.             3. There is a 3 mm noncalcified nodule in the base of the lingula.               4. There is nonspecific triangular soft tissue density in the anterior     mediastinal fat.  The       differential diagnosis includes thymic hyperplasia/reactivation.  Neoplasm such     as lymphoma or       thymic tumor cannot be excluded.  Clinical correlation and attention on follow-    up suggested.            This is a 53-year-old female who presents to the thoracic surgical clinic today     for evaluation of an anterior mediastinal mass as well as pulmonary nodules.      The pulmonary nodules are subcentimeter in size and she is a non-smoker making     is a low risk for lung cancer these to necessitate surveillance in regards to     the anterior mediastinal mass this likely represents a thymoma or thymic     hyperplasia as she has no B symptoms or the other malignant history.  We had a     clementine and candid  discussion regarding surgical resection of this including a     VATS approach.  We discussed the risks and benefits of surgery.  Prior to     scheduling surgical resection we will plan for basic spirometry in order to     assess the patient's ability to tolerate one lung ventilation.  We will schedule    this in the near future            PREVENTION      2 or More Falls Past Year?:  No      Fall Past Year with Injury?:  No      Encouraged to follow-up with:  PCP regarding preventative exams.      Chart initiated by      DEVANG STANFORD MA                 Disclaimer: Converted document may not contain table formatting or lab diagrams. Please see [employer] [facility] for the authenticated document.

## 2021-06-02 LAB — CONV VITAMIN B-1 (THIAMINE): 163.9 NMOL/L (ref 66.5–200)

## 2021-06-05 NOTE — H&P
History and Physical      Patient Name: Valentina Stevens   Patient ID: [id number]   Sex: Female   YOB: 1965    Primary Care Provider: Sera MORALES   Referring Provider: Rosalva BENZ    Visit Date: May 26, 2021    Provider: DEMAR Paniagua   Location: [facility]   Location Address: [address] [place] [address]  [id number]   Location Phone: [phone]          Chief Complaint     Memory issues, no imaging.       History Of Present Illness  Valentina Stveens is a 55 year old /White female who presents today to [facility] today referred by Rosalva BENZ for evaluation of memory. States that memory loss was first noticed about 2 years ago. Family has noticed any memory difficulty. Is having difficulty with short term memory. Endorses difficulty remembering appointments. Endorses difficulty handling financial affairs, such as balancing checkbook and paying bills timely. Does feel as if she could learn a new tool, appliance or gadget. Denies decreased interest in hobbies or activities. Denies depression. Denies homicidal ideation and suicidal ideation. Denies anxiety. Does experience repetitive questioning. Denies difficulty with judgment, bad financial decisions, and impulsivity.   Does not live alone. Does drive. Denies getting lost while driving, motor vehicle accidents, and traffic tickets.   Endorses family history of Alzheimer's Disease. States sister was diagnosed with early onset Alzheimer's Disease several years ago   Independently Reviewed: MRI brain      Feels as if memory issues started prior to taking topiramate and amitriptyline.  MoCA 20/30       Past Medical History  Ankle Sprain/Strain; Arthritis; Bowel Disease; Epilepsy; Hearing loss; Hyperlipemia; Hypertension; Intractable migraine without aura and without status migrainosus; Left Fx-5th Metatarsal; Leg pain; Limb Swelling; Lumbago/low back pain;  Migraines; Mitral Valve Disorders; Muscle cramps; Seizures; Shortness of Breath; Thyroid disease; Thyroid disorder         Past Surgical History  Atrial mass removal; Colonoscopy; Tubal ligation         Medication List  amitriptyline 25 mg oral tablet; Calcium 500 500 mg calcium (1,250 mg) oral tablet; fiber oral powder; hydrocodone-acetaminophen 5-325 mg oral tablet; Imitrex 50 mg oral tablet; levothyroxine 50 mcg oral tablet; multivitamin oral tablet; pramipexole 0.125 mg oral tablet; topiramate 50 mg oral tablet; vitamin E oral         Allergy List  Cephalexin adverse reaction; indomethacin; PENICILLINS; SULFA (SULFONAMIDES)       Allergies Reconciled  Family Medical History  Stroke; Cancer, Unspecified; Family history of colon cancer; Family history of Arthritis; Family history of stroke; Family history of diabetes mellitus         Social History  Alcohol (Current some day); Alcohol Use (Current some day); Homemaker.; lives with children; ; .; Recreational Drug Use (Never); Tobacco (Never); Working         Review of Systems  · Constitutional  o Denies  o : chills, excessive sweating, fatigue, fever, sycope/passing out, weight gain, weight loss  · Eyes  o Denies  o : changes in vision, blurry vision, double vision  · HENT  o Denies  o : loss of hearing, ringing in the ears, ear aches, sore throat, nasal congestion, sinus pain, nose bleeds, seasonal allergies  · Cardiovascular  o Denies  o : blood clots, swollen legs, anemia, easy burising or bleeding, transfusions  · Respiratory  o Denies  o : shortness of breath, dry cough, productive cough, pneumonia, COPD  · Gastrointestinal  o Denies  o : difficulty swallowing, reflux  · Genitourinary  o Denies  o : incontinence  · Neurologic  o Admits  o : headache  o Denies  o : seizure, stroke, tremor, loss of balance, falls, dizziness/vertigo, difficulty with sleep, numbness/tingling/paresthesia , difficulty with coordination, difficulty with dexterity,  "weakness  · Musculoskeletal  o Denies  o : neck stiffness/pain, swollen lymph nodes, muscle aches, joint pain, weakness, spasms, sciatica, pain radiating in arm, pain radiating in leg, low back pain  · Endocrine  o Denies  o : diabetes, thyroid disorder  · Psychiatric  o Admits  o : memory  o Denies  o : anxiety, depression      Vitals  Date Time BP Position Site L\R Cuff Size HR RR TEMP (F) WT  HT  BMI kg/m2 BSA m2 O2 Sat FR L/min FiO2        05/26/2021 11:40 /74 Sitting    77 - R   152lbs 0oz 5'  3\" 26.93 1.75             Physical Examination  · Constitutional  o Appearance  o : well-nourished, well groomed, in no apparent distress  · Eyes  o Pupils and Irises  o : Pupils equal, round, and reactive to light and accommodation bilaterally  · Respiratory  o Auscultation of Lungs  o : Lungs were clear to ascultation bilaterally. No wheezes, rhonchi or rales were appreciated.  · Cardiovascular  o Heart  o :   § Auscultation of Heart  § : Regular rate and rhythm, no murmurs, gallops or rubs were appreciated.  o Peripheral Vascular System  o :   § Extremities  § : No peripheral edema was appreciated  · Musculoskeletal  o General  o : Normal bulk and normal tone.  · Neurologic  o Mental Status Examination  o :   § Orientation  § : Alert and oriented to person, place, and time,  § Speech/Language  § : Intact naming, comprehension, and repetition. No dysarthria.  § Memory  § : memory impaired   § Attention  § : easily distracted   § Fund of Knowledge  § : Adequate fund of knowledge  o Cranial Nerves  o : Pupils are equal, round and reactive to light. Extraocular movements are intact. Visual fields are full. Fundoscopic examination reveals sharp disc bilaterally. Sensation in the V1-V3 distribution is intact and symmetric. Muscles of mastication are strong and symmetric. Muscles of facial expression are strong and symmetric. Hearing is intact. Palatal raise is intact and symmetric. Uvula is midline. Shoulder shrug is " strong. Tongue protrudes in the midline.  o Motor Examination  o :   § RUE Strength  § : strength normal  § LUE Strength  § : strength normal  § RLE Strength  § : strength normal  § LLE Strength  § : strength normal  o Reflexes  o : 2+ reflexes throughout and symmetric. Negative Heck. Negative Babinski.   o Sensation  o : Intact sensation to light touch, pinprick, vibration and proprioception throughout.  o Gait and Station  o :   § Gait Screening  § : Normal, narrow based gait, with a normal arm swing.  o Coordination  o : Intact finger to nose and heel to shin. Rapid alternating movements are intact in the upper and lower extremities.          Assessment  · Memory deficit     780.93/R41.3  MRI brain does not show any cause of memory loss. Will order labs to look for reversible causes of memory loss. Will try to get her transitioned onto a CGRP in f/u and off of amitriptyline and topiramate as these could be worsening memory.       Plan  · Orders  o CBC with Auto Diff [facility] (04818) - 780.93/R41.3 - 05/26/2021  o CMP [facility] (86291) - 780.93/R41.3 - 05/26/2021  o Folate level (87510) - 780.93/R41.3 - 05/26/2021  o Homocysteine (98352) - 780.93/R41.3 - 05/26/2021  o Methylmalonic acid (46706) - 780.93/R41.3 - 05/26/2021  o Vitamin B1 level (83876) - 780.93/R41.3 - 05/26/2021  o Vitamin B12 level (69808) - 780.93/R41.3 - 05/26/2021  · Medications  o Medications have been Reconciled  o Transition of Care or Provider Policy  · Instructions  o Encouraged to follow-up with Primary Care Provider for preventative care.  o Call or Return if symptoms worsen or persist.   o Total time spent with the patient and coordinating patient care was 45 minutes   · Disposition  o Call or Return if symptoms worsen or persist.            Electronically Signed by: DEMAR Paniagua -Author on May 28, 2021 10:07:23 AM

## 2021-06-05 NOTE — PROGRESS NOTES
Progress Note      Patient Name: Valentina Stevens   Patient ID: [id number]   Sex: Female   YOB: 1965    Primary Care Provider: Sera MORALES   Referring Provider: Rosalva BENZ    Visit Date: May 10, 2021    Provider: DEMAR Paniagua   Location: [facility]   Location Address: [address] [place] [address]  [id number]   Location Phone: [phone]          Chief Complaint     3 month f/u       History Of Present Illness  Valentina Stevens is a 55 year old /White female who presents today to [facility] today referred from Rosalva BENZ for follow up. Following up for migraines. Doing well on Amitriptyline and topiramate. States that she's down to less than 3 headache days per week.      Previous prophylactic migraine medications: topiramate, lisinopril  Previous abortive migraine medications: Imitrex       Past Medical History  Ankle Sprain/Strain; Arthritis; Bowel Disease; Epilepsy; Hearing loss; Hyperlipemia; Hypertension; Intractable migraine without aura and without status migrainosus; Left Fx-5th Metatarsal; Leg pain; Limb Swelling; Lumbago/low back pain; Migraines; Mitral Valve Disorders; Muscle cramps; Seizures; Shortness of Breath; Thyroid disease; Thyroid disorder         Past Surgical History  Atrial mass removal; Colonoscopy; Tubal ligation         Medication List  amitriptyline 25 mg oral tablet; Calcium 500 500 mg calcium (1,250 mg) oral tablet; fiber oral powder; hydrocodone-acetaminophen 5-325 mg oral tablet; Imitrex 50 mg oral tablet; levothyroxine 50 mcg oral tablet; multivitamin oral tablet; pramipexole 0.125 mg oral tablet; topiramate 50 mg oral tablet; vitamin E oral         Allergy List  Cephalexin adverse reaction; indomethacin; PENICILLINS; SULFA (SULFONAMIDES)         Family Medical History  Stroke; Cancer, Unspecified; Family history of colon cancer; Family history of Arthritis; Family history  "of stroke; Family history of diabetes mellitus         Social History  Alcohol (Current some day); Alcohol Use (Current some day); Homemaker.; lives with children; ; .; Recreational Drug Use (Never); Tobacco (Never); Working         Review of Systems  · Constitutional  o Denies  o : chills, excessive sweating, fatigue, fever, sycope/passing out, weight gain, weight loss  · Eyes  o Denies  o : changes in vision, blurry vision, double vision  · HENT  o Denies  o : loss of hearing, ringing in the ears, ear aches, sore throat, nasal congestion, sinus pain, nose bleeds, seasonal allergies  · Cardiovascular  o Denies  o : blood clots, swollen legs, anemia, easy burising or bleeding, transfusions  · Respiratory  o Denies  o : shortness of breath, dry cough, productive cough, pneumonia, COPD  · Gastrointestinal  o Denies  o : difficulty swallowing, reflux  · Genitourinary  o Denies  o : incontinence  · Neurologic  o Denies  o : headache, seizure, stroke, tremor, loss of balance, falls, dizziness/vertigo, difficulty with sleep, numbness/tingling/paresthesia , difficulty with coordination, difficulty with dexterity, weakness  · Musculoskeletal  o Denies  o : neck stiffness/pain, swollen lymph nodes, muscle aches, joint pain, weakness, spasms, sciatica, pain radiating in arm, pain radiating in leg, low back pain  · Endocrine  o Denies  o : diabetes, thyroid disorder  · Psychiatric  o Denies  o : anxiety, depression      Vitals  Date Time BP Position Site L\R Cuff Size HR RR TEMP (F) WT  HT  BMI kg/m2 BSA m2 O2 Sat FR L/min FiO2        05/10/2021 09:37 /82 Sitting    78 - R  96.7 155lbs 16oz 5'  3\" 27.63 1.77             Physical Examination  · Constitutional  o Appearance  o : well-nourished, well groomed, in no apparent distress  · Eyes  o Pupils and Irises  o : Pupils equal, round, and reactive to light and accommodation bilaterally  · Respiratory  o Auscultation of Lungs  o : Lungs were clear to " ascultation bilaterally. No wheezes, rhonchi or rales were appreciated.  · Cardiovascular  o Heart  o :   § Auscultation of Heart  § : Regular rate and rhythm, no murmurs, gallops or rubs were appreciated.  o Peripheral Vascular System  o :   § Extremities  § : No peripheral edema was appreciated  · Musculoskeletal  o General  o : Normal bulk and normal tone throughout. 5/5 motor strength throughout and symmetric. Normal gait and station.  · Neurologic  o Mental Status Examination  o :   § Orientation  § : Alert and oriented to person, place, and time,  § Speech/Language  § : Intact naming, comprehension, and repetition. No dysarthria.  § Memory  § : Immediate recall is 3/3. Recall at 5 minutes is 3/3.   § Attention  § : Attention span is intact to serial 7 examination and finger tapping.   § Fund of Knowledge  § : Adequate fund of knowledge  o Cranial Nerves  o : Pupils are equal, round and reactive to light. Extraocular movements are intact. Visual fields are full. Fundoscopic examination reveals sharp disc bilaterally. Sensation in the V1-V3 distribution is intact and symmetric. Muscles of mastication are strong and symmetric. Muscles of facial expression are strong and symmetric. Hearing is intact. Palatal raise is intact and symmetric. Uvula is midline. Shoulder shrug is strong. Tongue protrudes in the midline.  o Motor Examination  o :   § RUE Strength  § : strength normal  § LUE Strength  § : strength normal  § RLE Strength  § : strength normal  § LLE Strength  § : strength normal  o Reflexes  o : 2+ reflexes throughout and symmetric. Negative Heck. Negative Babinski.   o Sensation  o : Intact sensation to light touch, pinprick, vibration and proprioception throughout.  o Gait and Station  o :   § Gait Screening  § : Normal, narrow based gait, with a normal arm swing.  o Coordination  o : Intact finger to nose and heel to shin. Rapid alternating movements are intact in the upper and lower  extremities.          Assessment  · Intractable migraine without aura and without status migrainosus     346.11/G43.019  Continue topiramate and amitriptyline for preventative therapy of migraine. Potential side effects discussed. Continue imitrex for abortive therapy.      Plan  · Medications  o amitriptyline 25 mg oral tablet   SIG: take 1 tablet (25 mg) by oral route once daily at bedtime for 30 days   DISP: (30) Tablet with 3 refills  Refilled on 05/10/2021     o Imitrex 50 mg oral tablet   SIG: Take 1 tab at HA onset, may rpt in 2 hrs. No more than 2 tabs/day or 2 days/week   DISP: (9) Tablet with 5 refills  Refilled on 05/10/2021     o topiramate 50 mg oral tablet   SIG: take 1 tablet by oral route 2 times a day for 30 days one tablet qHS for 2 weeks, then BID   DISP: (60) Tablet with 5 refills  Refilled on 05/10/2021     o Medications have been Reconciled  o Transition of Care or Provider Policy  · Instructions  o f/u 4 months  · Disposition  o Call or Return if symptoms worsen or persist.            Electronically Signed by: Lizzy Lindo APRN -Author on May 10, 2021 10:15:27 AM

## 2021-06-07 LAB — METHYLMALONATE SERPL-SCNC: 160 NMOL/L (ref 0–378)

## 2021-06-17 ENCOUNTER — OFFICE VISIT (OUTPATIENT)
Dept: FAMILY MEDICINE CLINIC | Facility: CLINIC | Age: 56
End: 2021-06-17

## 2021-06-17 VITALS
OXYGEN SATURATION: 93 % | HEIGHT: 63 IN | BODY MASS INDEX: 27.14 KG/M2 | TEMPERATURE: 97.8 F | WEIGHT: 153.2 LBS | DIASTOLIC BLOOD PRESSURE: 80 MMHG | SYSTOLIC BLOOD PRESSURE: 120 MMHG | HEART RATE: 113 BPM

## 2021-06-17 DIAGNOSIS — R31.9 HEMATURIA, UNSPECIFIED TYPE: ICD-10-CM

## 2021-06-17 DIAGNOSIS — R39.89 ABNORMAL URINE COLOR: ICD-10-CM

## 2021-06-17 DIAGNOSIS — R82.998 LEUKOCYTES IN URINE: Primary | ICD-10-CM

## 2021-06-17 PROBLEM — Z78.0 MENOPAUSE: Status: ACTIVE | Noted: 2021-06-17

## 2021-06-17 PROBLEM — M51.369 DEGENERATION OF LUMBAR INTERVERTEBRAL DISC: Status: ACTIVE | Noted: 2021-06-17

## 2021-06-17 PROBLEM — G43.019 INTRACTABLE MIGRAINE WITHOUT AURA AND WITHOUT STATUS MIGRAINOSUS: Status: ACTIVE | Noted: 2021-02-10

## 2021-06-17 PROBLEM — I10 HYPERTENSION: Status: ACTIVE | Noted: 2021-05-21

## 2021-06-17 PROBLEM — H91.90 HEARING LOSS: Status: ACTIVE | Noted: 2021-06-17

## 2021-06-17 PROBLEM — G25.81 RLS (RESTLESS LEGS SYNDROME): Status: ACTIVE | Noted: 2021-06-17

## 2021-06-17 PROBLEM — M54.50 LOW BACK PAIN: Status: ACTIVE | Noted: 2021-06-17

## 2021-06-17 PROBLEM — E03.9 HYPOTHYROIDISM: Status: ACTIVE | Noted: 2021-05-21

## 2021-06-17 PROBLEM — M51.36 DEGENERATION OF LUMBAR INTERVERTEBRAL DISC: Status: ACTIVE | Noted: 2021-06-17

## 2021-06-17 PROBLEM — M19.90 ARTHRITIS: Status: ACTIVE | Noted: 2021-06-17

## 2021-06-17 PROBLEM — M81.0 OSTEOPOROSIS: Status: ACTIVE | Noted: 2021-06-17

## 2021-06-17 PROBLEM — E78.5 HYPERLIPEMIA: Status: ACTIVE | Noted: 2021-06-17

## 2021-06-17 LAB
BILIRUB BLD-MCNC: NEGATIVE MG/DL
CLARITY, POC: CLEAR
COLOR UR: YELLOW
GLUCOSE UR STRIP-MCNC: NEGATIVE MG/DL
KETONES UR QL: NEGATIVE
LEUKOCYTE EST, POC: ABNORMAL
NITRITE UR-MCNC: NEGATIVE MG/ML
PH UR: 7.5 [PH] (ref 5–8)
PROT UR STRIP-MCNC: NEGATIVE MG/DL
RBC # UR STRIP: ABNORMAL /UL
SP GR UR: 1.02 (ref 1–1.03)
UROBILINOGEN UR QL: NORMAL

## 2021-06-17 PROCEDURE — 87086 URINE CULTURE/COLONY COUNT: CPT | Performed by: NURSE PRACTITIONER

## 2021-06-17 PROCEDURE — 99213 OFFICE O/P EST LOW 20 MIN: CPT | Performed by: NURSE PRACTITIONER

## 2021-06-17 RX ORDER — TOPIRAMATE 50 MG/1
50 TABLET, FILM COATED ORAL NIGHTLY
COMMUNITY
Start: 2021-06-13 | End: 2021-08-03 | Stop reason: SDUPTHER

## 2021-06-17 RX ORDER — LISINOPRIL 2.5 MG/1
2.5 TABLET ORAL DAILY
COMMUNITY
Start: 2021-05-24 | End: 2021-11-29 | Stop reason: SDUPTHER

## 2021-06-17 RX ORDER — LEVOTHYROXINE SODIUM 0.07 MG/1
75 TABLET ORAL DAILY
COMMUNITY
Start: 2021-05-05 | End: 2021-08-20

## 2021-06-17 RX ORDER — PRAMIPEXOLE DIHYDROCHLORIDE 0.12 MG/1
0.12 TABLET ORAL NIGHTLY
COMMUNITY
Start: 2021-04-27 | End: 2021-06-25 | Stop reason: SDUPTHER

## 2021-06-17 RX ORDER — AMITRIPTYLINE HYDROCHLORIDE 25 MG/1
25 TABLET, FILM COATED ORAL DAILY
COMMUNITY
Start: 2021-05-05 | End: 2021-08-09

## 2021-06-17 RX ORDER — SUMATRIPTAN 50 MG/1
50 TABLET, FILM COATED ORAL 2 TIMES DAILY
COMMUNITY
Start: 2021-05-10 | End: 2021-08-03 | Stop reason: SDUPTHER

## 2021-06-17 RX ORDER — HYDROCODONE BITARTRATE AND ACETAMINOPHEN 5; 325 MG/1; MG/1
1 TABLET ORAL 2 TIMES DAILY PRN
COMMUNITY
Start: 2021-06-04

## 2021-06-17 NOTE — PROGRESS NOTES
"Chief Complaint  Urinary Tract Infection (patient has a strong odor to urine and its cloudy)    Subjective          Valentina Stevens presents to [facility]  Pt here today for the color change in urine and strong odor     Also pt mentioned has another question: regarding the EKG rhythm strips on the apple watch and showed  and SR and then others showed interference and the pt with movement --and pt also admits did not know she needed to be perfectly still when doing one of those--pt admits to no symptoms of heart --and I reviewed the rhythms and these were all normal--and looked like movement     Urinary Tract Infection   This is a new problem. The current episode started 1 to 4 weeks ago. Episode frequency: denied. The problem has been unchanged. Quality: denies dysuria  The pain is at a severity of 0/10. The patient is experiencing no pain. There has been no fever. She is not sexually active. There is no history of pyelonephritis. Pertinent negatives include no chills, discharge, flank pain, frequency, hematuria, hesitancy, nausea, possible pregnancy, sweats, urgency or vomiting. She has tried nothing (increased water ) for the symptoms. The treatment provided no relief. Her past medical history is significant for a urological procedure. There is no history of catheterization or recurrent UTIs.       Objective   Vital Signs:   /80   Pulse 113   Temp 97.8 °F (36.6 °C)   Ht 160 cm (63\")   Wt 69.5 kg (153 lb 3.2 oz)   SpO2 93%   BMI 27.14 kg/m²     Physical Exam  Constitutional:       Appearance: Normal appearance.   HENT:      Head: Normocephalic.      Right Ear: Tympanic membrane normal.      Left Ear: Tympanic membrane normal.      Nose: Nose normal.      Mouth/Throat:      Mouth: Mucous membranes are moist.   Eyes:      Pupils: Pupils are equal, round, and reactive to light.   Cardiovascular:      Rate and Rhythm: Normal rate and regular rhythm.      Heart " sounds: Normal heart sounds.   Pulmonary:      Effort: Pulmonary effort is normal.      Breath sounds: Normal breath sounds.   Abdominal:      General: Bowel sounds are normal.      Palpations: Abdomen is soft.      Tenderness: There is no abdominal tenderness. There is left CVA tenderness.      Hernia: No hernia is present.      Comments: Pt reports the Left CVA tenderness is normal for her as it is from her back    Musculoskeletal:         General: Normal range of motion.      Cervical back: Normal range of motion and neck supple.   Skin:     General: Skin is warm and dry.   Neurological:      Mental Status: She is alert and oriented to person, place, and time.   Psychiatric:         Mood and Affect: Mood normal.         Behavior: Behavior normal.         Judgment: Judgment normal.        Result Review : POCT urinalysis dipstick, automated [[id number]] (Order [id number])  Order  Date: 6/17/2021 Department: [facility] Ordering/Authorizing: Sera Loza APRN   In Basket Actions    Reviewed  Result Note  View in In Basket     Reprint Order Requisition    POCT urinalysis dipstick, automated (Order #[id number]) on 6/17/21       Contains abnormal data POCT urinalysis dipstick, automated  Order: [id number]  Status:  Final result   Visible to patient:  No (scheduled for 6/17/2021  8:37 AM)   Next appt:  08/03/2021 at 11:30 AM in Neurology (DEMAR Paniagua)   Dx:  Abnormal urine color  Specimen Information: Urine        Component   Ref Range & Units 08:35   Color   Yellow, Straw, Dark Yellow, Paulina Yellow    Clarity, UA   Clear Clear    Specific Gravity    1.005 - 1.030 1.025    pH, Urine   5.0 - 8.0 7.5    Leukocytes   Negative Small (1+)Abnormal     Nitrite, UA   Negative Negative    Protein, POC   Negative mg/dL Negative    Glucose, UA   Negative, 1000 mg/dL (3+) mg/dL Negative    Ketones, UA   Negative Negative    Urobilinogen, UA   Normal Normal    Bilirubin   Negative  Negative    Blood, UA   Negative TraceAbnormal     Resulting Agency [facility]         Specimen Collected: 06/17/21 08:35   Last Resulted: 06/17/21 08:35        Lab Flowsheet     Order Details     View Encounter     Lab and Collection Details     Routing     Result History              Routing History    Priority Sent On From To Message Type    6/17/2021  8:37 AM Cornelia Coleman Kimberly Ray, APRN Results   Result Read / Acknowledged    Acknowledge result  No acknowledgement history exists for this order.   Lab Component SmartPhrase Guide    POCT urinalysis dipstick, automated (Order #[id number]) on 6/17/21                     Assessment and Plan    Diagnoses and all orders for this visit:    1. Leukocytes in urine (Primary)  -     Urine Culture - Urine, Urine, Clean Catch    2. Abnormal urine color  -     POCT urinalysis dipstick, automated  -     Urine Culture - Urine, Urine, Clean Catch    3. Hematuria, unspecified type  -     Urine Culture - Urine, Urine, Clean Catch    pt to continue increased PO fluids       Follow Up   Return if symptoms worsen or fail to improve.  Patient was given instructions and counseling regarding her condition or for health maintenance advice. Please see specific information pulled into the AVS if appropriate.

## 2021-06-18 LAB — BACTERIA SPEC AEROBE CULT: NO GROWTH

## 2021-06-21 NOTE — PROGRESS NOTES
Please mail patient letter stating:    Valentina, the urine culture was negative for any kind of bacterial growth

## 2021-06-25 ENCOUNTER — OFFICE VISIT (OUTPATIENT)
Dept: FAMILY MEDICINE CLINIC | Facility: CLINIC | Age: 56
End: 2021-06-25

## 2021-06-25 VITALS
SYSTOLIC BLOOD PRESSURE: 138 MMHG | WEIGHT: 153 LBS | TEMPERATURE: 96.2 F | HEIGHT: 63 IN | HEART RATE: 115 BPM | BODY MASS INDEX: 27.11 KG/M2 | DIASTOLIC BLOOD PRESSURE: 80 MMHG | OXYGEN SATURATION: 98 %

## 2021-06-25 DIAGNOSIS — G25.81 RLS (RESTLESS LEGS SYNDROME): ICD-10-CM

## 2021-06-25 DIAGNOSIS — J02.9 ACUTE PHARYNGITIS, UNSPECIFIED ETIOLOGY: Primary | ICD-10-CM

## 2021-06-25 LAB
EXPIRATION DATE: NORMAL
INTERNAL CONTROL: NORMAL
Lab: NORMAL
S PYO AG THROAT QL: NEGATIVE

## 2021-06-25 PROCEDURE — 87880 STREP A ASSAY W/OPTIC: CPT | Performed by: NURSE PRACTITIONER

## 2021-06-25 PROCEDURE — 99214 OFFICE O/P EST MOD 30 MIN: CPT | Performed by: NURSE PRACTITIONER

## 2021-06-25 RX ORDER — PRAMIPEXOLE DIHYDROCHLORIDE 0.12 MG/1
0.12 TABLET ORAL NIGHTLY
Qty: 90 TABLET | Refills: 1 | Status: SHIPPED | OUTPATIENT
Start: 2021-06-25 | End: 2021-10-25

## 2021-06-25 NOTE — PROGRESS NOTES
Chief Complaint  Sore Throat (sneezing )    Subjective            Valentina Stevens presents to [facility]  Sore throat.  Patient states that she woke up this morning with sore throat and sneezing, and she wants to make sure that she does not have strep throat.  She works at [employer] and has to work tomorrow.  Denies any fever, chills, or body aches.  Denies runny nose or congestion.  Denies chest pain, palpitations, shortness of breath.  No coughing, wheezing.  No nausea, vomiting, or diarrhea.    Patient is also requesting refill on her restless leg medication.  States that she has been out of it and can see a big difference in sleep pattern.         Past Medical History:   Diagnosis Date   • Depression    • Diverticulitis of colon    • Dysuria    • External hemorrhoid    • GERD (gastroesophageal reflux disease)    • Hearing loss    • Hematuria    • Hypothyroidism    • Menopause    • Murmur    • Osteoporosis    • Rash of periwound skin    • Scoliosis    • Vitamin D deficiency        Allergies   Allergen Reactions   • Sulfa Antibiotics Hives   • Cephalexin Rash   • Indomethacin Rash   • Penicillins Rash        Past Surgical History:   Procedure Laterality Date   • ESSURE TUBAL LIGATION     • THYMECTOMY          Social History     Tobacco Use   • Smoking status: Never Smoker   • Smokeless tobacco: Never Used   Vaping Use   • Vaping Use: Never used   Substance Use Topics   • Alcohol use: Never   • Drug use: Never       History reviewed. No pertinent family history.     Health Maintenance Due   Topic Date Due   • ANNUAL PHYSICAL  Never done   • ZOSTER VACCINE (1 of 2) Never done   • COLORECTAL CANCER SCREENING  09/18/2019        Current Outpatient Medications on File Prior to Visit   Medication Sig   • amitriptyline (ELAVIL) 25 MG tablet Take 25 mg by mouth Daily.   • calcium carbonate-vitamin d 600-400 MG-UNIT per tablet Take 1 tablet by mouth 2 (two) times a day.   •  "HYDROcodone-acetaminophen (NORCO) 5-325 MG per tablet Take 1 tablet by mouth 2 (Two) Times a Day As Needed.   • levothyroxine (SYNTHROID, LEVOTHROID) 75 MCG tablet Take 75 mcg by mouth Daily.   • lisinopril (PRINIVIL,ZESTRIL) 2.5 MG tablet Take 2.5 mg by mouth Daily.   • SUMAtriptan (IMITREX) 50 MG tablet Take 50 mg by mouth 2 (two) times a day.   • topiramate (TOPAMAX) 50 MG tablet Take 50 mg by mouth Every Night.   • [DISCONTINUED] pramipexole (MIRAPEX) 0.125 MG tablet Take 0.125 mg by mouth Every Night.     No current facility-administered medications on file prior to visit.       Immunization History   Administered Date(s) Administered   • COVID-19 (PFIZER) 05/27/2021, 06/17/2021   • Hepatitis A 05/17/2018, 12/20/2018   • Influenza, Unspecified 10/19/2020   • Tdap 03/20/2018, 10/19/2020         Review of Systems   Per HPI    Objective     /80   Pulse 115   Temp 96.2 °F (35.7 °C)   Ht 160 cm (63\")   Wt 69.4 kg (153 lb)   SpO2 98%   BMI 27.10 kg/m²       Physical Exam  Vitals reviewed.   Constitutional:       General: She is not in acute distress.     Appearance: Normal appearance. She is well-developed and overweight.   HENT:      Head: Normocephalic and atraumatic.      Right Ear: Tympanic membrane, ear canal and external ear normal. There is no impacted cerumen.      Left Ear: Tympanic membrane, ear canal and external ear normal. There is no impacted cerumen.      Nose: Nose normal.      Mouth/Throat:      Mouth: Mucous membranes are moist.      Pharynx: Oropharynx is clear. No oropharyngeal exudate or posterior oropharyngeal erythema.   Eyes:      General: No scleral icterus.     Conjunctiva/sclera: Conjunctivae normal.      Pupils: Pupils are equal, round, and reactive to light.   Neck:      Thyroid: No thyroid mass, thyromegaly or thyroid tenderness.      Trachea: Trachea normal.   Cardiovascular:      Rate and Rhythm: Normal rate and regular rhythm.      Pulses: Normal pulses.      Heart sounds: " No murmur heard.     Pulmonary:      Effort: Pulmonary effort is normal. No respiratory distress.      Breath sounds: Normal breath sounds. No wheezing or rhonchi.   Musculoskeletal:         General: Normal range of motion.      Cervical back: Normal range of motion and neck supple.      Right lower leg: No edema.      Left lower leg: No edema.   Lymphadenopathy:      Cervical: No cervical adenopathy.   Skin:     General: Skin is warm and dry.   Neurological:      Mental Status: She is alert and oriented to person, place, and time.   Psychiatric:         Mood and Affect: Mood and affect normal.         Behavior: Behavior normal.         Thought Content: Thought content normal.         Judgment: Judgment normal.         Result Review :     The following data was reviewed by: DEMAR Reina on 06/25/2021:    CMP    CMP 10/20/20 4/2/21 5/26/21   Glucose 89 106 (A) 93   BUN 8 12 10   Creatinine 0.75 0.74 0.75   Sodium 139 139 135   Potassium 3.9 4.1 4.1   Chloride 103 104 102   Calcium 9.4 9.6 9.4   Albumin 4.2 4.2 4.5   Total Bilirubin 0.61 0.37 0.45   Alkaline Phosphatase 50 (A) 62 57   AST (SGOT) 27 28 28   ALT (SGPT) 28 28 32   (A) Abnormal value            CBC    CBC 10/20/20 4/2/21 5/26/21   WBC 7.05 7.17 7.15   RBC 5.19 5.64 (A) 5.38   Hemoglobin 14.2 15.5 14.7   Hematocrit 43.6 47.3 (A) 45.0   MCV 84.0 83.9 83.6   MCH 27.4 27.5 27.3   MCHC 32.6 (A) 32.8 (A) 32.7 (A)   RDW 13.3 13.3 13.5   Platelets 259 307 285   (A) Abnormal value            Lipid Panel    Lipid Panel 10/20/20   Total Cholesterol 177   Triglycerides 239 (A)   HDL Cholesterol 41   VLDL Cholesterol 48 (A)   LDL Cholesterol  88   (A) Abnormal value       Comments are available for some flowsheets but are not being displayed.           TSH    TSH 10/20/20 4/2/21   TSH 2.220 1.370             Lab Results   Component Value Date    RAPSCRN Negative 06/25/2021                   Assessment and Plan      Diagnoses and all orders for this  visit:    1. Acute pharyngitis, unspecified etiology (Primary)  Comments:  suspect viral illness  Orders:  -     POCT rapid strep A    2. RLS (restless legs syndrome)  -     pramipexole (MIRAPEX) 0.125 MG tablet; Take 1 tablet by mouth Every Night.  Dispense: 90 tablet; Refill: 1            Follow Up     Return if symptoms worsen or fail to improve.  Rapid strep is negative.  Suspect allergies versus other viral illness.  Patient is without any other symptoms at this point, thus antibiotic deferred.  Advised patient to follow-up with worsening/progressive symptoms.  Refill Mirapex.    Patient was given instructions and counseling regarding her condition or for health maintenance advice. Please see specific information pulled into the AVS if appropriate.

## 2021-06-25 NOTE — PATIENT INSTRUCTIONS
Sore Throat  A sore throat is pain, burning, irritation, or scratchiness in the throat. When you have a sore throat, you may feel pain or tenderness in your throat when you swallow or talk.  Many things can cause a sore throat, including:  · An infection.  · Seasonal allergies.  · Dryness in the air.  · Irritants, such as smoke or pollution.  · Radiation treatment to the area.  · Gastroesophageal reflux disease (GERD).  · A tumor.  A sore throat is often the first sign of another sickness. It may happen with other symptoms, such as coughing, sneezing, fever, and swollen neck glands. Most sore throats go away without medical treatment.  Follow these instructions at home:         · Take over-the-counter medicines only as told by your health care provider.  ? If your child has a sore throat, do not give your child aspirin because of the association with Reye syndrome.  · Drink enough fluids to keep your urine pale yellow.  · Rest as needed.  · To help with pain, try:  ? Sipping warm liquids, such as broth, herbal tea, or warm water.  ? Eating or drinking cold or frozen liquids, such as frozen ice pops.  ? Gargling with a salt-water mixture 3-4 times a day or as needed. To make a salt-water mixture, completely dissolve ½-1 tsp (3-6 g) of salt in 1 cup (237 mL) of warm water.  ? Sucking on hard candy or throat lozenges.  ? Putting a cool-mist humidifier in your bedroom at night to moisten the air.  ? Sitting in the bathroom with the door closed for 5-10 minutes while you run hot water in the shower.  · Do not use any products that contain nicotine or tobacco, such as cigarettes, e-cigarettes, and chewing tobacco. If you need help quitting, ask your health care provider.  · Wash your hands well and often with soap and water. If soap and water are not available, use hand .  Contact a health care provider if:  · You have a fever for more than 2-3 days.  · You have symptoms that last (are persistent) for more than  2-3 days.  · Your throat does not get better within 7 days.  · You have a fever and your symptoms suddenly get worse.  · Your child who is 3 months to 3 years old has a temperature of 102.2°F (39°C) or higher.  Get help right away if:  · You have difficulty breathing.  · You cannot swallow fluids, soft foods, or your saliva.  · You have increased swelling in your throat or neck.  · You have persistent nausea and vomiting.  Summary  · A sore throat is pain, burning, irritation, or scratchiness in the throat. Many things can cause a sore throat.  · Take over-the-counter medicines only as told by your health care provider. Do not give your child aspirin.  · Drink plenty of fluids, and rest as needed.  · Contact a health care provider if your symptoms worsen or your sore throat does not get better within 7 days.  This information is not intended to replace advice given to you by your health care provider. Make sure you discuss any questions you have with your health care provider.  Document Revised: 05/20/2019 Document Reviewed: 05/20/2019  [employer] Patient Education © 2021 [employer]

## 2021-06-30 ENCOUNTER — TELEPHONE (OUTPATIENT)
Dept: FAMILY MEDICINE CLINIC | Facility: CLINIC | Age: 56
End: 2021-06-30

## 2021-06-30 NOTE — TELEPHONE ENCOUNTER
Caller: Valentina Stevens    Relationship: Self    Best call back number: [phone]     What is the best time to reach you: ANYTIME     Who are you requesting to speak with CLINICAL STAFF    WhaT was the call regarding: PATIENT BLOOD PRESSURE LAST NIGHT WAS 93/70. PATIENT TOOK  BLOOD PRESSURE MEDICATION BUT DIDN'T KNOW IF SHE SHOULD OR NOT. PATIENT BLOOD PRESSURE WAS NORMAL TODAY 6/30/21/.     PLEASE ADVISE    Do you require a callback: YES

## 2021-06-30 NOTE — TELEPHONE ENCOUNTER
I called and told pt to watch for light headiness and dizziness.  I asked her to keep a record of her b/p readings for the next couple of days.

## 2021-07-09 ENCOUNTER — OFFICE VISIT (OUTPATIENT)
Dept: FAMILY MEDICINE CLINIC | Facility: CLINIC | Age: 56
End: 2021-07-09

## 2021-07-09 VITALS
WEIGHT: 153 LBS | BODY MASS INDEX: 27.1 KG/M2 | OXYGEN SATURATION: 97 % | HEART RATE: 91 BPM | DIASTOLIC BLOOD PRESSURE: 72 MMHG | SYSTOLIC BLOOD PRESSURE: 110 MMHG | TEMPERATURE: 98 F

## 2021-07-09 DIAGNOSIS — M25.572 ACUTE LEFT ANKLE PAIN: Primary | ICD-10-CM

## 2021-07-09 PROCEDURE — 99213 OFFICE O/P EST LOW 20 MIN: CPT | Performed by: NURSE PRACTITIONER

## 2021-07-09 RX ORDER — ERGOCALCIFEROL 1.25 MG/1
50000 CAPSULE ORAL WEEKLY
COMMUNITY
Start: 2021-06-28 | End: 2022-05-10

## 2021-07-09 NOTE — PROGRESS NOTES
Chief Complaint  Ankle Injury (patient fell yesterday and hurt left ankle)    Subjective          Valentina Shruti Stevens presents to [facility]    Patient presents with complaints of twisting left ankle.    She reports that she fell yesterday walking back from the mailbox.  She reports that she had on nonskid work shoes with good support.  She was walking across the grass surface, thought the surface was even.  Does not think that she tripped or stepped in a hole.  Thinks that she just fell.  She reports that she has a walker that she uses occasionally.  The walker was given to her secondary to back pain.  She was not using the walker yesterday.  She only uses the walker when she goes to the grocery store or to the mall to shop for extended times.  Her back gives out and causes her to fall.    She reports the left ankle was slightly swollen, rates the pain level at a 3 out of 10.  No significant pain when walking.  No bruising.  Slightly tender to palpate.  She routinely uses hydrocodone for her back.  No additional medication used for ankle pain.  She applied ice last evening.  She reports today pain and swelling is improved.  She works weekends at [employer].          Past Medical History:   Diagnosis Date   • Depression    • Diverticulitis of colon    • Dysuria    • External hemorrhoid    • GERD (gastroesophageal reflux disease)    • Hearing loss    • Hematuria    • Hypothyroidism    • Menopause    • Murmur    • Osteoporosis    • Rash of periwound skin    • Scoliosis    • Vitamin D deficiency        Allergies   Allergen Reactions   • Penicillins Shortness Of Breath and Rash   • Sulfa Antibiotics Hives   • Cephalexin Rash   • Indomethacin Rash        Past Surgical History:   Procedure Laterality Date   • ESSURE TUBAL LIGATION     • THYMECTOMY          Social History     Socioeconomic History   • Marital status:      Spouse name: Not on file   • Number of children: 2   • Years of  education: Not on file   • Highest education level: Not on file   Tobacco Use   • Smoking status: Never Smoker   • Smokeless tobacco: Never Used   Vaping Use   • Vaping Use: Never used   Substance and Sexual Activity   • Alcohol use: Never   • Drug use: Never       Family History   Problem Relation Age of Onset   • Cancer Mother    • Hypertension Father    • Osteoporosis Father         Health Maintenance Due   Topic Date Due   • ANNUAL PHYSICAL  Never done   • ZOSTER VACCINE (1 of 2) Never done   • COLORECTAL CANCER SCREENING  09/18/2019        Last Completed Pap Smear          PAP SMEAR (Every 3 Years) Next due on 10/9/2021    10/09/2018  Outside Procedure: IL CYTOPATH CERV/VAG THIN LAYER                Last Completed Mammogram          MAMMOGRAM (Every 2 Years) Next due on 10/26/2022    10/26/2020  Mammo Screening Bilateral With CAD    10/24/2019  Mammo Screening Bilateral With CAD    10/16/2018  Mammo Screening Bilateral With CAD    10/09/2018  Outside Procedure: INACTIVE -HC MAMMOGRAM SCREENING BILAT DIGITAL W CAD    09/15/2017  Mammo Screening Bilateral With CAD    Only the first 5 history entries have been loaded, but more history exists.                Last Completed Colonoscopy     This patient has no relevant Health Maintenance data.          Current Outpatient Medications on File Prior to Visit   Medication Sig   • amitriptyline (ELAVIL) 25 MG tablet Take 25 mg by mouth Daily.   • calcium carbonate-vitamin d 600-400 MG-UNIT per tablet Take 1 tablet by mouth 2 (two) times a day.   • HYDROcodone-acetaminophen (NORCO) 5-325 MG per tablet Take 1 tablet by mouth 2 (Two) Times a Day As Needed.   • levothyroxine (SYNTHROID, LEVOTHROID) 75 MCG tablet Take 75 mcg by mouth Daily.   • lisinopril (PRINIVIL,ZESTRIL) 2.5 MG tablet Take 2.5 mg by mouth Daily.   • pramipexole (MIRAPEX) 0.125 MG tablet Take 1 tablet by mouth Every Night.   • SUMAtriptan (IMITREX) 50 MG tablet Take 50 mg by mouth 2 (two) times a day.   •  topiramate (TOPAMAX) 50 MG tablet Take 50 mg by mouth Every Night.   • vitamin D (ERGOCALCIFEROL) 1.25 MG (73088 UT) capsule capsule Take 50,000 Units by mouth 1 (One) Time Per Week.     No current facility-administered medications on file prior to visit.       Immunization History   Administered Date(s) Administered   • COVID-19 (PFIZER) 05/27/2021, 06/17/2021   • Hepatitis A 05/17/2018, 12/20/2018   • Influenza, Unspecified 10/19/2020   • Tdap 03/20/2018, 10/19/2020       [facility]  Review of Systems   Constitutional: Negative.    HENT: Negative.    Respiratory: Negative.    Cardiovascular: Negative.    Musculoskeletal:        Reports left ankle pain and swelling as noted in HPI.        Objective     /72 (BP Location: Left arm)   Pulse 91   Temp 98 °F (36.7 °C)   Wt 69.4 kg (153 lb)   SpO2 97%   BMI 27.10 kg/m²         Physical Exam  Vitals and nursing note reviewed.   Constitutional:       Appearance: Normal appearance.   HENT:      Head: Normocephalic.   Cardiovascular:      Rate and Rhythm: Regular rhythm.      Heart sounds: Normal heart sounds.   Pulmonary:      Effort: Pulmonary effort is normal.      Breath sounds: Normal breath sounds.   Musculoskeletal:      Comments: Left ankle is slightly swollen at the lateral malleolus.  No redness or bruising.  No significant tenderness with palpation.  Range of motion is normal.   Skin:     General: Skin is warm and dry.   Neurological:      Mental Status: She is alert.   Psychiatric:         Mood and Affect: Mood normal.           Result Review :                           Assessment and Plan        Diagnoses and all orders for this visit:    1. Acute left ankle pain (Primary)    Ankle pain appears to be improving.  No significant indication for x-ray at this time.  Advised to wear Ace wrap or ankle support when working this weekend.  Advised to use ice for swelling.    Suggested that she consider using cane more often.  Especially on surfaces that may  not be even.        Follow Up     No follow-ups on file.    Patient was given instructions and counseling regarding her condition or for health maintenance advice. Please see specific information pulled into the AVS if appropriate.

## 2021-07-15 VITALS
HEART RATE: 77 BPM | DIASTOLIC BLOOD PRESSURE: 74 MMHG | HEIGHT: 63 IN | SYSTOLIC BLOOD PRESSURE: 130 MMHG | BODY MASS INDEX: 26.93 KG/M2 | WEIGHT: 152 LBS

## 2021-07-15 VITALS
WEIGHT: 156 LBS | SYSTOLIC BLOOD PRESSURE: 151 MMHG | HEART RATE: 78 BPM | DIASTOLIC BLOOD PRESSURE: 82 MMHG | HEIGHT: 63 IN | TEMPERATURE: 96.7 F | BODY MASS INDEX: 27.64 KG/M2

## 2021-08-03 ENCOUNTER — OFFICE VISIT (OUTPATIENT)
Dept: NEUROLOGY | Facility: CLINIC | Age: 56
End: 2021-08-03

## 2021-08-03 VITALS
BODY MASS INDEX: 26.58 KG/M2 | HEART RATE: 82 BPM | SYSTOLIC BLOOD PRESSURE: 116 MMHG | WEIGHT: 150 LBS | HEIGHT: 63 IN | DIASTOLIC BLOOD PRESSURE: 70 MMHG

## 2021-08-03 DIAGNOSIS — R41.3 MEMORY LOSS: ICD-10-CM

## 2021-08-03 DIAGNOSIS — G43.719 INTRACTABLE CHRONIC MIGRAINE WITHOUT AURA AND WITHOUT STATUS MIGRAINOSUS: Primary | ICD-10-CM

## 2021-08-03 PROCEDURE — 96372 THER/PROPH/DIAG INJ SC/IM: CPT | Performed by: NURSE PRACTITIONER

## 2021-08-03 PROCEDURE — 99214 OFFICE O/P EST MOD 30 MIN: CPT | Performed by: NURSE PRACTITIONER

## 2021-08-03 RX ORDER — FREMANEZUMAB-VFRM 225 MG/1.5ML
1.5 INJECTION SUBCUTANEOUS ONCE
Qty: 1 PEN | Refills: 0 | COMMUNITY
Start: 2021-08-03 | End: 2021-08-03

## 2021-08-03 RX ORDER — FREMANEZUMAB-VFRM 225 MG/1.5ML
225 INJECTION SUBCUTANEOUS
Qty: 1 PEN | Refills: 3 | Status: SHIPPED | OUTPATIENT
Start: 2021-08-03 | End: 2021-12-30

## 2021-08-03 RX ORDER — TOPIRAMATE 50 MG/1
50 TABLET, FILM COATED ORAL NIGHTLY
Qty: 30 TABLET | Refills: 3 | Status: SHIPPED | OUTPATIENT
Start: 2021-08-03 | End: 2021-09-23

## 2021-08-03 RX ORDER — SUMATRIPTAN 50 MG/1
50 TABLET, FILM COATED ORAL 2 TIMES DAILY
Qty: 9 TABLET | Refills: 3 | Status: SHIPPED | OUTPATIENT
Start: 2021-08-03 | End: 2021-09-23

## 2021-08-03 NOTE — PROGRESS NOTES
"Chief Complaint  Memory Loss    Subjective          Valentina Stevens presents to [facility]  Continuing to have about 12 headache days per month.  Has ran out of amitriptyline and is no longer on it.  Continuing to feel she is having short term memory loss.        Previous prophylactic migraine medications: topiramate, lisinopril, amitriptyline  Previous abortive migraine medications: Imitrex      Objective   Vital Signs:   /70 (BP Location: Right arm, Patient Position: Sitting, Cuff Size: Adult)   Pulse 82   Ht 160 cm (63\")   Wt 68 kg (150 lb)   BMI 26.57 kg/m²     Physical Exam  HENT:      Head: Normocephalic.   Pulmonary:      Effort: Pulmonary effort is normal.   Neurological:      Mental Status: She is alert and oriented to person, place, and time.      Cranial Nerves: Cranial nerves are intact.      Sensory: Sensation is intact.      Motor: Motor function is intact.      Coordination: Coordination is intact.      Deep Tendon Reflexes: Reflexes are normal and symmetric.        Neurologic Exam     Mental Status   Oriented to person, place, and time.        Result Review :               Assessment and Plan    Diagnoses and all orders for this visit:    1. Intractable chronic migraine without aura and without status migrainosus (Primary)  Assessment & Plan:  Start Ajovy for preventative therapy of migraine and Imitrex as needed for abortive therapy.  We will continue topiramate for now.  Goal will be to discontinue topiramate to see if memory improves.  She is already off of amitriptyline and we will not restart it.  Sample of Ajovy given to initiate treatment in the office today      Orders:  -     Fremanezumab-vfrm (Ajovy) 225 MG/1.5ML solution auto-injector; Inject 1.5 mL under the skin into the appropriate area as directed 1 (One) Time for 1 dose.  Dispense: 1 pen; Refill: 0  -     Fremanezumab-vfrm solution auto-injector 1.5 mL    2. Memory " loss  Assessment & Plan:  Thus far memory work-up has been unrevealing.  Will work on treating migraines with another option and discontinue topiramate.  After discontinuation if memory does not improve will order neurocognitive testing.      Other orders  -     topiramate (TOPAMAX) 50 MG tablet; Take 1 tablet by mouth Every Night.  Dispense: 30 tablet; Refill: 3  -     SUMAtriptan (IMITREX) 50 MG tablet; Take 1 tablet by mouth 2 (two) times a day.  Dispense: 9 tablet; Refill: 3  -     Fremanezumab-vfrm (Ajovy) 225 MG/1.5ML solution auto-injector; Inject 225 mg under the skin into the appropriate area as directed Every 30 (Thirty) Days.  Dispense: 1 pen; Refill: 3      Follow Up   Return in about 3 months (around 11/3/2021) for Migraine f/u, memory f/u.  Patient was given instructions and counseling regarding her condition or for health maintenance advice. Please see specific information pulled into the AVS if appropriate.

## 2021-08-09 PROBLEM — G43.719 INTRACTABLE CHRONIC MIGRAINE WITHOUT AURA AND WITHOUT STATUS MIGRAINOSUS: Status: ACTIVE | Noted: 2021-02-10

## 2021-08-09 PROBLEM — R41.3 MEMORY LOSS: Status: ACTIVE | Noted: 2021-08-09

## 2021-08-09 NOTE — ASSESSMENT & PLAN NOTE
Thus far memory work-up has been unrevealing.  Will work on treating migraines with another option and discontinue topiramate.  After discontinuation if memory does not improve will order neurocognitive testing.

## 2021-08-09 NOTE — ASSESSMENT & PLAN NOTE
Start Ajovy for preventative therapy of migraine and Imitrex as needed for abortive therapy.  We will continue topiramate for now.  Goal will be to discontinue topiramate to see if memory improves.  She is already off of amitriptyline and we will not restart it.  Sample of Ajovy given to initiate treatment in the office today

## 2021-08-10 ENCOUNTER — TELEPHONE (OUTPATIENT)
Dept: FAMILY MEDICINE CLINIC | Facility: CLINIC | Age: 56
End: 2021-08-10

## 2021-08-10 NOTE — TELEPHONE ENCOUNTER
Caller: Valentina Stevens    Relationship: Self    Best call back number: [phone]    What is the best time to reach you: ANYTIME    Who are you requesting to speak with (clinical staff, provider,  specific staff member): CLINICAL      What was the call regarding: THE PATIENT STATED HER DIVERTICULITIS IS CAUSING HER ISSUES BUT HER SPECIALIST CANNOT SEE HER ANYTIME SOON. SHE IS HAVING SEVERE STOMACH PAIN AND TROUBLE GOING TO THE RESTROOM. SHE WOULD LIKE PCP RECOMMENDATION AND A CALL BACK ASAP TO DISCUSS    Do you require a callback: YES.

## 2021-08-11 ENCOUNTER — TELEMEDICINE (OUTPATIENT)
Dept: FAMILY MEDICINE CLINIC | Facility: CLINIC | Age: 56
End: 2021-08-11

## 2021-08-11 DIAGNOSIS — R10.32 COLICKY LEFT LOWER QUADRANT PAIN: Primary | ICD-10-CM

## 2021-08-11 DIAGNOSIS — Z87.19 HISTORY OF DIVERTICULITIS: ICD-10-CM

## 2021-08-11 DIAGNOSIS — K59.00 CONSTIPATION, UNSPECIFIED CONSTIPATION TYPE: ICD-10-CM

## 2021-08-11 PROCEDURE — 99213 OFFICE O/P EST LOW 20 MIN: CPT | Performed by: NURSE PRACTITIONER

## 2021-08-11 RX ORDER — CIPROFLOXACIN 500 MG/1
500 TABLET, FILM COATED ORAL 2 TIMES DAILY
Qty: 20 TABLET | Refills: 0 | Status: SHIPPED | OUTPATIENT
Start: 2021-08-11 | End: 2021-09-23

## 2021-08-11 RX ORDER — METRONIDAZOLE 500 MG/1
500 TABLET ORAL 3 TIMES DAILY
Qty: 30 TABLET | Refills: 0 | Status: SHIPPED | OUTPATIENT
Start: 2021-08-11 | End: 2021-10-08

## 2021-08-11 NOTE — PROGRESS NOTES
Chief Complaint  Patient with complaint of a diverticulitis flareup and unable to do her normal visit today in the office as her son was just diagnosed with Covid yesterday    Subjective            Valentina Ramireznicole Stevens presents to [facility]  Patient unable to use the [employer] video chat or haiku-- could not explain and help her understand so we are doing [employer] video chat instead for this visit it had to be changed from a regular visit to this video visit due to her son just got diagnosed with Covid yesterday evening    Patient is fully vaccinated    ______________________________________________________    Patient reports having a flareup of her diverticulitis called her gastroenterologist Dr. Stephens and he is unable to see her until October patient did not feel like it was bad enough to go to the emergency room yet so she had called and made an appointment for today but cannot come in because of her sons positive Covid    Reports that her symptoms started about 3 days ago with that crampy sensation of throbbing, ache in the left lower quadrant-denies any nausea or vomiting no diarrhea she is experiencing some constipation she is still having bowel movements every day but she says it is just hard and then also denies all fever chills body aches cough congestion she is not having any of that and no issues with urinary frequency or urgency or burning and no back pain in the kidney area      PHQ-2 Total Score:    PHQ-9 Total Score:      Past Medical History:   Diagnosis Date   • Depression    • Diverticulitis of colon    • Dysuria    • External hemorrhoid    • GERD (gastroesophageal reflux disease)    • Hearing loss    • Hematuria    • Hypothyroidism    • Menopause    • Murmur    • Osteoporosis    • Rash of periwound skin    • Scoliosis    • Vitamin D deficiency        Allergies   Allergen Reactions   • Penicillins Shortness Of Breath and Rash   • Sulfa Antibiotics Hives   •  Cephalexin Rash   • Indomethacin Rash        Past Surgical History:   Procedure Laterality Date   • ESSURE TUBAL LIGATION     • THYMECTOMY          Social History     Tobacco Use   • Smoking status: Never Smoker   • Smokeless tobacco: Never Used   Vaping Use   • Vaping Use: Never used   Substance Use Topics   • Alcohol use: Never   • Drug use: Never       Family History   Problem Relation Age of Onset   • Cancer Mother    • Hypertension Father    • Osteoporosis Father         Health Maintenance Due   Topic Date Due   • ANNUAL PHYSICAL  Never done   • ZOSTER VACCINE (1 of 2) Never done   • COLORECTAL CANCER SCREENING  09/18/2019        Current Outpatient Medications on File Prior to Visit   Medication Sig   • calcium carbonate-vitamin d 600-400 MG-UNIT per tablet Take 1 tablet by mouth 2 (two) times a day.   • Fremanezumab-vfrm (Ajovy) 225 MG/1.5ML solution auto-injector Inject 225 mg under the skin into the appropriate area as directed Every 30 (Thirty) Days.   • HYDROcodone-acetaminophen (NORCO) 5-325 MG per tablet Take 1 tablet by mouth 2 (Two) Times a Day As Needed.   • levothyroxine (SYNTHROID, LEVOTHROID) 75 MCG tablet Take 75 mcg by mouth Daily.   • lisinopril (PRINIVIL,ZESTRIL) 2.5 MG tablet Take 2.5 mg by mouth Daily.   • pramipexole (MIRAPEX) 0.125 MG tablet Take 1 tablet by mouth Every Night.   • SUMAtriptan (IMITREX) 50 MG tablet Take 1 tablet by mouth 2 (two) times a day.   • topiramate (TOPAMAX) 50 MG tablet Take 1 tablet by mouth Every Night.   • vitamin D (ERGOCALCIFEROL) 1.25 MG (38084 UT) capsule capsule Take 50,000 Units by mouth 1 (One) Time Per Week.     No current facility-administered medications on file prior to visit.       Immunization History   Administered Date(s) Administered   • COVID-19 (PFIZER) 05/27/2021, 06/17/2021   • Hepatitis A 05/17/2018, 12/20/2018   • Influenza, Unspecified 10/19/2020   • Tdap 03/20/2018, 10/19/2020       [facility]  Review of Systems   Constitutional:  Negative for chills and fever.   HENT: Negative.    Respiratory: Negative.    Cardiovascular: Negative.    Gastrointestinal: Positive for abdominal pain and constipation. Negative for abdominal distention, anal bleeding, blood in stool, diarrhea, nausea, rectal pain, vomiting, GERD and indigestion.   Genitourinary: Negative.    Neurological: Negative.    Psychiatric/Behavioral: Negative.         Objective     There were no vitals taken for this visit.    This was a video visit    Physical Exam  Constitutional:       Appearance: Normal appearance.   HENT:      Head: Normocephalic.      Right Ear: External ear normal.      Left Ear: External ear normal.      Nose: Nose normal.      Mouth/Throat:      Comments: While patient was speaking her oropharynx appeared moist  Eyes:      Pupils: Pupils are equal, round, and reactive to light.      Comments: Normal-appearing eye movement patient wearing glasses during the video visit   Cardiovascular:      Comments: Normal color no cyanosis skin appears dry  Pulmonary:      Effort: Pulmonary effort is normal.      Comments: Through the video visit respiratory effort was normal unlabored no cough noted  Abdominal:      Comments: Patient reports left lower quadrant cramping ache but not excruciatingly so and not doubling her over and she still able to walk around upright   Musculoskeletal:         General: Normal range of motion.      Cervical back: Normal range of motion.      Comments: Through the video visit patient was moving about well   Skin:     General: Skin is dry.      Comments: Through the visit patient skin appeared dry normal colored   Neurological:      Mental Status: She is alert and oriented to person, place, and time.   Psychiatric:         Mood and Affect: Mood normal.         Behavior: Behavior normal.         Thought Content: Thought content normal.         Judgment: Judgment normal.         Result Review :             CT Abdomen Pelvis With Contrast  (06/07/2020 20:46)  CT Abdomen Pelvis With Contrast (04/09/2019 15:43)             Assessment and Plan      Diagnoses and all orders for this visit:    1. Colicky left lower quadrant pain (Primary)  -     metroNIDAZOLE (Flagyl) 500 MG tablet; Take 1 tablet by mouth 3 (Three) Times a Day.  Dispense: 30 tablet; Refill: 0  -     ciprofloxacin (Cipro) 500 MG tablet; Take 1 tablet by mouth 2 (Two) Times a Day.  Dispense: 20 tablet; Refill: 0    2. History of diverticulitis  -     metroNIDAZOLE (Flagyl) 500 MG tablet; Take 1 tablet by mouth 3 (Three) Times a Day.  Dispense: 30 tablet; Refill: 0  -     ciprofloxacin (Cipro) 500 MG tablet; Take 1 tablet by mouth 2 (Two) Times a Day.  Dispense: 20 tablet; Refill: 0    3. Constipation, unspecified constipation type    Instructed patient to increase her fiber and fluid intake and also still follow-up with Dr. Stephens in October        Follow Up     Return if symptoms worsen or fail to improve.    The use of a video visit has been reviewed with the patient and verbal informed consent has been obtained.

## 2021-08-20 RX ORDER — LEVOTHYROXINE SODIUM 0.07 MG/1
TABLET ORAL
Qty: 30 TABLET | Refills: 1 | Status: SHIPPED | OUTPATIENT
Start: 2021-08-20 | End: 2021-09-20

## 2021-08-23 ENCOUNTER — TELEPHONE (OUTPATIENT)
Dept: NEUROLOGY | Facility: CLINIC | Age: 56
End: 2021-08-23

## 2021-08-23 ENCOUNTER — PRIOR AUTHORIZATION (OUTPATIENT)
Dept: NEUROLOGY | Facility: CLINIC | Age: 56
End: 2021-08-23

## 2021-08-26 ENCOUNTER — OFFICE VISIT (OUTPATIENT)
Dept: SLEEP MEDICINE | Facility: HOSPITAL | Age: 56
End: 2021-08-26

## 2021-08-26 VITALS
TEMPERATURE: 97.8 F | DIASTOLIC BLOOD PRESSURE: 76 MMHG | HEART RATE: 87 BPM | HEIGHT: 63 IN | SYSTOLIC BLOOD PRESSURE: 120 MMHG | OXYGEN SATURATION: 94 % | BODY MASS INDEX: 26.4 KG/M2 | WEIGHT: 149 LBS

## 2021-08-26 DIAGNOSIS — G47.33 OSA (OBSTRUCTIVE SLEEP APNEA): Primary | ICD-10-CM

## 2021-08-26 PROCEDURE — G0463 HOSPITAL OUTPT CLINIC VISIT: HCPCS

## 2021-08-26 NOTE — PROGRESS NOTES
"      [facility]         Dr Carlyle Johnson  [unfilled]  Patient Care Team:  Sera Loza APRN as PCP - General (Nurse Practitioner)    Chief Complaint: DEVENDRA with hypertension    Interval History: Patient here for compliance visit.  As you recall currently on auto CPAP 8 to 20 cm with compliance today 100% average sleep 7 hours 19 minutes.  AHI and leak look excellent.  Patient benefiting from CPAP.  Goes to bed 11 gets up 545.  No tobacco no alcohol no caffeine abuse.  Currently has a full facemask that fits well.  Supplies have been adequate.    REVIEW OF SYSTEMS:   CARDIOVASCULAR: No chest pain, chest pressure or chest discomfort. No palpitations or edema.   RESPIRATORY: No shortness of breath, cough or sputum.   GASTROINTESTINAL: No anorexia, nausea, vomiting or diarrhea. No abdominal pain or blood.   HEMATOLOGIC: No bleeding or bruising.  Positive for shortness of breath  [place] 5 out of 24 within normal limits    Ventilator/Non-Invasive Ventilation Settings (From admission, onward)    None            Vital Signs  Temp:  [97.8 °F (36.6 °C)] 97.8 °F (36.6 °C)  Heart Rate:  [87] 87  BP: (120)/(76) 120/76  [unfilled]  Flowsheet Rows      First Filed Value   Admission Height  160 cm (63\") Documented at 08/26/2021 1100   Admission Weight  67.6 kg (149 lb) Documented at 08/26/2021 1100          Physical Exam:  Patient is examined using the personal protective equipment as per guidelines from infection control for this particular patient as enacted.  Hand hygiene was performed before and after patient interaction.   General Appearance:    Alert, cooperative, in no acute distress.  Following simple commands  Neck midline trachea, no thyromegaly   Lungs:     Clear to auscultation, respirations regular, even and                  unlabored  ENT Mallampati between 3 and 4 normal nasal exam    Heart:    Regular rhythm and normal rate, normal S1 and S2, no            murmur, no gallop, no rub, no click "   Chest Wall:    No abnormalities observed   Abdomen:     Normal bowel sounds, no masses, no organomegaly, soft        nontender, nondistended, no guarding, no rebound                tenderness   Extremities:   Moves all extremities well, no edema, no cyanosis, no             redness  CNS no focal neurological deficits normal sensory exam  Skin no rashes no nodules  Musculoskeletal no cyanosis no clubbing normal range of motion     Results Review:                                          I reviewed the patient's new clinical results.  I personally viewed and interpreted the patient's chest x-ray.        Medication Review:       No current facility-administered medications for this visit.      ASSESSMENT:   1.  Obstructive sleep apnea.    2.  Hypertension.    PLAN:  Reviewed compliance download with the patient.  Excellent compliance AHI leak.  Continue current auto CPAP 8 to 20 cm.  Yogi recall information given.  Treatment of underlying comorbidities  Advised patient no use of ozone  on the CPAP machine and limited or no amount of humidification if possible  Follow-up in 1 year.      Carlyle Johnson MD  08/26/21  11:50 EDT

## 2021-09-20 RX ORDER — LEVOTHYROXINE SODIUM 0.07 MG/1
TABLET ORAL
Qty: 30 TABLET | Refills: 1 | Status: SHIPPED | OUTPATIENT
Start: 2021-09-20 | End: 2022-01-18

## 2021-09-23 ENCOUNTER — OFFICE VISIT (OUTPATIENT)
Dept: FAMILY MEDICINE CLINIC | Facility: CLINIC | Age: 56
End: 2021-09-23

## 2021-09-23 VITALS — TEMPERATURE: 98.2 F | HEART RATE: 90 BPM | OXYGEN SATURATION: 96 %

## 2021-09-23 DIAGNOSIS — R51.9 NONINTRACTABLE HEADACHE, UNSPECIFIED CHRONICITY PATTERN, UNSPECIFIED HEADACHE TYPE: ICD-10-CM

## 2021-09-23 DIAGNOSIS — R09.89 RUNNY NOSE: ICD-10-CM

## 2021-09-23 DIAGNOSIS — Z20.822 CLOSE EXPOSURE TO COVID-19 VIRUS: Primary | ICD-10-CM

## 2021-09-23 PROCEDURE — 87635 SARS-COV-2 COVID-19 AMP PRB: CPT | Performed by: NURSE PRACTITIONER

## 2021-09-23 PROCEDURE — 99213 OFFICE O/P EST LOW 20 MIN: CPT | Performed by: NURSE PRACTITIONER

## 2021-09-23 NOTE — PROGRESS NOTES
Chief Complaint  Headache (runny nose.  had an exposure to covid)    Subjective            Valentina Shruti Stevens presents to [facility]  Patient is here today for an acute exposure reports that her daughter and her daughter's significant other tested positive for Covid she has had an exposure only symptom she is having really is headache and she does have a history of this--and is off of the Topamax and the Imitrex both-- so she just really did not know does not have any fever no nausea vomiting diarrhea no loss of taste or smell and a slight runny nose      PHQ-2 Total Score:    PHQ-9 Total Score:      Past Medical History:   Diagnosis Date   • Depression    • Difficulty walking    • Diverticulitis of colon    • Dysuria    • External hemorrhoid    • GERD (gastroesophageal reflux disease)    • Hearing loss    • Hematuria    • Hypertension    • Hypothyroidism    • Memory loss    • Menopause    • Migraine    • Murmur    • Osteoporosis    • Rash of periwound skin    • Scoliosis    • Sleep apnea    • Vitamin D deficiency        Allergies   Allergen Reactions   • Penicillins Shortness Of Breath and Rash   • Sulfa Antibiotics Hives   • Cephalexin Rash   • Indomethacin Rash        Past Surgical History:   Procedure Laterality Date   • CARPAL TUNNEL RELEASE     • ESSURE TUBAL LIGATION     • THYMECTOMY          Social History     Tobacco Use   • Smoking status: Never Smoker   • Smokeless tobacco: Never Used   Vaping Use   • Vaping Use: Never used   Substance Use Topics   • Alcohol use: Never   • Drug use: Never       Family History   Problem Relation Age of Onset   • Cancer Mother    • Migraines Mother    • Stroke Mother    • Hypertension Father    • Osteoporosis Father         Health Maintenance Due   Topic Date Due   • ANNUAL PHYSICAL  Never done   • ZOSTER VACCINE (1 of 2) Never done   • COLORECTAL CANCER SCREENING  09/18/2019        Current Outpatient Medications on File Prior to Visit    Medication Sig   • calcium carbonate-vitamin d 600-400 MG-UNIT per tablet Take 1 tablet by mouth 2 (two) times a day.   • Fremanezumab-vfrm (Ajovy) 225 MG/1.5ML solution auto-injector Inject 225 mg under the skin into the appropriate area as directed Every 30 (Thirty) Days.   • HYDROcodone-acetaminophen (NORCO) 5-325 MG per tablet Take 1 tablet by mouth 2 (Two) Times a Day As Needed.   • levothyroxine (SYNTHROID, LEVOTHROID) 75 MCG tablet TAKE 1 TABLET BY MOUTH EVERY DAY   • lisinopril (PRINIVIL,ZESTRIL) 2.5 MG tablet Take 2.5 mg by mouth Daily.   • metroNIDAZOLE (Flagyl) 500 MG tablet Take 1 tablet by mouth 3 (Three) Times a Day.   • pramipexole (MIRAPEX) 0.125 MG tablet Take 1 tablet by mouth Every Night.   • vitamin D (ERGOCALCIFEROL) 1.25 MG (83135 UT) capsule capsule Take 50,000 Units by mouth 1 (One) Time Per Week.   • SUMAtriptan (IMITREX) 50 MG tablet Take 1 tablet by mouth 2 (two) times a day.   • topiramate (TOPAMAX) 50 MG tablet Take 1 tablet by mouth Every Night.   • [DISCONTINUED] ciprofloxacin (Cipro) 500 MG tablet Take 1 tablet by mouth 2 (Two) Times a Day.     No current facility-administered medications on file prior to visit.       Immunization History   Administered Date(s) Administered   • COVID-19 (PFIZER) 05/27/2021, 06/17/2021   • Hepatitis A 05/17/2018, 12/20/2018   • Influenza, Unspecified 10/19/2020   • Tdap 03/20/2018, 10/19/2020       Review of Systems   Constitutional: Positive for fatigue. Negative for fever.   HENT: Positive for rhinorrhea. Negative for congestion, dental problem, drooling, mouth sores, nosebleeds, postnasal drip, sinus pressure, sneezing, sore throat and swollen glands.    Eyes: Negative.    Respiratory: Negative.    Cardiovascular: Negative.    Gastrointestinal: Negative.    Endocrine: Negative.    Genitourinary: Negative.    Musculoskeletal: Negative.    Neurological: Positive for headache.   Hematological: Negative.    Psychiatric/Behavioral: Negative.          Objective     Pulse 90   Temp 98.2 °F (36.8 °C)   SpO2 96%       Physical Exam  Vitals and nursing note reviewed.   Constitutional:       Appearance: Normal appearance.   HENT:      Head: Normocephalic.      Right Ear: External ear normal.      Left Ear: External ear normal.      Ears:      Comments: Bilateral hearing aids     Nose: Nose normal.      Mouth/Throat:      Mouth: Mucous membranes are moist.   Eyes:      Pupils: Pupils are equal, round, and reactive to light.   Cardiovascular:      Rate and Rhythm: Normal rate and regular rhythm.      Heart sounds: Normal heart sounds.   Pulmonary:      Effort: Pulmonary effort is normal.      Breath sounds: Normal breath sounds.   Abdominal:      General: Bowel sounds are normal.      Palpations: Abdomen is soft.   Musculoskeletal:         General: Normal range of motion.      Cervical back: Normal range of motion and neck supple.      Comments: Patient in the  seat does have a child in the backseat car in a car seat with her   Skin:     General: Skin is warm and dry.   Neurological:      Mental Status: She is alert and oriented to person, place, and time.   Psychiatric:         Mood and Affect: Mood normal.         Behavior: Behavior normal.         Judgment: Judgment normal.         Result Review :                          Assessment and Plan      Diagnoses and all orders for this visit:    1. Close exposure to COVID-19 virus (Primary)  -     COVID-19,CEPHEID/AVANI/BDMAX,COR/HERBERT/PAD/[facility]RIE IN-HOUSE(OR EMERGENT/ADD-ON),NP SWAB IN TRANSPORT MEDIA 3-4 HR TAT, RT-PCR - Swab, Nasopharynx    2. Nonintractable headache, unspecified chronicity pattern, unspecified headache type    3. Runny nose    Patient will self quarantine does not have to return to work at [employer] until Saturday we will call with the results and should something change she will let us know otherwise no work excuse is needed yet and she will just stay well-hydrated symptomatic treatment  only        Follow Up     Return if symptoms worsen or fail to improve.

## 2021-09-24 LAB — SARS-COV-2 N GENE RESP QL NAA+PROBE: NOT DETECTED

## 2021-10-08 ENCOUNTER — OFFICE VISIT (OUTPATIENT)
Dept: GASTROENTEROLOGY | Facility: CLINIC | Age: 56
End: 2021-10-08

## 2021-10-08 VITALS
HEIGHT: 63 IN | WEIGHT: 153.4 LBS | DIASTOLIC BLOOD PRESSURE: 59 MMHG | HEART RATE: 70 BPM | OXYGEN SATURATION: 98 % | SYSTOLIC BLOOD PRESSURE: 133 MMHG | BODY MASS INDEX: 27.18 KG/M2

## 2021-10-08 DIAGNOSIS — K59.04 CHRONIC IDIOPATHIC CONSTIPATION: ICD-10-CM

## 2021-10-08 DIAGNOSIS — K57.30 DIVERTICULA OF COLON: Primary | ICD-10-CM

## 2021-10-08 DIAGNOSIS — Z87.19 HISTORY OF DIVERTICULITIS: ICD-10-CM

## 2021-10-08 DIAGNOSIS — R19.4 ALTERED BOWEL HABITS: ICD-10-CM

## 2021-10-08 PROCEDURE — 99213 OFFICE O/P EST LOW 20 MIN: CPT | Performed by: NURSE PRACTITIONER

## 2021-10-08 RX ORDER — POLYETHYLENE GLYCOL 3350 17 G/17G
17 POWDER, FOR SOLUTION ORAL DAILY
Qty: 30 EACH | Refills: 3 | Status: SHIPPED | OUTPATIENT
Start: 2021-10-08 | End: 2022-01-04 | Stop reason: SDUPTHER

## 2021-10-08 NOTE — PATIENT INSTRUCTIONS
Constipation, Adult  Constipation is when a person has fewer than three bowel movements in a week, has difficulty having a bowel movement, or has stools (feces) that are dry, hard, or larger than normal. Constipation may be caused by an underlying condition. It may become worse with age if a person takes certain medicines and does not take in enough fluids.  Follow these instructions at home:  Eating and drinking    · Eat foods that have a lot of fiber, such as beans, whole grains, and fresh fruits and vegetables.  · Limit foods that are low in fiber and high in fat and processed sugars, such as fried or sweet foods. These include french fries, hamburgers, cookies, candies, and soda.  · Drink enough fluid to keep your urine pale yellow.    General instructions  · Exercise regularly or as told by your health care provider. Try to do 150 minutes of moderate exercise each week.  · Use the bathroom when you have the urge to go. Do not hold it in.  · Take over-the-counter and prescription medicines only as told by your health care provider. This includes any fiber supplements.  · During bowel movements:  ? Practice deep breathing while relaxing the lower abdomen.  ? Practice pelvic floor relaxation.  · Watch your condition for any changes. Let your health care provider know about them.  · Keep all follow-up visits as told by your health care provider. This is important.  Contact a health care provider if:  · You have pain that gets worse.  · You have a fever.  · You do not have a bowel movement after 4 days.  · You vomit.  · You are not hungry or you lose weight.  · You are bleeding from the opening between the buttocks (anus).  · You have thin, pencil-like stools.  Get help right away if:  · You have a fever and your symptoms suddenly get worse.  · You leak stool or have blood in your stool.  · Your abdomen is bloated.  · You have severe pain in your abdomen.  · You feel dizzy or you faint.  Summary  · Constipation is  when a person has fewer than three bowel movements in a week, has difficulty having a bowel movement, or has stools (feces) that are dry, hard, or larger than normal.  · Eat foods that have a lot of fiber, such as beans, whole grains, and fresh fruits and vegetables.  · Drink enough fluid to keep your urine pale yellow.  · Take over-the-counter and prescription medicines only as told by your health care provider. This includes any fiber supplements.  This information is not intended to replace advice given to you by your health care provider. Make sure you discuss any questions you have with your health care provider.  Document Revised: 11/04/2020 Document Reviewed: 11/04/2020  Elsevier Patient Education © 2021 [employer]

## 2021-10-08 NOTE — PROGRESS NOTES
Chief Complaint   Diverticulitis    History of Present Illness       Valentina Stevens is a 55 y.o. female who presents to [facility] for follow-up with a history of diverticulitis.  Patient was seen by her primary care provider back in August and treated with Flagyl was presented off to her symptoms quickly.Patient reports when seen by her primary care provider she was having a cramping sensation in the left lower quadrant with a flare of diverticulitis was not experiencing any nausea vomiting or diarrhea.  Patient reports she does experience constipation she has about 1 good bowel movement in the morning and then has difficult hard to pass bowel movements in the afternoon.  She is taking 1 fiber pill in the day.  Reports she is hydrating well.  No family history of colon cancer or previous history of colon polyps.  Patient has never taken anything for constipation.  Patient denies fever, nausea, vomiting, weight loss, night sweats, melena, hematochezia, hematemesis.    Colonoscopy: Review of the patient's most recent colonoscopy performed by Dr. Stephens on 11/13/2018 grade 1 internal hemorrhoids of moderate diverticulosis.   No recent abdominal imaging.       Results       Result Review :       CMP    CMP 10/20/20 4/2/21 5/26/21   Glucose 89 106 (A) 93   BUN 8 12 10   Creatinine 0.75 0.74 0.75   Sodium 139 139 135   Potassium 3.9 4.1 4.1   Chloride 103 104 102   Calcium 9.4 9.6 9.4   Albumin 4.2 4.2 4.5   Total Bilirubin 0.61 0.37 0.45   Alkaline Phosphatase 50 (A) 62 57   AST (SGOT) 27 28 28   ALT (SGPT) 28 28 32   (A) Abnormal value            CBC    CBC 10/20/20 4/2/21 5/26/21   WBC 7.05 7.17 7.15   RBC 5.19 5.64 (A) 5.38   Hemoglobin 14.2 15.5 14.7   Hematocrit 43.6 47.3 (A) 45.0   MCV 84.0 83.9 83.6   MCH 27.4 27.5 27.3   MCHC 32.6 (A) 32.8 (A) 32.7 (A)   RDW 13.3 13.3 13.5   Platelets 259 307 285   (A) Abnormal value                      Past Medical History       Past  Medical History:   Diagnosis Date   • Depression    • Difficulty walking    • Diverticulitis of colon    • Dysuria    • External hemorrhoid    • GERD (gastroesophageal reflux disease)    • Hearing loss    • Hematuria    • Hyperlipemia    • Hypertension    • Hypothyroidism    • Memory loss    • Menopause    • Migraine    • Murmur    • Osteoporosis    • Rash of periwound skin    • Scoliosis    • Sleep apnea    • Vitamin D deficiency        Past Surgical History:   Procedure Laterality Date   • CARPAL TUNNEL RELEASE     • COLONOSCOPY     • ESSURE TUBAL LIGATION     • THYMECTOMY           Current Outpatient Medications:   •  calcium carbonate-vitamin d 600-400 MG-UNIT per tablet, Take 1 tablet by mouth 2 (two) times a day., Disp: , Rfl:   •  Fremanezumab-vfrm (Ajovy) 225 MG/1.5ML solution auto-injector, Inject 225 mg under the skin into the appropriate area as directed Every 30 (Thirty) Days., Disp: 1 pen, Rfl: 3  •  HYDROcodone-acetaminophen (NORCO) 5-325 MG per tablet, Take 1 tablet by mouth 2 (Two) Times a Day As Needed., Disp: , Rfl:   •  levothyroxine (SYNTHROID, LEVOTHROID) 75 MCG tablet, TAKE 1 TABLET BY MOUTH EVERY DAY, Disp: 30 tablet, Rfl: 1  •  lisinopril (PRINIVIL,ZESTRIL) 2.5 MG tablet, Take 2.5 mg by mouth Daily., Disp: , Rfl:   •  pramipexole (MIRAPEX) 0.125 MG tablet, Take 1 tablet by mouth Every Night., Disp: 90 tablet, Rfl: 1  •  vitamin D (ERGOCALCIFEROL) 1.25 MG (15218 UT) capsule capsule, Take 50,000 Units by mouth 1 (One) Time Per Week., Disp: , Rfl:   •  polyethylene glycol (MiraLax) 17 g packet, Take 17 g by mouth Daily., Disp: 30 each, Rfl: 3     Allergies   Allergen Reactions   • Penicillins Shortness Of Breath and Rash   • Sulfa Antibiotics Hives   • Cephalexin Rash   • Indomethacin Rash       Family History   Problem Relation Age of Onset   • Cancer Mother    • Migraines Mother    • Stroke Mother    • Hypertension Father    • Osteoporosis Father         Social History     Social History  "Narrative   • Not on file       Objective       Vital Signs:   /59 (BP Location: Right arm, Patient Position: Sitting, Cuff Size: Adult)   Pulse 70   Ht 160 cm (63\")   Wt 69.6 kg (153 lb 6.4 oz)   SpO2 98%   BMI 27.17 kg/m²       Physical Exam  Constitutional:       General: She is not in acute distress.     Appearance: Normal appearance.   HENT:      Head: Normocephalic.   Eyes:      Conjunctiva/sclera: Conjunctivae normal.      Pupils: Pupils are equal, round, and reactive to light.      Visual Fields: Right eye visual fields normal and left eye visual fields normal.   Neck:      Trachea: Trachea normal.   Cardiovascular:      Rate and Rhythm: Normal rate and regular rhythm.      Heart sounds: Normal heart sounds.   Pulmonary:      Effort: Pulmonary effort is normal.      Breath sounds: Normal breath sounds and air entry.      Comments: Inspection of chest: normal appearance  Abdominal:      General: Abdomen is flat. Bowel sounds are normal.      Palpations: Abdomen is soft. There is no mass.      Tenderness: There is no guarding.   Musculoskeletal:      Right lower leg: No edema.      Left lower leg: No edema.   Skin:     Findings: No lesion.      Comments: Turgor is normal   Neurological:      Mental Status: She is alert and oriented to person, place, and time.   Psychiatric:         Mood and Affect: Mood and affect normal.           Assessment & Plan          Assessment and Plan    Diagnoses and all orders for this visit:    1. Diverticula of colon (Primary)    2. History of diverticulitis    3. Altered bowel habits    4. Chronic idiopathic constipation    Other orders  -     polyethylene glycol (MiraLax) 17 g packet; Take 17 g by mouth Daily.  Dispense: 30 each; Refill: 3      55-year-old female presenting the office today for a follow-up with a history of diverticulitis, altered bowel habits and constipation.  Patient will increase her fiber intake to 2 tablets daily and add 1 scoop of MiraLAX " daily to her regimen.  We have discussed diverticulitis and the etiology.  Patient will continue with healthy lifestyle and hydrating well.  We will plan for colonoscopy in 2023.  We will follow up in 3 months.  Patient to call with any questions or concerns.          Follow Up       Follow Up   Return in about 3 months (around 1/8/2022).  Patient was given instructions and counseling regarding her condition or for health maintenance advice. Please see specific information pulled into the AVS if appropriate.

## 2021-10-19 ENCOUNTER — OFFICE VISIT (OUTPATIENT)
Dept: FAMILY MEDICINE CLINIC | Facility: CLINIC | Age: 56
End: 2021-10-19

## 2021-10-19 VITALS
WEIGHT: 150 LBS | SYSTOLIC BLOOD PRESSURE: 120 MMHG | TEMPERATURE: 98 F | DIASTOLIC BLOOD PRESSURE: 64 MMHG | BODY MASS INDEX: 26.58 KG/M2 | HEIGHT: 63 IN | HEART RATE: 77 BPM | OXYGEN SATURATION: 98 %

## 2021-10-19 DIAGNOSIS — Z71.85 VACCINE COUNSELING: ICD-10-CM

## 2021-10-19 DIAGNOSIS — Z00.00 ENCOUNTER FOR WELLNESS EXAMINATION: Primary | ICD-10-CM

## 2021-10-19 DIAGNOSIS — M85.89 OSTEOPENIA OF MULTIPLE SITES: ICD-10-CM

## 2021-10-19 DIAGNOSIS — Z12.31 SCREENING MAMMOGRAM FOR BREAST CANCER: ICD-10-CM

## 2021-10-19 DIAGNOSIS — G47.33 OSA ON CPAP: ICD-10-CM

## 2021-10-19 DIAGNOSIS — N64.4 BREAST PAIN, RIGHT: ICD-10-CM

## 2021-10-19 DIAGNOSIS — G47.30 SLEEP HYPOPNEA: ICD-10-CM

## 2021-10-19 DIAGNOSIS — Z01.419 ENCOUNTER FOR GYNECOLOGICAL EXAMINATION: ICD-10-CM

## 2021-10-19 DIAGNOSIS — Z99.89 OSA ON CPAP: ICD-10-CM

## 2021-10-19 LAB
BILIRUB BLD-MCNC: NEGATIVE MG/DL
CLARITY, POC: ABNORMAL
COLOR UR: ABNORMAL
GLUCOSE UR STRIP-MCNC: NEGATIVE MG/DL
KETONES UR QL: NEGATIVE
LEUKOCYTE EST, POC: NEGATIVE
NITRITE UR-MCNC: NEGATIVE MG/ML
PH UR: 8.5 [PH] (ref 5–8)
PROT UR STRIP-MCNC: ABNORMAL MG/DL
RBC # UR STRIP: NEGATIVE /UL
SP GR UR: 1.02 (ref 1–1.03)
UROBILINOGEN UR QL: NORMAL

## 2021-10-19 PROCEDURE — 90471 IMMUNIZATION ADMIN: CPT | Performed by: NURSE PRACTITIONER

## 2021-10-19 PROCEDURE — 90686 IIV4 VACC NO PRSV 0.5 ML IM: CPT | Performed by: NURSE PRACTITIONER

## 2021-10-19 PROCEDURE — G0148 SCR C/V CYTO, AUTOSYS, RESCR: HCPCS | Performed by: NURSE PRACTITIONER

## 2021-10-19 PROCEDURE — 99396 PREV VISIT EST AGE 40-64: CPT | Performed by: NURSE PRACTITIONER

## 2021-10-19 PROCEDURE — 87624 HPV HI-RISK TYP POOLED RSLT: CPT | Performed by: NURSE PRACTITIONER

## 2021-10-19 PROCEDURE — 81002 URINALYSIS NONAUTO W/O SCOPE: CPT | Performed by: NURSE PRACTITIONER

## 2021-10-19 PROCEDURE — 86787 VARICELLA-ZOSTER ANTIBODY: CPT | Performed by: NURSE PRACTITIONER

## 2021-10-19 NOTE — PROGRESS NOTES
Chief Complaint  Gynecologic Exam (yearly pap,blood pressure readings)    Subjective            Valentina Stevens presents to [facility]  Pt here for the well woman pap exam and needs mammo ordered as well--Hx of fibroglandular dense breasts--    Pt reports unsure is even had even alight case of chickenpox--and was uncertain with regards to taking the shingles vax or not   Son had the chicken pox vaccine and then had full blown case of the chicken pox--    Also needs the flu shot    Then also F/U on the BP--been stable    Then pt is on CPAP machine for DEVENDRA--and she reported to the [facility] sleep medcine office the desaturations and drops in her O2 at night and they told her needed referral to the pulmonary and to D/W me today --on her debbie in her phon there at some nights hypopnea happens anywhere from 10-15 times at night         PHQ-2 Total Score:    PHQ-9 Total Score:      Past Medical History:   Diagnosis Date   • Depression    • Difficulty walking    • Diverticulitis of colon    • Dysuria    • External hemorrhoid    • GERD (gastroesophageal reflux disease)    • Hearing loss    • Hematuria    • Hyperlipemia    • Hypertension    • Hypothyroidism    • Memory loss    • Menopause    • Migraine    • Murmur    • Osteoporosis    • Rash of periwound skin    • Scoliosis    • Sleep apnea    • Vitamin D deficiency        Allergies   Allergen Reactions   • Penicillins Shortness Of Breath and Rash   • Sulfa Antibiotics Hives   • Cephalexin Rash   • Indomethacin Rash        Past Surgical History:   Procedure Laterality Date   • CARPAL TUNNEL RELEASE     • COLONOSCOPY     • ESSURE TUBAL LIGATION     • THYMECTOMY          Social History     Tobacco Use   • Smoking status: Never Smoker   • Smokeless tobacco: Never Used   Vaping Use   • Vaping Use: Never used   Substance Use Topics   • Alcohol use: Never   • Drug use: Never       Family History   Problem Relation Age of Onset   • Cancer Mother    •  Migraines Mother    • Stroke Mother    • Hypertension Father    • Osteoporosis Father         Health Maintenance Due   Topic Date Due   • ZOSTER VACCINE (1 of 2) Never done   • PAP SMEAR  10/09/2021        Current Outpatient Medications on File Prior to Visit   Medication Sig   • calcium carbonate-vitamin d 600-400 MG-UNIT per tablet Take 1 tablet by mouth 2 (two) times a day.   • Fremanezumab-vfrm (Ajovy) 225 MG/1.5ML solution auto-injector Inject 225 mg under the skin into the appropriate area as directed Every 30 (Thirty) Days.   • HYDROcodone-acetaminophen (NORCO) 5-325 MG per tablet Take 1 tablet by mouth 2 (Two) Times a Day As Needed.   • levothyroxine (SYNTHROID, LEVOTHROID) 75 MCG tablet TAKE 1 TABLET BY MOUTH EVERY DAY   • lisinopril (PRINIVIL,ZESTRIL) 2.5 MG tablet Take 2.5 mg by mouth Daily.   • polyethylene glycol (MiraLax) 17 g packet Take 17 g by mouth Daily.   • pramipexole (MIRAPEX) 0.125 MG tablet Take 1 tablet by mouth Every Night.   • vitamin D (ERGOCALCIFEROL) 1.25 MG (79507 UT) capsule capsule Take 50,000 Units by mouth 1 (One) Time Per Week.     No current facility-administered medications on file prior to visit.       Immunization History   Administered Date(s) Administered   • COVID-19 (PFIZER) 05/27/2021, 06/17/2021, 06/17/2021   • FluLaval/Fluarix/Fluzone >6 10/19/2021   • Hepatitis A 05/17/2018, 12/20/2018   • Influenza, Unspecified 10/19/2020, 10/19/2020   • Tdap 03/20/2018, 10/19/2020       Review of Systems   Constitutional: Negative.    HENT: Negative.    Eyes: Negative.    Respiratory: Negative.         SOBOE --used to see cardiologist    Cardiovascular: Negative.    Gastrointestinal: Negative.  Negative for abdominal pain.   Endocrine: Negative.    Genitourinary: Positive for amenorrhea and breast pain. Negative for breast discharge, breast lump, menstrual problem, pelvic pain, pelvic pressure, vaginal bleeding and vaginal discharge.        Right breast pain--   Musculoskeletal:  "Positive for back pain and myalgias.        Pt in pain mgt    Allergic/Immunologic: Negative.    Psychiatric/Behavioral: Negative.         Objective     /64 (BP Location: Left arm, Patient Position: Sitting, Cuff Size: Adult)   Pulse 77   Temp 98 °F (36.7 °C) (Temporal)   Ht 160 cm (63\")   Wt 68 kg (150 lb)   SpO2 98%   BMI 26.57 kg/m²       Physical Exam  Vitals and nursing note reviewed.   Constitutional:       Appearance: Normal appearance.   HENT:      Head: Normocephalic.      Right Ear: Tympanic membrane, ear canal and external ear normal.      Left Ear: Tympanic membrane, ear canal and external ear normal.      Nose: Nose normal.      Mouth/Throat:      Mouth: Mucous membranes are moist.   Eyes:      Pupils: Pupils are equal, round, and reactive to light.   Cardiovascular:      Rate and Rhythm: Normal rate and regular rhythm.      Heart sounds: Normal heart sounds.   Pulmonary:      Effort: Pulmonary effort is normal.      Breath sounds: Normal breath sounds.   Chest:   Breasts:      Zana Score is 5.      Right: Normal. No axillary adenopathy or supraclavicular adenopathy.      Left: Normal. No axillary adenopathy or supraclavicular adenopathy.       Abdominal:      General: Bowel sounds are normal.      Palpations: Abdomen is soft.      Hernia: There is no hernia in the left inguinal area or right inguinal area.   Genitourinary:     Exam position: Lithotomy position.      Pubic Area: No rash or pubic lice.       Zana stage (genital): 5.      Labia:         Right: No rash or tenderness.         Left: No rash or tenderness.       Urethra: No urethral pain.      Vagina: Normal.      Cervix: No cervical motion tenderness, discharge, lesion, erythema or cervical bleeding.      Uterus: Normal.       Adnexa: Right adnexa normal and left adnexa normal.        Right: No mass or tenderness.          Left: No mass or tenderness.        Rectum: No anal fissure or external hemorrhoid. Normal anal tone. "   Musculoskeletal:         General: Normal range of motion.      Cervical back: Normal range of motion and neck supple.   Lymphadenopathy:      Upper Body:      Right upper body: No supraclavicular or axillary adenopathy.      Left upper body: No supraclavicular or axillary adenopathy.      Lower Body: No right inguinal adenopathy. No left inguinal adenopathy.   Skin:     General: Skin is warm and dry.   Neurological:      Mental Status: She is alert and oriented to person, place, and time.   Psychiatric:         Mood and Affect: Mood normal.         Behavior: Behavior normal.         Judgment: Judgment normal.         Result Review :     The following data was reviewed by: DEMAR Eden on 10/19/2021:        POCT urinalysis dipstick, manual (10/19/2021 11:45)               Assessment and Plan      Diagnoses and all orders for this visit:    1. Encounter for wellness examination (Primary)  -     POCT urinalysis dipstick, manual  -     Varicella zoster antibody, IgG  -     FluLaval/Fluarix/Fluzone >6 Months ([id number])  -     Cancel: DEXA Bone Density Axial  -     IgP, Aptima HPV  -     DEXA Bone Density Axial    2. Encounter for gynecological examination  -     Cancel: DEXA Bone Density Axial  -     IgP, Aptima HPV  -     DEXA Bone Density Axial    3. Screening mammogram for breast cancer  -     Mammo Screening Bilateral With CAD; Future    4. Breast pain, right    5. Vaccine counseling  -     Varicella zoster antibody, IgG  -     FluLaval/Fluarix/Fluzone >6 Months ([id number])    6. DEVENDRA on CPAP  -     Ambulatory Referral to Pulmonology    7. Sleep hypopnea  -     Ambulatory Referral to Pulmonology    8. Osteopenia of multiple sites  -     Cancel: DEXA Bone Density Axial  -     DEXA Bone Density Axial    Patient was advised to continue doing her monthly self breast exam we will go ahead and proceed with the mammogram order    Also patient is in pain management but is encouraged to continue doing  weightbearing exercises like walking 30 to 60 minutes daily and to make sure she is taking good calcium with vitamin D intake--and we will proceed with the referral for her bone density as well    Patient is getting her flu shot today    Patient would like to have her varicella IgG levels drawn as she is unsure if she wants to proceed with getting a Shingrix    Pap smear performed today patient tolerated well--reports she still remains celibate as her  is still in long term and she does not have any sexual relations outside of her marital        Follow Up     Return if symptoms worsen or fail to improve.

## 2021-10-19 NOTE — PROGRESS NOTES
Venipuncture Blood Specimen Collection  Venipuncture performed in left arm  by Crystal Angel with good hemostasis. Patient tolerated the procedure well without complications.   10/19/21   Crystal Angel

## 2021-10-20 LAB — VZV IGG SER IA-ACNC: 1534 INDEX

## 2021-10-21 DIAGNOSIS — N64.4 BREAST PAIN IN FEMALE: Primary | ICD-10-CM

## 2021-10-21 LAB
CYTOLOGIST CVX/VAG CYTO: NORMAL
CYTOLOGY CVX/VAG DOC CYTO: NORMAL
CYTOLOGY CVX/VAG DOC THIN PREP: NORMAL
DX ICD CODE: NORMAL
HIV 1 & 2 AB SER-IMP: NORMAL
HPV I/H RISK 4 DNA CVX QL PROBE+SIG AMP: NEGATIVE
OTHER STN SPEC: NORMAL
STAT OF ADQ CVX/VAG CYTO-IMP: NORMAL

## 2021-10-21 NOTE — PROGRESS NOTES
Please mail letter to patient stating    Valentina, your Pap smear was read as negative for intraepithelial lesion or malignancy and the HPV screen was negative as well

## 2021-10-21 NOTE — PROGRESS NOTES
Please mail letter to patient stating    Valentina, you have definitely had chickenpox in the past so you would completely be safe going ahead and proceeding and taking your shingles vaccine

## 2021-10-24 DIAGNOSIS — G25.81 RLS (RESTLESS LEGS SYNDROME): ICD-10-CM

## 2021-10-25 RX ORDER — PRAMIPEXOLE DIHYDROCHLORIDE 0.12 MG/1
0.12 TABLET ORAL NIGHTLY
Qty: 90 TABLET | Refills: 1 | Status: SHIPPED | OUTPATIENT
Start: 2021-10-25 | End: 2021-12-29

## 2021-11-03 ENCOUNTER — OFFICE VISIT (OUTPATIENT)
Dept: NEUROLOGY | Facility: CLINIC | Age: 56
End: 2021-11-03

## 2021-11-03 ENCOUNTER — OFFICE VISIT (OUTPATIENT)
Dept: FAMILY MEDICINE CLINIC | Facility: CLINIC | Age: 56
End: 2021-11-03

## 2021-11-03 VITALS
HEART RATE: 109 BPM | WEIGHT: 155 LBS | TEMPERATURE: 97.7 F | DIASTOLIC BLOOD PRESSURE: 68 MMHG | SYSTOLIC BLOOD PRESSURE: 122 MMHG | HEIGHT: 63 IN | BODY MASS INDEX: 27.46 KG/M2 | OXYGEN SATURATION: 97 %

## 2021-11-03 VITALS
DIASTOLIC BLOOD PRESSURE: 81 MMHG | WEIGHT: 155.4 LBS | HEART RATE: 116 BPM | SYSTOLIC BLOOD PRESSURE: 125 MMHG | HEIGHT: 63 IN | BODY MASS INDEX: 27.54 KG/M2

## 2021-11-03 DIAGNOSIS — M25.511 CHRONIC RIGHT SHOULDER PAIN: Primary | ICD-10-CM

## 2021-11-03 DIAGNOSIS — G89.29 CHRONIC RIGHT SHOULDER PAIN: Primary | ICD-10-CM

## 2021-11-03 DIAGNOSIS — G43.719 INTRACTABLE CHRONIC MIGRAINE WITHOUT AURA AND WITHOUT STATUS MIGRAINOSUS: Primary | ICD-10-CM

## 2021-11-03 DIAGNOSIS — M19.011 ARTHRITIS OF RIGHT ACROMIOCLAVICULAR JOINT: ICD-10-CM

## 2021-11-03 DIAGNOSIS — R41.3 MEMORY LOSS: ICD-10-CM

## 2021-11-03 PROCEDURE — 99214 OFFICE O/P EST MOD 30 MIN: CPT | Performed by: NURSE PRACTITIONER

## 2021-11-03 RX ORDER — RIZATRIPTAN BENZOATE 10 MG/1
10 TABLET ORAL ONCE AS NEEDED
Qty: 9 TABLET | Refills: 3 | Status: SHIPPED | OUTPATIENT
Start: 2021-11-03 | End: 2022-03-31 | Stop reason: SDUPTHER

## 2021-11-03 NOTE — PROGRESS NOTES
"Chief Complaint  Migraine (ajovy)    Subjective          Valentina Stevens presents to [facility]  Continuing to have about 6 headache days per month.    Continuing to feel she is having short term memory loss.   Feels she's repetitive in conversations with her kids.  No improvement after coming off topiramate and amitriptyline.       Previous prophylactic migraine medications: topiramate, lisinopril, amitriptyline, Ajovy  Previous abortive migraine medications: Imitrex,       Objective   Vital Signs:   /81   Pulse 116   Ht 160 cm (63\")   Wt 70.5 kg (155 lb 6.4 oz)   BMI 27.53 kg/m²     Physical Exam  HENT:      Head: Normocephalic.   Pulmonary:      Effort: Pulmonary effort is normal.   Neurological:      Mental Status: She is alert and oriented to person, place, and time.      Cranial Nerves: Cranial nerves are intact.      Sensory: Sensation is intact.      Motor: Motor function is intact.      Coordination: Coordination is intact.      Deep Tendon Reflexes: Reflexes are normal and symmetric.        Neurologic Exam     Mental Status   Oriented to person, place, and time.        Result Review :               Assessment and Plan    Diagnoses and all orders for this visit:    1. Intractable chronic migraine without aura and without status migrainosus (Primary)  Assessment & Plan:  Continue Ajovy and start Maxalt PRN for abortive therapy.           2. Memory loss  Assessment & Plan:  Will continue to monitor for progression.  Thus far memory evaluation has been unrevealing.  Will continue to monitor for progression.       Other orders  -     rizatriptan (Maxalt) 10 MG tablet; Take 1 tablet by mouth 1 (One) Time As Needed for Migraine. May repeat in 2 hours if needed  Dispense: 9 tablet; Refill: 3      Follow Up   Return in about 3 months (around 2/3/2022) for Migraine f/u.  Patient was given instructions and counseling regarding her condition or for health " maintenance advice. Please see specific information pulled into the AVS if appropriate.

## 2021-11-05 NOTE — ASSESSMENT & PLAN NOTE
Will continue to monitor for progression.  Thus far memory evaluation has been unrevealing.  Will continue to monitor for progression.

## 2021-11-16 ENCOUNTER — PRIOR AUTHORIZATION (OUTPATIENT)
Dept: NEUROLOGY | Facility: CLINIC | Age: 56
End: 2021-11-16

## 2021-11-18 ENCOUNTER — HOSPITAL ENCOUNTER (OUTPATIENT)
Dept: MRI IMAGING | Facility: HOSPITAL | Age: 56
Discharge: HOME | End: 2021-11-18

## 2021-11-18 ENCOUNTER — HOSPITAL ENCOUNTER (OUTPATIENT)
Dept: MAMMOGRAPHY | Facility: HOSPITAL | Age: 56
Discharge: HOME | End: 2021-11-18

## 2021-11-18 DIAGNOSIS — M25.511 CHRONIC RIGHT SHOULDER PAIN: ICD-10-CM

## 2021-11-18 DIAGNOSIS — G89.29 CHRONIC RIGHT SHOULDER PAIN: ICD-10-CM

## 2021-11-18 DIAGNOSIS — M25.511 CHRONIC RIGHT SHOULDER PAIN: Primary | ICD-10-CM

## 2021-11-18 DIAGNOSIS — G89.29 CHRONIC RIGHT SHOULDER PAIN: Primary | ICD-10-CM

## 2021-11-18 DIAGNOSIS — R93.6 ABNORMAL MRI, SHOULDER: ICD-10-CM

## 2021-11-18 DIAGNOSIS — N64.4 BREAST PAIN IN FEMALE: ICD-10-CM

## 2021-11-18 PROCEDURE — 73221 MRI JOINT UPR EXTREM W/O DYE: CPT

## 2021-11-18 PROCEDURE — 77066 DX MAMMO INCL CAD BI: CPT

## 2021-11-18 PROCEDURE — G0279 TOMOSYNTHESIS, MAMMO: HCPCS

## 2021-11-18 NOTE — PROGRESS NOTES
Please mail letter to patient stating    Valentina, your mammogram was read as benign/negative and for you to continue doing your yearly mammogram screenings and I would like for you to continue doing your self breast exams monthly

## 2021-11-24 ENCOUNTER — OFFICE VISIT (OUTPATIENT)
Dept: ORTHOPEDIC SURGERY | Facility: CLINIC | Age: 56
End: 2021-11-24

## 2021-11-24 VITALS — WEIGHT: 154.8 LBS | HEIGHT: 63 IN | OXYGEN SATURATION: 98 % | HEART RATE: 100 BPM | BODY MASS INDEX: 27.43 KG/M2

## 2021-11-24 DIAGNOSIS — M19.011 ARTHRITIS OF RIGHT ACROMIOCLAVICULAR JOINT: Primary | ICD-10-CM

## 2021-11-24 DIAGNOSIS — M77.8 TENDINITIS OF SHOULDER, RIGHT: ICD-10-CM

## 2021-11-24 PROCEDURE — 99213 OFFICE O/P EST LOW 20 MIN: CPT | Performed by: STUDENT IN AN ORGANIZED HEALTH CARE EDUCATION/TRAINING PROGRAM

## 2021-11-24 NOTE — PROGRESS NOTES
"Chief Complaint  Pain of the Right Shoulder    Subjective              History of Present Illness    Valentina Stevens presents to [facility] for evaluation of right shoulder pain, chronic pain of shoulder. She recently underwent an MRI revealing AC joint arthritis and tendinopathty. She reports she fell in January and still has pain of the shoulder over the posterior shoulder. She report pain with reaching and stiffness and pain with movement. She has not noticed any weakness. She has tried a topical cream and will start therapy in January. She takes Hydrocodone for her chronic back pain. She takes NSAIDs as needed. She feels the cream helps with her pain. She denies bruising at time of fall.     Allergies   Allergen Reactions   • Penicillins Shortness Of Breath and Rash   • Sulfa Antibiotics Hives   • Cephalexin Rash   • Indomethacin Rash        Social History     Socioeconomic History   • Marital status:    • Number of children: 2   Tobacco Use   • Smoking status: Never Smoker   • Smokeless tobacco: Never Used   Vaping Use   • Vaping Use: Never used   Substance and Sexual Activity   • Alcohol use: Never   • Drug use: Never   • Sexual activity: Not Currently     Partners: Female     Birth control/protection: Other     Comment: Tubes tided        I reviewed the patient's chief complaint, history of present illness, review of systems, past medical history, surgical history, family history, social history, medications, and allergy list.     REVIEW OF SYSTEMS    Constitutional: Denies fevers, chills, weight loss  Cardiovascular: Denies chest pain, shortness of breath  Skin: Denies rashes, acute skin changes  Neurologic: Denies headache, loss of consciousness  MSK: right shoulder pain      Objective   Vital Signs:   Pulse 100   Ht 160 cm (63\")   Wt 70.2 kg (154 lb 12.8 oz)   SpO2 98%   BMI 27.42 kg/m²     Body mass index is 27.42 kg/m².    Physical Exam    Right upper extremity: " Tenderness over right posterior shoulder, nontender over AC joint and biceps, forward elevation 160 degrees, ER 60 degrees, IR to mid-lumbar spine, 5/5 rotator cuff testing, palpable radial pulse, negative cross body adduction, negative impingement testing, neuro intact, no pain with Speed's testing, sensation intact axillary, median, radial and ulnar, positive pulses     Procedures    Imaging Results (Most Recent)     None                   Assessment and Plan    Diagnoses and all orders for this visit:    1. Arthritis of right acromioclavicular joint (Primary)    2. Tendinitis of shoulder, right        Call or return if symptoms worsen or patient has any concerns.     Scribed for Michael Lee MD by Lesia Casillas  11/24/2021   14:03 EST         Follow Up   Return in about 2 months (around 1/24/2022).  Patient was given instructions and counseling regarding her condition or for health maintenance advice. Please see specific information pulled into the AVS if appropriate.     Discussed diagnosis and treatment plan. She will continue with therapy and taking her medications as needed. She does not need a work note today and will follow-up in the next several weeks to recheck after she has tried some therapy. She expressed understanding and will continue with course of treatment. If failure to improve, she may consider a steroid injection.     I have personally performed the services described in this document as scribed by the above individual and it is both accurate and complete.     Michael Lee MD  11/24/21  14:12 EST

## 2021-11-27 ENCOUNTER — TELEPHONE (OUTPATIENT)
Dept: FAMILY MEDICINE CLINIC | Facility: CLINIC | Age: 56
End: 2021-11-27

## 2021-11-29 DIAGNOSIS — I10 PRIMARY HYPERTENSION: Primary | ICD-10-CM

## 2021-11-29 RX ORDER — LISINOPRIL 2.5 MG/1
2.5 TABLET ORAL DAILY
Qty: 30 TABLET | Refills: 5 | Status: SHIPPED | OUTPATIENT
Start: 2021-11-29 | End: 2022-05-10 | Stop reason: SDUPTHER

## 2021-12-29 DIAGNOSIS — G25.81 RLS (RESTLESS LEGS SYNDROME): ICD-10-CM

## 2021-12-29 RX ORDER — PRAMIPEXOLE DIHYDROCHLORIDE 0.25 MG/1
0.25 TABLET ORAL NIGHTLY
Qty: 90 TABLET | Refills: 1 | Status: SHIPPED | OUTPATIENT
Start: 2021-12-29 | End: 2022-03-23

## 2021-12-30 RX ORDER — FREMANEZUMAB-VFRM 225 MG/1.5ML
INJECTION SUBCUTANEOUS
Qty: 1.5 ML | Refills: 1 | Status: SHIPPED | OUTPATIENT
Start: 2021-12-30 | End: 2022-03-21

## 2022-01-04 ENCOUNTER — OFFICE VISIT (OUTPATIENT)
Dept: GASTROENTEROLOGY | Facility: CLINIC | Age: 57
End: 2022-01-04

## 2022-01-04 VITALS
DIASTOLIC BLOOD PRESSURE: 73 MMHG | HEART RATE: 77 BPM | WEIGHT: 157.6 LBS | BODY MASS INDEX: 27.93 KG/M2 | OXYGEN SATURATION: 99 % | SYSTOLIC BLOOD PRESSURE: 132 MMHG | HEIGHT: 63 IN

## 2022-01-04 DIAGNOSIS — R19.4 ALTERED BOWEL HABITS: ICD-10-CM

## 2022-01-04 DIAGNOSIS — K57.30 DIVERTICULA OF COLON: Primary | ICD-10-CM

## 2022-01-04 DIAGNOSIS — K59.04 CHRONIC IDIOPATHIC CONSTIPATION: ICD-10-CM

## 2022-01-04 DIAGNOSIS — Z87.19 HISTORY OF DIVERTICULITIS: ICD-10-CM

## 2022-01-04 PROCEDURE — 99213 OFFICE O/P EST LOW 20 MIN: CPT | Performed by: NURSE PRACTITIONER

## 2022-01-04 RX ORDER — POLYETHYLENE GLYCOL 3350 17 G/17G
17 POWDER, FOR SOLUTION ORAL DAILY
Qty: 30 EACH | Refills: 3 | Status: SHIPPED | OUTPATIENT
Start: 2022-01-04 | End: 2022-04-22 | Stop reason: SDUPTHER

## 2022-01-04 RX ORDER — MULTIPLE VITAMINS W/ MINERALS TAB 9MG-400MCG
1 TAB ORAL DAILY
COMMUNITY

## 2022-01-05 ENCOUNTER — OFFICE VISIT (OUTPATIENT)
Dept: ORTHOPEDIC SURGERY | Facility: CLINIC | Age: 57
End: 2022-01-05

## 2022-01-05 VITALS — OXYGEN SATURATION: 98 % | WEIGHT: 159.4 LBS | HEIGHT: 63 IN | BODY MASS INDEX: 28.24 KG/M2 | HEART RATE: 82 BPM

## 2022-01-05 DIAGNOSIS — M19.011 ARTHRITIS OF RIGHT ACROMIOCLAVICULAR JOINT: Primary | ICD-10-CM

## 2022-01-05 DIAGNOSIS — M77.8 TENDINITIS OF SHOULDER, RIGHT: ICD-10-CM

## 2022-01-05 PROCEDURE — 99213 OFFICE O/P EST LOW 20 MIN: CPT | Performed by: PHYSICIAN ASSISTANT

## 2022-01-05 NOTE — PROGRESS NOTES
"Chief Complaint  Pain and Follow-up of the Right Shoulder    Subjective          Valentina Stevens presents to [facility] for   History of Present Illness    Valentina presents today for follow-up of her chronic right shoulder pain.  Patient has right shoulder arthritis and tendinopathy.  Today, patient reports that her pain is improved.  She reports doing home exercises as well as applying topical creams.  She is scheduled to start formal physical therapy next week.  She continues to take hydrocodone as needed for her back pain.  Patient has had new falls, but denies falling on her shoulder or injuring her shoulder.  She denies numbness or tingling.      Allergies   Allergen Reactions   • Penicillins Shortness Of Breath and Rash   • Sulfa Antibiotics Hives   • Cephalexin Rash   • Indomethacin Rash        Social History     Socioeconomic History   • Marital status:    • Number of children: 2   Tobacco Use   • Smoking status: Passive Smoke Exposure - Never Smoker   • Smokeless tobacco: Never Used   Vaping Use   • Vaping Use: Never used   Substance and Sexual Activity   • Alcohol use: Never   • Drug use: Never   • Sexual activity: Not Currently     Partners: Female     Birth control/protection: Other     Comment: Tubes tided        I reviewed the patient's chief complaint, history of present illness, review of systems, past medical history, surgical history, family history, social history, medications, and allergy list.     REVIEW OF SYSTEMS    Constitutional: Denies fevers, chills, weight loss  Cardiovascular: Denies chest pain, shortness of breath  Skin: Denies rashes, acute skin changes  Neurologic: Denies headache, loss of consciousness  MSK: Right shoulder pain      Objective   Vital Signs:   Pulse 82   Ht 160 cm (63\")   Wt 72.3 kg (159 lb 6.4 oz)   SpO2 98%   BMI 28.24 kg/m²     Body mass index is 28.24 kg/m².    Physical Exam    General: Alert.  No acute distress.  Right " upper extremity: Tender to palpation over the posterior shoulder.  Nontender over the remaining shoulder.  Forward elevation to 180 degrees.  Internal rotation to the lower thoracic spine.  5 out of 5 rotator cuff testing  Sensation intact axillary, median, radial, and ulnar nerve distributions.  Palpable radial pulse.      Procedures    Imaging Results (Most Recent)     None              Assessment and Plan    Diagnoses and all orders for this visit:    1. Arthritis of right acromioclavicular joint (Primary)    2. Tendinitis of shoulder, right        Valentina presents today for follow-up of her right shoulder arthritis.  Patient has been doing home exercises and continues to make improvements.  Patient to continue with formal physical therapy to work on range of motion and strength.  Okay to take ibuprofen as needed for pain.  Continue applying topical creams as needed.  Follow-up in 4 weeks for reevaluation.  No new x-rays are needed at next appointment.    Call or return if symptoms worsen or patient has any concerns.       Follow Up   Return in about 4 weeks (around 2/2/2022).  Patient was given instructions and counseling regarding her condition or for health maintenance advice. Please see specific information pulled into the AVS if appropriate.     Dot Deleon PA-C  01/05/22  07:50 EST

## 2022-01-14 ENCOUNTER — TREATMENT (OUTPATIENT)
Dept: PHYSICAL THERAPY | Facility: CLINIC | Age: 57
End: 2022-01-14

## 2022-01-14 DIAGNOSIS — M19.011 ARTHROSIS OF RIGHT ACROMIOCLAVICULAR JOINT: ICD-10-CM

## 2022-01-14 DIAGNOSIS — M25.511 CHRONIC RIGHT SHOULDER PAIN: Primary | ICD-10-CM

## 2022-01-14 DIAGNOSIS — G89.29 CHRONIC RIGHT SHOULDER PAIN: Primary | ICD-10-CM

## 2022-01-14 DIAGNOSIS — M25.611 SHOULDER STIFFNESS, RIGHT: ICD-10-CM

## 2022-01-14 PROCEDURE — 97161 PT EVAL LOW COMPLEX 20 MIN: CPT | Performed by: PHYSICAL THERAPIST

## 2022-01-14 NOTE — PROGRESS NOTES
Physical Therapy Initial Evaluation and Plan of Care    Patient: Valentina Stevens   : 1965  Diagnosis/ICD-10 Code:  Chronic right shoulder pain [M25.511, G89.29]  Referring practitioner: WILFRID Reina*  Date of Initial Visit: 2022  Today's Date: 2022  Patient seen for 1 sessions           Subjective Questionnaire: QuickDASH:  = 15.91% Disability      Subjective Evaluation    History of Present Illness  Mechanism of injury: The patient presents to physical therapy with complaints of right shoulder pain that has been present since 2021. Her pain is located primarily in the back of her right shoulder. Her shoulder feels much better since she started doing the exercises she was provided with at her first ortho visit. She had a follow up ortho visit about a week ago and it was recommended that she still come for a PT evaluation. 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 She currently takes hydrocodone for her low back pain. She doesn't take anything in particular for her shoulder pain. However, she has been compliant with her home exercises from the orthopedic surgeon. She denies any radicular symptoms into her right upper extremity.    Pain  Current pain ratin  At best pain ratin  At worst pain ratin    Hand dominance: right    Patient Goals  Patient goal: The patient would like to have less pain, return to normal activity, and learn some new exercises that she can do at home.           Objective          Static Posture     Shoulders  Rounded.    Tenderness     Additional Tenderness Details  Mild tenderness in right over infraspinatus and teres minor muscle bellies    Neurological Testing     Additional Neurological Details  Sensation to light touch intact from C2-T1 dermatomal pattern    Active Range of Motion   Left Shoulder   Normal active range of motion  Flexion: 170 degrees   Abduction: 170 degrees   External  rotation BTH: T3   Internal rotation BTB: T4     Right Shoulder   Flexion: 170 degrees   Abduction: 162 degrees with pain  External rotation BTH: T3   Internal rotation BTB: T5     Passive Range of Motion     Right Shoulder   Normal passive range of motion  Flexion: 170 degrees   Abduction: 170 degrees   External rotation 90°: 95 degrees   Internal rotation 90°: 85 degrees     Joint Play     Right Shoulder  Hypomobile in the posterior capsule and inferior capsule.     Strength/Myotome Testing     Left Shoulder     Planes of Motion   Flexion: 4+   Extension: 4+   Abduction: 4+   External rotation at 0°: 4+   Internal rotation at 0°: 4+     Isolated Muscles   Middle trapezius: 4     Right Shoulder     Planes of Motion   Flexion: 4-   Extension: 4+   Abduction: 4-   External rotation at 0°: 4   Internal rotation at 0°: 4+     Isolated Muscles   Middle trapezius: 4-     Tests     Right Shoulder   Positive empty can, Hawkin's and Neer's.   Negative lift-off and Speed's.       See Exercise, Manual, and Modality Logs for complete treatment.     Assessment & Plan     Assessment  Impairments: abnormal muscle firing, abnormal muscle tone, abnormal or restricted ROM, activity intolerance, impaired physical strength, lacks appropriate home exercise program, pain with function and safety issue  Functional Limitations: carrying objects, lifting, pulling, pushing, reaching behind back, reaching overhead and unable to perform repetitive tasks  Assessment details: The patient presents to physical therapy with complaints of chronic right shoulder pain which has improved significantly over the past month with performance of the home exercises that Dr. Lee provided for her. Her pain is minimal today. She presents with good active right shoulder ROM overall, but with a slight limitation in abduction ROM. Additionally, she presents with right shoulder weakness which impacts the functional use of her right upper extremity (QuickDASH).  Today she was provided with a home exercise program to initiate strengthening exercise for her right shoulder and she was scheduled to follow up in 2 weeks due to low level of pain and proven compliance with home exercise performance. She would benefit from skilled PT to address the above listed deficits and to allow the patient to return to her prior level of function.  Prognosis: good    Goals  Plan Goals: SHOULDER  PROBLEMS:     1. The patient has limited ROM of the right shoulder.    LTG 1: 8 weeks:  The patient will demonstrate 170 degrees of right shoulder abduction.    STATUS:  New   TREATMENT: Manual therapy, therapeutic exercise, home exercise instruction, and modalities as needed to include: electrical stimulation, ultrasound, moist heat, and ice.    2. The patient has limited strength of the right shoulder.   LTG 2: 8 weeks:  The patient will demonstrate 4+ /5 strength for right shoulder flexion, abduction, external rotation, and internal rotation as well as scapular retraction in order to demonstrate improved shoulder stability.    STATUS:  New   STG2a:  2 weeks:  The patient will be independent with home exercises.     STATUS:  New   TREATMENT: Manual therapy, therapeutic exercise, home exercise instruction, and modalities as needed to include: electrical stimulation, ultrasound, moist heat, and ice.     3. The patient complains of pain to the right shoulder with overhead lifting at work.   LTG 3: 8 weeks:  The patient will report a pain rating of 2 /10 or better with overhead lifting in order to improve sleep quality and tolerance to performance of activities of daily living.    STATUS:  New   STG 3a: 4 weeks:  The patient will report a pain rating of 4 /10 or better with overhead lifting.      STATUS:  New   TREATMENT: Manual therapy, therapeutic exercise, home exercise instruction, and modalities as needed to include: electrical stimulation, ultrasound, moist heat, and ice.    4. Carrying, Moving,  and Handling Objects Functional Limitation     LTG 4: 8 weeks:  The patient will demonstrate 0 % limitation by achieving a score of 11/55 on the QuickDASH.    STATUS:  New   TREATMENT:  Manual therapy, therapeutic exercise, home exercise instruction, and modalities as needed to include: moist heat, electrical stimulation, and ultrasound.    Plan  Therapy options: will be seen for skilled therapy services  Planned modality interventions: cryotherapy, electrical stimulation/Russian stimulation and TENS  Planned therapy interventions: balance/weight-bearing training, ADL retraining, soft tissue mobilization, strengthening, stretching, therapeutic activities, joint mobilization, home exercise program, functional ROM exercises, flexibility, body mechanics training, postural training, neuromuscular re-education, manual therapy and motor coordination training  Frequency: 3x week  Duration in weeks: 12  Treatment plan discussed with: patient        Visit Diagnoses:    ICD-10-CM ICD-9-CM   1. Chronic right shoulder pain  M25.511 719.41    G89.29 338.29   2. Shoulder stiffness, right  M25.611 719.51   3. Arthrosis of right acromioclavicular joint  M19.011 715.91       History # of Personal Factors and/or Comorbidities: MODERATE (1-2)  Examination of Body System(s): # of elements: LOW (1-2)  Clinical Presentation: STABLE   Clinical Decision Making: LOW       Timed:         Manual Therapy:    0     mins  33767;     Therapeutic Exercise:    0     mins  91684;     Neuromuscular Debra:    0    mins  65756;    Therapeutic Activity:     0     mins  35113;     Gait Trainin     mins  72214;     Ultrasound:     0     mins  73197;    Ionto                               0    mins   41814  Self Care                       0     mins   74454  Canalith Repos    0     mins 52249      Un-Timed:  Electrical Stimulation:    0     mins  25200 ( );  Dry Needling     0     mins self-pay  Traction     0     mins 61747  Low Eval     30      Mins  12038  Mod Eval     0     Mins  43419  High Eval                       0     Mins  60012  Re-Eval                           0    mins  52297    Timed Treatment:   0   mins   Total Treatment:     30   mins    PT SIGNATURE: Arnol Wood PT    Electronically signed 1/14/2022    [place] License: PT - [id number]         Initial Certification  Certification Period: 1/14/2022 thru 4/13/2022  I certify that the therapy services are furnished while this patient is under my care.  The services outlined above are required by this patient, and will be reviewed every 90 days.     PHYSICIAN: Rosalva Rogers, APRN      DATE:     Please sign and return via fax to [phone]. Thank you, [facility].

## 2022-01-18 ENCOUNTER — HOSPITAL ENCOUNTER (OUTPATIENT)
Dept: BONE DENSITY | Facility: HOSPITAL | Age: 57
Discharge: HOME | End: 2022-01-18
Admitting: NURSE PRACTITIONER

## 2022-01-18 PROCEDURE — 77080 DXA BONE DENSITY AXIAL: CPT

## 2022-01-18 RX ORDER — LEVOTHYROXINE SODIUM 0.07 MG/1
TABLET ORAL
Qty: 5 TABLET | Refills: 0 | Status: SHIPPED | OUTPATIENT
Start: 2022-01-18 | End: 2022-01-26

## 2022-01-26 RX ORDER — LEVOTHYROXINE SODIUM 0.07 MG/1
TABLET ORAL
Qty: 30 TABLET | Refills: 2 | Status: SHIPPED | OUTPATIENT
Start: 2022-01-26 | End: 2022-04-21

## 2022-01-27 ENCOUNTER — OFFICE VISIT (OUTPATIENT)
Dept: PULMONOLOGY | Facility: CLINIC | Age: 57
End: 2022-01-27

## 2022-01-27 ENCOUNTER — CLINICAL SUPPORT (OUTPATIENT)
Dept: FAMILY MEDICINE CLINIC | Facility: CLINIC | Age: 57
End: 2022-01-27

## 2022-01-27 ENCOUNTER — HOSPITAL ENCOUNTER (OUTPATIENT)
Dept: GENERAL RADIOLOGY | Facility: HOSPITAL | Age: 57
Discharge: HOME | End: 2022-01-27
Admitting: INTERNAL MEDICINE

## 2022-01-27 VITALS
SYSTOLIC BLOOD PRESSURE: 135 MMHG | HEIGHT: 63 IN | BODY MASS INDEX: 27.82 KG/M2 | DIASTOLIC BLOOD PRESSURE: 70 MMHG | HEART RATE: 91 BPM | RESPIRATION RATE: 18 BRPM | OXYGEN SATURATION: 96 % | TEMPERATURE: 99.5 F | WEIGHT: 157 LBS

## 2022-01-27 DIAGNOSIS — R06.09 DYSPNEA ON EXERTION: Primary | ICD-10-CM

## 2022-01-27 DIAGNOSIS — Z20.828 EXPOSURE TO SARS-ASSOCIATED CORONAVIRUS: Primary | ICD-10-CM

## 2022-01-27 DIAGNOSIS — G47.33 OBSTRUCTIVE SLEEP APNEA: ICD-10-CM

## 2022-01-27 DIAGNOSIS — R06.09 DYSPNEA ON EXERTION: ICD-10-CM

## 2022-01-27 PROCEDURE — 99203 OFFICE O/P NEW LOW 30 MIN: CPT | Performed by: INTERNAL MEDICINE

## 2022-01-27 PROCEDURE — 99211 OFF/OP EST MAY X REQ PHY/QHP: CPT | Performed by: NURSE PRACTITIONER

## 2022-01-27 PROCEDURE — U0004 COV-19 TEST NON-CDC HGH THRU: HCPCS | Performed by: NURSE PRACTITIONER

## 2022-01-27 PROCEDURE — 71046 X-RAY EXAM CHEST 2 VIEWS: CPT

## 2022-01-27 NOTE — PROGRESS NOTES
Pulmonary Consultation    Sera Loza A*,    Thank you for asking me to see Valentina Stevens for   Chief Complaint   Patient presents with   • Establish Care   • Shortness of Breath   .      History of Present Illness  Valentina Stevens is a 56 y.o. female with a PMH significant for obstructive sleep apnea presents for evaluation for shortness of breath on exertion patient denies any significant cough or expectoration or wheezing she denies any calf pain or swelling she has been using her CPAP mask for her sleep apnea regularly she is a non-smoker       Tobacco use history: Never smoker      Review of Systems: History obtained from chart review and the patient.  Review of Systems   Respiratory: Positive for shortness of breath.    All other systems reviewed and are negative.    As described in the HPI. Otherwise, remainder of ROS (14 systems) were negative.    Patient Active Problem List   Diagnosis   • Degeneration of lumbar intervertebral disc   • Hearing loss   • Hyperlipemia   • Hypertension   • Hypothyroidism   • Intractable chronic migraine without aura and without status migrainosus   • Menopause   • Low back pain   • Arthritis   • Osteoporosis   • RLS (restless legs syndrome)   • Memory loss   • Arthritis of right acromioclavicular joint   • Tendinitis of shoulder, right         Current Outpatient Medications:   •  Ajovy 225 MG/1.5ML solution auto-injector, INJECT 225MG UNDER THE SKIN INTO THE APPROPRIATE AREA AS DIRECTED EVERY 30 DAYS, Disp: 1.5 mL, Rfl: 1  •  calcium carbonate-vitamin d 600-400 MG-UNIT per tablet, Take 1 tablet by mouth 2 (two) times a day., Disp: , Rfl:   •  Calcium Carbonate-Vitamin D3 600-400 MG-UNIT tablet, TAKE 1 TABLET BY MOUTH TWICE DAILY, Disp: 60 tablet, Rfl: 5  •  Diclofenac Sodium (VOLTAREN) 1 % gel gel, Apply 4 g topically to the appropriate area as directed 4 (Four) Times a Day As Needed (shoulder pain)., Disp: 350 g, Rfl: 0  •  HYDROcodone-acetaminophen (NORCO)  5-325 MG per tablet, Take 1 tablet by mouth 2 (Two) Times a Day As Needed., Disp: , Rfl:   •  Ibandronate Sodium (BONIVA PO), Take  by mouth., Disp: , Rfl:   •  levothyroxine (SYNTHROID, LEVOTHROID) 75 MCG tablet, TAKE 1 TABLET BY MOUTH DAILY., Disp: 30 tablet, Rfl: 2  •  lisinopril (PRINIVIL,ZESTRIL) 2.5 MG tablet, Take 1 tablet by mouth Daily., Disp: 30 tablet, Rfl: 5  •  multivitamin with minerals tablet tablet, Take 1 tablet by mouth Daily., Disp: , Rfl:   •  polyethylene glycol (MiraLax) 17 g packet, Take 17 g by mouth Daily., Disp: 30 each, Rfl: 3  •  pramipexole (MIRAPEX) 0.25 MG tablet, Take 1 tablet by mouth Every Night., Disp: 90 tablet, Rfl: 1  •  rizatriptan (Maxalt) 10 MG tablet, Take 1 tablet by mouth 1 (One) Time As Needed for Migraine. May repeat in 2 hours if needed, Disp: 9 tablet, Rfl: 3  •  vitamin D (ERGOCALCIFEROL) 1.25 MG (61215 UT) capsule capsule, Take 50,000 Units by mouth 1 (One) Time Per Week., Disp: , Rfl:   •  vitamin E 100 UNIT capsule, Take 100 Units by mouth Daily., Disp: , Rfl:     Allergies   Allergen Reactions   • Penicillins Shortness Of Breath and Rash   • Sulfa Antibiotics Hives   • Cephalexin Rash   • Indomethacin Rash       Past Medical History:   Diagnosis Date   • Depression    • Difficulty walking    • Diverticulitis of colon    • Dysuria    • External hemorrhoid    • GERD (gastroesophageal reflux disease)    • Hearing loss    • Hematuria    • Hyperlipemia    • Hypertension    • Hypothyroidism    • Memory loss    • Menopause    • Migraine    • Murmur    • Osteoporosis    • Primary central sleep apnea    • Rash of periwound skin    • Scoliosis    • Seizures (HCC)     last when 10 years old   • Sleep apnea    • Sleep apnea, obstructive    • Vitamin D deficiency      Past Surgical History:   Procedure Laterality Date   • CARPAL TUNNEL RELEASE     • COLONOSCOPY     • ESSURE TUBAL LIGATION     • THYMECTOMY     • TUBAL ABDOMINAL LIGATION       Social History     Socioeconomic  "History   • Marital status:    • Number of children: 2   Tobacco Use   • Smoking status: Passive Smoke Exposure - Never Smoker   • Smokeless tobacco: Never Used   Vaping Use   • Vaping Use: Never used   Substance and Sexual Activity   • Alcohol use: Never   • Drug use: Never   • Sexual activity: Not Currently     Partners: Female     Birth control/protection: Other     Comment: Tubes tided     Family History   Problem Relation Age of Onset   • Cancer Mother    • Migraines Mother    • Stroke Mother    • Hypertension Father    • Osteoporosis Father    • Colon cancer Neg Hx           Objective     Blood pressure 135/70, pulse 91, temperature 99.5 °F (37.5 °C), temperature source Temporal, resp. rate 18, height 160 cm (63\"), weight 71.2 kg (157 lb), SpO2 96 %, not currently breastfeeding.  Physical Exam  Vitals and nursing note reviewed.   Constitutional:       Appearance: Normal appearance.   HENT:      Head: Normocephalic and atraumatic.      Nose: Nose normal.      Mouth/Throat:      Mouth: Mucous membranes are moist.   Eyes:      Extraocular Movements: Extraocular movements intact.      Pupils: Pupils are equal, round, and reactive to light.   Cardiovascular:      Rate and Rhythm: Normal rate and regular rhythm.      Pulses: Normal pulses.      Heart sounds: Normal heart sounds.   Pulmonary:      Effort: Pulmonary effort is normal.      Breath sounds: Normal breath sounds.   Abdominal:      General: Abdomen is flat. Bowel sounds are normal.      Palpations: Abdomen is soft.   Musculoskeletal:         General: Normal range of motion.      Cervical back: Normal range of motion and neck supple.   Skin:     General: Skin is warm.      Capillary Refill: Capillary refill takes 2 to 3 seconds.   Neurological:      General: No focal deficit present.      Mental Status: She is alert and oriented to person, place, and time.   Psychiatric:         Mood and Affect: Mood normal.           Assessment/Plan     Diagnoses and " all orders for this visit:    1. Dyspnea on exertion (Primary)  -     Full Pulmonary Function Test With Bronchodilator; Future  -     XR Chest 2 View; Future  -     6 Minute Walk Test; Future    2. Obstructive sleep apnea         Discussion/ Recommendations:   We will order PFTs 6-minute walk test and chest x-ray for evaluation of her dyspnea on exertion presently her examination is normal and her O2 sats at rest are normal patient is advised to continue using her CPAP for her obstructive sleep apnea she is counseled for complete vaccinations    Patient's Body mass index is 27.81 kg/m². indicating that she is overweight (BMI 25-29.9). Obesity-related health conditions include the following: obstructive sleep apnea. Obesity is unchanged. BMI is is above average; BMI management plan is completed. We discussed low calorie, low carb based diet program, portion control and increasing exercise..           Return in about 3 months (around 4/27/2022).      Thank you for allowing me to participate in the care of Valentina Stevens. Please do not hesitate to contact me with any questions.         This document has been electronically signed by Flo Simon MD on January 27, 2022 09:18 EST

## 2022-01-28 LAB — SARS-COV-2 RNA PNL SPEC NAA+PROBE: DETECTED

## 2022-02-09 ENCOUNTER — OFFICE VISIT (OUTPATIENT)
Dept: ORTHOPEDIC SURGERY | Facility: CLINIC | Age: 57
End: 2022-02-09

## 2022-02-09 VITALS — OXYGEN SATURATION: 96 % | HEIGHT: 63 IN | WEIGHT: 157 LBS | HEART RATE: 85 BPM | BODY MASS INDEX: 27.82 KG/M2

## 2022-02-09 DIAGNOSIS — M19.011 ARTHRITIS OF RIGHT ACROMIOCLAVICULAR JOINT: Primary | ICD-10-CM

## 2022-02-09 DIAGNOSIS — M77.8 TENDINITIS OF SHOULDER, RIGHT: ICD-10-CM

## 2022-02-09 PROCEDURE — 99213 OFFICE O/P EST LOW 20 MIN: CPT | Performed by: PHYSICIAN ASSISTANT

## 2022-02-09 NOTE — PROGRESS NOTES
Chief Complaint  Follow-up of the Right Shoulder    Subjective          Valentina Stevens presents to [facility] for   History of Present Illness    Valentina presents today for follow-up of her right shoulder.  Patient has chronic right shoulder arthritis and tendinopathy that we have been treating conservatively.  Today, patient states that she is doing well.  She explains that she attended 1 day of formal physical therapy and then had Covid so has not been able to return.  She is scheduled for follow-up and plans to go.  She has continue with her home exercises and noticed continued improvements overall.  She reports that she is not having any pain.  She is chronically on hydrocodone for her back.  She explains that she experiences frequent falls, but has not had any recently that injured her shoulder.  Denies numbness.        Allergies   Allergen Reactions   • Penicillins Shortness Of Breath and Rash   • Sulfa Antibiotics Hives   • Cephalexin Rash   • Indomethacin Rash        Social History     Socioeconomic History   • Marital status:    • Number of children: 2   Tobacco Use   • Smoking status: Passive Smoke Exposure - Never Smoker   • Smokeless tobacco: Never Used   Vaping Use   • Vaping Use: Never used   Substance and Sexual Activity   • Alcohol use: Never   • Drug use: Never   • Sexual activity: Not Currently     Partners: Female     Birth control/protection: Other     Comment: Tubes tided        I reviewed the patient's chief complaint, history of present illness, review of systems, past medical history, surgical history, family history, social history, medications, and allergy list.     REVIEW OF SYSTEMS    Constitutional: Denies fevers, chills, weight loss  Cardiovascular: Denies chest pain, shortness of breath  Skin: Denies rashes, acute skin changes  Neurologic: Denies headache, loss of consciousness  MSK: Right shoulder pain      Objective   Vital Signs:   Pulse 85    "Ht 160 cm (63\")   Wt 71.2 kg (157 lb)   SpO2 96%   BMI 27.81 kg/m²     Body mass index is 27.81 kg/m².    Physical Exam    General: Alert.  No acute distress.  Right upper extremity: No areas of tenderness to the shoulder.  Nontender over the elbow or forearm.  Forward elevation 180 degrees.  Internal rotation to lower thoracic spine.  External rotation to 60.  5 out of 5 rotator cuff testing.  Negative impingement testing.  Thumb opposition intact.  Palmar abduction of the thumb intact.  Sensation intact in axillary, median, radial, and ulnar nerve distributions.  Palpable radial pulse.    Procedures    Imaging Results (Most Recent)     None                 Assessment and Plan    Diagnoses and all orders for this visit:    1. Arthritis of right acromioclavicular joint (Primary)    2. Tendinitis of shoulder, right        Valentina presents today for follow-up of her right shoulder arthritis and tendinitis.  Patient is doing well.  Patient will continue with home exercises as well as formal physical therapy to work on range of motion and strength.  Okay to continue topical ointments as needed.  Follow-up in 6 weeks for reevaluation.  No new x-rays needed at next visit.    Call or return if symptoms worsen or patient has any concerns.       Follow Up   Return in about 6 weeks (around 3/23/2022).  Patient was given instructions and counseling regarding her condition or for health maintenance advice. Please see specific information pulled into the AVS if appropriate.     Dot Deleon PA-C  02/09/22  12:51 EST        "

## 2022-02-15 ENCOUNTER — OFFICE VISIT (OUTPATIENT)
Dept: FAMILY MEDICINE CLINIC | Facility: CLINIC | Age: 57
End: 2022-02-15

## 2022-02-15 VITALS
OXYGEN SATURATION: 96 % | DIASTOLIC BLOOD PRESSURE: 72 MMHG | HEART RATE: 92 BPM | TEMPERATURE: 98 F | SYSTOLIC BLOOD PRESSURE: 134 MMHG

## 2022-02-15 DIAGNOSIS — G89.29 CHRONIC BILATERAL LOW BACK PAIN WITH BILATERAL SCIATICA: Primary | ICD-10-CM

## 2022-02-15 DIAGNOSIS — M51.36 DDD (DEGENERATIVE DISC DISEASE), LUMBAR: ICD-10-CM

## 2022-02-15 DIAGNOSIS — M41.9 SCOLIOSIS OF THORACIC SPINE, UNSPECIFIED SCOLIOSIS TYPE: ICD-10-CM

## 2022-02-15 DIAGNOSIS — M54.42 CHRONIC BILATERAL LOW BACK PAIN WITH BILATERAL SCIATICA: Primary | ICD-10-CM

## 2022-02-15 DIAGNOSIS — M81.6 LOCALIZED OSTEOPOROSIS WITHOUT CURRENT PATHOLOGICAL FRACTURE: ICD-10-CM

## 2022-02-15 DIAGNOSIS — M54.41 CHRONIC BILATERAL LOW BACK PAIN WITH BILATERAL SCIATICA: Primary | ICD-10-CM

## 2022-02-15 DIAGNOSIS — M54.16 LUMBAR RADICULOPATHY: ICD-10-CM

## 2022-02-15 PROBLEM — K76.0 FATTY (CHANGE OF) LIVER, NOT ELSEWHERE CLASSIFIED: Status: ACTIVE | Noted: 2022-02-02

## 2022-02-15 PROCEDURE — 99214 OFFICE O/P EST MOD 30 MIN: CPT | Performed by: NURSE PRACTITIONER

## 2022-02-15 NOTE — PROGRESS NOTES
Answers for HPI/ROS submitted by the patient on 2/8/2022  What is the primary reason for your visit?: Back Pain  Chronicity: recurrent  Onset: more than 1 year ago  Frequency: constantly  Progression since onset: gradually worsening  Pain location: lumbar spine, sacro-iliac, thoracic spine  Pain quality: aching, burning, shooting  Radiates to: left thigh, right thigh  Pain - numeric: 6/10  Pain is: the same all the time  Aggravated by: bending, position, sitting, standing, twisting  Stiffness is present: all day  abdominal pain: No  bladder incontinence: No  bowel incontinence: No  chest pain: Yes  dysuria: No  fever: No  headaches: Yes  leg pain: Yes  numbness: Yes  paresis: No  paresthesias: No  pelvic pain: No  perianal numbness: No  tingling: No  weight loss: No  Risk factors: history of osteoporosis, menopause, poor posture    Chief Complaint  Back Pain (she needs proof that her back is hurting and she cut her hours on her own)    Subjective            Valentina Stevens presents to [facility]  Pt here for the F/U with regards to her worsening severity of her LBP and pt has known DDD and bulging discs--and her past MRI of the Lspine was 2019 and of the Tspine was 2020 and then pt also with scoliosis--and used to see Delicia CHAVEZ with DR Hilton Spivey neurosurgeon in the past--    Also with the Dx of osteopenia that was osteoporosis  and now osteopenia --except the osteoporosis of L4 and pt needs dental work and has not started the boniva as yet     Then also with the current PT for her right shoulder issues through ortho--    Pt currently still under the care of pain mgt for her DDD of the back and scoliosis and then the pt also reports they did the shots and they did not work and now they have her on norco -    Pt still having breakthrough pain-is at at least 6/10 all the time then other times much higher--    Pt also has tried to work through all this--[employer]--and she  was working 8 hr shifts on the weekends and now the pain is at the level that even if she drives to [place] her back pain is far advanced--and higher than 6/10--and pt reports has had to decrease her amount of hours she can work at one--time--and only able to work weekends R/T responsible for keeping her grandbabies 2 days during the week--and the pain is limiting her ability to work and do things at home like cleaning the house--and only able to do 1 room at a time then must rest and wait for the pain to get to a level she can tolerate and then reports now in the past month to two months the pt is experiencing radiculopathy s/s to her BLE --then pt also reports with these worsening s/s her legs gives out or something happens and she falls and has to use a cane to walk at times to assist her with all this and to try to prevent further injury or falls      Back Pain  This is a recurrent problem. The current episode started more than 1 year ago. The problem occurs constantly. The problem has been gradually worsening since onset. The pain is present in the lumbar spine, sacro-iliac and thoracic spine. The quality of the pain is described as aching, burning and shooting. The pain radiates to the left thigh and right thigh. The pain is at a severity of 6/10. The pain is the same all the time. The symptoms are aggravated by bending, position, sitting, standing and twisting. Stiffness is present all day. Associated symptoms include headaches, leg pain, numbness and weakness. Pertinent negatives include no abdominal pain, bladder incontinence, bowel incontinence, chest pain, dysuria, fever, paresis, paresthesias, pelvic pain, perianal numbness, tingling or weight loss. Risk factors include history of osteoporosis, menopause and poor posture.       PHQ-2 Total Score: 0  PHQ-9 Total Score: 0    Past Medical History:   Diagnosis Date   • Depression    • Difficulty walking    • Diverticulitis of colon    • Dysuria    • External  hemorrhoid    • GERD (gastroesophageal reflux disease)    • Hearing loss    • Hematuria    • Hyperlipemia    • Hypertension    • Hypothyroidism    • Lumbar radiculopathy 2/15/2022   • Memory loss    • Menopause    • Migraine    • Murmur    • Osteoporosis    • Primary central sleep apnea    • Rash of periwound skin    • Scoliosis    • Seizures (HCC)     last when 10 years old   • Sleep apnea    • Sleep apnea, obstructive    • Vitamin D deficiency        Allergies   Allergen Reactions   • Penicillins Shortness Of Breath and Rash   • Sulfa Antibiotics Hives   • Cephalexin Rash   • Indomethacin Rash        Past Surgical History:   Procedure Laterality Date   • CARPAL TUNNEL RELEASE     • COLONOSCOPY     • ESSURE TUBAL LIGATION     • THYMECTOMY     • TUBAL ABDOMINAL LIGATION          Social History     Tobacco Use   • Smoking status: Passive Smoke Exposure - Never Smoker   • Smokeless tobacco: Never Used   Vaping Use   • Vaping Use: Never used   Substance Use Topics   • Alcohol use: Never   • Drug use: Never       Family History   Problem Relation Age of Onset   • Cancer Mother    • Migraines Mother    • Stroke Mother    • Hypertension Father    • Osteoporosis Father    • Colon cancer Neg Hx         Health Maintenance Due   Topic Date Due   • LIPID PANEL  10/20/2021   • COVID-19 Vaccine (3 - Booster for Pfizer series) 11/17/2021        Current Outpatient Medications on File Prior to Visit   Medication Sig   • Ajovy 225 MG/1.5ML solution auto-injector INJECT 225MG UNDER THE SKIN INTO THE APPROPRIATE AREA AS DIRECTED EVERY 30 DAYS   • calcium carbonate-vitamin d 600-400 MG-UNIT per tablet Take 1 tablet by mouth 2 (two) times a day.   • Calcium Carbonate-Vitamin D3 600-400 MG-UNIT tablet TAKE 1 TABLET BY MOUTH TWICE DAILY   • Diclofenac Sodium (VOLTAREN) 1 % gel gel Apply 4 g topically to the appropriate area as directed 4 (Four) Times a Day As Needed (shoulder pain).   • HYDROcodone-acetaminophen (NORCO) 5-325 MG per  tablet Take 1 tablet by mouth 2 (Two) Times a Day As Needed.   • Ibandronate Sodium (BONIVA PO) Take  by mouth.   • levothyroxine (SYNTHROID, LEVOTHROID) 75 MCG tablet TAKE 1 TABLET BY MOUTH DAILY.   • lisinopril (PRINIVIL,ZESTRIL) 2.5 MG tablet Take 1 tablet by mouth Daily.   • multivitamin with minerals tablet tablet Take 1 tablet by mouth Daily.   • polyethylene glycol (MiraLax) 17 g packet Take 17 g by mouth Daily.   • pramipexole (MIRAPEX) 0.25 MG tablet Take 1 tablet by mouth Every Night.   • rizatriptan (Maxalt) 10 MG tablet Take 1 tablet by mouth 1 (One) Time As Needed for Migraine. May repeat in 2 hours if needed   • vitamin D (ERGOCALCIFEROL) 1.25 MG (96084 UT) capsule capsule Take 50,000 Units by mouth 1 (One) Time Per Week.   • vitamin E 100 UNIT capsule Take 100 Units by mouth Daily.     No current facility-administered medications on file prior to visit.       Immunization History   Administered Date(s) Administered   • COVID-19 (PFIZER) PURPLE CAP 05/27/2021, 06/17/2021, 06/17/2021   • FluLaval/Fluarix/Fluzone >6 10/19/2021   • Hepatitis A 05/17/2018, 12/20/2018   • Influenza, Unspecified 10/19/2020, 10/19/2020   • Shingrix 11/12/2021   • Tdap 03/20/2018, 10/19/2020       Review of Systems   Constitutional: Negative for fever and unexpected weight loss.   HENT: Negative for congestion, postnasal drip and sore throat.    Eyes: Negative.    Respiratory: Negative for cough and shortness of breath.    Cardiovascular: Negative for chest pain.   Gastrointestinal: Negative for abdominal pain and bowel incontinence.        No loss of control of bowels    Endocrine: Negative.    Genitourinary: Positive for breast pain. Negative for dysuria, pelvic pain and urinary incontinence.        Right breast was hurting and just had her mammo done    Musculoskeletal: Positive for back pain.   Skin: Negative.    Allergic/Immunologic: Negative.    Neurological: Positive for weakness and numbness. Negative for dizziness,  tingling, tremors, seizures, syncope, speech difficulty, light-headedness and paresthesias.   Psychiatric/Behavioral: Negative.         Objective     /72 (BP Location: Left arm, Patient Position: Sitting, Cuff Size: Adult)   Pulse 92   Temp 98 °F (36.7 °C) (Temporal)   SpO2 96%       Physical Exam  Vitals and nursing note reviewed.   Constitutional:       Appearance: Normal appearance.   HENT:      Head: Normocephalic.      Right Ear: External ear normal.      Left Ear: External ear normal.      Nose: Nose normal.      Mouth/Throat:      Comments: Wearing mask  Eyes:      Pupils: Pupils are equal, round, and reactive to light.   Cardiovascular:      Rate and Rhythm: Normal rate.   Pulmonary:      Effort: Pulmonary effort is normal.   Abdominal:      Palpations: Abdomen is soft.   Musculoskeletal:      Cervical back: Normal range of motion.      Thoracic back: Scoliosis present.      Lumbar back: Tenderness and bony tenderness present. Decreased range of motion. Positive right straight leg raise test and positive left straight leg raise test. Scoliosis present.   Skin:     General: Skin is warm and dry.   Neurological:      Mental Status: She is alert and oriented to person, place, and time.   Psychiatric:         Mood and Affect: Mood normal.         Behavior: Behavior normal.         Judgment: Judgment normal.         Result Review :             MRI Thoracic Spine Without Contrast (11/13/2020 14:50)  MRI Lumbar Spine Without Contrast (10/31/2019 17:00)  DEXA Bone Density Axial (01/18/2022 14:46)  Office Visit Converted with Delicia Roberts PA-C (11/09/2020)  Office Visit Converted with Delicia Roberts PA-C (03/09/2020)               Assessment and Plan      Diagnoses and all orders for this visit:    1. Chronic bilateral low back pain with bilateral sciatica (Primary)  -     MRI Lumbar Spine Without Contrast; Future  -     Ambulatory Referral to Neurosurgery    2. DDD (degenerative disc  disease), lumbar  -     MRI Lumbar Spine Without Contrast; Future  -     Ambulatory Referral to Neurosurgery    3. Lumbar radiculopathy  -     MRI Lumbar Spine Without Contrast; Future  -     Ambulatory Referral to Neurosurgery    4. Scoliosis of thoracic spine, unspecified scoliosis type  -     MRI Lumbar Spine Without Contrast; Future  -     Ambulatory Referral to Neurosurgery    5. Localized osteoporosis without current pathological fracture  -     MRI Lumbar Spine Without Contrast; Future    pt used to be on restrictions at work from per her prior PCP Dr Castaneda--and it was: not to lift pull or push anything >10#    Then since at work they stopped the restrictions and approx 3-4 yrs now they have her on regular duty and pt with far worsening daily s/s and pt cannot tolerate the pain beyond the 5 hrs on the weekend shifts--and only works weekends--we will get the updated MRI and get her back to neurosurgeon office then reinstate the limitations with the limits of hours at present time--and I will type this note for her with regards to the limits          Follow Up     Return if symptoms worsen or fail to improve.

## 2022-02-18 ENCOUNTER — TREATMENT (OUTPATIENT)
Dept: PHYSICAL THERAPY | Facility: CLINIC | Age: 57
End: 2022-02-18

## 2022-02-18 DIAGNOSIS — G89.29 CHRONIC RIGHT SHOULDER PAIN: Primary | ICD-10-CM

## 2022-02-18 DIAGNOSIS — M25.511 CHRONIC RIGHT SHOULDER PAIN: Primary | ICD-10-CM

## 2022-02-18 DIAGNOSIS — M19.011 ARTHROSIS OF RIGHT ACROMIOCLAVICULAR JOINT: ICD-10-CM

## 2022-02-18 DIAGNOSIS — M25.611 SHOULDER STIFFNESS, RIGHT: ICD-10-CM

## 2022-02-18 PROCEDURE — 97110 THERAPEUTIC EXERCISES: CPT | Performed by: PHYSICAL THERAPIST

## 2022-02-23 ENCOUNTER — TELEPHONE (OUTPATIENT)
Dept: NEUROLOGY | Facility: OTHER | Age: 57
End: 2022-02-23

## 2022-02-23 ENCOUNTER — HOSPITAL ENCOUNTER (OUTPATIENT)
Dept: MRI IMAGING | Facility: HOSPITAL | Age: 57
Discharge: HOME | End: 2022-02-23
Admitting: NURSE PRACTITIONER

## 2022-02-23 DIAGNOSIS — M54.16 LUMBAR RADICULOPATHY: ICD-10-CM

## 2022-02-23 DIAGNOSIS — M54.41 CHRONIC BILATERAL LOW BACK PAIN WITH BILATERAL SCIATICA: ICD-10-CM

## 2022-02-23 DIAGNOSIS — G89.29 CHRONIC BILATERAL LOW BACK PAIN WITH BILATERAL SCIATICA: ICD-10-CM

## 2022-02-23 DIAGNOSIS — M54.42 CHRONIC BILATERAL LOW BACK PAIN WITH BILATERAL SCIATICA: ICD-10-CM

## 2022-02-23 DIAGNOSIS — M81.6 LOCALIZED OSTEOPOROSIS WITHOUT CURRENT PATHOLOGICAL FRACTURE: ICD-10-CM

## 2022-02-23 DIAGNOSIS — M51.36 DDD (DEGENERATIVE DISC DISEASE), LUMBAR: ICD-10-CM

## 2022-02-23 DIAGNOSIS — M41.9 SCOLIOSIS OF THORACIC SPINE, UNSPECIFIED SCOLIOSIS TYPE: ICD-10-CM

## 2022-02-23 PROCEDURE — 72148 MRI LUMBAR SPINE W/O DYE: CPT

## 2022-02-23 NOTE — PROGRESS NOTES
Please mail letter to patient stating    Valentina, the MRI of the lumbar spine shows diffuse broad-based degenerative disc bulging and is butted up against the thecal sac at the L2-L3 with mild neuroforaminal narrowing and then the same thing at the L3-L4 as well and then the same thing at the L4-L5 level also with an area that appears to be consistent with a fissure in the disc and then the L5-S1 area there was no significant stenosis and the total impression by the radiologist is multilevel degenerative disc disease with the disc desiccation and posterior central disc fissure and disc bulging that everything combined results in moderate bilateral neuroforaminal narrowing    Normally I send patients on to the neurosurgeon with regards to this to see if there is any interventions that need to be done or further evaluation by them and recommendations please let me know if you need this referral

## 2022-02-25 ENCOUNTER — TREATMENT (OUTPATIENT)
Dept: PHYSICAL THERAPY | Facility: CLINIC | Age: 57
End: 2022-02-25

## 2022-02-25 DIAGNOSIS — G89.29 CHRONIC RIGHT SHOULDER PAIN: Primary | ICD-10-CM

## 2022-02-25 DIAGNOSIS — M25.611 SHOULDER STIFFNESS, RIGHT: ICD-10-CM

## 2022-02-25 DIAGNOSIS — M19.011 ARTHROSIS OF RIGHT ACROMIOCLAVICULAR JOINT: ICD-10-CM

## 2022-02-25 DIAGNOSIS — M25.511 CHRONIC RIGHT SHOULDER PAIN: Primary | ICD-10-CM

## 2022-02-25 PROCEDURE — 97110 THERAPEUTIC EXERCISES: CPT | Performed by: PHYSICAL THERAPIST

## 2022-02-25 NOTE — PROGRESS NOTES
"Physical Therapy Daily Treatment Note/Discharge summary      Patient: Valentina Stevens   : 1965  Referring practitioner: WILFRID Reina*  Date of Initial Visit: Type: THERAPY  Noted: 2022  Today's Date: 2022  Patient seen for 3 sessions       Visit Diagnoses:    ICD-10-CM ICD-9-CM   1. Chronic right shoulder pain  M25.511 719.41    G89.29 338.29   2. Shoulder stiffness, right  M25.611 719.51   3. Arthrosis of right acromioclavicular joint  M19.011 715.91       Subjective Evaluation    History of Present Illness    Subjective comment: bought putty on line and doing better with . MRI on 22 shows a 9mm fissure at L4-L5 and is being referred to neurosurgeon on 3-23-22. pain in lumbar spine comes and goes. will work this weekend at [employer] but only 5 hour shifts and 10# limit.         Objective   See Exercise, Manual, and Modality Logs for complete treatment.       Assessment & Plan     Assessment    Assessment details: Ready for dc from shoulder rehab.  still 40-45#. Did great with shoulder rehab. Needs to buy a 2# wt for home.    Prior rehab at [facility] for back pain didn't seem to help much so not sure about more back rx. Does agree we could help her with her balance issues though.     P: dc to indep program. See again pending neuro eval in 4 weeks. Also has to see her pulmonologist soon as still having 02 drops and \"stop breathing\" episodes at night per her phone, even though on CPAP 7-8 hours/night.                 Timed:         Manual Therapy:         mins  17413;     Therapeutic Exercise:    30     mins  39435;     Neuromuscular Debra:        mins  80060;    Therapeutic Activity:          mins  37899;     Gait Training:           mins  41193;     Ultrasound:          mins  41149;    Ionto                                   mins   77805  Self Care                            mins   18985  Canalith Repos         mins 44098      Un-Timed:  Electrical Stimulation:         mins  " 08692 ( );  Dry Needling          mins self-pay  Traction          mins 49995      Timed Treatment:   30   mins   Total Treatment:     30   mins    Zorre Zeno Kimura, PT  [place] License: [id number]    [place] License:  [id number]

## 2022-03-10 ENCOUNTER — OFFICE VISIT (OUTPATIENT)
Dept: NEUROSURGERY | Facility: CLINIC | Age: 57
End: 2022-03-10

## 2022-03-10 VITALS
DIASTOLIC BLOOD PRESSURE: 71 MMHG | HEIGHT: 63 IN | BODY MASS INDEX: 27.39 KG/M2 | SYSTOLIC BLOOD PRESSURE: 131 MMHG | WEIGHT: 154.6 LBS

## 2022-03-10 DIAGNOSIS — M47.817 FACET ARTHROPATHY, LUMBOSACRAL: ICD-10-CM

## 2022-03-10 DIAGNOSIS — G89.29 CHRONIC MIDLINE LOW BACK PAIN WITH BILATERAL SCIATICA: Primary | ICD-10-CM

## 2022-03-10 DIAGNOSIS — M54.42 CHRONIC MIDLINE LOW BACK PAIN WITH BILATERAL SCIATICA: Primary | ICD-10-CM

## 2022-03-10 DIAGNOSIS — M54.41 CHRONIC MIDLINE LOW BACK PAIN WITH BILATERAL SCIATICA: Primary | ICD-10-CM

## 2022-03-10 PROCEDURE — 99203 OFFICE O/P NEW LOW 30 MIN: CPT | Performed by: NEUROLOGICAL SURGERY

## 2022-03-10 NOTE — PROGRESS NOTES
"Chief Complaint  Back Pain    Subjective          Valentina Stevens who is a 56 y.o. year old female who presents to [facility] for Evaluation of the Spine.     The patient complains of pain located in the lumbar spine.  Patients states the pain has been present for years.  The pain came on gradually.  The pain scale level is up to 8/10 in severity.  The pain bilateral legs, not below the knee.  The pain is waxing/waning and described as aching and burning.  The pain is worse in the evening. Patient states prolonged driving and housework makes the pain worse. Patient states rest and pain medication makes the pain better.      Associated Symptoms Include: Numbness and heaviness after work and driving for a while  Conservative Interventions Include: Pain medication, PT, injections    Was this the result of an injury or accident?: No    History of Previous Spinal Surgery?: No    She reports that she has never smoked. She has never used smokeless tobacco.    Review of Systems   Musculoskeletal: Positive for back pain.   Neurological: Positive for numbness.        Objective   Vital Signs:   /71 (BP Location: Left arm, Patient Position: Sitting)   Ht 160 cm (63\")   Wt 70.1 kg (154 lb 9.6 oz)   BMI 27.39 kg/m²       Physical Exam  Constitutional:       Appearance: She is normal weight.   Musculoskeletal:      Comments: SLR -  Hip-     Neurological:      Mental Status: She is alert.      Sensory: No sensory deficit.      Motor: No weakness.      Deep Tendon Reflexes: Reflexes normal (2/4).   Psychiatric:         Mood and Affect: Mood normal.          Result Review :   I personally reviewed the patient's MRI scan which shows moderate DDD (L2-3 most notably, annular fissure L4-5). No notable canal or foraminal narrowing.        Assessment and Plan    Diagnoses and all orders for this visit:    1. Chronic midline low back pain with bilateral sciatica (Primary)    2. Facet " arthropathy, lumbosacral    Surgery would not likely help her lower back pain.     We discussed the importance of core strengthening, avoidance of activities that worsen the pain, nicotine cessation and maintenance of healthy weight. Surgery for patients with multilevel degenerative disc disease is not typically successful with the risks outweighing the benefit.     She could potentially benefit from facet injections/RFA. She will discuss with CPS.    Follow Up   Return if symptoms worsen or fail to improve.  Patient was given instructions and counseling regarding her condition or for health maintenance advice. Please see specific information pulled into the AVS if appropriate.

## 2022-03-21 RX ORDER — FREMANEZUMAB-VFRM 225 MG/1.5ML
INJECTION SUBCUTANEOUS
Qty: 1.5 ML | Refills: 0 | Status: SHIPPED | OUTPATIENT
Start: 2022-03-21 | End: 2022-03-31 | Stop reason: SDUPTHER

## 2022-03-22 DIAGNOSIS — G25.81 RLS (RESTLESS LEGS SYNDROME): ICD-10-CM

## 2022-03-23 ENCOUNTER — OFFICE VISIT (OUTPATIENT)
Dept: ORTHOPEDIC SURGERY | Facility: CLINIC | Age: 57
End: 2022-03-23

## 2022-03-23 VITALS — HEART RATE: 75 BPM | WEIGHT: 154 LBS | BODY MASS INDEX: 27.29 KG/M2 | HEIGHT: 63 IN | OXYGEN SATURATION: 100 %

## 2022-03-23 DIAGNOSIS — M77.8 TENDINITIS OF SHOULDER, RIGHT: ICD-10-CM

## 2022-03-23 DIAGNOSIS — M19.011 ARTHRITIS OF RIGHT ACROMIOCLAVICULAR JOINT: Primary | ICD-10-CM

## 2022-03-23 PROCEDURE — 99213 OFFICE O/P EST LOW 20 MIN: CPT | Performed by: PHYSICIAN ASSISTANT

## 2022-03-23 RX ORDER — PRAMIPEXOLE DIHYDROCHLORIDE 0.25 MG/1
0.25 TABLET ORAL NIGHTLY
Qty: 90 TABLET | Refills: 1 | Status: SHIPPED | OUTPATIENT
Start: 2022-03-23 | End: 2022-05-10 | Stop reason: SDUPTHER

## 2022-03-23 NOTE — PROGRESS NOTES
"Chief Complaint  Follow-up of the Right Shoulder    Subjective          Valentina Stevens presents to [facility] for   History of Present Illness    Valentina presents today for follow-up of her right shoulder.  Patient has chronic right shoulder arthritis and tendinopathy that we have been treating conservatively.  Today, patient states she is doing well.  She states that she was released from formal physical therapy about 2 weeks ago and has continued with her home exercises.  She notices improvements in her strength and range of motion overall.  She denies complications with her home exercises.  She reports a new fall but denies any injuries to her shoulder or other injuries during this fall.  She denies numbness and tingling.    Allergies   Allergen Reactions   • Penicillins Shortness Of Breath and Rash   • Sulfa Antibiotics Hives   • Cephalexin Rash   • Indomethacin Rash        Social History     Socioeconomic History   • Marital status:    • Number of children: 2   Tobacco Use   • Smoking status: Never Smoker   • Smokeless tobacco: Never Used   Vaping Use   • Vaping Use: Never used   Substance and Sexual Activity   • Alcohol use: Never   • Drug use: Never   • Sexual activity: Not Currently     Partners: Male     Birth control/protection: Other     Comment: Tubes tided        I reviewed the patient's chief complaint, history of present illness, review of systems, past medical history, surgical history, family history, social history, medications, and allergy list.     REVIEW OF SYSTEMS    Constitutional: Denies fevers, chills, weight loss  Cardiovascular: Denies chest pain, shortness of breath  Skin: Denies rashes, acute skin changes  Neurologic: Denies headache, loss of consciousness  MSK: Right shoulder pain      Objective   Vital Signs:   Pulse 75   Ht 160 cm (63\")   Wt 69.9 kg (154 lb)   SpO2 100%   BMI 27.28 kg/m²     Body mass index is 27.28 kg/m².    Physical " Exam    General: Alert. No acute distress.   Right upper extremity: Nontender to palpation.  Active forward elevation 280 degrees.  Internal rotation to the lower thoracic spine.  External rotation to 60 degrees.  5 out of 5 rotator cuff testing.  Demonstrates full active elbow and wrist range of motion.  Thumb opposition intact.  Palmar adduction of the thumb intact.  Sensation intact axillary, median, radial, and ulnar nerve distributions.  Palpable radial pulse.    Procedures    Imaging Results (Most Recent)     None              Assessment and Plan    Diagnoses and all orders for this visit:    1. Arthritis of right acromioclavicular joint (Primary)    2. Tendinitis of shoulder, right        Valentina presents today for follow-up of her chronic right shoulder arthritis and tendinitis that we are treating conservatively.  Patient instructed to continue with her home exercises to improve strength.  Okay to continue topical ointments as needed. Patient to follow-up as needed.    Call or return if symptoms worsen or patient has any concerns.     Follow Up   Return if symptoms worsen or fail to improve.  Patient was given instructions and counseling regarding her condition or for health maintenance advice. Please see specific information pulled into the AVS if appropriate.     Dot Deleon PA-C  03/23/22  11:48 EDT

## 2022-03-31 ENCOUNTER — OFFICE VISIT (OUTPATIENT)
Dept: NEUROLOGY | Facility: CLINIC | Age: 57
End: 2022-03-31

## 2022-03-31 VITALS
SYSTOLIC BLOOD PRESSURE: 122 MMHG | DIASTOLIC BLOOD PRESSURE: 66 MMHG | WEIGHT: 155.1 LBS | HEART RATE: 85 BPM | BODY MASS INDEX: 27.48 KG/M2 | HEIGHT: 63 IN

## 2022-03-31 DIAGNOSIS — G43.719 INTRACTABLE CHRONIC MIGRAINE WITHOUT AURA AND WITHOUT STATUS MIGRAINOSUS: Primary | ICD-10-CM

## 2022-03-31 PROCEDURE — 99213 OFFICE O/P EST LOW 20 MIN: CPT | Performed by: NURSE PRACTITIONER

## 2022-03-31 RX ORDER — RIZATRIPTAN BENZOATE 10 MG/1
10 TABLET ORAL ONCE AS NEEDED
Qty: 9 TABLET | Refills: 3 | Status: SHIPPED | OUTPATIENT
Start: 2022-03-31 | End: 2022-08-01 | Stop reason: SDUPTHER

## 2022-03-31 RX ORDER — FREMANEZUMAB-VFRM 225 MG/1.5ML
225 INJECTION SUBCUTANEOUS
Qty: 1.5 ML | Refills: 3 | Status: SHIPPED | OUTPATIENT
Start: 2022-03-31 | End: 2022-08-01 | Stop reason: SDUPTHER

## 2022-03-31 NOTE — PROGRESS NOTES
"Chief Complaint  Migraine    Subjective          Valentina Stevens presents to [facility]  Following up for migraines.  Has about 4-5 headache days per month on Ajovy.  Maxalt is effective abortive therapy.  Denies side effects.       Objective   Vital Signs:   /66   Pulse 85   Ht 160 cm (63\")   Wt 70.4 kg (155 lb 1.6 oz)   BMI 27.47 kg/m²     Physical Exam  HENT:      Head: Normocephalic.   Pulmonary:      Effort: Pulmonary effort is normal.   Neurological:      Mental Status: She is alert and oriented to person, place, and time.      Cranial Nerves: Cranial nerves are intact.      Sensory: Sensation is intact.      Motor: Motor function is intact.      Coordination: Coordination is intact.      Deep Tendon Reflexes: Reflexes are normal and symmetric.        Neurologic Exam     Mental Status   Oriented to person, place, and time.        Result Review :               Assessment and Plan    Diagnoses and all orders for this visit:    1. Intractable chronic migraine without aura and without status migrainosus (Primary)  Assessment & Plan:  Migraines are well controlled with Ajovy.  We will continue Ajovy for preventative therapy.  We will continue Maxalt as needed for abortive therapy.          Other orders  -     Fremanezumab-vfrm (Ajovy) 225 MG/1.5ML solution auto-injector; Inject 225 mg under the skin into the appropriate area as directed Every 30 (Thirty) Days.  Dispense: 1.5 mL; Refill: 3  -     rizatriptan (Maxalt) 10 MG tablet; Take 1 tablet by mouth 1 (One) Time As Needed for Migraine. May repeat in 2 hours if needed  Dispense: 9 tablet; Refill: 3      Follow Up   Return in about 4 months (around 7/31/2022) for Migraine f/u.  Patient was given instructions and counseling regarding her condition or for health maintenance advice. Please see specific information pulled into the AVS if appropriate.       "

## 2022-03-31 NOTE — ASSESSMENT & PLAN NOTE
Migraines are well controlled with Ajovy.  We will continue Ajovy for preventative therapy.  We will continue Maxalt as needed for abortive therapy.

## 2022-04-01 ENCOUNTER — OFFICE VISIT (OUTPATIENT)
Dept: FAMILY MEDICINE CLINIC | Facility: CLINIC | Age: 57
End: 2022-04-01

## 2022-04-01 VITALS
BODY MASS INDEX: 31.18 KG/M2 | DIASTOLIC BLOOD PRESSURE: 86 MMHG | SYSTOLIC BLOOD PRESSURE: 140 MMHG | HEART RATE: 105 BPM | WEIGHT: 176 LBS | OXYGEN SATURATION: 100 % | TEMPERATURE: 97.5 F

## 2022-04-01 DIAGNOSIS — M79.671 FOOT PAIN, RIGHT: Primary | ICD-10-CM

## 2022-04-01 PROCEDURE — 99213 OFFICE O/P EST LOW 20 MIN: CPT | Performed by: FAMILY MEDICINE

## 2022-04-01 NOTE — PROGRESS NOTES
Chief Complaint    Toe Pain    Subjective      Valentina Stevens presents to [facility]    History of Present Illness    1.) TOE/FOOT PAIN : Onset - several months ago. Location - all 5 toes. Intermittent pain. No recent injury. No effusion. No erythema of skin. No new masses.     Objective     Vital Signs:     /86   Pulse 105   Temp 97.5 °F (36.4 °C)   Wt 79.8 kg (176 lb)   SpO2 100%   BMI 31.18 kg/m²       Physical Exam  Vitals reviewed.   Constitutional:       General: She is not in acute distress.     Appearance: Normal appearance. She is well-developed.   HENT:      Head: Normocephalic and atraumatic.      Right Ear: Hearing and external ear normal.      Left Ear: Hearing and external ear normal.      Nose: Nose normal.   Eyes:      General: Lids are normal.         Right eye: No discharge.         Left eye: No discharge.      Conjunctiva/sclera: Conjunctivae normal.   Pulmonary:      Effort: Pulmonary effort is normal.   Abdominal:      General: There is no distension.   Musculoskeletal:         General: No swelling.      Cervical back: Neck supple.   Feet:      Comments: Examination for right foot - bunion appreciated, slight discomfort noted with palpation of distal aspect of 3rd toe. Remaining examination of foot was unremarkable.  Skin:     Coloration: Skin is not jaundiced.      Findings: No erythema.   Neurological:      Mental Status: She is alert. Mental status is at baseline.   Psychiatric:         Mood and Affect: Mood and affect normal.         Thought Content: Thought content normal.     Assessment and Plan     Diagnoses and all orders for this visit:    1. Foot pain, right (Primary)  Comments:  Suspect an inflammatory etiology. Recommend topical Diclofenac, which the patient is already prescribed. Continue w/ Epsom salt soaks.    Follow Up     Return if symptoms worsen or fail to improve.    Patient was given instructions and counseling regarding her  condition or for health maintenance advice. Please see specific information pulled into the AVS if appropriate.

## 2022-04-11 RX ORDER — FREMANEZUMAB-VFRM 225 MG/1.5ML
INJECTION SUBCUTANEOUS
Qty: 1.5 ML | Refills: 0 | OUTPATIENT
Start: 2022-04-11

## 2022-04-11 NOTE — TELEPHONE ENCOUNTER
"Refill denied. 30 day supply +3 refills sent in on 03/31/2022. \"E-Prescribing Status: Receipt confirmed by pharmacy (3/31/2022  1:21 PM EDT)\".   "

## 2022-04-21 RX ORDER — LEVOTHYROXINE SODIUM 0.07 MG/1
TABLET ORAL
Qty: 30 TABLET | Refills: 0 | Status: SHIPPED | OUTPATIENT
Start: 2022-04-21 | End: 2022-05-10 | Stop reason: SDUPTHER

## 2022-04-22 DIAGNOSIS — K59.04 CHRONIC IDIOPATHIC CONSTIPATION: Primary | ICD-10-CM

## 2022-04-22 RX ORDER — POLYETHYLENE GLYCOL 3350 17 G/17G
17 POWDER, FOR SOLUTION ORAL DAILY
Qty: 30 EACH | Refills: 5 | Status: SHIPPED | OUTPATIENT
Start: 2022-04-22 | End: 2022-05-22

## 2022-04-26 ENCOUNTER — OFFICE VISIT (OUTPATIENT)
Dept: FAMILY MEDICINE CLINIC | Facility: CLINIC | Age: 57
End: 2022-04-26

## 2022-04-26 VITALS
SYSTOLIC BLOOD PRESSURE: 140 MMHG | WEIGHT: 155.2 LBS | DIASTOLIC BLOOD PRESSURE: 82 MMHG | BODY MASS INDEX: 27.5 KG/M2 | OXYGEN SATURATION: 100 % | HEART RATE: 104 BPM | TEMPERATURE: 97.3 F | HEIGHT: 63 IN

## 2022-04-26 DIAGNOSIS — M79.671 RIGHT FOOT PAIN: Primary | ICD-10-CM

## 2022-04-26 PROCEDURE — 99213 OFFICE O/P EST LOW 20 MIN: CPT | Performed by: FAMILY MEDICINE

## 2022-04-26 NOTE — PROGRESS NOTES
"Chief Complaint    Foot Pain (Right foot pain follow up.  States not any better.)    Subjective      Valentina Stevens presents to [facility]    History of Present Illness    1.) RIGHT FOOT PAIN : Patient presents for follow up regarding right foot pain. During her previous visit here in office, she presented with c/o several months of pain that affected all 5 of her toes. She described her pain as intermittent. She notes that she has experienced at least one episode of edema. No recent injury. Trial of topical NSAID w/ no relief.    Objective     Vital Signs:     /82 (BP Location: Right arm, Patient Position: Sitting, Cuff Size: Adult)   Pulse 104   Temp 97.3 °F (36.3 °C) (Temporal)   Ht 160 cm (63\")   Wt 70.4 kg (155 lb 3.2 oz)   SpO2 100%   BMI 27.49 kg/m²       Physical Exam  Vitals reviewed.   Constitutional:       General: She is not in acute distress.     Appearance: Normal appearance. She is well-developed.   HENT:      Head: Normocephalic and atraumatic.      Right Ear: Hearing and external ear normal.      Left Ear: Hearing and external ear normal.      Nose: Nose normal.   Eyes:      General: Lids are normal.         Right eye: No discharge.         Left eye: No discharge.      Conjunctiva/sclera: Conjunctivae normal.   Pulmonary:      Effort: Pulmonary effort is normal.   Abdominal:      General: There is no distension.   Musculoskeletal:         General: No swelling.      Cervical back: Neck supple.        Feet:    Feet:      Comments: Examination of right foot - (+) bunion, no edema, tenderness with palpation of area noted above.  Skin:     Coloration: Skin is not jaundiced.      Findings: No erythema.   Neurological:      Mental Status: She is alert. Mental status is at baseline.   Psychiatric:         Mood and Affect: Mood and affect normal.         Thought Content: Thought content normal.     Assessment and Plan     Diagnoses and all orders for this " visit:    1. Right foot pain (Primary)  Comments:  Referred to podiatry for an evaluation and recommendations.   Orders:  -     Ambulatory Referral to Podiatry    Patient was given instructions and counseling regarding her condition or for health maintenance advice. Please see specific information pulled into the AVS if appropriate.

## 2022-05-10 ENCOUNTER — OFFICE VISIT (OUTPATIENT)
Dept: FAMILY MEDICINE CLINIC | Facility: CLINIC | Age: 57
End: 2022-05-10

## 2022-05-10 VITALS
DIASTOLIC BLOOD PRESSURE: 72 MMHG | HEART RATE: 78 BPM | BODY MASS INDEX: 27.46 KG/M2 | TEMPERATURE: 97.5 F | SYSTOLIC BLOOD PRESSURE: 134 MMHG | OXYGEN SATURATION: 97 % | WEIGHT: 155 LBS

## 2022-05-10 DIAGNOSIS — R13.10 DYSPHAGIA, UNSPECIFIED TYPE: ICD-10-CM

## 2022-05-10 DIAGNOSIS — E78.2 MIXED HYPERLIPIDEMIA: ICD-10-CM

## 2022-05-10 DIAGNOSIS — E03.9 HYPOTHYROIDISM, UNSPECIFIED TYPE: ICD-10-CM

## 2022-05-10 DIAGNOSIS — R23.3 EASY BRUISABILITY: ICD-10-CM

## 2022-05-10 DIAGNOSIS — R29.6 FREQUENT FALLS: ICD-10-CM

## 2022-05-10 DIAGNOSIS — I10 PRIMARY HYPERTENSION: Primary | ICD-10-CM

## 2022-05-10 DIAGNOSIS — E55.9 VITAMIN D DEFICIENCY: ICD-10-CM

## 2022-05-10 DIAGNOSIS — G25.81 RLS (RESTLESS LEGS SYNDROME): ICD-10-CM

## 2022-05-10 DIAGNOSIS — Z71.85 VACCINE COUNSELING: ICD-10-CM

## 2022-05-10 DIAGNOSIS — M50.30 DDD (DEGENERATIVE DISC DISEASE), CERVICAL: ICD-10-CM

## 2022-05-10 LAB
25(OH)D3 SERPL-MCNC: 27.3 NG/ML (ref 30–100)
BILIRUB UR QL STRIP: NEGATIVE
CLARITY UR: ABNORMAL
COLOR UR: YELLOW
DEPRECATED RDW RBC AUTO: 41.7 FL (ref 37–54)
ERYTHROCYTE [DISTWIDTH] IN BLOOD BY AUTOMATED COUNT: 13.4 % (ref 12.3–15.4)
FOLATE SERPL-MCNC: >20 NG/ML (ref 4.78–24.2)
GLUCOSE UR STRIP-MCNC: NEGATIVE MG/DL
HCT VFR BLD AUTO: 46.3 % (ref 34–46.6)
HGB BLD-MCNC: 15.3 G/DL (ref 12–15.9)
HGB UR QL STRIP.AUTO: NEGATIVE
KETONES UR QL STRIP: NEGATIVE
LEUKOCYTE ESTERASE UR QL STRIP.AUTO: NEGATIVE
MCH RBC QN AUTO: 28.2 PG (ref 26.6–33)
MCHC RBC AUTO-ENTMCNC: 33 G/DL (ref 31.5–35.7)
MCV RBC AUTO: 85.4 FL (ref 79–97)
NITRITE UR QL STRIP: NEGATIVE
PH UR STRIP.AUTO: 8 [PH] (ref 5–8)
PLATELET # BLD AUTO: 258 10*3/MM3 (ref 140–450)
PMV BLD AUTO: 11.2 FL (ref 6–12)
PROT UR QL STRIP: NEGATIVE
RBC # BLD AUTO: 5.42 10*6/MM3 (ref 3.77–5.28)
SP GR UR STRIP: 1.02 (ref 1–1.03)
UROBILINOGEN UR QL STRIP: ABNORMAL
VIT B12 BLD-MCNC: 524 PG/ML (ref 211–946)
WBC NRBC COR # BLD: 9.89 10*3/MM3 (ref 3.4–10.8)

## 2022-05-10 PROCEDURE — 82306 VITAMIN D 25 HYDROXY: CPT | Performed by: NURSE PRACTITIONER

## 2022-05-10 PROCEDURE — 80061 LIPID PANEL: CPT | Performed by: NURSE PRACTITIONER

## 2022-05-10 PROCEDURE — 82746 ASSAY OF FOLIC ACID SERUM: CPT | Performed by: NURSE PRACTITIONER

## 2022-05-10 PROCEDURE — 84443 ASSAY THYROID STIM HORMONE: CPT | Performed by: NURSE PRACTITIONER

## 2022-05-10 PROCEDURE — 90471 IMMUNIZATION ADMIN: CPT | Performed by: NURSE PRACTITIONER

## 2022-05-10 PROCEDURE — 83540 ASSAY OF IRON: CPT | Performed by: NURSE PRACTITIONER

## 2022-05-10 PROCEDURE — 81003 URINALYSIS AUTO W/O SCOPE: CPT | Performed by: NURSE PRACTITIONER

## 2022-05-10 PROCEDURE — 90732 PPSV23 VACC 2 YRS+ SUBQ/IM: CPT | Performed by: NURSE PRACTITIONER

## 2022-05-10 PROCEDURE — 80053 COMPREHEN METABOLIC PANEL: CPT | Performed by: NURSE PRACTITIONER

## 2022-05-10 PROCEDURE — 84466 ASSAY OF TRANSFERRIN: CPT | Performed by: NURSE PRACTITIONER

## 2022-05-10 PROCEDURE — 85027 COMPLETE CBC AUTOMATED: CPT | Performed by: NURSE PRACTITIONER

## 2022-05-10 PROCEDURE — 82607 VITAMIN B-12: CPT | Performed by: NURSE PRACTITIONER

## 2022-05-10 PROCEDURE — 99214 OFFICE O/P EST MOD 30 MIN: CPT | Performed by: NURSE PRACTITIONER

## 2022-05-10 PROCEDURE — 82728 ASSAY OF FERRITIN: CPT | Performed by: NURSE PRACTITIONER

## 2022-05-10 RX ORDER — PRAMIPEXOLE DIHYDROCHLORIDE 0.25 MG/1
0.25 TABLET ORAL NIGHTLY
Qty: 30 TABLET | Refills: 5 | Status: SHIPPED | OUTPATIENT
Start: 2022-05-10 | End: 2022-11-02

## 2022-05-10 RX ORDER — LEVOTHYROXINE SODIUM 0.07 MG/1
75 TABLET ORAL DAILY
Qty: 30 TABLET | Refills: 5 | Status: SHIPPED | OUTPATIENT
Start: 2022-05-10 | End: 2022-11-07

## 2022-05-10 RX ORDER — LISINOPRIL 2.5 MG/1
2.5 TABLET ORAL DAILY
Qty: 30 TABLET | Refills: 5 | Status: SHIPPED | OUTPATIENT
Start: 2022-05-10 | End: 2022-11-07

## 2022-05-10 NOTE — PROGRESS NOTES
Venipuncture Blood Specimen Collection  Venipuncture performed in left arm   by Crystal Angel with good hemostasis. Patient tolerated the procedure well without complications.   05/10/22   Crystal Angel

## 2022-05-10 NOTE — PROGRESS NOTES
Answers for HPI/ROS submitted by the patient on 5/4/2022  Please describe your symptoms.: Bruising easy  Have you had these symptoms before?: No  How long have you been having these symptoms?: 1-2 weeks  What is the primary reason for your visit?: Other    Chief Complaint  Bleeding/Bruising (Bruising a lot lately and wanted to get checked for anemia.)    Subjective            Valentina Stevens presents to [facility]  Pt here today with complaint of easy bruisability and was prior anemic would like blood work today    ________________________________________    Also she is fasting today and does need her medication refills on her chronic comorbid conditions as well:    --HTN: Well-controlled no chest pain no syncopal episodes no palpitations reported overall stable    --HLD: Overall diet controlled    -- Hypothyroidism: Patient does admit that she is more fatigued than usual and she has noticed lately she is having some dyspnea even with swallowing liquids    -- Restless leg syndrome: Patient reports is stable and tolerates the medication well with no side effects    _________________________________________    At the end of the visit patient did report that she had been to the neurologist and had reported to them about having more frequent falls than usual thought maybe that was related to her degenerative disc in her low back--neurologist told her it is more likely due to the cervical spine--and patient did have a prior history diagnoses of degenerative disc disease of the cervical spine      PHQ-2 Total Score:    PHQ-9 Total Score:      Past Medical History:   Diagnosis Date   • Arthritis    • CTS (carpal tunnel syndrome)    • Depression    • Difficulty walking    • Diverticulitis of colon    • Fatty (change of) liver, not elsewhere classified    • GERD (gastroesophageal reflux disease)    • Hearing loss    • Hyperlipemia    • Hypertension    • Hypothyroidism    • Low back pain     • Lumbar radiculopathy 02/15/2022   • Memory loss    • Menopause    • Migraine    • Murmur    • Osteoporosis    • Primary central sleep apnea    • Scoliosis    • Seizures (HCC)     last when 10 years old   • Sleep apnea, obstructive    • Vitamin D deficiency        Allergies   Allergen Reactions   • Penicillins Shortness Of Breath and Rash   • Sulfa Antibiotics Hives   • Cephalexin Rash   • Indomethacin Rash        Past Surgical History:   Procedure Laterality Date   • CARPAL TUNNEL RELEASE     • COLONOSCOPY     • EPIDURAL BLOCK     • ESSURE TUBAL LIGATION     • THYMECTOMY     • TUBAL ABDOMINAL LIGATION          Social History     Tobacco Use   • Smoking status: Never Smoker   • Smokeless tobacco: Never Used   Vaping Use   • Vaping Use: Never used   Substance Use Topics   • Alcohol use: Never   • Drug use: Never       Family History   Problem Relation Age of Onset   • Cancer Mother    • Migraines Mother    • Stroke Mother    • Hypertension Father    • Osteoporosis Father    • Colon cancer Neg Hx    • Alcohol abuse Neg Hx         Health Maintenance Due   Topic Date Due   • LIPID PANEL  10/20/2021   • COVID-19 Vaccine (3 - Booster for Pfizer series) 11/17/2021        Current Outpatient Medications on File Prior to Visit   Medication Sig   • Diclofenac Sodium (VOLTAREN) 1 % gel gel Apply 4 g topically to the appropriate area as directed 4 (Four) Times a Day As Needed (shoulder pain).   • Fremanezumab-vfrm (Ajovy) 225 MG/1.5ML solution auto-injector Inject 225 mg under the skin into the appropriate area as directed Every 30 (Thirty) Days.   • HYDROcodone-acetaminophen (NORCO) 5-325 MG per tablet Take 1 tablet by mouth 2 (Two) Times a Day As Needed.   • Ibandronate Sodium (BONIVA PO) Take  by mouth.   • multivitamin with minerals tablet tablet Take 1 tablet by mouth Daily.   • polyethylene glycol (MiraLax) 17 g packet Take 17 g by mouth Daily for 30 days.   • rizatriptan (Maxalt) 10 MG tablet Take 1 tablet by mouth 1  (One) Time As Needed for Migraine. May repeat in 2 hours if needed   • vitamin E 100 UNIT capsule Take 100 Units by mouth Daily.   • [DISCONTINUED] Calcium Carbonate-Vitamin D3 600-400 MG-UNIT tablet TAKE 1 TABLET BY MOUTH TWICE DAILY   • [DISCONTINUED] levothyroxine (SYNTHROID, LEVOTHROID) 75 MCG tablet TAKE 1 TABLET BY MOUTH DAILY.   • [DISCONTINUED] lisinopril (PRINIVIL,ZESTRIL) 2.5 MG tablet Take 1 tablet by mouth Daily.   • [DISCONTINUED] pramipexole (MIRAPEX) 0.25 MG tablet TAKE 1 TABLET BY MOUTH EVERY NIGHT   • vitamin D (ERGOCALCIFEROL) 1.25 MG (77330 UT) capsule capsule Take 50,000 Units by mouth 1 (One) Time Per Week.   • [DISCONTINUED] calcium carbonate-vitamin d 600-400 MG-UNIT per tablet Take 1 tablet by mouth 2 (two) times a day.     No current facility-administered medications on file prior to visit.       Immunization History   Administered Date(s) Administered   • COVID-19 (PFIZER) PURPLE CAP 05/27/2021, 06/17/2021, 06/17/2021   • FluLaval/Fluarix/Fluzone >6 10/19/2021   • Hepatitis A 05/17/2018, 12/20/2018   • Influenza, Unspecified 10/19/2020, 10/19/2020   • Pneumococcal Polysaccharide (PPSV23) 05/10/2022   • Shingrix 11/12/2021, 01/21/2022   • Tdap 03/20/2018, 10/19/2020       Review of Systems   Constitutional: Positive for fatigue.   HENT: Positive for hearing loss and trouble swallowing.         Wearing hearing aids    Respiratory: Positive for shortness of breath.         Chronic--and seeing the pulmonary soon    Cardiovascular: Negative for chest pain.   Gastrointestinal: Negative for abdominal pain, blood in stool, nausea, vomiting and GERD.   Endocrine: Negative for polydipsia, polyphagia and polyuria.   Genitourinary: Positive for frequency. Negative for dysuria.   Musculoskeletal: Positive for arthralgias, back pain, myalgias and neck pain.        Chronic   Neurological: Positive for headache. Negative for dizziness, syncope and light-headedness.        Falling more often--    Hematological: Bruises/bleeds easily.        Objective     /72 (BP Location: Right arm, Patient Position: Sitting, Cuff Size: Adult)   Pulse 78   Temp 97.5 °F (36.4 °C) (Temporal)   Wt 70.3 kg (155 lb)   SpO2 97%   BMI 27.46 kg/m²       Physical Exam  Vitals and nursing note reviewed.   Constitutional:       Appearance: Normal appearance.   HENT:      Head: Normocephalic.      Right Ear: External ear normal.      Left Ear: External ear normal.      Ears:      Comments: wearinghearing aids      Nose: Nose normal.      Mouth/Throat:      Comments: Wearing mask  Eyes:      Pupils: Pupils are equal, round, and reactive to light.   Cardiovascular:      Rate and Rhythm: Normal rate and regular rhythm.      Heart sounds: Normal heart sounds.   Pulmonary:      Effort: Pulmonary effort is normal.      Breath sounds: Normal breath sounds.   Abdominal:      General: Bowel sounds are normal.      Palpations: Abdomen is soft.   Musculoskeletal:      Cervical back: Neck supple. Spasms, bony tenderness and crepitus present. Pain with movement present.      Lumbar back: Tenderness and bony tenderness present. Decreased range of motion.      Comments: Uses cane to walk    Skin:     General: Skin is warm and dry.   Neurological:      Mental Status: She is alert and oriented to person, place, and time.   Psychiatric:         Mood and Affect: Mood normal.         Behavior: Behavior normal.         Thought Content: Thought content normal.         Judgment: Judgment normal.         Result Review :                          Assessment and Plan      Diagnoses and all orders for this visit:    1. Primary hypertension (Primary)  -     lisinopril (PRINIVIL,ZESTRIL) 2.5 MG tablet; Take 1 tablet by mouth Daily.  Dispense: 30 tablet; Refill: 5  -     Urinalysis With Culture If Indicated - Urine, Clean Catch    2. Mixed hyperlipidemia  -     Comprehensive Metabolic Panel  -     Lipid Panel    3. Hypothyroidism, unspecified type  -      TSH  -     levothyroxine (SYNTHROID, LEVOTHROID) 75 MCG tablet; Take 1 tablet by mouth Daily.  Dispense: 30 tablet; Refill: 5    4. RLS (restless legs syndrome)  -     pramipexole (MIRAPEX) 0.25 MG tablet; Take 1 tablet by mouth Every Night.  Dispense: 30 tablet; Refill: 5    5. Easy bruisability  -     CBC (No Diff)  -     Ferritin  -     Iron Profile  -     Comprehensive Metabolic Panel  -     Cortisol, Free  -     TSH  -     Vitamin B12 & Folate  -     Vitamin D 25 Hydroxy    6. Vitamin D deficiency  -     Vitamin D 25 Hydroxy  -     Calcium Carbonate-Vitamin D3 600-400 MG-UNIT tablet; Take 1 tablet by mouth 2 (Two) Times a Day.  Dispense: 60 tablet; Refill: 5    7. Dysphagia, unspecified type  -     US Thyroid    8. DDD (degenerative disc disease), cervical  -     MRI Cervical Spine Without Contrast; Future    9. Frequent falls  -     MRI Cervical Spine Without Contrast; Future    10. Vaccine counseling  -     pneumococcal polysaccharide 23-valent (PNEUMOVAX-23) vaccine 0.5 mL            Follow Up     Return in about 6 months (around 11/10/2022), or if symptoms worsen or fail to improve, for Recheck.

## 2022-05-11 DIAGNOSIS — E55.9 VITAMIN D DEFICIENCY: Primary | ICD-10-CM

## 2022-05-11 LAB
ALBUMIN SERPL-MCNC: 4.4 G/DL (ref 3.5–5.2)
ALBUMIN/GLOB SERPL: 1.3 G/DL
ALP SERPL-CCNC: 56 U/L (ref 39–117)
ALT SERPL W P-5'-P-CCNC: 25 U/L (ref 1–33)
ANION GAP SERPL CALCULATED.3IONS-SCNC: 11.3 MMOL/L (ref 5–15)
AST SERPL-CCNC: 22 U/L (ref 1–32)
BILIRUB SERPL-MCNC: 0.9 MG/DL (ref 0–1.2)
BUN SERPL-MCNC: 9 MG/DL (ref 6–20)
BUN/CREAT SERPL: 13.2 (ref 7–25)
CALCIUM SPEC-SCNC: 10.1 MG/DL (ref 8.6–10.5)
CHLORIDE SERPL-SCNC: 100 MMOL/L (ref 98–107)
CHOLEST SERPL-MCNC: 188 MG/DL (ref 0–200)
CO2 SERPL-SCNC: 24.7 MMOL/L (ref 22–29)
CREAT SERPL-MCNC: 0.68 MG/DL (ref 0.57–1)
EGFRCR SERPLBLD CKD-EPI 2021: 101.1 ML/MIN/1.73
FERRITIN SERPL-MCNC: 119 NG/ML (ref 13–150)
GLOBULIN UR ELPH-MCNC: 3.3 GM/DL
GLUCOSE SERPL-MCNC: 76 MG/DL (ref 65–99)
HDLC SERPL-MCNC: 47 MG/DL (ref 40–60)
IRON 24H UR-MRATE: 32 MCG/DL (ref 37–145)
IRON SATN MFR SERPL: 8 % (ref 20–50)
LDLC SERPL CALC-MCNC: 111 MG/DL (ref 0–100)
LDLC/HDLC SERPL: 2.26 {RATIO}
POTASSIUM SERPL-SCNC: 4.3 MMOL/L (ref 3.5–5.2)
PROT SERPL-MCNC: 7.7 G/DL (ref 6–8.5)
SODIUM SERPL-SCNC: 136 MMOL/L (ref 136–145)
TIBC SERPL-MCNC: 380 MCG/DL (ref 298–536)
TRANSFERRIN SERPL-MCNC: 255 MG/DL (ref 200–360)
TRIGL SERPL-MCNC: 174 MG/DL (ref 0–150)
TSH SERPL DL<=0.05 MIU/L-ACNC: 1.15 UIU/ML (ref 0.27–4.2)
VLDLC SERPL-MCNC: 30 MG/DL (ref 5–40)

## 2022-05-11 RX ORDER — ACETAMINOPHEN 160 MG
2000 TABLET,DISINTEGRATING ORAL DAILY
Qty: 30 CAPSULE | Refills: 5 | Status: SHIPPED | OUTPATIENT
Start: 2022-05-11 | End: 2023-05-11

## 2022-05-11 NOTE — PROGRESS NOTES
Please mail letter to patient state    Valentina, the urinalysis was normal except slightly cloudy; your ferritin levels and the thyroid levels were normal; the total iron was low at 32 and it should be 37-1 45 the transferrin and the total iron binding capacity was normal range; vitamin B12 and folic acid and your complete blood count were all normal range; vitamin D was still slightly low at 27.3 and it should be --and I will send in a daily low-dose vitamin D 3 for you to start--and then your cholesterol panel reveals triglycerides have dropped from 239 the last time down to 174 and prior to that you were at 316 the goal is to be less than 150 and then the HDL was in normal range and the LDL has also dropped to 111 and it should be less than 100

## 2022-05-13 ENCOUNTER — PATIENT MESSAGE (OUTPATIENT)
Dept: FAMILY MEDICINE CLINIC | Facility: CLINIC | Age: 57
End: 2022-05-13

## 2022-05-13 DIAGNOSIS — M81.6 LOCALIZED OSTEOPOROSIS WITHOUT CURRENT PATHOLOGICAL FRACTURE: Primary | ICD-10-CM

## 2022-05-13 RX ORDER — IBANDRONATE SODIUM 150 MG/1
150 TABLET, FILM COATED ORAL
Qty: 1 TABLET | Refills: 11 | Status: SHIPPED | OUTPATIENT
Start: 2022-05-13

## 2022-05-13 NOTE — TELEPHONE ENCOUNTER
From: Valentina Stevens  To: DEMAR Eden  Sent: 5/13/2022 10:09 AM EDT  Subject: New medicine     I don’t remember the name of the medicine that you were wanting to put me on a new medicine after I have my dental work done I’ve had it all done now so you can put me on it if you still need to

## 2022-05-16 ENCOUNTER — HOSPITAL ENCOUNTER (OUTPATIENT)
Dept: RESPIRATORY THERAPY | Facility: HOSPITAL | Age: 57
Discharge: HOME | End: 2022-05-16
Admitting: INTERNAL MEDICINE

## 2022-05-16 ENCOUNTER — PROCEDURE VISIT (OUTPATIENT)
Dept: CARDIAC REHAB | Facility: HOSPITAL | Age: 57
End: 2022-05-16

## 2022-05-16 DIAGNOSIS — R06.09 DYSPNEA ON EXERTION: ICD-10-CM

## 2022-05-16 DIAGNOSIS — R06.09 DYSPNEA ON EXERTION: Primary | ICD-10-CM

## 2022-05-16 PROCEDURE — 94726 PLETHYSMOGRAPHY LUNG VOLUMES: CPT

## 2022-05-16 PROCEDURE — 94726 PLETHYSMOGRAPHY LUNG VOLUMES: CPT | Performed by: INTERNAL MEDICINE

## 2022-05-16 PROCEDURE — 94060 EVALUATION OF WHEEZING: CPT | Performed by: INTERNAL MEDICINE

## 2022-05-16 PROCEDURE — 94060 EVALUATION OF WHEEZING: CPT

## 2022-05-16 PROCEDURE — 94618 PULMONARY STRESS TESTING: CPT

## 2022-05-16 PROCEDURE — 94729 DIFFUSING CAPACITY: CPT

## 2022-05-16 PROCEDURE — 94729 DIFFUSING CAPACITY: CPT | Performed by: INTERNAL MEDICINE

## 2022-05-16 RX ORDER — ALBUTEROL SULFATE 2.5 MG/3ML
2.5 SOLUTION RESPIRATORY (INHALATION) ONCE
Status: COMPLETED | OUTPATIENT
Start: 2022-05-16 | End: 2022-05-16

## 2022-05-16 RX ADMIN — ALBUTEROL SULFATE 2.5 MG: 2.5 SOLUTION RESPIRATORY (INHALATION) at 13:43

## 2022-05-19 DIAGNOSIS — M54.16 LUMBAR RADICULOPATHY: ICD-10-CM

## 2022-05-19 DIAGNOSIS — M51.36 DDD (DEGENERATIVE DISC DISEASE), LUMBAR: Primary | ICD-10-CM

## 2022-05-23 ENCOUNTER — HOSPITAL ENCOUNTER (OUTPATIENT)
Dept: ULTRASOUND IMAGING | Facility: HOSPITAL | Age: 57
Discharge: HOME | End: 2022-05-23
Admitting: NURSE PRACTITIONER

## 2022-05-23 ENCOUNTER — APPOINTMENT (OUTPATIENT)
Dept: MRI IMAGING | Facility: HOSPITAL | Age: 57
End: 2022-05-23

## 2022-05-23 PROCEDURE — 76536 US EXAM OF HEAD AND NECK: CPT

## 2022-05-23 NOTE — PROGRESS NOTES
Please mail letter to patient stating    Valentina, your thyroid ultrasound shows that it is normal size there are no thyroid nodules it is not enlarged and no adenopathy meaning no lymph node enlargement-and then the thyroid was mildly heterogenous--meaning that the tissue is indicative of thyroid disorder--of which you do have a hypothyroidism and or treated for it so we would expect to see this

## 2022-05-31 ENCOUNTER — OFFICE VISIT (OUTPATIENT)
Dept: PULMONOLOGY | Facility: CLINIC | Age: 57
End: 2022-05-31

## 2022-05-31 VITALS
OXYGEN SATURATION: 96 % | HEART RATE: 77 BPM | TEMPERATURE: 99.1 F | HEIGHT: 63 IN | WEIGHT: 157 LBS | DIASTOLIC BLOOD PRESSURE: 69 MMHG | RESPIRATION RATE: 16 BRPM | BODY MASS INDEX: 27.82 KG/M2 | SYSTOLIC BLOOD PRESSURE: 132 MMHG

## 2022-05-31 DIAGNOSIS — R06.09 DYSPNEA ON EXERTION: ICD-10-CM

## 2022-05-31 DIAGNOSIS — J45.909 MILD ASTHMA, UNSPECIFIED WHETHER COMPLICATED, UNSPECIFIED WHETHER PERSISTENT: ICD-10-CM

## 2022-05-31 DIAGNOSIS — G47.33 OSA (OBSTRUCTIVE SLEEP APNEA): Primary | ICD-10-CM

## 2022-05-31 DIAGNOSIS — I10 HYPERTENSION, UNSPECIFIED TYPE: ICD-10-CM

## 2022-05-31 PROCEDURE — 99214 OFFICE O/P EST MOD 30 MIN: CPT | Performed by: NURSE PRACTITIONER

## 2022-05-31 RX ORDER — ALBUTEROL SULFATE 90 UG/1
2 AEROSOL, METERED RESPIRATORY (INHALATION) EVERY 4 HOURS PRN
Qty: 18 G | Refills: 5 | Status: SHIPPED | OUTPATIENT
Start: 2022-05-31 | End: 2023-03-29 | Stop reason: SDUPTHER

## 2022-05-31 RX ORDER — POLYETHYLENE GLYCOL 3350 17 G/17G
POWDER, FOR SOLUTION ORAL
COMMUNITY
End: 2022-10-26

## 2022-05-31 NOTE — PROGRESS NOTES
Primary Care Provider  Sera Loza APRN     Referring Provider  No ref. provider found     Chief Complaint  Dyspnea on exertion (3 month f/u- PFT 5/16) and Cough (Walking uphill causes coughing patient claims she chokes on a lot of food and gets to coughing after that. )    Subjective          Valentina Stevens presents to [facility]  History of Present Illness  Valentina Stevens is a 56 y.o. female patient of Dr. Simon here for management of obstructive sleep apnea, hypertension and dyspnea on exertion.    Patient recently had a pulmonary function test on 5/16/2022.  This shows a normal spirometry with normal lung capacity and a low diffusion capacity.  There is also bronchial reversibility suggesting underlying reactive airway disease.  Patient's 6-minute walk showed no desaturations less than 97%.  These results were shared with patient today.  Patient states that she is doing well since her last visit.  She denies using any antibiotics or steroids for lungs.  She denies any fevers or chills.  Patient's shortness of breath is mild in severity, worse with exertion and improved with rest.  She continues to use a CPAP machine at night with no issues, and she is being followed by sleep medicine.  Patient states that she did have COVID in January 2022, but did not have any additional symptoms and states that she did not know that she had COVID at that time.  Overall, patient has no additional concerns at this time.  She is up-to-date with her flu, pneumonia, and COVID vaccines.  She is able to perform her ADLs without difficulty.         Her history of smoking is   Tobacco Use: Low Risk    • Smoking Tobacco Use: Never Smoker   • Smokeless Tobacco Use: Never Used   .    Review of Systems   Constitutional: Negative for chills, fatigue, fever, unexpected weight gain and unexpected weight loss.   HENT: Negative for congestion (Nasal), hearing loss, mouth  sores, nosebleeds, postnasal drip, sore throat and trouble swallowing.    Eyes: Negative for blurred vision and visual disturbance.   Respiratory: Positive for shortness of breath. Negative for apnea, cough and wheezing.         Negative for Hemoptysis     Cardiovascular: Negative for chest pain, palpitations and leg swelling.   Gastrointestinal: Negative for abdominal pain, constipation, diarrhea, nausea, vomiting and GERD.        Negative for Jaundice  Negative for Bloating  Negative for Melena   Musculoskeletal: Negative for joint swelling and myalgias.        Negative for Joint pain  Negative for Joint stiffness   Skin: Negative for color change.        Negative for cyanosis   Neurological: Negative for syncope, weakness, numbness and headache.      Sleep: Negative for Excessive daytime sleepiness  Negative for morning headaches  Negative for Snoring    Family History   Problem Relation Age of Onset   • Cancer Mother    • Migraines Mother    • Stroke Mother    • Hypertension Father    • Osteoporosis Father    • Colon cancer Neg Hx    • Alcohol abuse Neg Hx         Social History     Socioeconomic History   • Marital status:    • Number of children: 2   Tobacco Use   • Smoking status: Never Smoker   • Smokeless tobacco: Never Used   Vaping Use   • Vaping Use: Never used   Substance and Sexual Activity   • Alcohol use: Never   • Drug use: Never   • Sexual activity: Not Currently     Partners: Female     Birth control/protection: Other     Comment: Tubes tided        Past Medical History:   Diagnosis Date   • Arthritis    • CTS (carpal tunnel syndrome)    • Depression    • Difficulty walking    • Diverticulitis of colon    • Fatty (change of) liver, not elsewhere classified    • GERD (gastroesophageal reflux disease)    • Hearing loss    • Hyperlipemia    • Hypertension    • Hypothyroidism    • Low back pain    • Lumbar radiculopathy 02/15/2022   • Memory loss    • Menopause    • Migraine    • Murmur    •  Osteoporosis    • Pneumonia    • Primary central sleep apnea    • Scoliosis    • Seizures (HCC)     last when 10 years old   • Sleep apnea, obstructive    • Vitamin D deficiency         Immunization History   Administered Date(s) Administered   • COVID-19 (PFIZER) PURPLE CAP 05/27/2021, 06/17/2021, 06/17/2021   • FluLaval/Fluarix/Fluzone >6 10/19/2021   • Hepatitis A 05/17/2018, 12/20/2018   • Influenza, Unspecified 10/19/2020, 10/19/2020   • Pneumococcal Polysaccharide (PPSV23) 05/10/2022   • Shingrix 11/12/2021, 01/21/2022   • Tdap 03/20/2018, 10/19/2020         Allergies   Allergen Reactions   • Penicillins Shortness Of Breath and Rash   • Sulfa Antibiotics Hives   • Cephalexin Rash   • Indomethacin Rash          Current Outpatient Medications:   •  Calcium Carbonate-Vitamin D3 600-400 MG-UNIT tablet, Take 1 tablet by mouth 2 (Two) Times a Day., Disp: 60 tablet, Rfl: 5  •  Cholecalciferol (Vitamin D3) 50 MCG (2000 UT) capsule, Take 1 capsule by mouth Daily., Disp: 30 capsule, Rfl: 5  •  Diclofenac Sodium (VOLTAREN) 1 % gel gel, Apply 4 g topically to the appropriate area as directed 4 (Four) Times a Day As Needed (shoulder pain)., Disp: 350 g, Rfl: 0  •  Fremanezumab-vfrm (Ajovy) 225 MG/1.5ML solution auto-injector, Inject 225 mg under the skin into the appropriate area as directed Every 30 (Thirty) Days., Disp: 1.5 mL, Rfl: 3  •  HYDROcodone-acetaminophen (NORCO) 5-325 MG per tablet, Take 1 tablet by mouth 2 (Two) Times a Day As Needed., Disp: , Rfl:   •  ibandronate (Boniva) 150 MG tablet, Take 1 tablet by mouth Every 30 (Thirty) Days. Take it first thing in the morning without any other medication and sit up 30 to 60 minutes after taking the medication, Disp: 1 tablet, Rfl: 11  •  levothyroxine (SYNTHROID, LEVOTHROID) 75 MCG tablet, Take 1 tablet by mouth Daily., Disp: 30 tablet, Rfl: 5  •  lisinopril (PRINIVIL,ZESTRIL) 2.5 MG tablet, Take 1 tablet by mouth Daily., Disp: 30 tablet, Rfl: 5  •  multivitamin with  minerals tablet tablet, Take 1 tablet by mouth Daily., Disp: , Rfl:   •  polyethylene glycol (MIRALAX) 17 GM/SCOOP powder, polyethylene glycol 3350 17 gram oral powder packet  TAKE 17 Grams (ONE PACKET) BY MOUTH DAILY, Disp: , Rfl:   •  pramipexole (MIRAPEX) 0.25 MG tablet, Take 1 tablet by mouth Every Night., Disp: 30 tablet, Rfl: 5  •  rizatriptan (Maxalt) 10 MG tablet, Take 1 tablet by mouth 1 (One) Time As Needed for Migraine. May repeat in 2 hours if needed, Disp: 9 tablet, Rfl: 3  •  vitamin E 100 UNIT capsule, Take 100 Units by mouth Daily., Disp: , Rfl:   •  albuterol sulfate  (90 Base) MCG/ACT inhaler, Inhale 2 puffs Every 4 (Four) Hours As Needed for Wheezing., Disp: 18 g, Rfl: 5     Objective   Physical Exam  Constitutional:       General: She is not in acute distress.     Appearance: Normal appearance. She is normal weight.   HENT:      Right Ear: Hearing normal.      Left Ear: Hearing normal.      Nose: No nasal tenderness or congestion.      Mouth/Throat:      Mouth: Mucous membranes are moist. No oral lesions.   Eyes:      Extraocular Movements: Extraocular movements intact.      Pupils: Pupils are equal, round, and reactive to light.   Neck:      Thyroid: No thyroid mass or thyromegaly.   Cardiovascular:      Rate and Rhythm: Normal rate and regular rhythm.      Pulses: Normal pulses.      Heart sounds: Normal heart sounds. No murmur heard.  Pulmonary:      Effort: Pulmonary effort is normal.      Breath sounds: Normal breath sounds. No wheezing, rhonchi or rales.   Chest:   Breasts:      Right: No axillary adenopathy.       Abdominal:      General: Bowel sounds are normal. There is no distension.      Palpations: Abdomen is soft.      Tenderness: There is no abdominal tenderness.   Musculoskeletal:      Cervical back: Neck supple.      Right lower leg: No edema.      Left lower leg: No edema.   Lymphadenopathy:      Cervical: No cervical adenopathy.      Upper Body:      Right upper body: No  "axillary adenopathy.   Skin:     General: Skin is warm and dry.      Findings: No lesion or rash.   Neurological:      General: No focal deficit present.      Mental Status: She is alert and oriented to person, place, and time.      Cranial Nerves: Cranial nerves are intact.   Psychiatric:         Mood and Affect: Affect normal. Mood is not anxious or depressed.         Vital Signs:   /69 (BP Location: Left arm, Patient Position: Sitting, Cuff Size: Adult)   Pulse 77   Temp 99.1 °F (37.3 °C) (Infrared)   Resp 16   Ht 160 cm (63\")   Wt 71.2 kg (157 lb)   SpO2 96% Comment: room air  BMI 27.81 kg/m²        Result Review :     CMP    CMP 5/10/22   Glucose 76   BUN 9   Creatinine 0.68   Sodium 136   Potassium 4.3   Chloride 100   Calcium 10.1   Albumin 4.40   Total Bilirubin 0.9   Alkaline Phosphatase 56   AST (SGOT) 22   ALT (SGPT) 25           CBC w/diff    CBC w/Diff 5/10/22   WBC 9.89   RBC 5.42 (A)   Hemoglobin 15.3   Hematocrit 46.3   MCV 85.4   MCH 28.2   MCHC 33.0   RDW 13.4   Platelets 258   (A) Abnormal value            Data reviewed: Radiologic studies Chest x-ray 1/27/2022, pulmonary function test 5/16/2022, and Dr. Simon last office note   Procedures        Assessment and Plan    Diagnoses and all orders for this visit:    1. DEVENDRA (obstructive sleep apnea) (Primary)    2. Mild asthma, unspecified whether complicated, unspecified whether persistent  -     albuterol sulfate  (90 Base) MCG/ACT inhaler; Inhale 2 puffs Every 4 (Four) Hours As Needed for Wheezing.  Dispense: 18 g; Refill: 5    3. Dyspnea on exertion    4. Hypertension, unspecified type    5.  Start albuterol as needed for shortness of breath  6.  Follow-up with sleep medicine as scheduled for management of sleep apnea and CPAP machine.  7.  Follow-up with primary care for management of hypertension.  8.  Follow-up with our office in 4 months, sooner if needed    I spent 30 minutes caring for Valentina on this date of service. " This time includes time spent by me in the following activities:preparing for the visit, reviewing tests, obtaining and/or reviewing a separately obtained history, performing a medically appropriate examination and/or evaluation , counseling and educating the patient/family/caregiver, ordering medications, tests, or procedures and documenting information in the medical record    Follow Up   Return in about 4 months (around 9/30/2022) for Recheck with Dr. Simon/Hailey.  Patient was given instructions and counseling regarding her condition or for health maintenance advice. Please see specific information pulled into the AVS if appropriate.

## 2022-06-24 ENCOUNTER — TREATMENT (OUTPATIENT)
Dept: PHYSICAL THERAPY | Facility: CLINIC | Age: 57
End: 2022-06-24

## 2022-06-24 DIAGNOSIS — R26.9 GAIT DISTURBANCE: ICD-10-CM

## 2022-06-24 DIAGNOSIS — R29.6 FALLS FREQUENTLY: Primary | ICD-10-CM

## 2022-06-24 PROCEDURE — 97161 PT EVAL LOW COMPLEX 20 MIN: CPT | Performed by: PHYSICAL THERAPIST

## 2022-06-24 NOTE — PROGRESS NOTES
Physical Therapy Initial Evaluation and Plan of Care      Patient: Valentina Stevens   : 1965  Diagnosis/ICD-10 Code:  Falls frequently [R29.6]  Referring practitioner: WILFRID Eden*  Date of Initial Visit: 2022  Today's Date: 2022  Patient seen for 1 sessions    Progress note due: 2022  Re-cert due: 2022           Subjective Questionnaire: SOSA/56    PMH: scoliosis, seizures,   Precautions/Contraindication: falls risk       Subjective Evaluation    History of Present Illness  Mechanism of injury: Pt reports she is having neck and back back and issues with her balance. She says her main complaint is her balance and fell approx 2 day ago and put a hole in her wall at home. She denies any loss of conscienceness or any new injuries. She has fell 10 to 11 years at home and broke her back. She has PT on her low back at [email or web address] approx 6 years ago and still does her exercises time to time. She did PT also for her carpel tunnel, shoulders, and hip last year at [facility]. She reports having her neck hurts to turn it and feels popping. She was told by her doctor her neck could be causing her balance issues. She denies any dizziness, double vision, or radiating pain into her arms. She also is not having any vertigo like symptoms. She is not sure why she falls but does ambulate with cane 8 months ago for her balance. She says she does not fall standing in one place. She tends to fall while she is walking or doing an activity. She says she sees a podiatrist for her bunions.       Patient Occupation: [employer]  Pain  Current pain ratin  At worst pain ratin  Location: neck    Hand dominance: right             Objective          Static Posture     Head  Forward.    Shoulders  Rounded.    Ankle/Foot   Ankle/Foot (Left): Hallux valgus.   Ankle/Foot (Right): Hallux valgus.     Neurological Testing     Reflexes   Left   Patellar (L4): normal (2+)    Right   Patellar (L4):  normal (2+)    Active Range of Motion   Cervical/Thoracic Spine   Cervical    Flexion: 62 degrees   Extension: 50 degrees   Left rotation: 70 degrees   Right rotation: 75 degrees     Strength/Myotome Testing     Left Knee   Flexion: 4  Extension: 4    Right Knee   Flexion: 4  Extension: 4    Left Ankle/Foot   Dorsiflexion: 3+  Plantar flexion: 3+  Inversion: 4-  Eversion: 4-  Great toe flexion: 3-  Great toe extension: 3+    Right Ankle/Foot   Dorsiflexion: 3+  Plantar flexion: 3+  Inversion: 4-  Eversion: 4-  Great toe flexion: 3-  Great toe extension: 3+    Ambulation   Weight-Bearing Status   Assistive device used: single point cane    Observational Gait   Decreased walking speed, stride length, left stance time, right stance time, left step length and right step length.   Left foot contact pattern: heel to toe  Right foot contact pattern: heel to toe  Left arm swing: decreased  Right arm swing: decreased  Base of support: decreased    Functional Assessment     Single Leg Stance   Left: 2 seconds  Right: 2 seconds    Comments  Static balance feet together : eyes closed: 30 sec . eyed closed with head turns: mild unsteadiness              Assessment & Plan     Assessment  Impairments: abnormal coordination, abnormal gait, abnormal muscle firing, activity intolerance, impaired balance, impaired physical strength, lacks appropriate home exercise program, pain with function and safety issue  Functional Limitations: walking, standing and unable to perform repetitive tasks  Assessment details: Pt presents with decreased balance and gait disturbance. Pt has history of frequent falls at home and has notable decreased in BLE strength, coordination, and stability. Pt will benefit from skilled PT to address functional deficits and decrease risk for falls and injury.         Goals  Plan Goals:       1. The patient demonstrates weakness of the B ankle.   LTG 2: 12 weeks:  The patient will demonstrate 4+/5 strength for B ankle  dorsiflexion to improve ankle stability.    STATUS:  New   STG 2a: 6 weeks:  The patient will demonstrate 4/5 strength for B ankle dorsiflexion to improve ankle stability.  and extension.    STATUS:  New       2. The patient has gait dysfunction.  LTG 3: 12 weeks:  The patient will ambulate without assistive device, independently, for community distances with minimal limp to the L lower extremity in order to improve mobility and allow patient to perform activities such as grocery shopping with greater ease.  STATUS:  New  STG 3a: The patient will be independent in HEP.  STATUS:  New  TREATMENT: Gait training, aquatic therapy, therapeutic exercise, and home exercise instruction.    3. Mobility: Walking/Moving Around Functional Limitation    LTG 5: 12 weeks:  The patient will demonstrate improve SOSA score to 50/56 to decrease fall risk  STATUS:  New  STG 4a: 6 weeks:  The patient will demonstrate improve FGA score to 45/56 to decrease fall  Risk.    STATUS:  New            Plan  Therapy options: will be seen for skilled therapy services  Planned modality interventions: cryotherapy, TENS and thermotherapy (hydrocollator packs)  Planned therapy interventions: manual therapy, motor coordination training, neuromuscular re-education, postural training, soft tissue mobilization, spinal/joint mobilization, strengthening, therapeutic activities, stretching, gait training, home exercise program, functional ROM exercises, flexibility, fine motor coordination training, body mechanics training, balance/weight-bearing training and abdominal trunk stabilization  Frequency: 1x week  Duration in weeks: 12  Treatment plan discussed with: patient        Visit Diagnoses:    ICD-10-CM ICD-9-CM   1. Falls frequently  R29.6 V15.88   2. Gait disturbance  R26.9 781.2       Timed:         Manual Therapy:    0     mins  78116;     Therapeutic Exercise:    0     mins  98042;     Neuromuscular Debra:    0    mins  87316;    Therapeutic Activity:      0     mins  00796;     Gait Trainin     mins  83222;     Ultrasound:     0     mins  14291;    Ionto                               0    mins   92867  Self-care  __0__ mins 30441    Un-Timed:  Electrical Stimulation:    0     mins  58003 ( );  Traction     0     mins 73970  Low Eval     60     Mins  14100  Mod Eval     0     Mins  42124  High Eval                       0     Mins  01603  Hot pack     0     Mins    Cold pack                       0     Mins      Timed Treatment:   0   mins   Total Treatment:     60   mins    PT SIGNATURE: Jonny Ramirez PT, DPT      Initial Certification  Certification Period: 2022 thru 2022  I certify that the therapy services are furnished while this patient is under my care.  The services outlined above are required by this patient, and will be reviewed every 90 days.     PHYSICIAN: Sera Loza APRN   NPI: [id number]     DATE:     Please sign and return via fax to [phone].. Thank you, [facility].

## 2022-06-29 ENCOUNTER — TREATMENT (OUTPATIENT)
Dept: PHYSICAL THERAPY | Facility: CLINIC | Age: 57
End: 2022-06-29

## 2022-06-29 DIAGNOSIS — R29.6 FALLS FREQUENTLY: Primary | ICD-10-CM

## 2022-06-29 DIAGNOSIS — R26.9 GAIT DISTURBANCE: ICD-10-CM

## 2022-06-29 PROCEDURE — 97110 THERAPEUTIC EXERCISES: CPT | Performed by: PHYSICAL THERAPIST

## 2022-06-29 PROCEDURE — 97112 NEUROMUSCULAR REEDUCATION: CPT | Performed by: PHYSICAL THERAPIST

## 2022-06-29 NOTE — PROGRESS NOTES
Physical Therapy Daily Progress Note      Patient: Valentina Stevens   : 1965  Diagnosis/ICD-10 Code:  Falls frequently [R29.6]  Referring practitioner: WILFRID Eden*  Date of Initial Visit: Type: THERAPY  Noted: 2022  Today's Date: 2022  Patient seen for 2 sessions         Valentina Stevens reports: that she does have neck and low back pain. Is still scheduled for MRI of her neck but not sure if it has been approved by insurance. They told her her insurance wouldn't cover it until she had gone through PT. MD thinks that her balance issue could be from her neck. Other PT thought maybe it was her feet.  She hasn't been doing her exercises at home because she has been really busy.       Objective   Mod fwd head and shoulders posture; poor TA brace  Amb with STC for balance     See Exercise, Manual, and Modality Logs for complete treatment.       Assessment/Plan   Pt demonstrates fair posture in standing. Instructed pt in correction in posture and to perform TA brace throughout the day to assist with LBP. Pt displays min unsteadiness with balance exercises with min UE support this date. Pt given handout with exercises to perform at home. Encouraged pt to perform more often at home.     Progress per Plan of Care; progress core stabilization and postural muscles to assist with balance, improve balance in standing and increase LE strength           Manual Therapy:         mins  72050;  Therapeutic Exercise:    10     mins  39532;     Neuromuscular Debra:    23    mins  38880;    Therapeutic Activity:          mins  27796;     Gait Training:           mins  31693;     Ultrasound:          mins  81087;    Electrical Stimulation:         mins  40562 ( );  Dry Needling          mins self-pay    Timed Treatment:   33   mins   Total Treatment:     33   mins    Delicia Christian, PT , DPT, OCS   Physical Therapist          [place] License # [id number]   [place] License # [id number]                        
Never

## 2022-07-06 ENCOUNTER — TREATMENT (OUTPATIENT)
Dept: PHYSICAL THERAPY | Facility: CLINIC | Age: 57
End: 2022-07-06

## 2022-07-06 DIAGNOSIS — R29.6 FALLS FREQUENTLY: Primary | ICD-10-CM

## 2022-07-06 DIAGNOSIS — R26.9 GAIT DISTURBANCE: ICD-10-CM

## 2022-07-06 PROCEDURE — 97112 NEUROMUSCULAR REEDUCATION: CPT | Performed by: PHYSICAL THERAPIST

## 2022-07-06 PROCEDURE — 97110 THERAPEUTIC EXERCISES: CPT | Performed by: PHYSICAL THERAPIST

## 2022-07-06 NOTE — PROGRESS NOTES
Physical Therapy Daily Progress Note      Patient: Valentina Stevens   : 1965  Diagnosis/ICD-10 Code:  Falls frequently [R29.6]  Referring practitioner: WILFRID Eden*  Date of Initial Visit: Type: THERAPY  Noted: 2022  Today's Date: 2022  Patient seen for 3 sessions         Valentina Stevnes reports that she is doing fine today. No new complaints. She was able to do some of her exercises at home and they are going well. Pt hasn't fallen any since the last session. She did trip over her dog once.       Objective   Mod fwd head and shoulders posture; increased thoracic kyphosis  Poor TA brace  Amb with STC for balance     See Exercise, Manual, and Modality Logs for complete treatment.     HEP:  Access Code: [email or web address]  URL: [email or web address]  Date: 2022  Prepared by: Delicia Christian    Exercises  Standing Row with Anchored Resistance - 1 x daily - 15 reps - 5 sec hold    Assessment/Plan   Focused more treatment on posture due to increased forward shoulder and head posture. Pt had some difficulty with scap retraction and maintaining posture during exercises. Pt displayed min LOB during foam balance exercises and required single UE support for safety.    Progress per Plan of Care; progress core stabilization and postural muscles to assist with balance, improve balance in standing and increase LE strength           Manual Therapy:         mins  44826;  Therapeutic Exercise:    12     mins  64744;     Neuromuscular Debra:    26    mins  05199;    Therapeutic Activity:          mins  17320;     Gait Training:           mins  20270;     Ultrasound:          mins  76398;    Electrical Stimulation:         mins  21647 ( );  Dry Needling          mins self-pay    Timed Treatment:   38   mins   Total Treatment:     38   mins    Delicia Christian PT , DPT, OCS   Physical Therapist          [place] License # [id number]   [place] License # [id number]

## 2022-07-07 ENCOUNTER — TELEPHONE (OUTPATIENT)
Dept: FAMILY MEDICINE CLINIC | Facility: CLINIC | Age: 57
End: 2022-07-07

## 2022-07-07 NOTE — TELEPHONE ENCOUNTER
Caller: Valentina Stevens    Relationship: Self    Best call back number: [phone]    Who are you requesting to speak with (clinical staff, provider,  specific staff member): MEDICAL STAFF    What was the call regarding: PATIENT IS HAVING MUSCLE SPASMS IN HER BACK. SHE WOULD LIKE TO KNOW IF THERE IS AN OVER THE COUNTER MUSCLE RELAXER SHE CAN BUY. SHE WOULD LIKE TO MAKE SURE THEY DO NOT INTERFERE WITH THE MEDICATIONS SHE IS TAKING. PLEASE CALL PATIENT TO ADVISE.

## 2022-07-13 ENCOUNTER — TREATMENT (OUTPATIENT)
Dept: PHYSICAL THERAPY | Facility: CLINIC | Age: 57
End: 2022-07-13

## 2022-07-13 DIAGNOSIS — R26.9 GAIT DISTURBANCE: ICD-10-CM

## 2022-07-13 DIAGNOSIS — R29.6 FALLS FREQUENTLY: Primary | ICD-10-CM

## 2022-07-13 PROCEDURE — 97110 THERAPEUTIC EXERCISES: CPT | Performed by: PHYSICAL THERAPIST

## 2022-07-13 PROCEDURE — 97112 NEUROMUSCULAR REEDUCATION: CPT | Performed by: PHYSICAL THERAPIST

## 2022-07-13 NOTE — PROGRESS NOTES
Physical Therapy Daily Progress Note      Patient: Valentina Stevens   : 1965  Diagnosis/ICD-10 Code:  Falls frequently [R29.6]  Referring practitioner: WILFRID Eden*  Date of Initial Visit: Type: THERAPY  Noted: 2022  Today's Date: 2022  Patient seen for 4 sessions         Valentina Stevens reports that she is doing fine today. No new complaints. No falls recently. She does feel like her balance is starting to improve.       Objective   Mod fwd head and shoulders posture; increased thoracic kyphosis  Poor TA brace  Amb with STC for balance     See Exercise, Manual, and Modality Logs for complete treatment.     HEP:  Access Code: [email or web address]  URL: [email or web address]  Date: 2022  Prepared by: Delicia Christian    Exercises  Walking March - 2 x daily - 3 sets - 10 reps  Standing Hip Abduction with Resistance at Ankles and Counter Support - 2 x daily - 15 reps      Assessment/Plan   Pt demonstrates good progress with balance exercises this date. Min LOB during exercises and only required CGA for step-up with knee . SBA required for all other balance exercises. Pt displays good progress with posture training as well. Did require a few cues to look up rather than down at feet during exercises.      Progress per Plan of Care; progress core stabilization and postural muscles to assist with balance, improve balance in standing and increase LE strength           Manual Therapy:         mins  74913;  Therapeutic Exercise:    12     mins  39018;     Neuromuscular Debra:    26    mins  42933;    Therapeutic Activity:          mins  03184;     Gait Training:           mins  27898;     Ultrasound:          mins  59180;    Electrical Stimulation:         mins  14194 ( );  Dry Needling          mins self-pay    Timed Treatment:   38   mins   Total Treatment:     38   mins    Delicia Christian, PT , DPT, OCS   Physical Therapist          [place] License # [id number]   [place] License #  [id number]

## 2022-07-15 ENCOUNTER — OFFICE VISIT (OUTPATIENT)
Dept: FAMILY MEDICINE CLINIC | Facility: CLINIC | Age: 57
End: 2022-07-15

## 2022-07-15 VITALS
HEART RATE: 102 BPM | DIASTOLIC BLOOD PRESSURE: 80 MMHG | SYSTOLIC BLOOD PRESSURE: 150 MMHG | OXYGEN SATURATION: 97 % | TEMPERATURE: 97.3 F | BODY MASS INDEX: 27.28 KG/M2 | WEIGHT: 154 LBS

## 2022-07-15 DIAGNOSIS — E61.1 IRON DEFICIENCY: ICD-10-CM

## 2022-07-15 DIAGNOSIS — I83.891 VARICOSE VEINS OF RIGHT LOWER EXTREMITY WITH EDEMA: Primary | ICD-10-CM

## 2022-07-15 DIAGNOSIS — R58 ECCHYMOSIS: ICD-10-CM

## 2022-07-15 LAB
BASOPHILS # BLD AUTO: 0.07 10*3/MM3 (ref 0–0.2)
BASOPHILS NFR BLD AUTO: 0.8 % (ref 0–1.5)
DEPRECATED RDW RBC AUTO: 41.5 FL (ref 37–54)
EOSINOPHIL # BLD AUTO: 0.16 10*3/MM3 (ref 0–0.4)
EOSINOPHIL NFR BLD AUTO: 1.9 % (ref 0.3–6.2)
ERYTHROCYTE [DISTWIDTH] IN BLOOD BY AUTOMATED COUNT: 13.4 % (ref 12.3–15.4)
FERRITIN SERPL-MCNC: 118 NG/ML (ref 13–150)
HCT VFR BLD AUTO: 43.6 % (ref 34–46.6)
HGB BLD-MCNC: 14.3 G/DL (ref 12–15.9)
IMM GRANULOCYTES # BLD AUTO: 0.03 10*3/MM3 (ref 0–0.05)
IMM GRANULOCYTES NFR BLD AUTO: 0.3 % (ref 0–0.5)
IRON 24H UR-MRATE: 68 MCG/DL (ref 37–145)
IRON SATN MFR SERPL: 20 % (ref 20–50)
LYMPHOCYTES # BLD AUTO: 2.68 10*3/MM3 (ref 0.7–3.1)
LYMPHOCYTES NFR BLD AUTO: 31.1 % (ref 19.6–45.3)
MCH RBC QN AUTO: 27.8 PG (ref 26.6–33)
MCHC RBC AUTO-ENTMCNC: 32.8 G/DL (ref 31.5–35.7)
MCV RBC AUTO: 84.8 FL (ref 79–97)
MONOCYTES # BLD AUTO: 0.72 10*3/MM3 (ref 0.1–0.9)
MONOCYTES NFR BLD AUTO: 8.4 % (ref 5–12)
NEUTROPHILS NFR BLD AUTO: 4.96 10*3/MM3 (ref 1.7–7)
NEUTROPHILS NFR BLD AUTO: 57.5 % (ref 42.7–76)
NRBC BLD AUTO-RTO: 0 /100 WBC (ref 0–0.2)
PLATELET # BLD AUTO: 259 10*3/MM3 (ref 140–450)
PMV BLD AUTO: 11.7 FL (ref 6–12)
RBC # BLD AUTO: 5.14 10*6/MM3 (ref 3.77–5.28)
TIBC SERPL-MCNC: 344 MCG/DL (ref 298–536)
TRANSFERRIN SERPL-MCNC: 231 MG/DL (ref 200–360)
WBC NRBC COR # BLD: 8.62 10*3/MM3 (ref 3.4–10.8)

## 2022-07-15 PROCEDURE — 82530 CORTISOL FREE: CPT | Performed by: NURSE PRACTITIONER

## 2022-07-15 PROCEDURE — 85025 COMPLETE CBC W/AUTO DIFF WBC: CPT | Performed by: NURSE PRACTITIONER

## 2022-07-15 PROCEDURE — 82728 ASSAY OF FERRITIN: CPT | Performed by: NURSE PRACTITIONER

## 2022-07-15 PROCEDURE — 83540 ASSAY OF IRON: CPT | Performed by: NURSE PRACTITIONER

## 2022-07-15 PROCEDURE — 84466 ASSAY OF TRANSFERRIN: CPT | Performed by: NURSE PRACTITIONER

## 2022-07-15 PROCEDURE — 99214 OFFICE O/P EST MOD 30 MIN: CPT | Performed by: NURSE PRACTITIONER

## 2022-07-15 NOTE — PROGRESS NOTES
Chief Complaint  RT lower leg swollen     Subjective            Valentina Stevens presents to [facility]  History of Present Illness     She reports RLE has been somewhat swollen for the past few days. She has been sitting in a hot bath and putting an ice pack on it, but it hasn't gone down. The feet and ankle are not swollen; just the calf region. She denies any pain with walking. There is no redness or erythema, no warmth or induration of the right leg. She does have a random bruise to the RLE -anterior, and jst below the knee. No known injury. She also notes that she has varicose veins.     She works at [employer] - three, five hour shifts. She is on her feet the whole time that she is here. She is in the process of getting corrective shoes/inserts for bilateral bunions.       Past Medical History:   Diagnosis Date   • Arthritis    • CTS (carpal tunnel syndrome)    • Depression    • Difficulty walking    • Diverticulitis of colon    • Fatty (change of) liver, not elsewhere classified    • GERD (gastroesophageal reflux disease)    • Hearing loss    • Hyperlipemia    • Hypertension    • Hypothyroidism    • Low back pain    • Lumbar radiculopathy 02/15/2022   • Memory loss    • Menopause    • Migraine    • Murmur    • Osteoporosis    • Pneumonia    • Primary central sleep apnea    • Scoliosis    • Seizures (HCC)     last when 10 years old   • Sleep apnea, obstructive    • Vitamin D deficiency        Allergies   Allergen Reactions   • Penicillins Shortness Of Breath and Rash   • Sulfa Antibiotics Hives   • Cephalexin Rash   • Indomethacin Rash        Past Surgical History:   Procedure Laterality Date   • CARPAL TUNNEL RELEASE     • COLONOSCOPY     • EPIDURAL BLOCK     • ESSURE TUBAL LIGATION     • THYMECTOMY     • TUBAL ABDOMINAL LIGATION          Social History     Tobacco Use   • Smoking status: Never Smoker   • Smokeless tobacco: Never Used   Vaping Use   • Vaping Use: Never used    Substance Use Topics   • Alcohol use: Never   • Drug use: Never       Family History   Problem Relation Age of Onset   • Cancer Mother    • Migraines Mother    • Stroke Mother    • Hypertension Father    • Osteoporosis Father    • Colon cancer Neg Hx    • Alcohol abuse Neg Hx         Health Maintenance Due   Topic Date Due   • COVID-19 Vaccine (3 - Booster for Pfizer series) 11/17/2021        Current Outpatient Medications on File Prior to Visit   Medication Sig   • albuterol sulfate  (90 Base) MCG/ACT inhaler Inhale 2 puffs Every 4 (Four) Hours As Needed for Wheezing.   • Calcium Carbonate-Vitamin D3 600-400 MG-UNIT tablet Take 1 tablet by mouth 2 (Two) Times a Day.   • Cholecalciferol (Vitamin D3) 50 MCG (2000 UT) capsule Take 1 capsule by mouth Daily.   • Diclofenac Sodium (VOLTAREN) 1 % gel gel Apply 4 g topically to the appropriate area as directed 4 (Four) Times a Day As Needed (shoulder pain).   • Fremanezumab-vfrm (Ajovy) 225 MG/1.5ML solution auto-injector Inject 225 mg under the skin into the appropriate area as directed Every 30 (Thirty) Days.   • HYDROcodone-acetaminophen (NORCO) 5-325 MG per tablet Take 1 tablet by mouth 2 (Two) Times a Day As Needed.   • ibandronate (Boniva) 150 MG tablet Take 1 tablet by mouth Every 30 (Thirty) Days. Take it first thing in the morning without any other medication and sit up 30 to 60 minutes after taking the medication   • levothyroxine (SYNTHROID, LEVOTHROID) 75 MCG tablet Take 1 tablet by mouth Daily.   • lisinopril (PRINIVIL,ZESTRIL) 2.5 MG tablet Take 1 tablet by mouth Daily.   • multivitamin with minerals tablet tablet Take 1 tablet by mouth Daily.   • polyethylene glycol (MIRALAX) 17 GM/SCOOP powder polyethylene glycol 3350 17 gram oral powder packet   TAKE 17 Grams (ONE PACKET) BY MOUTH DAILY   • pramipexole (MIRAPEX) 0.25 MG tablet Take 1 tablet by mouth Every Night.   • rizatriptan (Maxalt) 10 MG tablet Take 1 tablet by mouth 1 (One) Time As Needed  for Migraine. May repeat in 2 hours if needed   • vitamin E 100 UNIT capsule Take 100 Units by mouth Daily.     No current facility-administered medications on file prior to visit.       Immunization History   Administered Date(s) Administered   • COVID-19 (PFIZER) PURPLE CAP 05/27/2021, 06/17/2021, 06/17/2021   • FluLaval/Fluarix/Fluzone >6 10/19/2021   • Hepatitis A 05/17/2018, 12/20/2018   • Influenza, Unspecified 10/19/2020, 10/19/2020   • Pneumococcal Polysaccharide (PPSV23) 05/10/2022   • Shingrix 11/12/2021, 01/21/2022   • Tdap 03/20/2018, 10/19/2020       Review of Systems     Objective     /80   Pulse 102   Temp 97.3 °F (36.3 °C)   Wt 69.9 kg (154 lb)   SpO2 97%   BMI 27.28 kg/m²       Physical Exam  Vitals reviewed.   Constitutional:       General: She is not in acute distress.     Appearance: Normal appearance. She is well-developed.   HENT:      Head: Normocephalic and atraumatic.   Eyes:      General: No scleral icterus.     Extraocular Movements: Extraocular movements intact.      Conjunctiva/sclera: Conjunctivae normal.   Cardiovascular:      Rate and Rhythm: Normal rate and regular rhythm.      Pulses: Normal pulses.      Heart sounds: No murmur heard.  Pulmonary:      Effort: Pulmonary effort is normal. No respiratory distress.      Breath sounds: Normal breath sounds. No wheezing, rhonchi or rales.   Musculoskeletal:         General: Normal range of motion.      Right lower leg: Swelling (may be mild swelling of the RLE in comparison to the left - there is no erythema, edema, warmth, induration noted - Sridevi's sign negative - NTTP) present. No edema.      Left lower leg: No edema.      Comments: Varicosities are present bilaterally, with the right just slightly worse than the left. There is a small, golf ball sized, bruise just distal to the knee, anterolateral.    Skin:     General: Skin is warm and dry.   Neurological:      Mental Status: She is alert and oriented to person, place, and  time.   Psychiatric:         Mood and Affect: Mood and affect normal.         Behavior: Behavior normal.         Thought Content: Thought content normal.         Judgment: Judgment normal.         Result Review :     The following data was reviewed by: DEMAR Reina on 07/15/2022:    CMP    CMP 5/10/22   Glucose 76   BUN 9   Creatinine 0.68   Sodium 136   Potassium 4.3   Chloride 100   Calcium 10.1   Albumin 4.40   Total Bilirubin 0.9   Alkaline Phosphatase 56   AST (SGOT) 22   ALT (SGPT) 25           CBC    CBC 5/10/22   WBC 9.89   RBC 5.42 (A)   Hemoglobin 15.3   Hematocrit 46.3   MCV 85.4   MCH 28.2   MCHC 33.0   RDW 13.4   Platelets 258   (A) Abnormal value            Ferritin (05/10/2022 12:31)  Iron Profile (05/10/2022 12:31)                Assessment and Plan      Diagnoses and all orders for this visit:    1. Varicose veins of right lower extremity with edema (Primary)  -     Venous w Reflux Lower Extremity - Right/Left CAR; Future    2. Ecchymosis  -     CBC Auto Differential  -     Iron Profile  -     Ferritin  -     Protime-INR  -     APTT    3. Iron deficiency  -     CBC Auto Differential  -     Iron Profile  -     Ferritin  -     Protime-INR  -     APTT            Follow Up     Symptoms and exam does not appear to be consistent with acute DVT.  I suspect venous insufficiency in the setting of varicose veins.  I cannot explain her bruising; however, she was noted to be iron deficient on recent labs back in May, but is not taking an iron supplement.  She states that she is taking a multivitamin that includes iron.  I will check CBC, iron profile, ferritin, PT/INR, PTT.  I will also check venous ultrasound of the right lower extremity with reflux to assess for venous insufficiency.  I have encouraged her to wear a medium grade compression stocking when she is at work, or going to be on her feet for prolonged periods of time.  Follow-up pending labs and ultrasound.  If symptoms were to worsen  acutely such that she would develop pain, redness, or any area of induration, she has been advised to go to the emergency room.    Patient was given instructions and counseling regarding her condition or for health maintenance advice. Please see specific information pulled into the AVS if appropriate.

## 2022-07-18 RX ORDER — RIZATRIPTAN BENZOATE 10 MG/1
TABLET ORAL
Qty: 9 TABLET | Refills: 3 | OUTPATIENT
Start: 2022-07-18

## 2022-07-20 ENCOUNTER — TREATMENT (OUTPATIENT)
Dept: PHYSICAL THERAPY | Facility: CLINIC | Age: 57
End: 2022-07-20

## 2022-07-20 DIAGNOSIS — R26.9 GAIT DISTURBANCE: ICD-10-CM

## 2022-07-20 DIAGNOSIS — R29.6 FALLS FREQUENTLY: Primary | ICD-10-CM

## 2022-07-20 PROCEDURE — 97112 NEUROMUSCULAR REEDUCATION: CPT | Performed by: PHYSICAL THERAPIST

## 2022-07-20 PROCEDURE — 97110 THERAPEUTIC EXERCISES: CPT | Performed by: PHYSICAL THERAPIST

## 2022-07-20 NOTE — PROGRESS NOTES
Physical Therapy Daily Progress Note      Patient: Valentina Stevens   : 1965  Diagnosis/ICD-10 Code:  Falls frequently [R29.6]  Referring practitioner: WILFRID Eden*  Date of Initial Visit: Type: THERAPY  Noted: 2022  Today's Date: 2022  Patient seen for 5 sessions         Valentina Steevns reports that she is having pain in her back today. They are short staffed at work so she has been doing a lot. Standing a lot. Pain is worse with standing/walking. Balance is getting better. She hasn't fallen any since starting PT. She has tripped over the dog a few times, however able to catch herself before she falls down.     Objective   Mod fwd head and shoulders posture; increased thoracic kyphosis  Poor TA brace  Amb with STC for balance   Guarded posture of spine during amb this date    See Exercise, Manual, and Modality Logs for complete treatment.     HEP:  Access Code: [email or web address]  URL: [email or web address]  Date: 2022  Prepared by: Delicia Christian    Exercises  Sidelying Hip Abduction - 1 x daily - 15 reps - 3 sec hold      Assessment/Plan   Focused on LE strengthening with core/spine stabilization with mat exercises this date. Held on most standing exercises due to lower back pain. Pt displays min-mod fatigue in the LE's with exercises. Some improvement in lower back pain noted after exercises. Instructed pt to continue with exercises at home that do not increase her pain and will perform more standing balance exercises here next visit.     Progress per Plan of Care; progress core stabilization and postural muscles to assist with balance, improve balance in standing and increase LE strength           Manual Therapy:         mins  64395;  Therapeutic Exercise:    23     mins  68251;     Neuromuscular Debra:    15    mins  64179;    Therapeutic Activity:          mins  53211;     Gait Training:           mins  46230;     Ultrasound:          mins  16922;    Electrical  Stimulation:         mins  72513 ( );  Dry Needling          mins self-pay    Timed Treatment:   38   mins   Total Treatment:     38   mins    Delicia Christian, PT , DPT, OCS   Physical Therapist          [place] License # [id number]   [place] License # [id number]

## 2022-07-25 ENCOUNTER — TELEPHONE (OUTPATIENT)
Dept: FAMILY MEDICINE CLINIC | Facility: CLINIC | Age: 57
End: 2022-07-25

## 2022-07-25 ENCOUNTER — OFFICE VISIT (OUTPATIENT)
Dept: FAMILY MEDICINE CLINIC | Facility: CLINIC | Age: 57
End: 2022-07-25

## 2022-07-25 VITALS
WEIGHT: 154 LBS | SYSTOLIC BLOOD PRESSURE: 110 MMHG | BODY MASS INDEX: 27.28 KG/M2 | OXYGEN SATURATION: 100 % | HEART RATE: 82 BPM | DIASTOLIC BLOOD PRESSURE: 60 MMHG | TEMPERATURE: 97.7 F

## 2022-07-25 DIAGNOSIS — R58 ECCHYMOSIS: ICD-10-CM

## 2022-07-25 DIAGNOSIS — M54.50 ACUTE BILATERAL LOW BACK PAIN WITHOUT SCIATICA: Primary | ICD-10-CM

## 2022-07-25 DIAGNOSIS — E61.1 IRON DEFICIENCY: ICD-10-CM

## 2022-07-25 DIAGNOSIS — I83.891 VARICOSE VEINS OF RIGHT LOWER EXTREMITY WITH EDEMA: ICD-10-CM

## 2022-07-25 LAB
ALBUMIN SERPL-MCNC: 3.8 G/DL (ref 3.5–5.2)
ALBUMIN/GLOB SERPL: 1.3 G/DL
ALP SERPL-CCNC: 42 U/L (ref 39–117)
ALT SERPL W P-5'-P-CCNC: 30 U/L (ref 1–33)
ANION GAP SERPL CALCULATED.3IONS-SCNC: 12.8 MMOL/L (ref 5–15)
APTT PPP: 34.4 SECONDS (ref 24.2–34.2)
AST SERPL-CCNC: 26 U/L (ref 1–32)
BASOPHILS # BLD AUTO: 0.06 10*3/MM3 (ref 0–0.2)
BASOPHILS NFR BLD AUTO: 0.8 % (ref 0–1.5)
BILIRUB SERPL-MCNC: 0.5 MG/DL (ref 0–1.2)
BUN SERPL-MCNC: 7 MG/DL (ref 6–20)
BUN/CREAT SERPL: 10.9 (ref 7–25)
CALCIUM SPEC-SCNC: 9 MG/DL (ref 8.6–10.5)
CHLORIDE SERPL-SCNC: 104 MMOL/L (ref 98–107)
CO2 SERPL-SCNC: 23.2 MMOL/L (ref 22–29)
CREAT SERPL-MCNC: 0.64 MG/DL (ref 0.57–1)
DEPRECATED RDW RBC AUTO: 39.3 FL (ref 37–54)
EGFRCR SERPLBLD CKD-EPI 2021: 102.6 ML/MIN/1.73
EOSINOPHIL # BLD AUTO: 0.22 10*3/MM3 (ref 0–0.4)
EOSINOPHIL NFR BLD AUTO: 2.8 % (ref 0.3–6.2)
ERYTHROCYTE [DISTWIDTH] IN BLOOD BY AUTOMATED COUNT: 13.4 % (ref 12.3–15.4)
GLOBULIN UR ELPH-MCNC: 3 GM/DL
GLUCOSE SERPL-MCNC: 102 MG/DL (ref 65–99)
HCT VFR BLD AUTO: 41.1 % (ref 34–46.6)
HGB BLD-MCNC: 14 G/DL (ref 12–15.9)
IMM GRANULOCYTES # BLD AUTO: 0.03 10*3/MM3 (ref 0–0.05)
IMM GRANULOCYTES NFR BLD AUTO: 0.4 % (ref 0–0.5)
INR PPP: 1.01 (ref 0.86–1.15)
LYMPHOCYTES # BLD AUTO: 2.55 10*3/MM3 (ref 0.7–3.1)
LYMPHOCYTES NFR BLD AUTO: 33 % (ref 19.6–45.3)
MAGNESIUM SERPL-MCNC: 2.2 MG/DL (ref 1.6–2.6)
MCH RBC QN AUTO: 28.1 PG (ref 26.6–33)
MCHC RBC AUTO-ENTMCNC: 34.1 G/DL (ref 31.5–35.7)
MCV RBC AUTO: 82.5 FL (ref 79–97)
MONOCYTES # BLD AUTO: 0.62 10*3/MM3 (ref 0.1–0.9)
MONOCYTES NFR BLD AUTO: 8 % (ref 5–12)
NEUTROPHILS NFR BLD AUTO: 4.25 10*3/MM3 (ref 1.7–7)
NEUTROPHILS NFR BLD AUTO: 55 % (ref 42.7–76)
NRBC BLD AUTO-RTO: 0 /100 WBC (ref 0–0.2)
PLATELET # BLD AUTO: 232 10*3/MM3 (ref 140–450)
PMV BLD AUTO: 11.3 FL (ref 6–12)
POTASSIUM SERPL-SCNC: 3.8 MMOL/L (ref 3.5–5.2)
PROT SERPL-MCNC: 6.8 G/DL (ref 6–8.5)
PROTHROMBIN TIME: 13.4 SECONDS (ref 11.8–14.9)
RBC # BLD AUTO: 4.98 10*6/MM3 (ref 3.77–5.28)
SODIUM SERPL-SCNC: 140 MMOL/L (ref 136–145)
WBC NRBC COR # BLD: 7.73 10*3/MM3 (ref 3.4–10.8)

## 2022-07-25 PROCEDURE — 80053 COMPREHEN METABOLIC PANEL: CPT | Performed by: FAMILY MEDICINE

## 2022-07-25 PROCEDURE — 99213 OFFICE O/P EST LOW 20 MIN: CPT | Performed by: FAMILY MEDICINE

## 2022-07-25 PROCEDURE — 85730 THROMBOPLASTIN TIME PARTIAL: CPT | Performed by: NURSE PRACTITIONER

## 2022-07-25 PROCEDURE — 83735 ASSAY OF MAGNESIUM: CPT | Performed by: FAMILY MEDICINE

## 2022-07-25 PROCEDURE — 85610 PROTHROMBIN TIME: CPT | Performed by: NURSE PRACTITIONER

## 2022-07-25 PROCEDURE — 85025 COMPLETE CBC W/AUTO DIFF WBC: CPT | Performed by: FAMILY MEDICINE

## 2022-07-25 RX ORDER — BACLOFEN 10 MG/1
10 TABLET ORAL 3 TIMES DAILY PRN
Qty: 30 TABLET | Refills: 1 | Status: SHIPPED | OUTPATIENT
Start: 2022-07-25 | End: 2023-02-11

## 2022-07-25 NOTE — PROGRESS NOTES
Chief Complaint  Spasms (Muscle spasms x one month in back area. )    Subjective          Valentina Stevens presents to [facility]  Back Pain  This is a new problem. Episode onset: 4 weeks. The problem occurs intermittently. The problem is unchanged. The pain is present in the lumbar spine. The quality of the pain is described as aching. The pain does not radiate. The pain is moderate. Pertinent negatives include no abdominal pain, bladder incontinence, bowel incontinence, chest pain, dysuria, fever, headaches, leg pain, numbness, paresis, paresthesias, pelvic pain, perianal numbness, tingling, weakness or weight loss. She has tried heat for the symptoms. The treatment provided no relief.       Objective   Allergies   Allergen Reactions   • Penicillins Shortness Of Breath and Rash   • Sulfa Antibiotics Hives   • Cephalexin Rash   • Indomethacin Rash     Immunization History   Administered Date(s) Administered   • COVID-19 (PFIZER) PURPLE CAP 05/27/2021, 06/17/2021, 06/17/2021   • FluLaval/Fluarix/Fluzone >6 10/19/2021   • Hepatitis A 05/17/2018, 12/20/2018   • Influenza, Unspecified 10/19/2020, 10/19/2020   • Pneumococcal Polysaccharide (PPSV23) 05/10/2022   • Shingrix 11/12/2021, 01/21/2022   • Tdap 03/20/2018, 10/19/2020     Past Medical History:   Diagnosis Date   • Arthritis    • CTS (carpal tunnel syndrome)    • Depression    • Difficulty walking    • Diverticulitis of colon    • Fatty (change of) liver, not elsewhere classified    • GERD (gastroesophageal reflux disease)    • Hearing loss    • Hyperlipemia    • Hypertension    • Hypothyroidism    • Low back pain    • Lumbar radiculopathy 02/15/2022   • Memory loss    • Menopause    • Migraine    • Murmur    • Osteoporosis    • Pneumonia    • Primary central sleep apnea    • Scoliosis    • Seizures (HCC)     last when 10 years old   • Sleep apnea, obstructive    • Vitamin D deficiency       Past Surgical History:   Procedure  Laterality Date   • CARPAL TUNNEL RELEASE     • COLONOSCOPY     • EPIDURAL BLOCK     • ESSURE TUBAL LIGATION     • THYMECTOMY     • TUBAL ABDOMINAL LIGATION        Social History     Socioeconomic History   • Marital status:    • Number of children: 2   Tobacco Use   • Smoking status: Never Smoker   • Smokeless tobacco: Never Used   Vaping Use   • Vaping Use: Never used   Substance and Sexual Activity   • Alcohol use: Never   • Drug use: Never   • Sexual activity: Not Currently     Partners: Female     Birth control/protection: Other     Comment: Tubes tided        Current Outpatient Medications:   •  albuterol sulfate  (90 Base) MCG/ACT inhaler, Inhale 2 puffs Every 4 (Four) Hours As Needed for Wheezing., Disp: 18 g, Rfl: 5  •  Calcium Carbonate-Vitamin D3 600-400 MG-UNIT tablet, Take 1 tablet by mouth 2 (Two) Times a Day., Disp: 60 tablet, Rfl: 5  •  Diclofenac Sodium (VOLTAREN) 1 % gel gel, Apply 4 g topically to the appropriate area as directed 4 (Four) Times a Day As Needed (shoulder pain)., Disp: 350 g, Rfl: 0  •  Fremanezumab-vfrm (Ajovy) 225 MG/1.5ML solution auto-injector, Inject 225 mg under the skin into the appropriate area as directed Every 30 (Thirty) Days., Disp: 1.5 mL, Rfl: 3  •  HYDROcodone-acetaminophen (NORCO) 5-325 MG per tablet, Take 1 tablet by mouth 2 (Two) Times a Day As Needed., Disp: , Rfl:   •  ibandronate (Boniva) 150 MG tablet, Take 1 tablet by mouth Every 30 (Thirty) Days. Take it first thing in the morning without any other medication and sit up 30 to 60 minutes after taking the medication, Disp: 1 tablet, Rfl: 11  •  levothyroxine (SYNTHROID, LEVOTHROID) 75 MCG tablet, Take 1 tablet by mouth Daily., Disp: 30 tablet, Rfl: 5  •  lisinopril (PRINIVIL,ZESTRIL) 2.5 MG tablet, Take 1 tablet by mouth Daily., Disp: 30 tablet, Rfl: 5  •  multivitamin with minerals tablet tablet, Take 1 tablet by mouth Daily., Disp: , Rfl:   •  polyethylene glycol (MIRALAX) 17 GM/SCOOP powder,  polyethylene glycol 3350 17 gram oral powder packet  TAKE 17 Grams (ONE PACKET) BY MOUTH DAILY, Disp: , Rfl:   •  pramipexole (MIRAPEX) 0.25 MG tablet, Take 1 tablet by mouth Every Night., Disp: 30 tablet, Rfl: 5  •  rizatriptan (Maxalt) 10 MG tablet, Take 1 tablet by mouth 1 (One) Time As Needed for Migraine. May repeat in 2 hours if needed, Disp: 9 tablet, Rfl: 3  •  vitamin E 100 UNIT capsule, Take 100 Units by mouth Daily., Disp: , Rfl:   •  baclofen (LIORESAL) 10 MG tablet, Take 1 tablet by mouth 3 (Three) Times a Day As Needed for Muscle Spasms., Disp: 30 tablet, Rfl: 1  •  Cholecalciferol (Vitamin D3) 50 MCG (2000 UT) capsule, Take 1 capsule by mouth Daily., Disp: 30 capsule, Rfl: 5   Family History   Problem Relation Age of Onset   • Cancer Mother    • Migraines Mother    • Stroke Mother    • Hypertension Father    • Osteoporosis Father    • Colon cancer Neg Hx    • Alcohol abuse Neg Hx           Vital Signs:   Vitals:    07/25/22 1438   BP: 110/60   Pulse: 82   Temp: 97.7 °F (36.5 °C)   SpO2: 100%   Weight: 69.9 kg (154 lb)       Review of Systems   Constitutional: Negative for fatigue, fever and weight loss.   Eyes: Negative for visual disturbance.   Cardiovascular: Negative for chest pain.   Gastrointestinal: Negative for abdominal pain and bowel incontinence.   Genitourinary: Negative for bladder incontinence, dysuria and pelvic pain.   Musculoskeletal: Positive for back pain.   Neurological: Negative for dizziness, tingling, syncope, weakness, light-headedness, numbness, headaches and paresthesias.   Psychiatric/Behavioral: Negative for behavioral problems, decreased concentration, dysphoric mood and sleep disturbance. The patient is not nervous/anxious.       Physical Exam  Vitals reviewed.   Constitutional:       Appearance: Normal appearance. She is well-developed.   HENT:      Head: Normocephalic and atraumatic.      Right Ear: External ear normal.      Left Ear: External ear normal.       Mouth/Throat:      Pharynx: No oropharyngeal exudate.   Eyes:      Conjunctiva/sclera: Conjunctivae normal.      Pupils: Pupils are equal, round, and reactive to light.   Cardiovascular:      Rate and Rhythm: Normal rate and regular rhythm.      Pulses: Normal pulses.      Heart sounds: Normal heart sounds. No murmur heard.    No friction rub. No gallop.   Pulmonary:      Effort: Pulmonary effort is normal.      Breath sounds: Normal breath sounds. No wheezing or rhonchi.   Abdominal:      General: Abdomen is flat. Bowel sounds are normal. There is no distension.      Palpations: Abdomen is soft. There is no mass.      Tenderness: There is no abdominal tenderness. There is no guarding or rebound.      Hernia: No hernia is present.   Musculoskeletal:         General: Normal range of motion.      Comments: Bilateral lower back paraspinal muscle tenderness, neg straight leg raise, no swelling, redness, warmth, or bruising.  No vertebrae tenderness.   Skin:     General: Skin is warm and dry.      Capillary Refill: Capillary refill takes less than 2 seconds.   Neurological:      General: No focal deficit present.      Mental Status: She is alert and oriented to person, place, and time.      Cranial Nerves: No cranial nerve deficit.   Psychiatric:         Mood and Affect: Mood and affect normal.         Behavior: Behavior normal.         Thought Content: Thought content normal.         Judgment: Judgment normal.        Result Review :                 Assessment and Plan    Diagnoses and all orders for this visit:    1. Acute bilateral low back pain without sciatica (Primary)  -     baclofen (LIORESAL) 10 MG tablet; Take 1 tablet by mouth 3 (Three) Times a Day As Needed for Muscle Spasms.  Dispense: 30 tablet; Refill: 1  -     CBC Auto Differential  -     Comprehensive Metabolic Panel  -     Magnesium            Follow Up   Return if symptoms worsen or fail to improve.  Patient was given instructions and counseling  regarding her condition or for health maintenance advice. Please see specific information pulled into the AVS if appropriate.

## 2022-07-25 NOTE — TELEPHONE ENCOUNTER
sheree     Caller: Valentina Stevens    Relationship to patient: Self    Best call back number:[phone]    Chief complaint: LOWER BACK PAIN AND SPASMS     Type of visit: SAME DAY APPOINTMENT OR A MYCHART VIDEO  VISIT     Requested date: TODAY , Monday July 25, 2022       Additional notes:PATIENT HAS THREE GRANDCHILDREN  THAT  SHE ASKED IF SHE COULD BRING TO THE APPOINTMENT.   A 3 YEAR OLD , ALMOST 2 YEARS OLD AND A 3 MONTH OLD. [facility] INFORMED PATIENT THAT THE OFFICE WOULD HAVE TO INSTRUCT IF SHE COULD BRING THEM OR NOT. THEREFORE, [facility] DID NOT SCHEDULE SAME DAY APPOINTMENT.         [facility] ATTEMPTED TO WARM TRANSFER TO THE NON CLINICAL NUMBER FOR ADVICE FOR PATIENT AND WAS UNABLE TO DUE SO.    [facility] ALSO ATTEMPTED TO SCHEDULE MYCHART VIDEO VISIT FOR THE PATIENT AND HAD NO AVAILABLE APPOINTMENTS WITH PCP.

## 2022-07-27 ENCOUNTER — TREATMENT (OUTPATIENT)
Dept: PHYSICAL THERAPY | Facility: CLINIC | Age: 57
End: 2022-07-27

## 2022-07-27 DIAGNOSIS — R26.9 GAIT DISTURBANCE: ICD-10-CM

## 2022-07-27 DIAGNOSIS — R29.6 FALLS FREQUENTLY: Primary | ICD-10-CM

## 2022-07-27 PROCEDURE — 97110 THERAPEUTIC EXERCISES: CPT | Performed by: PHYSICAL THERAPIST

## 2022-07-27 PROCEDURE — 97112 NEUROMUSCULAR REEDUCATION: CPT | Performed by: PHYSICAL THERAPIST

## 2022-07-27 NOTE — PROGRESS NOTES
Re-Assessment / Re-Certification        Patient: Valentina Stevens   : 1965  Diagnosis/ICD-10 Code:  Falls frequently [R29.6]  Referring practitioner: WILFRID Eden*  Date of Initial Visit: Type: THERAPY  Noted: 2022  Today's Date: 2022  Patient seen for 6 sessions      Subjective:   Valentina Stevens reports: that she is doing better. Only having occasional neck pain. Also feels like her balance has improved a lot. She isn't using her cane much when walking. She does use the shopping cart to assist her at the grocery. Also does take a RW when shopping with her daughter but only because of lower back pain, not balance. She is able to amb up/down steps with reciprocal pattern and doesn't usually use handrail. Only uses it if low back is hurting. She did see MD about her back yesterday and they prescribed muscle relaxers. They also said PT if medication doesn't help. Pt feels comfortable with d/c from PT at this time.     Outcome Measures: Borges/56; FGA:   Clinical Progress: improved  Home Program Compliance: Yes  Treatment has included: therapeutic exercise, neuromuscular re-education, manual therapy, therapeutic activity and gait training      Objective          Strength/Myotome Testing     Left Hip   Planes of Motion   Flexion: 4+  Abduction: 4  Adduction: 4+    Right Hip   Planes of Motion   Flexion: 4+  Abduction: 4  Adduction: 4+    Left Knee   Flexion: 4+  Extension: 5    Right Knee   Flexion: 4+  Extension: 5    Left Ankle/Foot   Dorsiflexion: 5  Plantar flexion: 5    Right Ankle/Foot   Dorsiflexion: 5  Plantar flexion: 5    Ambulation   Weight-Bearing Status   Assistive device used: none    Ambulation: Level Surfaces   Ambulation without assistive device: independent    Ambulation: Stairs   Ascend stairs: independent  Pattern: reciprocal  Railings: without rails  Descend stairs: independent  Pattern: reciprocal  Railings: without rails  Curbs: independent    Observational Gait    Gait: within functional limits   Walking speed and stride length within functional limits.       Assessment & Plan     Assessment    Assessment details: Pt demonstrates significant improvements in balance as seen by score on the SOSA. Pt also displays min risk for falls on the FGA. Pt displays some unsteadiness with head turns/nods and tandem amb, however able to correct balance easily. Pt displays improvement in LE strength as well with min weakness noted overall. Pt displays improved posture when standing as well as improved OA flexion. Pt displays improved scap stabilization and activation of DNF. Pt has met all goals and is independent with HEP. Pt will be discharged at this time with HEP.     Goals  Plan Goals: LTG 2: 12 weeks:  The patient will demonstrate 4+/5 strength for B ankle dorsiflexion to improve ankle stability. - met  STG 2a: 6 weeks:  The patient will demonstrate 4/5 strength for B ankle dorsiflexion to improve ankle stability. - met  LTG 3: 12 weeks:  The patient will ambulate without assistive device, independently, for community distances with minimal limp to the L lower extremity in order to improve mobility and allow patient to perform activities such as grocery shopping with greater ease. - met  STG 3a: The patient will be independent in HEP.  - met              LTG 5: 12 weeks:  The patient will demonstrate improve SOSA score to 50/56 to decrease fall risk - met  STG 4a: 6 weeks:  The patient will demonstrate improve SOSA score to 45/56 to decrease fall risk. - met    Plan  Plan details: D/c pt with HEP       Progress toward previous goals: All Met    Recommendations: Discharge      PT Signature: Delicia Christian, PT, DPT, OCS     [place] License # [id number]    [place] License # [id number]      Timed:         Manual Therapy:         mins  09367;     Therapeutic Exercise:    10     mins  30801;     Neuromuscular Debra:    15    mins  61750;    Therapeutic Activity:          mins  10403;     Gait  Training:           mins  12766;     Ultrasound:          mins  08647;    Ionto                                   mins   43464  Self Care                            mins   71257    Un-Timed:  Electrical Stimulation:         mins  95127 ( );  Traction          mins 49612  Re-Eval                               mins  22820      Timed Treatment:   25   mins   Total Treatment:     25   mins

## 2022-07-30 LAB — CORTIS F SERPL-MCNC: 0.39 UG/DL

## 2022-08-01 ENCOUNTER — OFFICE VISIT (OUTPATIENT)
Dept: NEUROLOGY | Facility: CLINIC | Age: 57
End: 2022-08-01

## 2022-08-01 VITALS
BODY MASS INDEX: 27.29 KG/M2 | SYSTOLIC BLOOD PRESSURE: 135 MMHG | DIASTOLIC BLOOD PRESSURE: 79 MMHG | WEIGHT: 154 LBS | HEIGHT: 63 IN | HEART RATE: 101 BPM

## 2022-08-01 DIAGNOSIS — G43.019 INTRACTABLE MIGRAINE WITHOUT AURA AND WITHOUT STATUS MIGRAINOSUS: Primary | ICD-10-CM

## 2022-08-01 PROCEDURE — 99213 OFFICE O/P EST LOW 20 MIN: CPT | Performed by: NURSE PRACTITIONER

## 2022-08-01 RX ORDER — RIZATRIPTAN BENZOATE 10 MG/1
10 TABLET ORAL ONCE AS NEEDED
Qty: 9 TABLET | Refills: 5 | Status: SHIPPED | OUTPATIENT
Start: 2022-08-01 | End: 2023-03-09 | Stop reason: SDUPTHER

## 2022-08-01 RX ORDER — FREMANEZUMAB-VFRM 225 MG/1.5ML
225 INJECTION SUBCUTANEOUS
Qty: 1.5 ML | Refills: 5 | Status: SHIPPED | OUTPATIENT
Start: 2022-08-01 | End: 2023-03-09 | Stop reason: SDUPTHER

## 2022-08-01 NOTE — PROGRESS NOTES
"Chief Complaint  Migraine    Subjective          Valentina Shruti Stevens presents to [facility]  Following up for migraines.  Has less than 1 headache days per month on Ajovy.  Maxalt is effective abortive therapy.  Denies side effects.       Objective   Vital Signs:   /79   Pulse 101   Ht 160 cm (63\")   Wt 69.9 kg (154 lb)   BMI 27.28 kg/m²     Physical Exam  HENT:      Head: Normocephalic.   Pulmonary:      Effort: Pulmonary effort is normal.   Neurological:      Mental Status: She is alert and oriented to person, place, and time.      Cranial Nerves: Cranial nerves are intact.      Sensory: Sensation is intact.      Motor: Motor function is intact.      Coordination: Coordination is intact.      Deep Tendon Reflexes: Reflexes are normal and symmetric.        Neurologic Exam     Mental Status   Oriented to person, place, and time.        Result Review :               Assessment and Plan    Diagnoses and all orders for this visit:    1. Intractable migraine without aura and without status migrainosus (Primary)  Assessment & Plan:  Migraines are well controlled with Ajovy.  We will continue Ajovy for preventative therapy.  We will continue Maxalt as needed for abortive therapy.          Other orders  -     Fremanezumab-vfrm (Ajovy) 225 MG/1.5ML solution auto-injector; Inject 225 mg under the skin into the appropriate area as directed Every 30 (Thirty) Days.  Dispense: 1.5 mL; Refill: 5  -     rizatriptan (Maxalt) 10 MG tablet; Take 1 tablet by mouth 1 (One) Time As Needed for Migraine. May repeat in 2 hours if needed  Dispense: 9 tablet; Refill: 5      Follow Up   Return in about 6 months (around 2/1/2023) for Migraine f/u.  Patient was given instructions and counseling regarding her condition or for health maintenance advice. Please see specific information pulled into the AVS if appropriate.       "

## 2022-08-03 ENCOUNTER — HOSPITAL ENCOUNTER (OUTPATIENT)
Dept: CARDIOLOGY | Facility: HOSPITAL | Age: 57
Discharge: HOME | End: 2022-08-03
Admitting: NURSE PRACTITIONER

## 2022-08-03 DIAGNOSIS — I83.891 VARICOSE VEINS OF RIGHT LOWER EXTREMITY WITH EDEMA: ICD-10-CM

## 2022-08-03 LAB
BH CV LOW VAS RIGHT GSV DIST CALF VESSEL: 1
BH CV LOWER VAS RIGHT GSV DIST THIGH COMPRESSIBILTY: NORMAL
BH CV LOWER VAS RIGHT GSV MID CALF COMPRESSIBILTY: NORMAL
BH CV LOWER VAS RIGHT GSV MID THIGH COMPRESSIBILTY: NORMAL
BH CV LOWER VASCULAR LEFT COMMON FEMORAL AUGMENT: NORMAL
BH CV LOWER VASCULAR LEFT COMMON FEMORAL COMPETENT: NORMAL
BH CV LOWER VASCULAR LEFT COMMON FEMORAL COMPRESS: NORMAL
BH CV LOWER VASCULAR LEFT COMMON FEMORAL PHASIC: NORMAL
BH CV LOWER VASCULAR LEFT COMMON FEMORAL SPONT: NORMAL
BH CV LOWER VASCULAR RIGHT COMMON FEMORAL AUGMENT: NORMAL
BH CV LOWER VASCULAR RIGHT COMMON FEMORAL COMPETENT: NORMAL
BH CV LOWER VASCULAR RIGHT COMMON FEMORAL COMPRESS: NORMAL
BH CV LOWER VASCULAR RIGHT COMMON FEMORAL PHASIC: NORMAL
BH CV LOWER VASCULAR RIGHT COMMON FEMORAL SPONT: NORMAL
BH CV LOWER VASCULAR RIGHT DISTAL FEMORAL COMPRESS: NORMAL
BH CV LOWER VASCULAR RIGHT GASTRONEMIUS COMPRESS: NORMAL
BH CV LOWER VASCULAR RIGHT GREATER SAPH BK COMPETENT: NORMAL
BH CV LOWER VASCULAR RIGHT GREATER SAPH BK COMPRESS: NORMAL
BH CV LOWER VASCULAR RIGHT GSV DIST THIGH COMPETENT: NORMAL
BH CV LOWER VASCULAR RIGHT GSV MID CALF COMPETENT: NORMAL
BH CV LOWER VASCULAR RIGHT GSV MID THIGH COMPETENT: NORMAL
BH CV LOWER VASCULAR RIGHT LESSER SAPH COMPETENT: NORMAL
BH CV LOWER VASCULAR RIGHT LESSER SAPH COMPRESS: NORMAL
BH CV LOWER VASCULAR RIGHT MID FEMORAL AUGMENT: NORMAL
BH CV LOWER VASCULAR RIGHT MID FEMORAL COMPETENT: NORMAL
BH CV LOWER VASCULAR RIGHT MID FEMORAL COMPRESS: NORMAL
BH CV LOWER VASCULAR RIGHT MID FEMORAL PHASIC: NORMAL
BH CV LOWER VASCULAR RIGHT MID FEMORAL SPONT: NORMAL
BH CV LOWER VASCULAR RIGHT PERONEAL COMPRESS: NORMAL
BH CV LOWER VASCULAR RIGHT POPLITEAL AUGMENT: NORMAL
BH CV LOWER VASCULAR RIGHT POPLITEAL COMPETENT: NORMAL
BH CV LOWER VASCULAR RIGHT POPLITEAL COMPRESS: NORMAL
BH CV LOWER VASCULAR RIGHT POPLITEAL PHASIC: NORMAL
BH CV LOWER VASCULAR RIGHT POPLITEAL SPONT: NORMAL
BH CV LOWER VASCULAR RIGHT POSTERIOR TIBIAL COMPRESS: NORMAL
BH CV LOWER VASCULAR RIGHT PROXIMAL FEMORAL COMPRESS: NORMAL
BH CV LOWER VASCULAR RIGHT SAPHENOFEMORAL JUNCTION AUGMENT: NORMAL
BH CV LOWER VASCULAR RIGHT SAPHENOFEMORAL JUNCTION COMPETENT: NORMAL
BH CV LOWER VASCULAR RIGHT SAPHENOFEMORAL JUNCTION COMPRESS: NORMAL
BH CV LOWER VASCULAR RIGHT SAPHENOFEMORAL JUNCTION PHASIC: NORMAL
BH CV LOWER VASCULAR RIGHT SAPHENOFEMORAL JUNCTION SPONT: NORMAL
BH CV LOWER VASCULAR RIGHT SSV MID CALF COMPETENT: NORMAL
BH CV LOWER VASCULAR RIGHT SSV MID CALF COMPRESS: NORMAL
BH CV LOWER VASCULAR RIGHT VARICOSITY BK COMPETENT: NORMAL
BH CV LOWER VASCULAR RIGHT VARICOSITY BK COMPRESS: NORMAL
BH CV RIGHT LOWER VAS GSV KNEE REFLUX TIME: 0.95 SEC
BH CV RIGHT LOWER VAS GSV KNEE TRANS DIAMETER: 0.37 CM
BH CV RIGHT LOWER VAS SSV MID CALF REFLUX TIME: 2.33 SEC
MAXIMAL PREDICTED HEART RATE: 162 BPM
STRESS TARGET HR: 138 BPM

## 2022-08-03 PROCEDURE — 93971 EXTREMITY STUDY: CPT | Performed by: SURGERY

## 2022-08-03 PROCEDURE — 93971 EXTREMITY STUDY: CPT

## 2022-08-18 RX ORDER — POLYETHYLENE GLYCOL 3350 17 G/17G
POWDER, FOR SOLUTION ORAL
Qty: 30 PACKET | Refills: 3 | Status: SHIPPED | OUTPATIENT
Start: 2022-08-18 | End: 2022-09-09 | Stop reason: SDUPTHER

## 2022-09-04 ENCOUNTER — HOSPITAL ENCOUNTER (EMERGENCY)
Facility: HOSPITAL | Age: 57
Discharge: HOME | End: 2022-09-04
Attending: STUDENT IN AN ORGANIZED HEALTH CARE EDUCATION/TRAINING PROGRAM | Admitting: STUDENT IN AN ORGANIZED HEALTH CARE EDUCATION/TRAINING PROGRAM

## 2022-09-04 ENCOUNTER — APPOINTMENT (OUTPATIENT)
Dept: GENERAL RADIOLOGY | Facility: HOSPITAL | Age: 57
End: 2022-09-04

## 2022-09-04 VITALS
WEIGHT: 155.87 LBS | TEMPERATURE: 98.3 F | BODY MASS INDEX: 27.62 KG/M2 | OXYGEN SATURATION: 97 % | HEIGHT: 63 IN | SYSTOLIC BLOOD PRESSURE: 124 MMHG | HEART RATE: 68 BPM | DIASTOLIC BLOOD PRESSURE: 57 MMHG | RESPIRATION RATE: 18 BRPM

## 2022-09-04 DIAGNOSIS — M25.562 PAIN AND SWELLING OF LEFT KNEE: Primary | ICD-10-CM

## 2022-09-04 DIAGNOSIS — M25.462 PAIN AND SWELLING OF LEFT KNEE: Primary | ICD-10-CM

## 2022-09-04 PROCEDURE — 96372 THER/PROPH/DIAG INJ SC/IM: CPT

## 2022-09-04 PROCEDURE — 73562 X-RAY EXAM OF KNEE 3: CPT

## 2022-09-04 PROCEDURE — 99283 EMERGENCY DEPT VISIT LOW MDM: CPT

## 2022-09-04 PROCEDURE — 25010000002 KETOROLAC TROMETHAMINE PER 15 MG

## 2022-09-04 RX ORDER — KETOROLAC TROMETHAMINE 30 MG/ML
30 INJECTION, SOLUTION INTRAMUSCULAR; INTRAVENOUS ONCE
Status: COMPLETED | OUTPATIENT
Start: 2022-09-04 | End: 2022-09-04

## 2022-09-04 RX ADMIN — KETOROLAC TROMETHAMINE 30 MG: 30 INJECTION, SOLUTION INTRAMUSCULAR; INTRAVENOUS at 13:21

## 2022-09-04 NOTE — DISCHARGE INSTRUCTIONS
Please wear knee sleeve when up and moving, you can take it off while sitting or going to sleep  Please continue taking Motrin or Tylenol as needed for swelling  Please elevate the leg and apply ice to left knee for 10 to 15-minute increments multiple times throughout the day    Please follow-up with your PCP or Ortho if symptoms do not resolve

## 2022-09-04 NOTE — ED PROVIDER NOTES
Subjective   Patient is a 56-year-old female presenting today due to left pain since Saturday morning.  Patient rates her pain 8 out of 10 while sitting and 10 out of 10 with walking.  She has been taking hydrocodone for the pain with some relief.  She has a history of arthritis as well as previous bilateral patellar fractures with surgery.  She states that she has not been able to ice or wrap the knee due to taking care of her grandchild.  She has a history of arthritis.  She denies recent fall or injury.      History provided by:  Patient   used: No        Review of Systems   Constitutional: Negative.    HENT: Negative.    Eyes: Negative.    Respiratory: Negative.    Cardiovascular: Negative.    Gastrointestinal: Negative.    Endocrine: Negative.    Genitourinary: Negative.    Musculoskeletal: Positive for joint swelling.   Skin: Negative.  Negative for color change and wound.   Allergic/Immunologic: Negative.    Neurological: Negative.    Hematological: Negative.    Psychiatric/Behavioral: Negative.        Past Medical History:   Diagnosis Date   • Arthritis    • CTS (carpal tunnel syndrome)    • Depression    • Difficulty walking    • Diverticulitis of colon    • Fatty (change of) liver, not elsewhere classified    • GERD (gastroesophageal reflux disease)    • Hearing loss    • Hyperlipemia    • Hypertension    • Hypothyroidism    • Low back pain    • Lumbar radiculopathy 02/15/2022   • Memory loss    • Menopause    • Migraine    • Murmur    • Osteoporosis    • Pneumonia    • Primary central sleep apnea    • Scoliosis    • Seizures (HCC)     last when 10 years old   • Sleep apnea, obstructive    • Vitamin D deficiency        Allergies   Allergen Reactions   • Penicillins Shortness Of Breath and Rash   • Sulfa Antibiotics Hives   • Cephalexin Rash   • Indomethacin Rash       Past Surgical History:   Procedure Laterality Date   • CARPAL TUNNEL RELEASE     • COLONOSCOPY     • EPIDURAL BLOCK      • ESSURE TUBAL LIGATION     • THYMECTOMY     • TUBAL ABDOMINAL LIGATION         Family History   Problem Relation Age of Onset   • Cancer Mother    • Migraines Mother    • Stroke Mother    • Hypertension Father    • Osteoporosis Father    • Dementia Sister    • Migraines Sister    • Migraines Sister    • Colon cancer Neg Hx    • Alcohol abuse Neg Hx        Social History     Socioeconomic History   • Marital status:    • Number of children: 2   Tobacco Use   • Smoking status: Never Smoker   • Smokeless tobacco: Never Used   Vaping Use   • Vaping Use: Never used   Substance and Sexual Activity   • Alcohol use: Never   • Drug use: Never   • Sexual activity: Not Currently     Partners: Male     Birth control/protection: Other     Comment: Tubes tided           Objective   Physical Exam  Vitals and nursing note reviewed.   Constitutional:       Appearance: Normal appearance. She is normal weight.   HENT:      Head: Normocephalic and atraumatic.      Nose: Nose normal.      Mouth/Throat:      Mouth: Mucous membranes are moist.   Eyes:      Extraocular Movements: Extraocular movements intact.      Conjunctiva/sclera: Conjunctivae normal.      Pupils: Pupils are equal, round, and reactive to light.   Cardiovascular:      Rate and Rhythm: Normal rate and regular rhythm.      Heart sounds: Normal heart sounds.   Pulmonary:      Effort: Pulmonary effort is normal.      Breath sounds: Normal breath sounds.   Musculoskeletal:         General: Swelling present. No tenderness, deformity or signs of injury.      Cervical back: Normal range of motion and neck supple.      Right knee: Normal.      Left knee: Swelling present. No deformity or erythema. Decreased range of motion. No tenderness.        Legs:    Skin:     General: Skin is warm and dry.      Findings: No bruising or erythema.   Neurological:      General: No focal deficit present.      Mental Status: She is alert and oriented to person, place, and time.    Psychiatric:         Mood and Affect: Mood normal.         Behavior: Behavior normal.         Procedures           ED Course                                           MDM  Number of Diagnoses or Management Options  Diagnosis management comments: I have spoken with patient. I have explained the patient´s condition, diagnoses and treatment plan based on the information available to me at this time. I have answered the patient's questions and addressed any concerns. The patient has a good  understanding of the patient´s diagnosis, condition, and treatment plan as can be expected at this point. The vital signs have been stable. The patient´s condition is stable and appropriate for discharge from the emergency department.      The patient will pursue further outpatient evaluation with the primary care physician or other designated or consulting physician as outlined in the discharge instructions. They are agreeable to this plan of care and follow-up instructions have been explained in detail. The patient has received these instructions in written format and have expressed an understanding of the discharge instructions. The patient is aware that any significant change in condition or worsening of symptoms should prompt an immediate return to this or the closest emergency department or call to 911.         Amount and/or Complexity of Data Reviewed  Tests in the radiology section of CPT®: reviewed  Independent visualization of images, tracings, or specimens: yes    Risk of Complications, Morbidity, and/or Mortality  Presenting problems: low  Diagnostic procedures: low  Management options: low    Patient Progress  Patient progress: stable      Final diagnoses:   Pain and swelling of left knee       ED Disposition  ED Disposition     ED Disposition   Discharge    Condition   Stable    Comment   --             Sera Loza, APRN  [address] [place] [address] [phone]      If symptoms worsen    Rosa,  MD Michael  [address] [place] [id number]  [phone]    Schedule an appointment as soon as possible for a visit   If symptoms worsen         Medication List      No changes were made to your prescriptions during this visit.          Xu Lynn PA-C  09/04/22 2672

## 2022-09-09 ENCOUNTER — OFFICE VISIT (OUTPATIENT)
Dept: FAMILY MEDICINE CLINIC | Facility: CLINIC | Age: 57
End: 2022-09-09

## 2022-09-09 VITALS
HEIGHT: 63 IN | BODY MASS INDEX: 27.61 KG/M2 | WEIGHT: 155.81 LBS | OXYGEN SATURATION: 98 % | TEMPERATURE: 96.7 F | HEART RATE: 108 BPM

## 2022-09-09 DIAGNOSIS — M17.12 ARTHRITIS OF LEFT KNEE: Primary | ICD-10-CM

## 2022-09-09 PROCEDURE — 99213 OFFICE O/P EST LOW 20 MIN: CPT | Performed by: FAMILY MEDICINE

## 2022-09-09 NOTE — PROGRESS NOTES
"Chief Complaint    Hospital Follow Up Visit (Was seen at [facility] on 09/04 for left knee pain/swelling.  Had x-ray, Toradol injection, informed to wear a knee immobilizer and take Motrin for pain.)    Subjective      Valentina Stevens presents to [facility]    History of Present Illness    1.) LEFT KNEE PAIN : Onset - 9.3.22. Patient was evaluated in the ER - [facility] - on 9.4.22. Presented w/ pain and edema. Imaging revealed worsening arthritis. Placed in a brace and given IM NSAID. Advised PRN norco. Provided with a brace. Patient reports that she continue to experience pain. She has been wearing the brace, as prescribed. She would like to hold off regarding any additional evaluation at this time.     Objective     Vital Signs:     Pulse 108   Temp 96.7 °F (35.9 °C) (Temporal)   Ht 160 cm (63\")   Wt 70.7 kg (155 lb 13 oz)   SpO2 98%   BMI 27.60 kg/m²     Physical Exam  Vitals reviewed.   Constitutional:       General: She is not in acute distress.     Appearance: Normal appearance. She is well-developed.   HENT:      Head: Normocephalic and atraumatic.      Right Ear: Hearing and external ear normal.      Left Ear: Hearing and external ear normal.      Nose: Nose normal.   Eyes:      General: Lids are normal.         Right eye: No discharge.         Left eye: No discharge.      Conjunctiva/sclera: Conjunctivae normal.   Pulmonary:      Effort: Pulmonary effort is normal.   Abdominal:      General: There is no distension.   Musculoskeletal:         General: No swelling.      Cervical back: Neck supple.        Legs:       Comments: Brace in place   Skin:     Coloration: Skin is not jaundiced.      Findings: No erythema.   Neurological:      Mental Status: She is alert. Mental status is at baseline.   Psychiatric:         Mood and Affect: Mood and affect normal.         Thought Content: Thought content normal.     Assessment and Plan     Diagnoses and all orders for this " visit:    1. Arthritis of left knee (Primary)    · Patient advised that she can continue to wear brace for compression. If no instability, patient can remove the brace. Recommend PRN NSAID, as advised per ER. Referral to orthopedic surgery offered - patient is declining at this time.     Follow Up : As needed.     Patient was given instructions and counseling regarding her condition or for health maintenance advice. Please see specific information pulled into the AVS if appropriate.

## 2022-09-21 ENCOUNTER — OFFICE VISIT (OUTPATIENT)
Dept: SLEEP MEDICINE | Facility: HOSPITAL | Age: 57
End: 2022-09-21

## 2022-09-21 VITALS
DIASTOLIC BLOOD PRESSURE: 59 MMHG | HEART RATE: 91 BPM | WEIGHT: 157.4 LBS | BODY MASS INDEX: 27.89 KG/M2 | SYSTOLIC BLOOD PRESSURE: 110 MMHG | OXYGEN SATURATION: 94 % | HEIGHT: 63 IN

## 2022-09-21 DIAGNOSIS — Z99.89 OSA ON CPAP: Primary | ICD-10-CM

## 2022-09-21 DIAGNOSIS — G47.33 OSA ON CPAP: Primary | ICD-10-CM

## 2022-09-21 PROCEDURE — G0463 HOSPITAL OUTPT CLINIC VISIT: HCPCS | Performed by: INTERNAL MEDICINE

## 2022-09-21 PROCEDURE — 99213 OFFICE O/P EST LOW 20 MIN: CPT | Performed by: INTERNAL MEDICINE

## 2022-09-21 NOTE — PROGRESS NOTES
"  [address] [address]  Phone: [phone]  Fax: [phone]      SLEEP CLINIC FOLLOW UP PROGRESS NOTE.    Valentina Stevens  [id number]   1965  56 y.o.  female      PCP: Sera Loza APRN      Date of visit: 9/21/2022    Chief Complaint   Patient presents with   • Sleep Apnea       HPI:  This is a 56 y.o. years old patient is here for the management of obstructive sleep apnea.  Used to see Dr. Morrell in the past. Patient is using positive airway pressure therapy with auto CPAP and the symptoms of snoring, non-restorative sleep and daytime excessive sleepiness have improved significantly on the therapy. Normally goes to bed at 10 PM and wakes up at 5:30 AM.  The patient wakes up 2 time(s) during the night and has no problem going back to sleep.  Feels refreshed after waking up.  Patient also denies headaches and nasal congestion.  She works in [employer]    Medications and allergies are reviewed by me and documented in the encounter.     SOCIAL (habits pertaining to sleep medicine)  History tobacco use:No   History of alcohol use: 0 per week  Caffeine use: 1     REVIEW OF SYSTEMS:   [place] Sleepiness Scale :Total score: 6   Nasal congestion:No   Dry mouth/nose:No   Post nasal drip; No   Acid reflux/Heartburn:No   Abd bloating:No   Morning headache:No   Anxiety:No   Depression:No    PHYSICAL EXAMINATION:  CONSTITUTIONAL:  Vitals:    09/21/22 1300   BP: 110/59   Pulse: 91   SpO2: 94%   Weight: 71.4 kg (157 lb 6.4 oz)   Height: 160 cm (62.99\")    Body mass index is 27.89 kg/m².   NOSE: nasal passages are clear, No deformities noted   RESP SYSTEM: Not in any respiratory distress, no chest deformities noted,   CARDIOVASULAR: No edema noted  NEURO: Oriented x 3, gait normal,  Mood and affect appeared appropriate      Data reviewed:  The Smart card downloaded on 9/21/2022 has been reviewed independently by me for compliance and " discussed the data with the patient.   Compliance; 97%  More than 4 hr use, 97%  Average use of the device 7 hours and 10 per night  Residual AHI: 4.1 /hr (goal < 5.0 /hr)  Mask type: Fullface  Device: DreamStation 1  DME: [employer]      ASSESSMENT AND PLAN:  · Obstructive sleep apnea ( G 47.33).  The symptoms of sleep apnea have improved with the device and the treatment.  Patient's compliance with the device is excellent for treatment of sleep apnea.  I have independently reviewed the smart card down load and discussed with the patient the download data and encouarged the patient to continue to use the device.The residual AHI is acceptable. The device is benefiting the patient and the device is medically necessary.  Without proper control of sleep apnea and good compliance there is a increased risk for hypertension, diabetes mellitus and nonrestorative sleep with hypersomnia which can increase risk for motor vehicle accidents.  Untreated sleep apnea is also a risk factor for development of atrial fibrillation, pulmonary hypertension and stroke. The patient is also instructed to get the supplies from the DME company and and change them on a regular basis.  A prescription for supplies has been sent to the [employer] company.  I have also discussed the good sleep hygiene habits and adequate amount of sleep needed for good health.  · Return in about 1 year (around 9/21/2023) for Next scheduled follow-up. . Patient's questions were answered.      Mariajose Smith MD  Sleep Medicine.  Medical Director, [facility]  9/21/2022 ,

## 2022-09-27 ENCOUNTER — OFFICE VISIT (OUTPATIENT)
Dept: FAMILY MEDICINE CLINIC | Facility: CLINIC | Age: 57
End: 2022-09-27

## 2022-09-27 VITALS
SYSTOLIC BLOOD PRESSURE: 118 MMHG | WEIGHT: 156 LBS | OXYGEN SATURATION: 98 % | TEMPERATURE: 97.1 F | HEART RATE: 79 BPM | BODY MASS INDEX: 27.64 KG/M2 | HEIGHT: 63 IN | DIASTOLIC BLOOD PRESSURE: 64 MMHG

## 2022-09-27 DIAGNOSIS — Z86.59 HISTORY OF DEPRESSION: ICD-10-CM

## 2022-09-27 DIAGNOSIS — M25.562 PAIN AND SWELLING OF KNEE, LEFT: ICD-10-CM

## 2022-09-27 DIAGNOSIS — R13.10 DYSPHAGIA, UNSPECIFIED TYPE: ICD-10-CM

## 2022-09-27 DIAGNOSIS — M25.462 PAIN AND SWELLING OF KNEE, LEFT: ICD-10-CM

## 2022-09-27 DIAGNOSIS — G89.29 OTHER CHRONIC PAIN: ICD-10-CM

## 2022-09-27 DIAGNOSIS — Z87.898 HISTORY OF LEARNING DISABILITY: ICD-10-CM

## 2022-09-27 DIAGNOSIS — M25.562 LATERAL KNEE PAIN, LEFT: Primary | ICD-10-CM

## 2022-09-27 PROCEDURE — 99214 OFFICE O/P EST MOD 30 MIN: CPT | Performed by: NURSE PRACTITIONER

## 2022-09-27 NOTE — PROGRESS NOTES
Answers for HPI/ROS submitted by the patient on 9/20/2022  Please describe your symptoms.: Knee pain  Have you had these symptoms before?: Yes  How long have you been having these symptoms?: Greater than 2 weeks  What is the primary reason for your visit?: Other    Chief Complaint  Knee Pain (Left knee pain and speak with you about phych work up. )    Subjective            Valentina Stevens presents to [facility]  History of Present Illness  Pt here for the F/U on the persistent left knee pain and then started 9/4/22--pt reports awakened with it and no injury as she recalls--pain and swelling and was sen at ER dept and they did the xrays and then gave her a brace to wear and then also gave a Toradol shot--persistent pain and swelling--and at times feels like going to give out on her  --all the pain in the meniscal area--used ice and hurt worse and then used heat and helps somewhat--and then pt is also on the norco and still hurting at a level 6/10 on pain scale     Also needs a referral to the psych for the eval of what kind of work could she do--as her back hurts chronically --and then knee pain as well--and then the work she is doing right now--exacerbates the symptoms terribly--and then older sister here and reports that the pt had prior learning 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--and could not graduate from high school --pt reports was 18 yrs old before she graduatated--and just needs further eval with regards to this           PHQ-2 Total Score: 0  PHQ-9 Total Score: 0    Past Medical History:   Diagnosis Date   • Allergic     Drug allergies   • Arthritis    • Asthma    • CTS (carpal tunnel syndrome)    • Depression    • Difficulty walking    • Diverticulitis of colon    • Fatty (change of) liver, not elsewhere classified    • GERD (gastroesophageal reflux disease)    • Hearing loss    • Hyperlipemia    • Hypertension    • Hypothyroidism    • Low back pain    • Lumbar  radiculopathy 02/15/2022   • Memory loss    • Menopause    • Migraine    • Murmur    • Osteopenia    • Osteoporosis    • Pneumonia    • Primary central sleep apnea    • Scoliosis    • Seizures (HCC)     last when 10 years old   • Sleep apnea, obstructive    • Tremor    • Urinary tract infection    • Visual impairment    • Vitamin D deficiency        Allergies   Allergen Reactions   • Penicillins Shortness Of Breath and Rash   • Sulfa Antibiotics Hives   • Cephalexin Rash   • Indomethacin Rash        Past Surgical History:   Procedure Laterality Date   • CARPAL TUNNEL RELEASE     • COLONOSCOPY     • EPIDURAL BLOCK     • ESSURE TUBAL LIGATION     • THYMECTOMY     • TUBAL ABDOMINAL LIGATION          Social History     Tobacco Use   • Smoking status: Never Smoker   • Smokeless tobacco: Never Used   Vaping Use   • Vaping Use: Never used   Substance Use Topics   • Alcohol use: Never   • Drug use: Never       Family History   Problem Relation Age of Onset   • Cancer Mother    • Migraines Mother    • Stroke Mother    • Hypertension Father    • Osteoporosis Father    • Alcohol abuse Father    • Dementia Sister    • Migraines Sister    • Migraines Sister    • Colon cancer Neg Hx         Health Maintenance Due   Topic Date Due   • COVID-19 Vaccine (3 - Booster for Pfizer series) 08/12/2021        Current Outpatient Medications on File Prior to Visit   Medication Sig   • albuterol sulfate  (90 Base) MCG/ACT inhaler Inhale 2 puffs Every 4 (Four) Hours As Needed for Wheezing.   • baclofen (LIORESAL) 10 MG tablet Take 1 tablet by mouth 3 (Three) Times a Day As Needed for Muscle Spasms.   • Calcium Carbonate-Vitamin D3 600-400 MG-UNIT tablet Take 1 tablet by mouth 2 (Two) Times a Day.   • Cholecalciferol (Vitamin D3) 50 MCG (2000 UT) capsule Take 1 capsule by mouth Daily.   • Diclofenac Sodium (VOLTAREN) 1 % gel gel Apply 4 g topically to the appropriate area as directed 4 (Four) Times a Day As Needed (shoulder pain).   •  Fremanezumab-vfrm (Ajovy) 225 MG/1.5ML solution auto-injector Inject 225 mg under the skin into the appropriate area as directed Every 30 (Thirty) Days.   • HYDROcodone-acetaminophen (NORCO) 5-325 MG per tablet Take 1 tablet by mouth 2 (Two) Times a Day As Needed.   • ibandronate (Boniva) 150 MG tablet Take 1 tablet by mouth Every 30 (Thirty) Days. Take it first thing in the morning without any other medication and sit up 30 to 60 minutes after taking the medication   • levothyroxine (SYNTHROID, LEVOTHROID) 75 MCG tablet Take 1 tablet by mouth Daily.   • lisinopril (PRINIVIL,ZESTRIL) 2.5 MG tablet Take 1 tablet by mouth Daily.   • multivitamin with minerals tablet tablet Take 1 tablet by mouth Daily.   • polyethylene glycol (MIRALAX) 17 GM/SCOOP powder polyethylene glycol 3350 17 gram oral powder packet   TAKE 17 Grams (ONE PACKET) BY MOUTH DAILY   • pramipexole (MIRAPEX) 0.25 MG tablet Take 1 tablet by mouth Every Night.   • rizatriptan (Maxalt) 10 MG tablet Take 1 tablet by mouth 1 (One) Time As Needed for Migraine. May repeat in 2 hours if needed   • vitamin E 100 UNIT capsule Take 100 Units by mouth Daily.     No current facility-administered medications on file prior to visit.       Immunization History   Administered Date(s) Administered   • COVID-19 (PFIZER) PURPLE CAP 05/27/2021, 06/17/2021, 06/17/2021   • FluLaval/Fluzone >6mos 10/19/2021   • Hepatitis A 05/17/2018, 12/20/2018   • Influenza, Unspecified 10/19/2020, 10/19/2020   • Pneumococcal Polysaccharide (PPSV23) 05/10/2022   • Shingrix 11/12/2021, 01/21/2022   • Tdap 03/20/2018, 10/19/2020       Review of Systems   Constitutional: Negative for fatigue.   HENT: Negative for trouble swallowing.    Respiratory: Positive for choking and shortness of breath.         With the asthma if flared up --and was checked for this in the past per pt report with regards to the swallowing    Cardiovascular: Negative for chest pain.   Gastrointestinal: Negative for  "abdominal pain.   Genitourinary: Negative for dysuria.   Musculoskeletal: Positive for arthralgias, back pain, gait problem and joint swelling.        Limps with the knee pain and swelling    Neurological: Negative for dizziness and light-headedness.   Psychiatric/Behavioral: Positive for stress. Negative for self-injury and suicidal ideas.        Objective     /64 (BP Location: Left arm, Patient Position: Sitting, Cuff Size: Adult)   Pulse 79   Temp 97.1 °F (36.2 °C) (Temporal)   Ht 160 cm (63\")   Wt 70.8 kg (156 lb)   SpO2 98%   BMI 27.63 kg/m²       Physical Exam  Vitals and nursing note reviewed. Exam conducted with a chaperone present (sister).   Constitutional:       Appearance: Normal appearance.   HENT:      Head: Normocephalic.      Nose: Nose normal.   Eyes:      Pupils: Pupils are equal, round, and reactive to light.   Cardiovascular:      Rate and Rhythm: Normal rate.   Pulmonary:      Effort: Pulmonary effort is normal.   Abdominal:      Palpations: Abdomen is soft.   Musculoskeletal:      Cervical back: Normal range of motion.      Lumbar back: Tenderness and bony tenderness present. Decreased range of motion.      Left knee: Swelling, bony tenderness and crepitus present.      Comments: Inner horn of the lateral meniscal area    Skin:     General: Skin is warm and dry.   Neurological:      Mental Status: She is alert and oriented to person, place, and time.   Psychiatric:         Mood and Affect: Mood normal.         Behavior: Behavior normal.         Thought Content: Thought content normal.         Judgment: Judgment normal.         Result Review :             XR Knee 3 View Left (09/04/2022 12:37)  ED with Zakiya Moncada MD (09/04/2022)               Assessment and Plan      Diagnoses and all orders for this visit:    1. Lateral knee pain, left (Primary)  -     Ambulatory Referral to Orthopedic Surgery  -     MRI Knee Left Without Contrast; Future    2. Pain and swelling of knee, left  - "     Ambulatory Referral to Orthopedic Surgery  -     MRI Knee Left Without Contrast; Future    3. History of learning disability  -     Ambulatory Referral to Psychiatry    4. History of depression  -     Ambulatory Referral to Psychiatry    5. Other chronic pain  -     Ambulatory Referral to Psychiatry    6. Dysphagia, unspecified type  -     FL Video Swallow With Speech Single Contrast; Future            Follow Up     Return if symptoms worsen or fail to improve.

## 2022-09-30 ENCOUNTER — OFFICE VISIT (OUTPATIENT)
Dept: PULMONOLOGY | Facility: CLINIC | Age: 57
End: 2022-09-30

## 2022-09-30 VITALS
HEIGHT: 63 IN | BODY MASS INDEX: 27.64 KG/M2 | TEMPERATURE: 98.2 F | HEART RATE: 75 BPM | OXYGEN SATURATION: 97 % | RESPIRATION RATE: 16 BRPM | DIASTOLIC BLOOD PRESSURE: 81 MMHG | WEIGHT: 156 LBS | SYSTOLIC BLOOD PRESSURE: 120 MMHG

## 2022-09-30 DIAGNOSIS — Z23 ENCOUNTER FOR IMMUNIZATION: ICD-10-CM

## 2022-09-30 DIAGNOSIS — R06.09 DYSPNEA ON EXERTION: ICD-10-CM

## 2022-09-30 DIAGNOSIS — Z99.89 OSA ON CPAP: ICD-10-CM

## 2022-09-30 DIAGNOSIS — I10 HYPERTENSION, UNSPECIFIED TYPE: ICD-10-CM

## 2022-09-30 DIAGNOSIS — R05.9 COUGH: ICD-10-CM

## 2022-09-30 DIAGNOSIS — G47.33 OSA ON CPAP: ICD-10-CM

## 2022-09-30 DIAGNOSIS — J45.20 MILD INTERMITTENT ASTHMA, UNSPECIFIED WHETHER COMPLICATED: Primary | ICD-10-CM

## 2022-09-30 PROCEDURE — 90471 IMMUNIZATION ADMIN: CPT | Performed by: NURSE PRACTITIONER

## 2022-09-30 PROCEDURE — 90686 IIV4 VACC NO PRSV 0.5 ML IM: CPT | Performed by: NURSE PRACTITIONER

## 2022-09-30 PROCEDURE — 99213 OFFICE O/P EST LOW 20 MIN: CPT | Performed by: NURSE PRACTITIONER

## 2022-09-30 NOTE — PATIENT INSTRUCTIONS
Sleep Apnea  Sleep apnea is a condition in which breathing pauses or becomes shallow during sleep. People with sleep apnea usually snore loudly. They may have times when they gasp and stop breathing for 10 seconds or more during sleep. This may happen many times during the night.  Sleep apnea disrupts your sleep and keeps your body from getting the rest that it needs. This condition can increase your risk of certain health problems, including:  Heart attack.  Stroke.  Obesity.  Type 2 diabetes.  Heart failure.  Irregular heartbeat.  High blood pressure.  The goal of treatment is to help you breathe normally again.  What are the causes?  The most common cause of sleep apnea is a collapsed or blocked airway.  There are three kinds of sleep apnea:  Obstructive sleep apnea. This kind is caused by a blocked or collapsed airway.  Central sleep apnea. This kind happens when the part of the brain that controls breathing does not send the correct signals to the muscles that control breathing.  Mixed sleep apnea. This is a combination of obstructive and central sleep apnea.  What increases the risk?  You are more likely to develop this condition if you:  Are overweight.  Smoke.  Have a smaller than normal airway.  Are older.  Are male.  Drink alcohol.  Take sedatives or tranquilizers.  Have a family history of sleep apnea.  Have a tongue or tonsils that are larger than normal.  What are the signs or symptoms?  Symptoms of this condition include:  Trouble staying asleep.  Loud snoring.  Morning headaches.  Waking up gasping.  Dry mouth or sore throat in the morning.  Daytime sleepiness and tiredness.  If you have daytime fatigue because of sleep apnea, you may be more likely to have:  Trouble concentrating.  Forgetfulness.  Irritability or mood swings.  Personality changes.  Feelings of depression.  Sexual dysfunction. This may include loss of interest if you are female, or erectile dysfunction if you are male.  How is this  diagnosed?  This condition may be diagnosed with:  A medical history.  A physical exam.  A series of tests that are done while you are sleeping (sleep study). These tests are usually done in a sleep lab, but they may also be done at home.  How is this treated?  Treatment for this condition aims to restore normal breathing and to ease symptoms during sleep. It may involve managing health issues that can affect breathing, such as high blood pressure or obesity. Treatment may include:  Sleeping on your side.  Using a decongestant if you have nasal congestion.  Avoiding the use of depressants, including alcohol, sedatives, and narcotics.  Losing weight if you are overweight.  Making changes to your diet.  Quitting smoking.  Using a device to open your airway while you sleep, such as:  An oral appliance. This is a custom-made mouthpiece that shifts your lower jaw forward.  A continuous positive airway pressure (CPAP) device. This device blows air through a mask when you breathe out (exhale).  A nasal expiratory positive airway pressure (EPAP) device. This device has valves that you put into each nostril.  A bi-level positive airway pressure (BPAP) device. This device blows air through a mask when you breathe in (inhale) and breathe out (exhale).  Having surgery if other treatments do not work. During surgery, excess tissue is removed to create a wider airway.  Follow these instructions at home:  Lifestyle  Make any lifestyle changes that your health care provider recommends.  Eat a healthy, well-balanced diet.  Take steps to lose weight if you are overweight.  Avoid using depressants, including alcohol, sedatives, and narcotics.  Do not use any products that contain nicotine or tobacco. These products include cigarettes, chewing tobacco, and vaping devices, such as e-cigarettes. If you need help quitting, ask your health care provider.  General instructions  Take over-the-counter and prescription medicines only as told  by your health care provider.  If you were given a device to open your airway while you sleep, use it only as told by your health care provider.  If you are having surgery, make sure to tell your health care provider you have sleep apnea. You may need to bring your device with you.  Keep all follow-up visits. This is important.  Contact a health care provider if:  The device that you received to open your airway during sleep is uncomfortable or does not seem to be working.  Your symptoms do not improve.  Your symptoms get worse.  Get help right away if:  You develop:  Chest pain.  Shortness of breath.  Discomfort in your back, arms, or stomach.  You have:  Trouble speaking.  Weakness on one side of your body.  Drooping in your face.  These symptoms may represent a serious problem that is an emergency. Do not wait to see if the symptoms will go away. Get medical help right away. Call your local emergency services (911 in the [place] Do not drive yourself to the hospital.  Summary  Sleep apnea is a condition in which breathing pauses or becomes shallow during sleep.  The most common cause is a collapsed or blocked airway.  The goal of treatment is to restore normal breathing and to ease symptoms during sleep.  This information is not intended to replace advice given to you by your health care provider. Make sure you discuss any questions you have with your health care provider.  Document Revised: 11/26/2021 Document Reviewed: 11/26/2021  [employer] Patient Education © 2022 [employer]

## 2022-09-30 NOTE — PROGRESS NOTES
Primary Care Provider  Sera Loza APRN     Referring Provider  No ref. provider found     Chief Complaint  Sleep Apnea and Cough (Dry cough- At night)    Subjective          Valentina Stevens presents to [facility]  History of Present Illness  Valentina Stevens is a 56 y.o. female patient of Dr. Simon here for management of mild asthma, obstructive sleep apnea, hypertension and dyspnea on exertion.    Patient states she is doing well since her last visit.  She denies using any antibiotics or steroids for her lungs.  She denies any fevers or chills.  Her shortness of breath is mild in severity, worse with exertion and improved with rest.  She states that she is only used her albuterol a few times in the last 4 months.  She continues to wear her CPAP machine at night and is being managed by sleep medicine.  She does complain of a dry cough at night and states that she was taking allergy medication but this did not help with her symptoms.  Encourage patient try to use her albuterol at night to see if this helps, if not we can try Tessalon Perles.  Patient is also on lisinopril, and states that she has been on this medication for several years.  Educated patient this is a potential side effect of lisinopril even if she has been on it for long periods of time.  Otherwise, she is doing well and has no concerns at this time.  She is able to perform her ADLs without difficulty.  She is up-to-date with her COVID and pneumonia vaccines.  She would like to receive a flu vaccine today in office.     Her history of smoking is   Tobacco Use: Low Risk    • Smoking Tobacco Use: Never Smoker   • Smokeless Tobacco Use: Never Used   .    Review of Systems   Constitutional: Negative for chills, fatigue, fever, unexpected weight gain and unexpected weight loss.   HENT: Negative for congestion (Nasal), hearing loss, mouth sores, nosebleeds, postnasal drip, sore throat and  trouble swallowing.    Eyes: Negative for blurred vision and visual disturbance.   Respiratory: Positive for cough and shortness of breath. Negative for apnea and wheezing.         Negative for Hemoptysis     Cardiovascular: Negative for chest pain, palpitations and leg swelling.   Gastrointestinal: Negative for abdominal pain, constipation, diarrhea, nausea, vomiting and GERD.        Negative for Jaundice  Negative for Bloating  Negative for Melena   Musculoskeletal: Negative for joint swelling and myalgias.        Negative for Joint pain  Negative for Joint stiffness   Skin: Negative for color change.        Negative for cyanosis   Neurological: Negative for syncope, weakness, numbness and headache.      Sleep: Negative for Excessive daytime sleepiness  Negative for morning headaches  Negative for Snoring    Family History   Problem Relation Age of Onset   • Cancer Mother    • Migraines Mother    • Stroke Mother    • Hypertension Father    • Osteoporosis Father    • Alcohol abuse Father    • Dementia Sister    • Migraines Sister    • Migraines Sister    • Colon cancer Neg Hx         Social History     Socioeconomic History   • Marital status:    • Number of children: 2   Tobacco Use   • Smoking status: Never Smoker   • Smokeless tobacco: Never Used   Vaping Use   • Vaping Use: Never used   Substance and Sexual Activity   • Alcohol use: Never   • Drug use: Never   • Sexual activity: Not Currently     Partners: Male     Birth control/protection: Other     Comment: Tubes tided        Past Medical History:   Diagnosis Date   • Allergic     Drug allergies   • Arthritis    • Asthma    • CTS (carpal tunnel syndrome)    • Depression    • Difficulty walking    • Diverticulitis of colon    • Fatty (change of) liver, not elsewhere classified    • GERD (gastroesophageal reflux disease)    • Hearing loss    • Hyperlipemia    • Hypertension    • Hypothyroidism    • Low back pain    • Lumbar radiculopathy 02/15/2022   •  Memory loss    • Menopause    • Migraine    • Murmur    • Osteopenia    • Osteoporosis    • Pneumonia    • Primary central sleep apnea    • Scoliosis    • Seizures (HCC)     last when 10 years old   • Sleep apnea, obstructive    • Tremor    • Urinary tract infection    • Visual impairment    • Vitamin D deficiency         Immunization History   Administered Date(s) Administered   • COVID-19 (PFIZER) PURPLE CAP 05/27/2021, 06/17/2021, 06/17/2021   • FluLaval/Fluzone >6mos 10/19/2021, 09/30/2022   • Hepatitis A 05/17/2018, 12/20/2018   • Influenza, Unspecified 10/19/2020, 10/19/2020   • Pneumococcal Polysaccharide (PPSV23) 05/10/2022   • Shingrix 11/12/2021, 01/21/2022   • Tdap 03/20/2018, 10/19/2020         Allergies   Allergen Reactions   • Penicillins Shortness Of Breath and Rash   • Sulfa Antibiotics Hives   • Cephalexin Rash   • Indomethacin Rash          Current Outpatient Medications:   •  albuterol sulfate  (90 Base) MCG/ACT inhaler, Inhale 2 puffs Every 4 (Four) Hours As Needed for Wheezing., Disp: 18 g, Rfl: 5  •  baclofen (LIORESAL) 10 MG tablet, Take 1 tablet by mouth 3 (Three) Times a Day As Needed for Muscle Spasms., Disp: 30 tablet, Rfl: 1  •  Calcium Carbonate-Vitamin D3 600-400 MG-UNIT tablet, Take 1 tablet by mouth 2 (Two) Times a Day., Disp: 60 tablet, Rfl: 5  •  Cholecalciferol (Vitamin D3) 50 MCG (2000 UT) capsule, Take 1 capsule by mouth Daily., Disp: 30 capsule, Rfl: 5  •  Diclofenac Sodium (VOLTAREN) 1 % gel gel, Apply 4 g topically to the appropriate area as directed 4 (Four) Times a Day As Needed (shoulder pain)., Disp: 350 g, Rfl: 0  •  Fremanezumab-vfrm (Ajovy) 225 MG/1.5ML solution auto-injector, Inject 225 mg under the skin into the appropriate area as directed Every 30 (Thirty) Days., Disp: 1.5 mL, Rfl: 5  •  HYDROcodone-acetaminophen (NORCO) 5-325 MG per tablet, Take 1 tablet by mouth 2 (Two) Times a Day As Needed., Disp: , Rfl:   •  ibandronate (Boniva) 150 MG tablet, Take 1  tablet by mouth Every 30 (Thirty) Days. Take it first thing in the morning without any other medication and sit up 30 to 60 minutes after taking the medication, Disp: 1 tablet, Rfl: 11  •  levothyroxine (SYNTHROID, LEVOTHROID) 75 MCG tablet, Take 1 tablet by mouth Daily., Disp: 30 tablet, Rfl: 5  •  lisinopril (PRINIVIL,ZESTRIL) 2.5 MG tablet, Take 1 tablet by mouth Daily., Disp: 30 tablet, Rfl: 5  •  multivitamin with minerals tablet tablet, Take 1 tablet by mouth Daily., Disp: , Rfl:   •  polyethylene glycol (MIRALAX) 17 GM/SCOOP powder, polyethylene glycol 3350 17 gram oral powder packet  TAKE 17 Grams (ONE PACKET) BY MOUTH DAILY, Disp: , Rfl:   •  pramipexole (MIRAPEX) 0.25 MG tablet, Take 1 tablet by mouth Every Night., Disp: 30 tablet, Rfl: 5  •  rizatriptan (Maxalt) 10 MG tablet, Take 1 tablet by mouth 1 (One) Time As Needed for Migraine. May repeat in 2 hours if needed, Disp: 9 tablet, Rfl: 5  •  vitamin E 100 UNIT capsule, Take 100 Units by mouth Daily., Disp: , Rfl:      Objective   Physical Exam  Constitutional:       General: She is not in acute distress.     Appearance: Normal appearance. She is normal weight.   HENT:      Right Ear: Hearing normal.      Left Ear: Hearing normal.      Nose: No nasal tenderness or congestion.      Mouth/Throat:      Mouth: Mucous membranes are moist. No oral lesions.   Eyes:      Extraocular Movements: Extraocular movements intact.      Pupils: Pupils are equal, round, and reactive to light.   Neck:      Thyroid: No thyroid mass or thyromegaly.   Cardiovascular:      Rate and Rhythm: Normal rate and regular rhythm.      Pulses: Normal pulses.      Heart sounds: Normal heart sounds. No murmur heard.  Pulmonary:      Effort: Pulmonary effort is normal.      Breath sounds: Normal breath sounds. No wheezing, rhonchi or rales.   Chest:   Breasts:      Right: No axillary adenopathy.       Abdominal:      General: Bowel sounds are normal. There is no distension.       "Palpations: Abdomen is soft.      Tenderness: There is no abdominal tenderness.   Musculoskeletal:      Cervical back: Neck supple.      Right lower leg: No edema.      Left lower leg: No edema.   Lymphadenopathy:      Cervical: No cervical adenopathy.      Upper Body:      Right upper body: No axillary adenopathy.   Skin:     General: Skin is warm and dry.      Findings: No lesion or rash.   Neurological:      General: No focal deficit present.      Mental Status: She is alert and oriented to person, place, and time.      Cranial Nerves: Cranial nerves are intact.   Psychiatric:         Mood and Affect: Affect normal. Mood is not anxious or depressed.         Vital Signs:   /81 (BP Location: Left arm, Patient Position: Sitting, Cuff Size: Adult)   Pulse 75   Temp 98.2 °F (36.8 °C) (Temporal)   Resp 16   Ht 160 cm (63\")   Wt 70.8 kg (156 lb)   SpO2 97% Comment: room air  BMI 27.63 kg/m²        Result Review :      CMP    CMP 5/10/22 7/25/22   Glucose 76 102 (A)   BUN 9 7   Creatinine 0.68 0.64   Sodium 136 140   Potassium 4.3 3.8   Chloride 100 104   Calcium 10.1 9.0   Albumin 4.40 3.80   Total Bilirubin 0.9 0.5   Alkaline Phosphatase 56 42   AST (SGOT) 22 26   ALT (SGPT) 25 30   (A) Abnormal value            CBC w/diff    CBC w/Diff 5/10/22 7/15/22 7/25/22   WBC 9.89 8.62 7.73   RBC 5.42 (A) 5.14 4.98   Hemoglobin 15.3 14.3 14.0   Hematocrit 46.3 43.6 41.1   MCV 85.4 84.8 82.5   MCH 28.2 27.8 28.1   MCHC 33.0 32.8 34.1   RDW 13.4 13.4 13.4   Platelets 258 259 232   Neutrophil Rel %  57.5 55.0   Immature Granulocyte Rel %  0.3 0.4   Lymphocyte Rel %  31.1 33.0   Monocyte Rel %  8.4 8.0   Eosinophil Rel %  1.9 2.8   Basophil Rel %  0.8 0.8   (A) Abnormal value            Data reviewed: Radiologic studies Chest CT 1/21/2021, pulmonary function test 5/16/2022 and My last office note   Procedures        Assessment and Plan    Diagnoses and all orders for this visit:    1. Mild intermittent asthma, " unspecified whether complicated (Primary)    2. DEVENDRA on CPAP    3. Dyspnea on exertion    4. Hypertension, unspecified type    5. Cough    6. Encounter for immunization  -     FluLaval/Fluzone >6 mos (2838-5120)    7.  Continue albuterol as needed.  8.  Continue CPAP at current settings.  Follow-up with sleep medicine as scheduled.  9.  Flu vaccine in office today.  10.  Follow-up in 6 monthsHussain, sooner if needed.        Follow Up   Return in about 6 months (around 3/30/2023) for Recheck with Dr. Simon.  Patient was given instructions and counseling regarding her condition or for health maintenance advice. Please see specific information pulled into the AVS if appropriate.

## 2022-10-03 ENCOUNTER — OFFICE VISIT (OUTPATIENT)
Dept: ORTHOPEDIC SURGERY | Facility: CLINIC | Age: 57
End: 2022-10-03

## 2022-10-03 VITALS — WEIGHT: 156 LBS | BODY MASS INDEX: 27.64 KG/M2 | HEIGHT: 63 IN

## 2022-10-03 DIAGNOSIS — M25.562 ACUTE PAIN OF LEFT KNEE: Primary | ICD-10-CM

## 2022-10-03 PROCEDURE — 20610 DRAIN/INJ JOINT/BURSA W/O US: CPT | Performed by: ORTHOPAEDIC SURGERY

## 2022-10-03 RX ORDER — LIDOCAINE HYDROCHLORIDE 10 MG/ML
5 INJECTION, SOLUTION INFILTRATION; PERINEURAL
Status: COMPLETED | OUTPATIENT
Start: 2022-10-03 | End: 2022-10-03

## 2022-10-03 RX ORDER — TRIAMCINOLONE ACETONIDE 40 MG/ML
40 INJECTION, SUSPENSION INTRA-ARTICULAR; INTRAMUSCULAR
Status: COMPLETED | OUTPATIENT
Start: 2022-10-03 | End: 2022-10-03

## 2022-10-03 RX ADMIN — LIDOCAINE HYDROCHLORIDE 5 ML: 10 INJECTION, SOLUTION INFILTRATION; PERINEURAL at 15:03

## 2022-10-03 RX ADMIN — TRIAMCINOLONE ACETONIDE 40 MG: 40 INJECTION, SUSPENSION INTRA-ARTICULAR; INTRAMUSCULAR at 15:03

## 2022-10-03 NOTE — PROGRESS NOTES
"Chief Complaint  Follow-up of the Left Knee     Subjective      Valentina Stevens presents to [facility] for an evaluation of left knee. She is scheduled for a left knee MRI. She has been having increased pain in the left knee. Most of her pain is laterally.     Allergies   Allergen Reactions   • Penicillins Shortness Of Breath and Rash   • Sulfa Antibiotics Hives   • Cephalexin Rash   • Indomethacin Rash        Social History     Socioeconomic History   • Marital status:    • Number of children: 2   Tobacco Use   • Smoking status: Never Smoker   • Smokeless tobacco: Never Used   Vaping Use   • Vaping Use: Never used   Substance and Sexual Activity   • Alcohol use: Never   • Drug use: Never   • Sexual activity: Not Currently     Partners: Male     Birth control/protection: Other     Comment: Tubes tided        Review of Systems     Objective   Vital Signs:   Ht 160 cm (63\")   Wt 70.8 kg (156 lb)   BMI 27.63 kg/m²       Physical Exam  Constitutional:       Appearance: Normal appearance. Patient is well-developed and normal weight.   HENT:      Head: Normocephalic.      Right Ear: Hearing and external ear normal.      Left Ear: Hearing and external ear normal.      Nose: Nose normal.   Eyes:      Conjunctiva/sclera: Conjunctivae normal.   Cardiovascular:      Rate and Rhythm: Normal rate.   Pulmonary:      Effort: Pulmonary effort is normal.      Breath sounds: No wheezing or rales.   Abdominal:      Palpations: Abdomen is soft.      Tenderness: There is no abdominal tenderness.   Musculoskeletal:      Cervical back: Normal range of motion.   Skin:     Findings: No rash.   Neurological:      Mental Status: Patient  is alert and oriented to person, place, and time.   Psychiatric:         Mood and Affect: Mood and affect normal.         Judgment: Judgment normal.       Ortho Exam      LEFT KNEE: mild swelling. Good strength to hamstrings, quadriceps, dorsiflexors and plantar " flexors. Stable to varus/valgus stress. Stable anterior and posterior drawer. Negative Lachman. Full extension and flexion. Dorsal Pedal Pulse 2+, posterior tibialis pulse 2+. Calf soft. Mild tenderness along the joint lines.     Large Joint Arthrocentesis: L knee  Date/Time: 10/3/2022 3:03 PM  Consent given by: patient  Site marked: site marked  Timeout: Immediately prior to procedure a time out was called to verify the correct patient, procedure, equipment, support staff and site/side marked as required   Supporting Documentation  Indications: pain   Procedure Details  Location: knee - L knee  Preparation: Patient was prepped and draped in the usual sterile fashion  Needle size: 22 G  Medications administered: 40 mg triamcinolone acetonide 40 MG/ML; 5 mL lidocaine 1 %  Patient tolerance: patient tolerated the procedure well with no immediate complications              Imaging Results (Most Recent)     None           Result Review :       XR Knee 3 View Left    Result Date: 9/4/2022  Narrative: PROCEDURE: XR KNEE 3 VW LEFT  COMPARISON: [facility], , KNEE 3 VIEWS LT, 9/30/2006, 4:39.  INDICATIONS: LEFT ANTERIOR KNEE PAIN X 09/03  FINDINGS:  BONES: Mild degenerative changes are present in the medial and patellofemoral compartments.  No fractures or acute osseous abnormalities are identified. SOFT TISSUES: Negative.  No visible soft tissue swelling.  EFFUSION: None visible.  OTHER: Negative.       Impression:  Mild degenerative change.  No acute findings are identified.      ALEX VILLALOBOS MD       Electronically Signed and Approved By: ALEX VILLALOBOS MD on 9/04/2022 at 13:10                      Assessment and Plan     Diagnoses and all orders for this visit:    1. Acute pain of left knee (Primary)        Plan for left knee steroid injection.   Left knee steroid injection administered, she tolerated this well.     Call or return if worsening symptoms.    Follow Up     6 weeks       Patient was  given instructions and counseling regarding her condition or for health maintenance advice. Please see specific information pulled into the AVS if appropriate.     Scribed for Bill Oliveira MD by Maggy Schuler.  10/03/22   14:54 EDT      I have personally performed the services described in this document as scribed by the above individual and it is both accurate and complete. Bill Oliveira MD 10/03/22   Answers for HPI/ROS submitted by the patient on 9/30/2022  Please describe your symptoms.: Knee pain  Have you had these symptoms before?: No  How long have you been having these symptoms?: Greater than 2 weeks  What is the primary reason for your visit?: Other

## 2022-10-07 NOTE — PROGRESS NOTES
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 medication as well as the risks, benefits, and side effects including potential falls, possible impaired driving and metabolic adversities among others. Patient is agreeable to call the office with any worsening of symptoms or onset of side effects. Patient is agreeable to call 911 or go to the nearest ER should he/she begin having SI/HI. No medication side effects or related complaints today.     MEDS ORDERED DURING VISIT:  No orders of the defined types were placed in this encounter.      Return if symptoms worsen or fail to improve.         This document has been electronically signed by Kirk Eubanks MD  October 10, 2022 09:37 EDT    Dictated Utilizing Dragon Dictation: Part of this note may be an electronic transcription/translation of spoken language to printed text using the Dragon Dictation System.

## 2022-10-07 NOTE — PATIENT INSTRUCTIONS
1.  Please return to clinic at your next scheduled visit.  Contact the clinic ([phone]) at least 24 hours prior in the event you need to cancel.  2.  Do no harm to yourself or others.    3.  Avoid alcohol and drugs.    4.  Take all medications as prescribed.  Please contact the clinic with any concerns. If you are in need of medication refills, please call the clinic at [phone].    5. Should you want to get in touch with your provider, Dr. Kirk Eubanks, please utilize [employer] or contact the office ([phone]), and staff will be able to page Dr. Eubanks directly.  6.  In the event you have personal crisis, contact the following crisis numbers: Suicide Prevention Hotline 1-336.267.1285; [facility] Helpline 1-691-376-IXON; [facility] Emergency Room [phone]; text HELLO to 430944; or 775.     SPECIFIC RECOMMENDATIONS:     1.      Medications discussed at this encounter:                   -None     2.      Psychotherapy recommendations:      3.     Return to clinic: As needed

## 2022-10-10 ENCOUNTER — OFFICE VISIT (OUTPATIENT)
Dept: PSYCHIATRY | Facility: CLINIC | Age: 57
End: 2022-10-10

## 2022-10-10 VITALS
DIASTOLIC BLOOD PRESSURE: 67 MMHG | BODY MASS INDEX: 27.46 KG/M2 | WEIGHT: 155 LBS | SYSTOLIC BLOOD PRESSURE: 131 MMHG | HEIGHT: 63 IN

## 2022-10-10 DIAGNOSIS — F81.9 LEARNING DISABILITY: Primary | ICD-10-CM

## 2022-10-10 DIAGNOSIS — R56.9 SEIZURES: ICD-10-CM

## 2022-10-10 PROBLEM — M47.816 LUMBAR SPONDYLOSIS: Status: ACTIVE | Noted: 2022-05-26

## 2022-10-10 PROCEDURE — 90792 PSYCH DIAG EVAL W/MED SRVCS: CPT | Performed by: STUDENT IN AN ORGANIZED HEALTH CARE EDUCATION/TRAINING PROGRAM

## 2022-10-13 ENCOUNTER — OFFICE VISIT (OUTPATIENT)
Dept: FAMILY MEDICINE CLINIC | Facility: CLINIC | Age: 57
End: 2022-10-13

## 2022-10-13 VITALS
WEIGHT: 155 LBS | HEART RATE: 83 BPM | TEMPERATURE: 97.4 F | SYSTOLIC BLOOD PRESSURE: 124 MMHG | BODY MASS INDEX: 27.46 KG/M2 | DIASTOLIC BLOOD PRESSURE: 68 MMHG | OXYGEN SATURATION: 97 %

## 2022-10-13 DIAGNOSIS — R56.9 SEIZURES: ICD-10-CM

## 2022-10-13 DIAGNOSIS — R90.82 WHITE MATTER DISEASE, UNSPECIFIED: ICD-10-CM

## 2022-10-13 DIAGNOSIS — H53.9 VISUAL CHANGES: ICD-10-CM

## 2022-10-13 DIAGNOSIS — R29.6 FREQUENT FALLS: Primary | ICD-10-CM

## 2022-10-13 DIAGNOSIS — Z87.898 HISTORY OF LEARNING DISABILITY: ICD-10-CM

## 2022-10-13 PROCEDURE — 99214 OFFICE O/P EST MOD 30 MIN: CPT | Performed by: NURSE PRACTITIONER

## 2022-10-13 NOTE — PROGRESS NOTES
Answers for HPI/ROS submitted by the patient on 10/13/2022  Please describe your symptoms.: Follow up after fall check right knee  Have you had these symptoms before?: No  How long have you been having these symptoms?: 5-7 days  What is the primary reason for your visit?: Other    Chief Complaint  Follow-up (Patient has been falling and she hurt her Right Knee.  The knee is feeling better, she did go to Urgent Care, but she is still falling. She doesn't understand why she is falling. )    Subjective            Valentina Stevens presents to [facility]  History of Present Illness  Pt here today for the F/U on the recent falls--in the past 2 weeks pt reports fell for no known reason 3 times--and then this last time she hurt her knees and was seen in the urgent care--    Pt also reports that in the past 2 weeks right eye line of vision changes like a black spot in her vision and even took off glasses and still there --was seeing Dr Bojorquez--and he moved and she will have to find a new eye MD     Pt even did prior PT this summer for the freq falls and they even adjusted her cane and pt even uses the walker if out walking shopping a lot      And also in pain mgt and they did spinal injections and did nothing and now just managed with meds     No med changes in the past 2 weeks     Pt reports will be just walking a long and then fall     __________________________________________    Pt also saw DR Eubanks and he eval'd her and would like her eval'd per Reina ([phone]) and Ricky ([phone])       PHQ-2 Total Score:    PHQ-9 Total Score:      Past Medical History:   Diagnosis Date   • Allergic     Drug allergies   • Arthritis    • Asthma    • CTS (carpal tunnel syndrome)    • Depression    • Difficulty walking    • Diverticulitis of colon    • Fatty (change of) liver, not elsewhere classified    • GERD (gastroesophageal reflux disease)    • Hearing loss    • Hyperlipemia    •  Hypertension    • Hypothyroidism    • Low back pain    • Lumbar radiculopathy 02/15/2022   • Memory loss    • Menopause    • Migraine    • Murmur    • Osteopenia    • Osteoporosis    • Pneumonia    • Primary central sleep apnea    • Scoliosis    • Seizures (HCC)     last when 10 years old   • Sleep apnea, obstructive    • Tremor    • Urinary tract infection    • Visual impairment    • Vitamin D deficiency        Allergies   Allergen Reactions   • Penicillins Shortness Of Breath and Rash   • Sulfa Antibiotics Hives   • Cephalexin Rash   • Indomethacin Rash        Past Surgical History:   Procedure Laterality Date   • CARPAL TUNNEL RELEASE     • COLONOSCOPY     • EPIDURAL BLOCK     • ESSURE TUBAL LIGATION     • SKIN BIOPSY     • THYMECTOMY     • TUBAL ABDOMINAL LIGATION          Social History     Tobacco Use   • Smoking status: Never   • Smokeless tobacco: Never   Vaping Use   • Vaping Use: Never used   Substance Use Topics   • Alcohol use: Never   • Drug use: Never       Family History   Problem Relation Age of Onset   • Cancer Mother    • Migraines Mother    • Stroke Mother    • Hypertension Father    • Osteoporosis Father    • Alcohol abuse Father    • Dementia Sister    • Migraines Sister    • Migraines Sister    • Colon cancer Neg Hx         There are no preventive care reminders to display for this patient.     Current Outpatient Medications on File Prior to Visit   Medication Sig   • albuterol sulfate  (90 Base) MCG/ACT inhaler Inhale 2 puffs Every 4 (Four) Hours As Needed for Wheezing.   • baclofen (LIORESAL) 10 MG tablet Take 1 tablet by mouth 3 (Three) Times a Day As Needed for Muscle Spasms.   • Calcium Carbonate-Vitamin D3 600-400 MG-UNIT tablet Take 1 tablet by mouth 2 (Two) Times a Day.   • Cholecalciferol (Vitamin D3) 50 MCG (2000 UT) capsule Take 1 capsule by mouth Daily.   • Diclofenac Sodium (VOLTAREN) 1 % gel gel Apply 4 g topically to the appropriate area as directed 4 (Four) Times a Day As  Needed (shoulder pain).   • Fremanezumab-vfrm (Ajovy) 225 MG/1.5ML solution auto-injector Inject 225 mg under the skin into the appropriate area as directed Every 30 (Thirty) Days.   • HYDROcodone-acetaminophen (NORCO) 5-325 MG per tablet Take 1 tablet by mouth 2 (Two) Times a Day As Needed.   • ibandronate (Boniva) 150 MG tablet Take 1 tablet by mouth Every 30 (Thirty) Days. Take it first thing in the morning without any other medication and sit up 30 to 60 minutes after taking the medication   • levothyroxine (SYNTHROID, LEVOTHROID) 75 MCG tablet Take 1 tablet by mouth Daily.   • lisinopril (PRINIVIL,ZESTRIL) 2.5 MG tablet Take 1 tablet by mouth Daily.   • multivitamin with minerals tablet tablet Take 1 tablet by mouth Daily.   • polyethylene glycol (MIRALAX) 17 GM/SCOOP powder polyethylene glycol 3350 17 gram oral powder packet   TAKE 17 Grams (ONE PACKET) BY MOUTH DAILY   • pramipexole (MIRAPEX) 0.25 MG tablet Take 1 tablet by mouth Every Night.   • rizatriptan (Maxalt) 10 MG tablet Take 1 tablet by mouth 1 (One) Time As Needed for Migraine. May repeat in 2 hours if needed   • vitamin E 100 UNIT capsule Take 100 Units by mouth Daily.     No current facility-administered medications on file prior to visit.       Immunization History   Administered Date(s) Administered   • COVID-19 (PFIZER) PURPLE CAP 05/27/2021, 06/17/2021, 06/17/2021   • FluLaval/Fluzone >6mos 10/19/2021, 09/30/2022   • Hepatitis A 05/17/2018, 12/20/2018   • Influenza, Unspecified 10/19/2020, 10/19/2020   • Pneumococcal Polysaccharide (PPSV23) 05/10/2022   • Shingrix 11/12/2021, 01/21/2022   • Tdap 03/20/2018, 10/19/2020       Review of Systems   Constitutional: Negative for fever.   HENT: Negative.  Negative for ear pain.    Eyes: Positive for visual disturbance.        Like  Black spot in the right eyes vision and even took glasses off and still there--started 2 weeks ago--   Respiratory: Negative for shortness of breath.         Hx of asthma  and unchanged    Cardiovascular: Negative for chest pain.   Gastrointestinal: Negative for abdominal pain.   Genitourinary: Negative for dysuria and vaginal bleeding.   Musculoskeletal: Positive for arthralgias and back pain.        Chronic occasionally numbness or tingling to the legs if standing too long    Skin: Negative for wound.   Neurological: Negative for dizziness, seizures, syncope, light-headedness and headache.        Just freq falls --and HA's improved since the injections for migraines (Ajovy)    Psychiatric/Behavioral: Negative for self-injury and suicidal ideas.        Objective     /68 (BP Location: Right arm, Patient Position: Sitting, Cuff Size: Adult)   Pulse 83   Temp 97.4 °F (36.3 °C) (Temporal)   Wt 70.3 kg (155 lb)   SpO2 97%   BMI 27.46 kg/m²       Physical Exam  Vitals and nursing note reviewed.   Constitutional:       Appearance: Normal appearance.   HENT:      Head: Normocephalic.      Right Ear: Tympanic membrane, ear canal and external ear normal.      Left Ear: Tympanic membrane, ear canal and external ear normal.      Ears:      Comments: And wears hearing aids      Nose: Nose normal.      Mouth/Throat:      Mouth: Mucous membranes are moist.   Eyes:      Pupils: Pupils are equal, round, and reactive to light.   Cardiovascular:      Rate and Rhythm: Normal rate and regular rhythm.      Heart sounds: Normal heart sounds.   Pulmonary:      Effort: Pulmonary effort is normal.      Breath sounds: Normal breath sounds.   Abdominal:      Palpations: Abdomen is soft.   Musculoskeletal:      Cervical back: Normal range of motion.      Comments: Chronic back pain    Skin:     General: Skin is warm and dry.   Neurological:      Mental Status: She is alert and oriented to person, place, and time.   Psychiatric:         Mood and Affect: Mood normal.         Behavior: Behavior normal.         Thought Content: Thought content normal.         Judgment: Judgment normal.         Result  Review :           UC with Taqui, Humera, MD (10/05/2022)  Office Visit with Bill Oliveira MD (10/03/2022)  Office Visit with Kirk Eubanks MD (10/10/2022)  MRI Brain With & Without Contrast (12/08/2020 09:28)  XR Knee 4+ View Left (10/05/2022 18:40)  XR Knee 4+ View Right (10/05/2022 18:41)  Treatment with Delicia Christian PT (07/13/2022)  Treatment with Jonny Ramirez PT (06/24/2022)             Assessment and Plan      Diagnoses and all orders for this visit:    1. Frequent falls (Primary)  -     MRI Brain With & Without Contrast; Future    2. Visual changes  -     MRI Brain With & Without Contrast; Future    3. White matter disease, unspecified  -     MRI Brain With & Without Contrast; Future    4. History of learning disability  -     Ambulatory Referral to Neuropsychology    5. Seizures (HCC)  -     Ambulatory Referral to Neuropsychology    pt will need to start using her cane and or walker more freq--since her falls are more freq --if possible reports unable to use this at work         Follow Up     Return if symptoms worsen or fail to improve.

## 2022-10-14 ENCOUNTER — PATIENT ROUNDING (BHMG ONLY) (OUTPATIENT)
Dept: PSYCHIATRY | Facility: CLINIC | Age: 57
End: 2022-10-14

## 2022-10-14 NOTE — PROGRESS NOTES
October 14, 2022    Hello, may I speak with Valentina Stevens?    My name is MEL     I am  with [facility]  [facility]  [address]  [place] [address] [phone].    Before we get started may I verify your date of birth? 1965    I am calling to officially welcome you to our practice and ask about your recent visit. Is this a good time to talk? yes    Tell me about your visit with us. What things went well?  HE GAVE ME THE NUMBER OF A THERAPIST TO SEE       We're always looking for ways to make our patients' experiences even better. Do you have recommendations on ways we may improve? NO    Overall were you satisfied with your first visit to our practice?YES     I appreciate you taking the time to speak with me today. Is there anything else I can do for you? NO    Thank you, and have a great day.

## 2022-10-26 ENCOUNTER — OFFICE VISIT (OUTPATIENT)
Dept: FAMILY MEDICINE CLINIC | Facility: CLINIC | Age: 57
End: 2022-10-26

## 2022-10-26 VITALS
OXYGEN SATURATION: 96 % | BODY MASS INDEX: 28 KG/M2 | DIASTOLIC BLOOD PRESSURE: 75 MMHG | TEMPERATURE: 97.3 F | WEIGHT: 158 LBS | HEIGHT: 63 IN | SYSTOLIC BLOOD PRESSURE: 128 MMHG | HEART RATE: 76 BPM

## 2022-10-26 DIAGNOSIS — I10 HYPERTENSION, UNSPECIFIED TYPE: ICD-10-CM

## 2022-10-26 DIAGNOSIS — E03.9 HYPOTHYROIDISM, UNSPECIFIED TYPE: ICD-10-CM

## 2022-10-26 DIAGNOSIS — R53.83 TIRED: ICD-10-CM

## 2022-10-26 DIAGNOSIS — Z12.31 SCREENING MAMMOGRAM FOR BREAST CANCER: ICD-10-CM

## 2022-10-26 DIAGNOSIS — E55.9 VITAMIN D DEFICIENCY: ICD-10-CM

## 2022-10-26 DIAGNOSIS — N84.1 CERVICAL POLYP: ICD-10-CM

## 2022-10-26 DIAGNOSIS — Z01.419 ENCOUNTER FOR WELL WOMAN EXAM WITH ROUTINE GYNECOLOGICAL EXAM: Primary | ICD-10-CM

## 2022-10-26 DIAGNOSIS — E78.2 MIXED HYPERLIPIDEMIA: ICD-10-CM

## 2022-10-26 DIAGNOSIS — E66.3 OVERWEIGHT (BMI 25.0-29.9): ICD-10-CM

## 2022-10-26 PROCEDURE — 82607 VITAMIN B-12: CPT | Performed by: NURSE PRACTITIONER

## 2022-10-26 PROCEDURE — 36415 COLL VENOUS BLD VENIPUNCTURE: CPT | Performed by: NURSE PRACTITIONER

## 2022-10-26 PROCEDURE — 80053 COMPREHEN METABOLIC PANEL: CPT | Performed by: NURSE PRACTITIONER

## 2022-10-26 PROCEDURE — G0123 SCREEN CERV/VAG THIN LAYER: HCPCS | Performed by: NURSE PRACTITIONER

## 2022-10-26 PROCEDURE — 85025 COMPLETE CBC W/AUTO DIFF WBC: CPT | Performed by: NURSE PRACTITIONER

## 2022-10-26 PROCEDURE — 81003 URINALYSIS AUTO W/O SCOPE: CPT | Performed by: NURSE PRACTITIONER

## 2022-10-26 PROCEDURE — 82746 ASSAY OF FOLIC ACID SERUM: CPT | Performed by: NURSE PRACTITIONER

## 2022-10-26 PROCEDURE — 99396 PREV VISIT EST AGE 40-64: CPT | Performed by: NURSE PRACTITIONER

## 2022-10-26 PROCEDURE — 87624 HPV HI-RISK TYP POOLED RSLT: CPT | Performed by: NURSE PRACTITIONER

## 2022-10-26 PROCEDURE — 82306 VITAMIN D 25 HYDROXY: CPT | Performed by: NURSE PRACTITIONER

## 2022-10-26 PROCEDURE — 80061 LIPID PANEL: CPT | Performed by: NURSE PRACTITIONER

## 2022-10-26 PROCEDURE — 84443 ASSAY THYROID STIM HORMONE: CPT | Performed by: NURSE PRACTITIONER

## 2022-10-26 RX ORDER — POLYETHYLENE GLYCOL 3350 17 G/17G
POWDER, FOR SOLUTION ORAL
COMMUNITY
Start: 2022-10-14 | End: 2022-11-07

## 2022-10-26 RX ORDER — BENZONATATE 200 MG/1
CAPSULE ORAL
COMMUNITY
Start: 2022-10-24 | End: 2023-03-29

## 2022-10-26 NOTE — PROGRESS NOTES
Handle urine and pap  Answers for HPI/ROS submitted by the patient on 10/19/2022  What is the primary reason for your visit?: Physical

## 2022-10-26 NOTE — PROGRESS NOTES
Answers for HPI/ROS submitted by the patient on 10/19/2022  What is the primary reason for your visit?: Physical    Chief Complaint  Gynecologic Exam (Yearly exam )    Subjective            Valentina Stevens presents to [facility]  History of Present Illness  Patient is here for her yearly annual gynecologic physical exam and will need her mammogram ordered as well    Wears her seatbelt    Never a smoker    Does not need any refills or labs and already received her updated immunizations      PHQ-2 Total Score:    PHQ-9 Total Score:      Past Medical History:   Diagnosis Date   • Allergic     Drug allergies   • Arthritis    • Asthma    • CTS (carpal tunnel syndrome)    • Depression    • Difficulty walking    • Diverticulitis of colon    • Fatty (change of) liver, not elsewhere classified    • GERD (gastroesophageal reflux disease)    • Hearing loss    • Hyperlipemia    • Hypertension    • Hypothyroidism    • Low back pain    • Lumbar radiculopathy 02/15/2022   • Memory loss    • Menopause    • Migraine    • Murmur    • Osteopenia    • Osteoporosis    • Pneumonia    • Primary central sleep apnea    • Scoliosis    • Seizures (HCC)     last when 10 years old   • Sleep apnea, obstructive    • Tremor    • Urinary tract infection    • Visual impairment    • Vitamin D deficiency        Allergies   Allergen Reactions   • Penicillins Shortness Of Breath and Rash   • Sulfa Antibiotics Hives   • Cephalexin Rash   • Indomethacin Rash        Past Surgical History:   Procedure Laterality Date   • CARPAL TUNNEL RELEASE     • COLONOSCOPY     • EPIDURAL BLOCK     • ESSURE TUBAL LIGATION     • SKIN BIOPSY     • THYMECTOMY     • TUBAL ABDOMINAL LIGATION          Social History     Tobacco Use   • Smoking status: Never   • Smokeless tobacco: Never   Vaping Use   • Vaping Use: Never used   Substance Use Topics   • Alcohol use: Never   • Drug use: Never       Family History   Problem Relation Age of Onset    • Cancer Mother    • Migraines Mother    • Stroke Mother    • Hypertension Father    • Osteoporosis Father    • Alcohol abuse Father    • Dementia Sister    • Migraines Sister    • Migraines Sister    • Colon cancer Neg Hx         Health Maintenance Due   Topic Date Due   • COVID-19 Vaccine (3 - Booster for Pfizer series) 08/12/2021   • ANNUAL PHYSICAL  10/20/2022        Current Outpatient Medications on File Prior to Visit   Medication Sig   • albuterol sulfate  (90 Base) MCG/ACT inhaler Inhale 2 puffs Every 4 (Four) Hours As Needed for Wheezing.   • baclofen (LIORESAL) 10 MG tablet Take 1 tablet by mouth 3 (Three) Times a Day As Needed for Muscle Spasms.   • benzonatate (TESSALON) 200 MG capsule TAKE 1 CAPSULE BY MOUTH EVERY 8 HOURS AS NEEDED for cough   • Calcium Carbonate-Vitamin D3 600-400 MG-UNIT tablet Take 1 tablet by mouth 2 (Two) Times a Day.   • Cholecalciferol (Vitamin D3) 50 MCG (2000 UT) capsule Take 1 capsule by mouth Daily.   • Fremanezumab-vfrm (Ajovy) 225 MG/1.5ML solution auto-injector Inject 225 mg under the skin into the appropriate area as directed Every 30 (Thirty) Days.   • HYDROcodone-acetaminophen (NORCO) 5-325 MG per tablet Take 1 tablet by mouth 2 (Two) Times a Day As Needed.   • levothyroxine (SYNTHROID, LEVOTHROID) 75 MCG tablet Take 1 tablet by mouth Daily.   • lisinopril (PRINIVIL,ZESTRIL) 2.5 MG tablet Take 1 tablet by mouth Daily.   • multivitamin with minerals tablet tablet Take 1 tablet by mouth Daily.   • polyethylene glycol 17 g packet TAKE 17 GRAMS (ONE PACKET) BY MOUTH DAILY]   • pramipexole (MIRAPEX) 0.25 MG tablet Take 1 tablet by mouth Every Night.   • rizatriptan (Maxalt) 10 MG tablet Take 1 tablet by mouth 1 (One) Time As Needed for Migraine. May repeat in 2 hours if needed   • vitamin E 100 UNIT capsule Take 100 Units by mouth Daily.   • Diclofenac Sodium (VOLTAREN) 1 % gel gel Apply 4 g topically to the appropriate area as directed 4 (Four) Times a Day As  "Needed (shoulder pain).   • ibandronate (Boniva) 150 MG tablet Take 1 tablet by mouth Every 30 (Thirty) Days. Take it first thing in the morning without any other medication and sit up 30 to 60 minutes after taking the medication   • [DISCONTINUED] polyethylene glycol (MIRALAX) 17 GM/SCOOP powder polyethylene glycol 3350 17 gram oral powder packet   TAKE 17 Grams (ONE PACKET) BY MOUTH DAILY     No current facility-administered medications on file prior to visit.       Immunization History   Administered Date(s) Administered   • COVID-19 (PFIZER) PURPLE CAP 05/27/2021, 06/17/2021, 06/17/2021   • FluLaval/Fluzone >6mos 10/19/2021, 09/30/2022   • Hepatitis A 05/17/2018, 12/20/2018   • Influenza, Unspecified 10/19/2020, 10/19/2020   • Pneumococcal Polysaccharide (PPSV23) 05/10/2022   • Shingrix 11/12/2021, 01/21/2022   • Tdap 03/20/2018, 10/19/2020       Review of Systems   Constitutional: Positive for fatigue.   HENT: Negative for trouble swallowing.    Eyes: Negative for blurred vision and double vision.   Respiratory: Positive for choking. Negative for shortness of breath.         Scheduled for the swallow study   Cardiovascular: Negative for chest pain.   Gastrointestinal: Negative for abdominal pain and blood in stool.   Endocrine: Negative.    Genitourinary: Negative for breast discharge, breast lump, breast pain, dysuria, menstrual problem, vaginal bleeding and vaginal discharge.   Musculoskeletal: Positive for arthralgias, back pain, myalgias and neck pain.   Skin: Negative.    Allergic/Immunologic: Negative.    Neurological: Negative for dizziness, seizures, syncope and light-headedness.   Psychiatric/Behavioral: Negative for self-injury, suicidal ideas and depressed mood. The patient is not nervous/anxious.         Objective     /75 (BP Location: Left arm, Patient Position: Sitting, Cuff Size: Adult)   Pulse 76   Temp 97.3 °F (36.3 °C) (Infrared)   Ht 160 cm (63\")   Wt 71.7 kg (158 lb)   SpO2 96%   " BMI 27.99 kg/m²       Physical Exam  Vitals and nursing note reviewed.   Constitutional:       Appearance: Normal appearance.   HENT:      Head: Normocephalic.      Right Ear: External ear normal.      Left Ear: External ear normal.      Nose: Nose normal.      Mouth/Throat:      Comments: Wearing mask  Eyes:      Pupils: Pupils are equal, round, and reactive to light.   Cardiovascular:      Rate and Rhythm: Normal rate and regular rhythm.      Heart sounds: Normal heart sounds.   Pulmonary:      Effort: Pulmonary effort is normal.      Breath sounds: Normal breath sounds.   Chest:   Breasts:     Zana Score is 5.      Right: Normal. No swelling, bleeding, inverted nipple, nipple discharge, skin change or tenderness.      Left: Normal. No swelling, bleeding, inverted nipple, nipple discharge, skin change or tenderness.   Abdominal:      General: Bowel sounds are normal.      Palpations: Abdomen is soft.      Tenderness: There is no abdominal tenderness.      Hernia: There is no hernia in the left inguinal area or right inguinal area.   Genitourinary:     General: Normal vulva.      Exam position: Lithotomy position.      Pubic Area: No rash or pubic lice.       Zana stage (genital): 5.      Labia:         Right: No rash, tenderness, lesion or injury.         Left: No rash, tenderness, lesion or injury.       Urethra: No urethral pain or urethral lesion.      Vagina: Normal.      Cervix: Lesion present. No cervical motion tenderness, discharge, friability, erythema, cervical bleeding or eversion.      Uterus: Normal.       Adnexa: Right adnexa normal and left adnexa normal.        Right: No mass, tenderness or fullness.          Left: No mass, tenderness or fullness.           Musculoskeletal:      Cervical back: Normal range of motion and neck supple.      Comments: Chronic Back pain    Lymphadenopathy:      Upper Body:      Right upper body: No supraclavicular or axillary adenopathy.      Left upper body: No  supraclavicular or axillary adenopathy.      Lower Body: No right inguinal adenopathy. No left inguinal adenopathy.   Skin:     General: Skin is warm and dry.   Neurological:      Mental Status: She is alert and oriented to person, place, and time.   Psychiatric:         Mood and Affect: Mood normal.         Behavior: Behavior normal.         Thought Content: Thought content normal.         Judgment: Judgment normal.         Result Review :                          Assessment and Plan      Diagnoses and all orders for this visit:    1. Encounter for well woman exam with routine gynecological exam (Primary)  -     IgP, Aptima HPV  -     CBC & Differential  -     Comprehensive Metabolic Panel  -     Lipid Panel  -     TSH Rfx On Abnormal To Free T4  -     Urinalysis With Culture If Indicated - Urine, Clean Catch  -     Vitamin D,25-Hydroxy  -     Vitamin B12 & Folate    2. Screening mammogram for breast cancer  -     Mammo Screening Digital Tomosynthesis Bilateral With CAD; Future    3. Vitamin D deficiency  -     Vitamin D,25-Hydroxy    4. Hypothyroidism, unspecified type  -     TSH Rfx On Abnormal To Free T4    5. Mixed hyperlipidemia  -     Lipid Panel    6. Hypertension, unspecified type  -     CBC & Differential  -     Comprehensive Metabolic Panel  -     Lipid Panel  -     TSH Rfx On Abnormal To Free T4  -     Urinalysis With Culture If Indicated - Urine, Clean Catch    7. Tired  -     Vitamin B12 & Folate    8. Overweight (BMI 25.0-29.9)  Comments:  counseled diet and exercise/activity     9. Cervical polyp  -     Ambulatory Referral to Gynecology            Follow Up     Return if symptoms worsen or fail to improve.

## 2022-10-26 NOTE — PROGRESS NOTES
..  Venipuncture Blood Specimen Collection  Venipuncture performed in left arm by Lindsey Maharaj with good hemostasis. Patient tolerated the procedure well without complications.   10/26/22   Lindsey Maharaj

## 2022-10-27 LAB
25(OH)D3 SERPL-MCNC: 34.8 NG/ML (ref 30–100)
ALBUMIN SERPL-MCNC: 4.7 G/DL (ref 3.5–5.2)
ALBUMIN/GLOB SERPL: 1.7 G/DL
ALP SERPL-CCNC: 53 U/L (ref 39–117)
ALT SERPL W P-5'-P-CCNC: 28 U/L (ref 1–33)
ANION GAP SERPL CALCULATED.3IONS-SCNC: 10.5 MMOL/L (ref 5–15)
AST SERPL-CCNC: 23 U/L (ref 1–32)
BASOPHILS # BLD AUTO: 0.06 10*3/MM3 (ref 0–0.2)
BASOPHILS NFR BLD AUTO: 0.6 % (ref 0–1.5)
BILIRUB SERPL-MCNC: 0.4 MG/DL (ref 0–1.2)
BILIRUB UR QL STRIP: NEGATIVE
BUN SERPL-MCNC: 8 MG/DL (ref 6–20)
BUN/CREAT SERPL: 12.3 (ref 7–25)
CALCIUM SPEC-SCNC: 9.6 MG/DL (ref 8.6–10.5)
CHLORIDE SERPL-SCNC: 104 MMOL/L (ref 98–107)
CHOLEST SERPL-MCNC: 192 MG/DL (ref 0–200)
CLARITY UR: CLEAR
CO2 SERPL-SCNC: 27.5 MMOL/L (ref 22–29)
COLOR UR: YELLOW
CREAT SERPL-MCNC: 0.65 MG/DL (ref 0.57–1)
DEPRECATED RDW RBC AUTO: 39.8 FL (ref 37–54)
EGFRCR SERPLBLD CKD-EPI 2021: 101.6 ML/MIN/1.73
EOSINOPHIL # BLD AUTO: 0.24 10*3/MM3 (ref 0–0.4)
EOSINOPHIL NFR BLD AUTO: 2.3 % (ref 0.3–6.2)
ERYTHROCYTE [DISTWIDTH] IN BLOOD BY AUTOMATED COUNT: 13.1 % (ref 12.3–15.4)
FOLATE SERPL-MCNC: >20 NG/ML (ref 4.78–24.2)
GLOBULIN UR ELPH-MCNC: 2.8 GM/DL
GLUCOSE SERPL-MCNC: 88 MG/DL (ref 65–99)
GLUCOSE UR STRIP-MCNC: NEGATIVE MG/DL
HCT VFR BLD AUTO: 45.6 % (ref 34–46.6)
HDLC SERPL-MCNC: 51 MG/DL (ref 40–60)
HGB BLD-MCNC: 15.2 G/DL (ref 12–15.9)
HGB UR QL STRIP.AUTO: NEGATIVE
IMM GRANULOCYTES # BLD AUTO: 0.04 10*3/MM3 (ref 0–0.05)
IMM GRANULOCYTES NFR BLD AUTO: 0.4 % (ref 0–0.5)
KETONES UR QL STRIP: NEGATIVE
LDLC SERPL CALC-MCNC: 116 MG/DL (ref 0–100)
LDLC/HDLC SERPL: 2.21 {RATIO}
LEUKOCYTE ESTERASE UR QL STRIP.AUTO: NEGATIVE
LYMPHOCYTES # BLD AUTO: 3.55 10*3/MM3 (ref 0.7–3.1)
LYMPHOCYTES NFR BLD AUTO: 34.3 % (ref 19.6–45.3)
MCH RBC QN AUTO: 28.2 PG (ref 26.6–33)
MCHC RBC AUTO-ENTMCNC: 33.3 G/DL (ref 31.5–35.7)
MCV RBC AUTO: 84.6 FL (ref 79–97)
MONOCYTES # BLD AUTO: 0.69 10*3/MM3 (ref 0.1–0.9)
MONOCYTES NFR BLD AUTO: 6.7 % (ref 5–12)
NEUTROPHILS NFR BLD AUTO: 5.76 10*3/MM3 (ref 1.7–7)
NEUTROPHILS NFR BLD AUTO: 55.7 % (ref 42.7–76)
NITRITE UR QL STRIP: NEGATIVE
NRBC BLD AUTO-RTO: 0 /100 WBC (ref 0–0.2)
PH UR STRIP.AUTO: 6.5 [PH] (ref 5–8)
PLATELET # BLD AUTO: 262 10*3/MM3 (ref 140–450)
PMV BLD AUTO: 10.9 FL (ref 6–12)
POTASSIUM SERPL-SCNC: 3.8 MMOL/L (ref 3.5–5.2)
PROT SERPL-MCNC: 7.5 G/DL (ref 6–8.5)
PROT UR QL STRIP: NEGATIVE
RBC # BLD AUTO: 5.39 10*6/MM3 (ref 3.77–5.28)
SODIUM SERPL-SCNC: 142 MMOL/L (ref 136–145)
SP GR UR STRIP: 1.02 (ref 1–1.03)
TRIGL SERPL-MCNC: 141 MG/DL (ref 0–150)
TSH SERPL DL<=0.05 MIU/L-ACNC: 1.55 UIU/ML (ref 0.27–4.2)
UROBILINOGEN UR QL STRIP: NORMAL
VIT B12 BLD-MCNC: 617 PG/ML (ref 211–946)
VLDLC SERPL-MCNC: 25 MG/DL (ref 5–40)
WBC NRBC COR # BLD: 10.34 10*3/MM3 (ref 3.4–10.8)

## 2022-10-27 NOTE — PROGRESS NOTES
Please mail letter to patient stating    Valentina, vitamin B12 and folic acid and vitamin D levels were all normal range; thyroid levels were normal range; cholesterol panel was vastly improved from the last few time as triglycerides are normal and the HDL is higher and the LDL is down to 160 and should be less than 100 when fasting    Comprehensive panel shows normal glucose and normal kidney and liver function test and normal electrolytes; and all of your blood counts were in good range; urinalysis was normal

## 2022-11-02 DIAGNOSIS — G25.81 RLS (RESTLESS LEGS SYNDROME): ICD-10-CM

## 2022-11-02 RX ORDER — PRAMIPEXOLE DIHYDROCHLORIDE 0.25 MG/1
0.25 TABLET ORAL NIGHTLY
Qty: 90 TABLET | Refills: 1 | Status: SHIPPED | OUTPATIENT
Start: 2022-11-02

## 2022-11-04 ENCOUNTER — HOSPITAL ENCOUNTER (OUTPATIENT)
Dept: MRI IMAGING | Facility: HOSPITAL | Age: 57
Discharge: HOME | End: 2022-11-04
Admitting: NURSE PRACTITIONER

## 2022-11-04 DIAGNOSIS — R90.82 WHITE MATTER DISEASE, UNSPECIFIED: ICD-10-CM

## 2022-11-04 DIAGNOSIS — H53.9 VISUAL CHANGES: ICD-10-CM

## 2022-11-04 DIAGNOSIS — R29.6 FREQUENT FALLS: ICD-10-CM

## 2022-11-04 PROCEDURE — 70553 MRI BRAIN STEM W/O & W/DYE: CPT

## 2022-11-04 PROCEDURE — 0 GADOBENATE DIMEGLUMINE 529 MG/ML SOLUTION: Performed by: NURSE PRACTITIONER

## 2022-11-04 PROCEDURE — A9577 INJ MULTIHANCE: HCPCS | Performed by: NURSE PRACTITIONER

## 2022-11-04 RX ADMIN — GADOBENATE DIMEGLUMINE 15 ML: 529 INJECTION, SOLUTION INTRAVENOUS at 17:33

## 2022-11-06 DIAGNOSIS — I10 PRIMARY HYPERTENSION: ICD-10-CM

## 2022-11-06 DIAGNOSIS — E03.9 HYPOTHYROIDISM, UNSPECIFIED TYPE: ICD-10-CM

## 2022-11-07 RX ORDER — POLYETHYLENE GLYCOL 3350 17 G/17G
POWDER, FOR SOLUTION ORAL
Qty: 30 PACKET | Refills: 3 | Status: SHIPPED | OUTPATIENT
Start: 2022-11-07 | End: 2023-03-29

## 2022-11-07 RX ORDER — LEVOTHYROXINE SODIUM 0.07 MG/1
75 TABLET ORAL DAILY
Qty: 30 TABLET | Refills: 5 | Status: SHIPPED | OUTPATIENT
Start: 2022-11-07

## 2022-11-07 RX ORDER — LISINOPRIL 2.5 MG/1
2.5 TABLET ORAL DAILY
Qty: 30 TABLET | Refills: 5 | Status: SHIPPED | OUTPATIENT
Start: 2022-11-07

## 2022-11-07 NOTE — PROGRESS NOTES
Please mail letter to patient stating    Valentina the MRI of the brain with and without contrast shows no acute intracranial process identified and the findings just suggestive of mild chronic small vessel ischemic disease which you will see with patients that have a history of high blood pressure and or migraines--if you would like I can always refer you to the neurologist for further evaluation please just let me know

## 2022-11-17 ENCOUNTER — APPOINTMENT (OUTPATIENT)
Dept: MRI IMAGING | Facility: HOSPITAL | Age: 57
End: 2022-11-17

## 2022-11-17 ENCOUNTER — HOSPITAL ENCOUNTER (OUTPATIENT)
Dept: GENERAL RADIOLOGY | Facility: HOSPITAL | Age: 57
Discharge: HOME | End: 2022-11-17
Admitting: NURSE PRACTITIONER

## 2022-11-17 DIAGNOSIS — R13.10 DYSPHAGIA, UNSPECIFIED TYPE: ICD-10-CM

## 2022-11-17 PROCEDURE — 92611 MOTION FLUOROSCOPY/SWALLOW: CPT

## 2022-11-17 PROCEDURE — 74230 X-RAY XM SWLNG FUNCJ C+: CPT

## 2022-11-17 NOTE — MBS/VFSS/FEES
outpatient - Speech Language Pathology   Swallow modifed barium swallow study  Raimundo     Patient Name: Valentina Stevens  : 1965  MRN: [id number]  Today's Date: 2022               Admit Date: 2022    Visit Dx:     ICD-10-CM ICD-9-CM   1. Dysphagia, unspecified type  R13.10 787.20     Patient Active Problem List   Diagnosis   • DDD (degenerative disc disease), lumbar   • Hearing loss   • Mixed hyperlipidemia   • Hypertension   • Hypothyroidism   • Intractable chronic migraine without aura and without status migrainosus   • Menopause   • Low back pain   • Arthritis   • Osteoporosis   • RLS (restless legs syndrome)   • Memory loss   • Arthritis of right acromioclavicular joint   • Tendinitis of shoulder, right   • Fatty (change of) liver, not elsewhere classified   • Scoliosis, unspecified   • Lumbar radiculopathy   • Scoliosis   • Migraine   • CTS (carpal tunnel syndrome)   • GERD (gastroesophageal reflux disease)   • DEVENDRA on CPAP   • Primary central sleep apnea   • Seizures (HCC)   • Difficulty walking   • Vitamin D deficiency   • Murmur   • DDD (degenerative disc disease), cervical   • History of learning disability   • Lumbar spondylosis     Past Medical History:   Diagnosis Date   • Allergic     Drug allergies   • Arthritis    • Asthma    • CTS (carpal tunnel syndrome)    • Depression    • Difficulty walking    • Diverticulitis of colon    • Fatty (change of) liver, not elsewhere classified    • GERD (gastroesophageal reflux disease)    • Hearing loss    • Hyperlipemia    • Hypertension    • Hypothyroidism    • Low back pain    • Lumbar radiculopathy 02/15/2022   • Memory loss    • Menopause    • Migraine    • Murmur    • Osteopenia    • Osteoporosis    • Pneumonia    • Primary central sleep apnea    • Scoliosis    • Seizures (HCC)     last when 10 years old   • Sleep apnea, obstructive    • Tremor    • Urinary tract infection    • Visual impairment    • Vitamin D deficiency      Past  Surgical History:   Procedure Laterality Date   • CARPAL TUNNEL RELEASE     • COLONOSCOPY     • EPIDURAL BLOCK     • ESSURE TUBAL LIGATION     • SKIN BIOPSY     • THYMECTOMY     • TUBAL ABDOMINAL LIGATION       MODIFIED BARIUM SWALLOW STUDY: SPEECH PATHOLOGY REPORT        DATE OF SERVICE: 11/17/2022    PERTINENT INFORMATION:  Ms. Stevens is a 57year old female with complaint of dysphagia.    She was referred for an MBSS by DEMAR Eden to rule out aspiration as well as to determine appropriate treatment plan for this patient.      PROCEDURE:    Patient was alert and cooperative.  The patient was viewed in lateral plane.  The following Ba consistencies were administered: Thin barium, nectar thick barium, barium paste, barium mixed applesauce, barium mixed with cracker.  The following compensatory swallowing strategies were performed: Bolus modification, cyclic ingestion      RESULTS:    1.  Nectar thick liquid by cup.  Swallow completed.  2.  Thin liquid by cup.  Swallow completed.  3.  Purée by spoon.  Swallow completed.  4.  Pudding consistency by spoon.  Swallow completed.  5.  Solid consistency.  Chewing with swallow completed.  6.  Thin liquid by straw.  Swallow completed.      IMPRESSIONS:    Ms. Stevens demonstrated oral pharyngeal swallow appearing grossly within functional limits.  No aspiration or penetration observed during the study.      FUNCTIONAL DEFICIT: Patient scored level 7 of 7 on Functional Communication Measures for swallowing indicating a 0% limitation in function for current status, goal status, and discharge status.      RECOMMENDATIONS:   1.  Diet: Regular solids, thin liquids  2.  Positioning: Fully upright for all p.o. intake, 30 minutes following  3.  Recommend follow-up esophagram        yesPatient/responsible party agrees with the plan of care and has been informed of all alternatives, risks and benefits.    Thank you for this referral.  SLP Recommendation and Plan                                                                                        EDUCATION  The patient has been educated in the following areas:   Dysphagia (Swallowing Impairment).              Time Calculation:       Therapy Charges for Today     Code Description Service Date Service Provider Modifiers Qty    [id number] HC ST MOTION FLUORO EVAL SWALLOW 6 11/17/2022 Dilcia Bowers, SLP GN 1               CHUCKIE Steele  11/17/2022

## 2022-11-17 NOTE — PROGRESS NOTES
Please mail letter to patient state    Valentina the swallow study shows:    IMPRESSION:    1. No evidence of laryngeal penetration or aspiration.  Esophageal stasis.    Please see speech therapy report for full findings and details.     And I have not received the full report from speech therapy as yet

## 2022-11-28 ENCOUNTER — OFFICE VISIT (OUTPATIENT)
Dept: FAMILY MEDICINE CLINIC | Facility: CLINIC | Age: 57
End: 2022-11-28

## 2022-11-28 VITALS
OXYGEN SATURATION: 98 % | SYSTOLIC BLOOD PRESSURE: 128 MMHG | HEART RATE: 89 BPM | BODY MASS INDEX: 27.81 KG/M2 | TEMPERATURE: 97.3 F | WEIGHT: 157 LBS | DIASTOLIC BLOOD PRESSURE: 78 MMHG

## 2022-11-28 DIAGNOSIS — Z87.898 HISTORY OF MULTIPLE PULMONARY NODULES: ICD-10-CM

## 2022-11-28 DIAGNOSIS — R05.3 PERSISTENT COUGH FOR 3 WEEKS OR LONGER: Primary | ICD-10-CM

## 2022-11-28 DIAGNOSIS — R06.02 MILD SHORTNESS OF BREATH: ICD-10-CM

## 2022-11-28 PROCEDURE — 99214 OFFICE O/P EST MOD 30 MIN: CPT | Performed by: NURSE PRACTITIONER

## 2022-11-28 RX ORDER — DEXTROMETHORPHAN HYDROBROMIDE AND PROMETHAZINE HYDROCHLORIDE 15; 6.25 MG/5ML; MG/5ML
5 SYRUP ORAL 4 TIMES DAILY PRN
Qty: 120 ML | Refills: 0 | Status: SHIPPED | OUTPATIENT
Start: 2022-11-28 | End: 2023-01-05

## 2022-11-28 RX ORDER — PREDNISONE 5 MG/1
TABLET ORAL
Qty: 48 TABLET | Refills: 0 | Status: SHIPPED | OUTPATIENT
Start: 2022-11-28 | End: 2023-03-29

## 2022-11-28 NOTE — PROGRESS NOTES
Answers for HPI/ROS submitted by the patient on 11/26/2022  What is the primary reason for your visit?: Cough    Chief Complaint  Cough (Coughing for 3 months. 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 The current episode started more than 1 month ago. The problem has been gradually worsening. The problem occurs every few minutes. The cough is non-productive. Associated symptoms include shortness of breath and wheezing. Pertinent negatives include no chest pain, chills, ear congestion, ear pain, fever, headaches, heartburn, hemoptysis, myalgias, nasal congestion, postnasal drip, rash, rhinorrhea, sore throat, sweats or weight loss. Nothing aggravates the symptoms.       PHQ-2 Total Score: 0  PHQ-9 Total Score: 0    Past Medical History:   Diagnosis Date   • Allergic     Drug allergies   • Arthritis    • Asthma    • CTS (carpal tunnel syndrome)    • Depression    • Difficulty walking    • Diverticulitis of colon    • Fatty (change of) liver, not elsewhere classified    • GERD  (gastroesophageal reflux disease)    • Hearing loss    • Hyperlipemia    • Hypertension    • Hypothyroidism    • Low back pain    • Lumbar radiculopathy 02/15/2022   • Memory loss    • Menopause    • Migraine    • Murmur    • Osteopenia    • Osteoporosis    • Pneumonia    • Primary central sleep apnea    • Scoliosis    • Seizures (HCC)     last when 10 years old   • Sleep apnea, obstructive    • Tremor    • Urinary tract infection    • Visual impairment    • Vitamin D deficiency        Allergies   Allergen Reactions   • Penicillins Shortness Of Breath and Rash   • Sulfa Antibiotics Hives   • Cephalexin Rash   • Indomethacin Rash        Past Surgical History:   Procedure Laterality Date   • CARPAL TUNNEL RELEASE     • COLONOSCOPY     • EPIDURAL BLOCK     • ESSURE TUBAL LIGATION     • SKIN BIOPSY     • THYMECTOMY     • TUBAL ABDOMINAL LIGATION          Social History     Tobacco Use   • Smoking status: Never   • Smokeless tobacco: Never   Vaping Use   • Vaping Use: Never used   Substance Use Topics   • Alcohol use: Never   • Drug use: Never       Family History   Problem Relation Age of Onset   • Cancer Mother    • Migraines Mother    • Stroke Mother    • Hypertension Father    • Osteoporosis Father    • Alcohol abuse Father    • Dementia Sister    • Migraines Sister    • Migraines Sister    • Colon cancer Neg Hx         Health Maintenance Due   Topic Date Due   • COVID-19 Vaccine (3 - Booster for Pfizer series) 08/12/2021   • ANNUAL PHYSICAL  10/20/2022        Current Outpatient Medications on File Prior to Visit   Medication Sig   • albuterol sulfate  (90 Base) MCG/ACT inhaler Inhale 2 puffs Every 4 (Four) Hours As Needed for Wheezing.   • baclofen (LIORESAL) 10 MG tablet Take 1 tablet by mouth 3 (Three) Times a Day As Needed for Muscle Spasms.   • benzonatate (TESSALON) 200 MG capsule TAKE 1 CAPSULE BY MOUTH EVERY 8 HOURS AS NEEDED for cough   • Calcium Carbonate-Vitamin D3 600-400 MG-UNIT tablet Take 1  tablet by mouth 2 (Two) Times a Day.   • Cholecalciferol (Vitamin D3) 50 MCG (2000 UT) capsule Take 1 capsule by mouth Daily.   • Diclofenac Sodium (VOLTAREN) 1 % gel gel Apply 4 g topically to the appropriate area as directed 4 (Four) Times a Day As Needed (shoulder pain).   • Fremanezumab-vfrm (Ajovy) 225 MG/1.5ML solution auto-injector Inject 225 mg under the skin into the appropriate area as directed Every 30 (Thirty) Days.   • HYDROcodone-acetaminophen (NORCO) 5-325 MG per tablet Take 1 tablet by mouth 2 (Two) Times a Day As Needed.   • ibandronate (Boniva) 150 MG tablet Take 1 tablet by mouth Every 30 (Thirty) Days. Take it first thing in the morning without any other medication and sit up 30 to 60 minutes after taking the medication   • levothyroxine (SYNTHROID, LEVOTHROID) 75 MCG tablet TAKE 1 TABLET BY MOUTH DAILY   • lisinopril (PRINIVIL,ZESTRIL) 2.5 MG tablet TAKE 1 TABLET BY MOUTH DAILY   • multivitamin with minerals tablet tablet Take 1 tablet by mouth Daily.   • polyethylene glycol 17 g packet TAKE 17 GRAMS (ONE PACKET) BY MOUTH DAILY]   • pramipexole (MIRAPEX) 0.25 MG tablet TAKE 1 TABLET BY MOUTH EVERY NIGHT   • rizatriptan (Maxalt) 10 MG tablet Take 1 tablet by mouth 1 (One) Time As Needed for Migraine. May repeat in 2 hours if needed   • vitamin E 100 UNIT capsule Take 100 Units by mouth Daily.     No current facility-administered medications on file prior to visit.       Immunization History   Administered Date(s) Administered   • COVID-19 (PFIZER) PURPLE CAP 05/27/2021, 06/17/2021, 06/17/2021   • FluLaval/Fluzone >6mos 10/19/2021, 09/30/2022   • Hepatitis A 05/17/2018, 12/20/2018   • Influenza, Unspecified 10/19/2020, 10/19/2020   • Pneumococcal Polysaccharide (PPSV23) 05/10/2022   • Shingrix 11/12/2021, 01/21/2022   • Tdap 03/20/2018, 10/19/2020       Review of Systems   Constitutional: Negative for chills, fever and unexpected weight loss.   HENT: Negative for ear pain, postnasal drip,  rhinorrhea and sore throat.    Respiratory: Positive for cough, shortness of breath and wheezing. Negative for hemoptysis.    Cardiovascular: Negative for chest pain.   Endocrine: Negative for heat intolerance.   Musculoskeletal: Negative for myalgias.   Skin: Negative for rash.        Objective     /78 (BP Location: Right arm, Patient Position: Sitting, Cuff Size: Adult)   Pulse 89   Temp 97.3 °F (36.3 °C) (Temporal)   Wt 71.2 kg (157 lb)   SpO2 98%   BMI 27.81 kg/m²       Physical Exam  Vitals and nursing note reviewed.   Constitutional:       Appearance: Normal appearance.   HENT:      Head: Normocephalic.      Right Ear: External ear normal.      Left Ear: External ear normal.      Nose: Nose normal.      Mouth/Throat:      Comments: Wearing mask  Eyes:      Pupils: Pupils are equal, round, and reactive to light.   Cardiovascular:      Rate and Rhythm: Normal rate and regular rhythm.      Heart sounds: Normal heart sounds.   Pulmonary:      Effort: Pulmonary effort is normal.      Breath sounds: Normal breath sounds.      Comments: Croupy barking cough and at times audible wheeze but to auscultation CTA   Abdominal:      General: Bowel sounds are normal.      Palpations: Abdomen is soft.   Musculoskeletal:         General: Normal range of motion.      Cervical back: Normal range of motion and neck supple.   Skin:     General: Skin is warm and dry.   Neurological:      Mental Status: She is alert and oriented to person, place, and time.   Psychiatric:         Mood and Affect: Mood normal.         Behavior: Behavior normal.         Thought Content: Thought content normal.         Judgment: Judgment normal.         Result Review :{Labs  Result Review  Imaging  Med Tab  Media :23}             Office Visit with Hailey Watson APRN (09/30/2022)  CT CHEST HI RESOLUTION WITH CONTRAST DIAGNOSTIC(INACTIVE) (04/25/2019 16:15)  CT CHEST HI RESOLUTION WITH CONTRAST DIAGNOSTIC(INACTIVE) (01/21/2021  16:46)               Assessment and Plan      Diagnoses and all orders for this visit:    1. Persistent cough for 3 weeks or longer (Primary)  Comments:  greater than 3 months  Orders:  -     CT Chest Without Contrast; Future  -     predniSONE 5 MG (48) tablet therapy pack dose pack; Take as directed on package instructions.  Dispense: 48 tablet; Refill: 0  -     promethazine-dextromethorphan (PROMETHAZINE-DM) 6.25-15 MG/5ML syrup; Take 5 mL by mouth 4 (Four) Times a Day As Needed for Cough.  Dispense: 120 mL; Refill: 0    2. History of multiple pulmonary nodules  -     CT Chest Without Contrast; Future  -     predniSONE 5 MG (48) tablet therapy pack dose pack; Take as directed on package instructions.  Dispense: 48 tablet; Refill: 0    3. Mild shortness of breath  -     CT Chest Without Contrast; Future  -     predniSONE 5 MG (48) tablet therapy pack dose pack; Take as directed on package instructions.  Dispense: 48 tablet; Refill: 0  -     promethazine-dextromethorphan (PROMETHAZINE-DM) 6.25-15 MG/5ML syrup; Take 5 mL by mouth 4 (Four) Times a Day As Needed for Cough.  Dispense: 120 mL; Refill: 0            Follow Up     Return if symptoms worsen or fail to improve.

## 2022-12-05 ENCOUNTER — HOSPITAL ENCOUNTER (OUTPATIENT)
Dept: CT IMAGING | Facility: HOSPITAL | Age: 57
Discharge: HOME | End: 2022-12-05
Admitting: NURSE PRACTITIONER

## 2022-12-05 DIAGNOSIS — R05.3 PERSISTENT COUGH FOR 3 WEEKS OR LONGER: ICD-10-CM

## 2022-12-05 DIAGNOSIS — R06.02 MILD SHORTNESS OF BREATH: ICD-10-CM

## 2022-12-05 DIAGNOSIS — Z87.898 HISTORY OF MULTIPLE PULMONARY NODULES: ICD-10-CM

## 2022-12-05 PROCEDURE — 71250 CT THORAX DX C-: CPT

## 2022-12-05 NOTE — PROGRESS NOTES
Please mail letter to patient stating    Valentina the CT of the chest without contrast shows bibasilar atelectasis with no suspicious infiltrates and actually the atelectasis per the radiology report appears to be due to the elevated hemidiaphragm and then the nodule that you had before appears stable and unchanged and then there was scarring as well and then they also made mention of your scoliosis

## 2022-12-14 ENCOUNTER — HOSPITAL ENCOUNTER (OUTPATIENT)
Dept: MAMMOGRAPHY | Facility: HOSPITAL | Age: 57
Discharge: HOME | End: 2022-12-14
Admitting: NURSE PRACTITIONER

## 2022-12-14 DIAGNOSIS — Z12.31 SCREENING MAMMOGRAM FOR BREAST CANCER: ICD-10-CM

## 2022-12-14 PROCEDURE — 77067 SCR MAMMO BI INCL CAD: CPT

## 2022-12-14 PROCEDURE — 77063 BREAST TOMOSYNTHESIS BI: CPT

## 2022-12-15 NOTE — PROGRESS NOTES
Please mail letter to patient stating    Valentina your mammogram was read as negative/benign and for you to continue doing your yearly mammographic screenings and I would like for you to continue doing your monthly self breast exams please

## 2022-12-29 ENCOUNTER — PRIOR AUTHORIZATION (OUTPATIENT)
Dept: NEUROLOGY | Facility: CLINIC | Age: 57
End: 2022-12-29

## 2022-12-30 NOTE — PROGRESS NOTES
Chief Complaint   1 year follow up  History of Present Illness       Valentina Stevens is a 57 y.o. female who presents to [facility] for follow-up with a history of chronic idiopathic constipation.  Patient continues 1 fiber pill daily and MiraLAX.  She reports improvement in her bowel movements with these medications but does have looser bowel movements in the morning 1-2 times and then improved bowel movements throughout the day.  Patient denies fever, nausea, vomiting, weight loss, night sweats, melena, hematochezia, hematemesis.    Colonoscopy: Review of the patient's most recent colonoscopy performed by Dr. Stephens on 11/13/2018 grade 1 internal hemorrhoids of moderate diverticulosis.  Most recent labs on 10.26.2022  Results       Result Review :       CMP    CMP 5/10/22 7/25/22 10/26/22   Glucose 76 102 (A) 88   BUN 9 7 8   Creatinine 0.68 0.64 0.65   eGFR 101.1 102.6 101.6   Sodium 136 140 142   Potassium 4.3 3.8 3.8   Chloride 100 104 104   Calcium 10.1 9.0 9.6   Total Protein 7.7 6.8 7.5   Albumin 4.40 3.80 4.70   Globulin 3.3 3.0 2.8   Total Bilirubin 0.9 0.5 0.4   Alkaline Phosphatase 56 42 53   AST (SGOT) 22 26 23   ALT (SGPT) 25 30 28   Albumin/Globulin Ratio 1.3 1.3 1.7   BUN/Creatinine Ratio 13.2 10.9 12.3   Anion Gap 11.3 12.8 10.5   (A) Abnormal value       Comments are available for some flowsheets but are not being displayed.           CBC    CBC 7/15/22 7/25/22 10/26/22   WBC 8.62 7.73 10.34   RBC 5.14 4.98 5.39 (A)   Hemoglobin 14.3 14.0 15.2   Hematocrit 43.6 41.1 45.6   MCV 84.8 82.5 84.6   MCH 27.8 28.1 28.2   MCHC 32.8 34.1 33.3   RDW 13.4 13.4 13.1   Platelets 259 232 262   (A) Abnormal value                          Past Medical History       Past Medical History:   Diagnosis Date   • Allergic     Drug allergies   • Arthritis    • Asthma    • CTS (carpal tunnel syndrome)    • Depression    • Difficulty walking    • Diverticulitis of colon    • Fatty  (change of) liver, not elsewhere classified    • GERD (gastroesophageal reflux disease)    • Hearing loss    • Hyperlipemia    • Hypertension    • Hypothyroidism    • Low back pain    • Lumbar radiculopathy 02/15/2022   • Memory loss    • Menopause    • Migraine    • Murmur    • Osteopenia    • Osteoporosis    • Pneumonia    • Primary central sleep apnea    • Scoliosis    • Seizures (HCC)     last when 10 years old   • Sleep apnea, obstructive    • Tremor    • Urinary tract infection    • Visual impairment    • Vitamin D deficiency        Past Surgical History:   Procedure Laterality Date   • COLONOSCOPY  2018    [facility]   • EPIDURAL BLOCK     • ESSURE TUBAL LIGATION     • SKIN BIOPSY     • THYMECTOMY     • TUBAL ABDOMINAL LIGATION           Current Outpatient Medications:   •  albuterol sulfate  (90 Base) MCG/ACT inhaler, Inhale 2 puffs Every 4 (Four) Hours As Needed for Wheezing., Disp: 18 g, Rfl: 5  •  baclofen (LIORESAL) 10 MG tablet, Take 1 tablet by mouth 3 (Three) Times a Day As Needed for Muscle Spasms., Disp: 30 tablet, Rfl: 1  •  benzonatate (TESSALON) 200 MG capsule, TAKE 1 CAPSULE BY MOUTH EVERY 8 HOURS AS NEEDED for cough, Disp: , Rfl:   •  Calcium Carbonate-Vitamin D3 600-400 MG-UNIT tablet, Take 1 tablet by mouth 2 (Two) Times a Day., Disp: 60 tablet, Rfl: 5  •  Cholecalciferol (Vitamin D3) 50 MCG (2000 UT) capsule, Take 1 capsule by mouth Daily., Disp: 30 capsule, Rfl: 5  •  Fremanezumab-vfrm (Ajovy) 225 MG/1.5ML solution auto-injector, Inject 225 mg under the skin into the appropriate area as directed Every 30 (Thirty) Days., Disp: 1.5 mL, Rfl: 5  •  HYDROcodone-acetaminophen (NORCO) 5-325 MG per tablet, Take 1 tablet by mouth 2 (Two) Times a Day As Needed., Disp: , Rfl:   •  ibandronate (Boniva) 150 MG tablet, Take 1 tablet by mouth Every 30 (Thirty) Days. Take it first thing in the morning without any other medication and sit up 30 to 60 minutes after taking the medication, Disp: 1 tablet,  Rfl: 11  •  levothyroxine (SYNTHROID, LEVOTHROID) 75 MCG tablet, TAKE 1 TABLET BY MOUTH DAILY, Disp: 30 tablet, Rfl: 5  •  lisinopril (PRINIVIL,ZESTRIL) 2.5 MG tablet, TAKE 1 TABLET BY MOUTH DAILY, Disp: 30 tablet, Rfl: 5  •  multivitamin with minerals tablet tablet, Take 1 tablet by mouth Daily., Disp: , Rfl:   •  polyethylene glycol 17 g packet, TAKE 17 GRAMS (ONE PACKET) BY MOUTH DAILY], Disp: 30 packet, Rfl: 3  •  pramipexole (MIRAPEX) 0.25 MG tablet, TAKE 1 TABLET BY MOUTH EVERY NIGHT, Disp: 90 tablet, Rfl: 1  •  predniSONE 5 MG (48) tablet therapy pack dose pack, Take as directed on package instructions., Disp: 48 tablet, Rfl: 0  •  rizatriptan (Maxalt) 10 MG tablet, Take 1 tablet by mouth 1 (One) Time As Needed for Migraine. May repeat in 2 hours if needed, Disp: 9 tablet, Rfl: 5  •  vitamin E 100 UNIT capsule, Take 100 Units by mouth Daily., Disp: , Rfl:      Allergies   Allergen Reactions   • Penicillins Shortness Of Breath and Rash   • Sulfa Antibiotics Hives   • Cephalexin Rash   • Indomethacin Rash       Family History   Problem Relation Age of Onset   • Cancer Mother    • Migraines Mother    • Stroke Mother    • Hypertension Father    • Osteoporosis Father    • Alcohol abuse Father    • Dementia Sister    • Migraines Sister    • Migraines Sister    • Colon cancer Neg Hx         Social History     Social History Narrative   • Not on file       Objective     Vital Signs:   /60 (BP Location: Right arm, Patient Position: Sitting, Cuff Size: Adult)   Pulse 80   Ht 160 cm (63\")   Wt 71.7 kg (158 lb)   SpO2 97%   BMI 27.99 kg/m²       Physical Exam  Constitutional:       General: She is not in acute distress.     Appearance: Normal appearance. She is well-developed and normal weight.   Eyes:      Conjunctiva/sclera: Conjunctivae normal.      Pupils: Pupils are equal, round, and reactive to light.      Visual Fields: Right eye visual fields normal and left eye visual fields normal.   Cardiovascular:       Rate and Rhythm: Normal rate and regular rhythm.      Heart sounds: Normal heart sounds.   Pulmonary:      Effort: Pulmonary effort is normal. No retractions.      Breath sounds: Normal breath sounds and air entry.      Comments: Inspection of chest: normal appearance  Abdominal:      General: Bowel sounds are normal.      Palpations: Abdomen is soft.      Tenderness: There is no abdominal tenderness.      Comments: No appreciable hepatosplenomegaly   Musculoskeletal:      Cervical back: Neck supple.      Right lower leg: No edema.      Left lower leg: No edema.   Lymphadenopathy:      Cervical: No cervical adenopathy.   Skin:     Findings: No lesion.      Comments: Turgor normal   Neurological:      Mental Status: She is alert and oriented to person, place, and time.   Psychiatric:         Mood and Affect: Mood and affect normal.           Assessment & Plan          Assessment and Plan    Diagnoses and all orders for this visit:    1. Chronic idiopathic constipation (Primary)    2. History of diverticulitis    3. Diverticula of colon    4. Altered bowel habits      57-year-old female presenting the office today  for follow-up with a history of chronic idiopathic constipation.  Patient continues 1 fiber pill daily and MiraLAX with good control of her bowel movement.  I have educated the patient to cut back on MiraLAX as needed for loose stools.  We will follow-up on an as-needed basis.  Patient has no family history of colon cancer no previous history of polyps.  Patient be due for repeat colonoscopy in 2028.  Patient agreeable to this plan will call with any questions or concerns.          Follow Up       Follow Up   Return if symptoms worsen or fail to improve.  Patient was given instructions and counseling regarding her condition or for health maintenance advice. Please see specific information pulled into the AVS if appropriate.

## 2023-01-05 ENCOUNTER — OFFICE VISIT (OUTPATIENT)
Dept: GASTROENTEROLOGY | Facility: CLINIC | Age: 58
End: 2023-01-05
Payer: COMMERCIAL

## 2023-01-05 VITALS
DIASTOLIC BLOOD PRESSURE: 60 MMHG | OXYGEN SATURATION: 97 % | WEIGHT: 158 LBS | BODY MASS INDEX: 28 KG/M2 | HEART RATE: 80 BPM | HEIGHT: 63 IN | SYSTOLIC BLOOD PRESSURE: 128 MMHG

## 2023-01-05 DIAGNOSIS — Z87.19 HISTORY OF DIVERTICULITIS: ICD-10-CM

## 2023-01-05 DIAGNOSIS — R19.4 ALTERED BOWEL HABITS: ICD-10-CM

## 2023-01-05 DIAGNOSIS — K59.04 CHRONIC IDIOPATHIC CONSTIPATION: Primary | ICD-10-CM

## 2023-01-05 DIAGNOSIS — K57.30 DIVERTICULA OF COLON: ICD-10-CM

## 2023-01-05 PROCEDURE — 99213 OFFICE O/P EST LOW 20 MIN: CPT | Performed by: NURSE PRACTITIONER

## 2023-01-10 ENCOUNTER — OFFICE VISIT (OUTPATIENT)
Dept: OBSTETRICS AND GYNECOLOGY | Facility: CLINIC | Age: 58
End: 2023-01-10
Payer: COMMERCIAL

## 2023-01-10 VITALS
DIASTOLIC BLOOD PRESSURE: 74 MMHG | HEART RATE: 88 BPM | SYSTOLIC BLOOD PRESSURE: 127 MMHG | BODY MASS INDEX: 28.34 KG/M2 | WEIGHT: 160 LBS

## 2023-01-10 DIAGNOSIS — N84.1 CERVICAL POLYP: Primary | ICD-10-CM

## 2023-01-10 PROCEDURE — 57500 BIOPSY OF CERVIX: CPT | Performed by: OBSTETRICS & GYNECOLOGY

## 2023-01-10 PROCEDURE — 88305 TISSUE EXAM BY PATHOLOGIST: CPT | Performed by: OBSTETRICS & GYNECOLOGY

## 2023-01-10 NOTE — ASSESSMENT & PLAN NOTE
The patient has a normal Pap smear.  She had a 5 mm x 5 mm cervical polyp protruding through the external os.  This was removed in the office without difficulty.  She will follow-up as needed.

## 2023-01-10 NOTE — PROGRESS NOTES
Cervical polyp removal    CC: Cervical polyp    HPI:   57 y.o. who presents for evaluation and management of a cervical polyp.  The patient states she has had no symptoms.  The polyp was noted at her last annual exam.  She would like this removed.  She has no other concerns today.    History: PMHx, Meds, Allergies, PSHx, Social Hx, and POBHx all reviewed and updated.    /74   Pulse 88   Wt 72.6 kg (160 lb)   Breastfeeding No   BMI 28.34 kg/m²     Time out: The correct patient, site and procedure were confirmed    Physical Exam  Vitals and nursing note reviewed. Exam conducted with a chaperone present.   Constitutional:       General: She is not in acute distress.     Appearance: Normal appearance. She is not ill-appearing.   Abdominal:      General: Abdomen is flat. There is no distension.      Palpations: Abdomen is soft. There is no mass.      Tenderness: There is no abdominal tenderness. There is no guarding or rebound.      Hernia: No hernia is present.   Genitourinary:     General: Normal vulva.      Exam position: Lithotomy position.      Labia:         Right: No rash, tenderness, lesion or injury.         Left: No rash, tenderness, lesion or injury.       Vagina: Normal.      Cervix: Lesion present. No cervical motion tenderness, friability, erythema or eversion.            Comments: There was a 5 mm x 5 mm cervical polyp protruding to the external os.  This was grasped with a [place] forcep and gently rotated until removed intact.  The specimen was placed in formalin for permanent pathology.  The patient tolerated the procedure well and without incident.  No significant bleeding was noted and there was good hemostasis at the end of the procedure  Musculoskeletal:         General: No swelling.      Right lower leg: No edema.      Left lower leg: No edema.   Skin:     General: Skin is warm and dry.      Findings: No rash.   Neurological:      Mental Status: She is alert and oriented to person, place,  and time.   Psychiatric:         Mood and Affect: Mood normal.         Behavior: Behavior normal.         Thought Content: Thought content normal.         Judgment: Judgment normal.           ASSESSMENT AND PLAN:  Diagnoses and all orders for this visit:    1. Cervical polyp (Primary)  Assessment & Plan:  The patient has a normal Pap smear.  She had a 5 mm x 5 mm cervical polyp protruding through the external os.  This was removed in the office without difficulty.  She will follow-up as needed.    Orders:  -     Tissue Pathology Exam; Future  -     Tissue Pathology Exam        Follow Up:  Return if symptoms worsen or fail to improve.    Felipe Campoverde MD  01/10/2023

## 2023-01-12 LAB
CYTO UR: NORMAL
LAB AP CASE REPORT: NORMAL
LAB AP CLINICAL INFORMATION: NORMAL
PATH REPORT.FINAL DX SPEC: NORMAL
PATH REPORT.GROSS SPEC: NORMAL

## 2023-01-29 DIAGNOSIS — E55.9 VITAMIN D DEFICIENCY: ICD-10-CM

## 2023-01-30 RX ORDER — CALCIUM CARBONATE/VITAMIN D3 600 MG-10
TABLET ORAL
Qty: 60 TABLET | Refills: 5 | Status: SHIPPED | OUTPATIENT
Start: 2023-01-30

## 2023-02-09 ENCOUNTER — OFFICE VISIT (OUTPATIENT)
Dept: FAMILY MEDICINE CLINIC | Facility: CLINIC | Age: 58
End: 2023-02-09
Payer: COMMERCIAL

## 2023-02-09 VITALS
BODY MASS INDEX: 27.99 KG/M2 | HEART RATE: 80 BPM | WEIGHT: 158 LBS | SYSTOLIC BLOOD PRESSURE: 126 MMHG | OXYGEN SATURATION: 96 % | DIASTOLIC BLOOD PRESSURE: 74 MMHG | TEMPERATURE: 97.3 F

## 2023-02-09 DIAGNOSIS — R29.898 TRANSIENT RIGHT LEG WEAKNESS: ICD-10-CM

## 2023-02-09 DIAGNOSIS — M62.81 MUSCLE WEAKNESS OF RIGHT UPPER EXTREMITY: ICD-10-CM

## 2023-02-09 DIAGNOSIS — L98.9 SKIN LESION OF FACE: Primary | ICD-10-CM

## 2023-02-09 DIAGNOSIS — M50.30 DDD (DEGENERATIVE DISC DISEASE), CERVICAL: ICD-10-CM

## 2023-02-09 PROCEDURE — 99214 OFFICE O/P EST MOD 30 MIN: CPT | Performed by: NURSE PRACTITIONER

## 2023-02-09 NOTE — PROGRESS NOTES
Answers for HPI/ROS submitted by the patient on 2/2/2023  Please describe your symptoms.: Skin tag on nose where glasses rub it  Have you had these symptoms before?: No  How long have you been having these symptoms?: 5-7 days  What is the primary reason for your visit?: Other    Chief Complaint  Follow-up (She has a painful skin tag on her nose.  She is also getting weaker with her right arm and leg. )    Subjective            Valentina Stevens presents to [facility]  History of Present Illness  Pt reports the skin tag of the right side of the nose --been there a long while but recently started getting irritated BC of the location --pt's glasses sit right there and rubs--used to be established with [place] [facility]--    Also pt with the reports that in the past 1 week--right arm and right glenis weakness--and and pt with cane use--for approx 1 yr and then at some times does not use-and pt uses this for her freq falls and balance-also sees neurology as well--and sees the neurologist 3/9/23--for F/U     Just had an MRI brain  11/4/22 and was stable    And then pt reports no new swallowing issues and no slurred speech--and pt reports that she drops things at work with the right hand grasp--only for 1 week--and then the pt also reports some leg weakness but pt also with worsening back issues as well and not worked up for the neck as yet and then pt with DDD and stenosis of the lumbar and thoracic and bulging discs as well      Also DDD Cspine from imaging 2019--and then in pain mgt as well--pt also did PT in the past multiple times--and does stay active and tries to doing her exercises--and worsening s/s     Also F/U with pain mgt next week       PHQ-2 Total Score: 0  PHQ-9 Total Score: 0    Past Medical History:   Diagnosis Date   • Abnormal Pap smear of cervix    • Allergic     Drug allergies   • Arthritis    • Asthma    • CTS (carpal tunnel syndrome)    • Depression    • Difficulty walking     • Diverticulitis of colon    • Fatty (change of) liver, not elsewhere classified    • GERD (gastroesophageal reflux disease)    • Hearing loss    • Hyperlipemia    • Hypertension    • Hypothyroidism    • Low back pain    • Lumbar radiculopathy 02/15/2022   • Memory loss    • Menopause    • Migraine    • Murmur    • Osteopenia    • Osteoporosis    • Pneumonia    • Primary central sleep apnea    • Scoliosis    • Seizures (HCC)     last when 10 years old   • Sleep apnea, obstructive    • Tremor    • Visual impairment    • Vitamin D deficiency        Allergies   Allergen Reactions   • Penicillins Shortness Of Breath and Rash   • Sulfa Antibiotics Hives   • Cephalexin Rash   • Indomethacin Rash        Past Surgical History:   Procedure Laterality Date   • COLONOSCOPY  2018    glenna   • EPIDURAL BLOCK     • ESSURE TUBAL LIGATION     • SKIN BIOPSY     • THYMECTOMY     • TUBAL ABDOMINAL LIGATION          Social History     Tobacco Use   • Smoking status: Never     Passive exposure: Never   • Smokeless tobacco: Never   Vaping Use   • Vaping Use: Never used   Substance Use Topics   • Alcohol use: Never   • Drug use: Never       Family History   Problem Relation Age of Onset   • Hypertension Father    • Osteoporosis Father    • Alcohol abuse Father    • Skin cancer Father    • Thyroid cancer Mother    • Migraines Mother    • Stroke Mother    • Skin cancer Mother    • Skin cancer Brother    • Dementia Sister    • Migraines Sister    • Migraines Sister    • Uterine cancer Maternal Grandmother    • Colon cancer Neg Hx    • Breast cancer Neg Hx    • Prostate cancer Neg Hx    • Clotting disorder Neg Hx         Health Maintenance Due   Topic Date Due   • COVID-19 Vaccine (4 - Booster for Pfizer series) 08/12/2021   • ANNUAL PHYSICAL  10/19/2022        Current Outpatient Medications on File Prior to Visit   Medication Sig   • albuterol sulfate  (90 Base) MCG/ACT inhaler Inhale 2 puffs Every 4 (Four) Hours As Needed for  Wheezing.   • baclofen (LIORESAL) 10 MG tablet Take 1 tablet by mouth 3 (Three) Times a Day As Needed for Muscle Spasms.   • benzonatate (TESSALON) 200 MG capsule TAKE 1 CAPSULE BY MOUTH EVERY 8 HOURS AS NEEDED for cough   • Calcium + Vitamin D3 600-10 MG-MCG tablet TAKE 1 TABLET BY MOUTH TWICE DAILY   • Cholecalciferol (Vitamin D3) 50 MCG (2000 UT) capsule Take 1 capsule by mouth Daily.   • Fremanezumab-vfrm (Ajovy) 225 MG/1.5ML solution auto-injector Inject 225 mg under the skin into the appropriate area as directed Every 30 (Thirty) Days.   • HYDROcodone-acetaminophen (NORCO) 5-325 MG per tablet Take 1 tablet by mouth 2 (Two) Times a Day As Needed.   • ibandronate (Boniva) 150 MG tablet Take 1 tablet by mouth Every 30 (Thirty) Days. Take it first thing in the morning without any other medication and sit up 30 to 60 minutes after taking the medication   • levothyroxine (SYNTHROID, LEVOTHROID) 75 MCG tablet TAKE 1 TABLET BY MOUTH DAILY   • lisinopril (PRINIVIL,ZESTRIL) 2.5 MG tablet TAKE 1 TABLET BY MOUTH DAILY   • multivitamin with minerals tablet tablet Take 1 tablet by mouth Daily.   • polyethylene glycol 17 g packet TAKE 17 GRAMS (ONE PACKET) BY MOUTH DAILY]   • pramipexole (MIRAPEX) 0.25 MG tablet TAKE 1 TABLET BY MOUTH EVERY NIGHT   • predniSONE 5 MG (48) tablet therapy pack dose pack Take as directed on package instructions.   • rizatriptan (Maxalt) 10 MG tablet Take 1 tablet by mouth 1 (One) Time As Needed for Migraine. May repeat in 2 hours if needed   • vitamin E 100 UNIT capsule Take 100 Units by mouth Daily.     No current facility-administered medications on file prior to visit.       Immunization History   Administered Date(s) Administered   • COVID-19 (PFIZER) PURPLE CAP 05/27/2021, 06/17/2021, 06/17/2021   • FluLaval/Fluzone >6mos 10/19/2021, 09/30/2022   • Hepatitis A 05/17/2018, 12/20/2018   • Influenza, Unspecified 10/19/2020, 10/19/2020   • Pneumococcal Polysaccharide (PPSV23) 05/10/2022   •  Shingrix 11/12/2021, 01/21/2022   • Tdap 03/20/2018, 10/19/2020       Review of Systems   Constitutional: Negative for chills and fever.   HENT: Positive for trouble swallowing.         Unchanged and has had prior work-up with swallow studies   Respiratory: Positive for choking and shortness of breath. Negative for cough and wheezing.         With her asthma and not exacerbated and with the swallowing and choking easy- Unchanged and has had prior work-up with swallow studies   Cardiovascular: Negative for chest pain.   Gastrointestinal: Negative for abdominal pain and blood in stool.   Genitourinary: Negative for dysuria.   Musculoskeletal: Positive for arthralgias, back pain, gait problem and neck pain. Negative for joint swelling.   Neurological: Positive for weakness. Negative for tremors, seizures, syncope, facial asymmetry and numbness.        Objective     /74 (BP Location: Right arm, Patient Position: Sitting, Cuff Size: Adult)   Pulse 80   Temp 97.3 °F (36.3 °C) (Temporal)   Wt 71.7 kg (158 lb)   SpO2 96%   BMI 27.99 kg/m²       Physical Exam  Vitals and nursing note reviewed.   Constitutional:       Appearance: Normal appearance.   HENT:      Head: Normocephalic.      Right Ear: External ear normal.      Left Ear: External ear normal.      Nose: Nose normal.      Mouth/Throat:      Comments: Wearing mask  Eyes:      Pupils: Pupils are equal, round, and reactive to light.   Cardiovascular:      Rate and Rhythm: Normal rate and regular rhythm.      Heart sounds: Normal heart sounds.   Pulmonary:      Effort: Pulmonary effort is normal.      Breath sounds: Normal breath sounds.   Abdominal:      Palpations: Abdomen is soft.   Musculoskeletal:      Cervical back: Bony tenderness and crepitus present. Pain with movement present. Decreased range of motion.      Thoracic back: Tenderness and bony tenderness present. Decreased range of motion. Scoliosis present.      Lumbar back: Tenderness and bony  tenderness present. Decreased range of motion. Scoliosis present.      Comments: Walking with the cane to ambulate--and bilat hand grasp and bilat foot push--are equal and strong and full sensation--    Skin:     General: Skin is warm and dry.      Comments: An approx 4-5 mm sized skin lesion of the right side of the nose--right where the glasses sit   Neurological:      Mental Status: She is alert and oriented to person, place, and time.   Psychiatric:         Mood and Affect: Mood normal.         Behavior: Behavior normal.         Thought Content: Thought content normal.         Judgment: Judgment normal.         Result Review :           MRI Brain With & Without Contrast (11/04/2022 17:32)  CT Chest Without Contrast Diagnostic (12/05/2022 13:00)  MRI Shoulder Right Without Contrast (11/18/2021 08:05)  XR Shoulder 2+ View Right (In Office) (11/03/2021 10:16)  Mammo Screening Bilateral With CAD (10/24/2019 14:54)                 Assessment and Plan      Diagnoses and all orders for this visit:    1. Skin lesion of face (Primary)  -     Ambulatory Referral to Dermatology    2. DDD (degenerative disc disease), cervical  Comments:  prior xrays in 2019   Orders:  -     MRI Cervical Spine Without Contrast; Future    3. Muscle weakness of right upper extremity  -     MRI Cervical Spine Without Contrast; Future    4. Transient right leg weakness  -     MRI Cervical Spine Without Contrast; Future    we will try to get the MRI c-spine and then pt F/U with pain mgt next week and then also follow-up with her neurologist as well    Patient with advanced severe scoliosis and DDD of the thoracic and lumbar spine    Then also the referral to the dermatologist         Follow Up     Return if symptoms worsen or fail to improve.

## 2023-02-11 ENCOUNTER — APPOINTMENT (OUTPATIENT)
Dept: GENERAL RADIOLOGY | Facility: HOSPITAL | Age: 58
End: 2023-02-11
Payer: COMMERCIAL

## 2023-02-11 ENCOUNTER — HOSPITAL ENCOUNTER (EMERGENCY)
Facility: HOSPITAL | Age: 58
Discharge: HOME | End: 2023-02-11
Attending: EMERGENCY MEDICINE | Admitting: EMERGENCY MEDICINE
Payer: COMMERCIAL

## 2023-02-11 VITALS
SYSTOLIC BLOOD PRESSURE: 114 MMHG | OXYGEN SATURATION: 98 % | BODY MASS INDEX: 27.38 KG/M2 | DIASTOLIC BLOOD PRESSURE: 82 MMHG | WEIGHT: 154.54 LBS | RESPIRATION RATE: 18 BRPM | HEART RATE: 88 BPM | HEIGHT: 63 IN | TEMPERATURE: 98.1 F

## 2023-02-11 DIAGNOSIS — S20.212A CHEST WALL CONTUSION, LEFT, INITIAL ENCOUNTER: Primary | ICD-10-CM

## 2023-02-11 PROCEDURE — 99282 EMERGENCY DEPT VISIT SF MDM: CPT

## 2023-02-11 PROCEDURE — 71101 X-RAY EXAM UNILAT RIBS/CHEST: CPT

## 2023-02-11 RX ORDER — CYCLOBENZAPRINE HCL 10 MG
10 TABLET ORAL 2 TIMES DAILY PRN
Qty: 10 TABLET | Refills: 0 | Status: SHIPPED | OUTPATIENT
Start: 2023-02-11

## 2023-02-11 RX ORDER — SODIUM CHLORIDE 0.9 % (FLUSH) 0.9 %
10 SYRINGE (ML) INJECTION AS NEEDED
Status: DISCONTINUED | OUTPATIENT
Start: 2023-02-11 | End: 2023-02-11

## 2023-02-11 NOTE — ED PROVIDER NOTES
Time: 9:28 AM EST  Date of encounter:  2/11/2023  Independent Historian/Clinical History and Information was obtained by:   Patient  Chief Complaint: abdominal pain    History is limited by: N/A    History of Present Illness:  Patient is a 57 y.o. year old female who presents to the emergency department for evaluation of abdominal pain with onset past few days. Pt states that her 4 year old granddaughter jumped on her stomach a few days ago and she is having LUQ abdominal pain along with pain along rib cage. Pt was lying on a couch when a 4 year old jumped on top of the couch and landed on Pt's stomach. Pain is worst over L lower ribs.  Patient states that she had not noticed that the pain is any worse to touch but is worse when she attempts to stand up or if she takes a deep breath.    HPI    Patient Care Team  Primary Care Provider: Sera Loza APRN    Past Medical History:     Allergies   Allergen Reactions   • Penicillins Shortness Of Breath and Rash   • Sulfa Antibiotics Hives   • Cephalexin Rash   • Indomethacin Rash     Past Medical History:   Diagnosis Date   • Abnormal Pap smear of cervix    • Allergic     Drug allergies   • Arthritis    • Asthma    • CTS (carpal tunnel syndrome)    • Depression    • Difficulty walking    • Diverticulitis of colon    • Fatty (change of) liver, not elsewhere classified    • GERD (gastroesophageal reflux disease)    • Hearing loss    • Hyperlipemia    • Hypertension    • Hypothyroidism    • Low back pain    • Lumbar radiculopathy 02/15/2022   • Memory loss    • Menopause    • Migraine    • Murmur    • Osteopenia    • Osteoporosis    • Pneumonia    • Primary central sleep apnea    • Scoliosis    • Seizures (HCC)     last when 10 years old   • Sleep apnea, obstructive    • Tremor    • Visual impairment    • Vitamin D deficiency      Past Surgical History:   Procedure Laterality Date   • COLONOSCOPY  2018    glenna   • EPIDURAL BLOCK     • ESSURE TUBAL LIGATION     •  SKIN BIOPSY     • THYMECTOMY     • TUBAL ABDOMINAL LIGATION       Family History   Problem Relation Age of Onset   • Hypertension Father    • Osteoporosis Father    • Alcohol abuse Father    • Skin cancer Father    • Thyroid cancer Mother    • Migraines Mother    • Stroke Mother    • Skin cancer Mother    • Skin cancer Brother    • Dementia Sister    • Migraines Sister    • Migraines Sister    • Uterine cancer Maternal Grandmother    • Colon cancer Neg Hx    • Breast cancer Neg Hx    • Prostate cancer Neg Hx    • Clotting disorder Neg Hx        Home Medications:  Prior to Admission medications    Medication Sig Start Date End Date Taking? Authorizing Provider   albuterol sulfate  (90 Base) MCG/ACT inhaler Inhale 2 puffs Every 4 (Four) Hours As Needed for Wheezing. 5/31/22   Hailey Watson APRN   baclofen (LIORESAL) 10 MG tablet Take 1 tablet by mouth 3 (Three) Times a Day As Needed for Muscle Spasms. 7/25/22   Eduardo Adames MD   benzonatate (TESSALON) 200 MG capsule TAKE 1 CAPSULE BY MOUTH EVERY 8 HOURS AS NEEDED for cough 10/24/22   ProviderThaddeus MD   Calcium + Vitamin D3 600-10 MG-MCG tablet TAKE 1 TABLET BY MOUTH TWICE DAILY 1/30/23   Sera Loza APRN   Cholecalciferol (Vitamin D3) 50 MCG (2000 UT) capsule Take 1 capsule by mouth Daily. 5/11/22 5/11/23  Sera Loza APRN   Fremanezumab-vfrm (Ajovy) 225 MG/1.5ML solution auto-injector Inject 225 mg under the skin into the appropriate area as directed Every 30 (Thirty) Days. 8/1/22   Lizzy Lindo APRN   HYDROcodone-acetaminophen (NORCO) 5-325 MG per tablet Take 1 tablet by mouth 2 (Two) Times a Day As Needed. 6/4/21   Thaddeus Carter MD   ibandronate (Boniva) 150 MG tablet Take 1 tablet by mouth Every 30 (Thirty) Days. Take it first thing in the morning without any other medication and sit up 30 to 60 minutes after taking the medication 5/13/22   Sera Loza APRN   levothyroxine (SYNTHROID,  LEVOTHROID) 75 MCG tablet TAKE 1 TABLET BY MOUTH DAILY 11/7/22   Sera Loza APRN   lisinopril (PRINIVIL,ZESTRIL) 2.5 MG tablet TAKE 1 TABLET BY MOUTH DAILY 11/7/22   Sera Loza APRN   multivitamin with minerals tablet tablet Take 1 tablet by mouth Daily.    Provider, MD Thaddeus   polyethylene glycol 17 g packet TAKE 17 GRAMS (ONE PACKET) BY MOUTH DAILY] 11/7/22   Tessie Kruger APRN   pramipexole (MIRAPEX) 0.25 MG tablet TAKE 1 TABLET BY MOUTH EVERY NIGHT 11/2/22   Sera Loza APRN   predniSONE 5 MG (48) tablet therapy pack dose pack Take as directed on package instructions. 11/28/22   Sera Loza APRN   rizatriptan (Maxalt) 10 MG tablet Take 1 tablet by mouth 1 (One) Time As Needed for Migraine. May repeat in 2 hours if needed 8/1/22   Lizzy Lindo APRN   vitamin E 100 UNIT capsule Take 100 Units by mouth Daily.    Provider, MD Thaddeus        Social History:   Social History     Tobacco Use   • Smoking status: Never     Passive exposure: Never   • Smokeless tobacco: Never   Vaping Use   • Vaping Use: Never used   Substance Use Topics   • Alcohol use: Never   • Drug use: Never         Review of Systems:  Review of Systems   Constitutional: Negative for chills and fever.   HENT: Negative for congestion, rhinorrhea and sore throat.    Eyes: Negative for pain and visual disturbance.   Respiratory: Negative for apnea, cough, chest tightness and shortness of breath.    Cardiovascular: Negative for chest pain and palpitations.   Gastrointestinal: Positive for abdominal pain. Negative for diarrhea, nausea and vomiting.   Genitourinary: Negative for difficulty urinating and dysuria.   Musculoskeletal: Negative for joint swelling.        Rib pain   Skin: Negative for color change.   Neurological: Negative for seizures and headaches.   Psychiatric/Behavioral: Negative.    All other systems reviewed and are negative.       Physical Exam:  /74   Pulse 92    "Temp 98.6 °F (37 °C) (Oral)   Resp 16   Ht 160 cm (63\")   Wt 70.1 kg (154 lb 8.7 oz)   SpO2 92%   BMI 27.38 kg/m²     Physical Exam  Vitals and nursing note reviewed.   Constitutional:       Appearance: Normal appearance.   HENT:      Head: Normocephalic and atraumatic.      Nose: Nose normal.      Mouth/Throat:      Mouth: Mucous membranes are dry.   Eyes:      Extraocular Movements: Extraocular movements intact.      Pupils: Pupils are equal, round, and reactive to light.   Cardiovascular:      Rate and Rhythm: Normal rate and regular rhythm.      Heart sounds: Normal heart sounds.   Pulmonary:      Effort: Pulmonary effort is normal.      Breath sounds: Normal breath sounds.   Chest:      Comments: Tenderness to left lower anterior/lateral rib cage  Abdominal:      General: Bowel sounds are normal.      Palpations: Abdomen is soft.      Tenderness: There is no abdominal tenderness.   Musculoskeletal:         General: No swelling. Normal range of motion.      Cervical back: Normal range of motion and neck supple.   Skin:     General: Skin is warm and dry.      Coloration: Skin is not jaundiced.   Neurological:      General: No focal deficit present.      Mental Status: She is alert and oriented to person, place, and time. Mental status is at baseline.   Psychiatric:         Mood and Affect: Mood normal.         Behavior: Behavior normal.         Judgment: Judgment normal.                  Procedures:  Procedures      Medical Decision Making:      Comorbidities that affect care:    depression, seizure, GERD, HTN, HLD    External Notes reviewed:    Past clinic visits, past labs and past imaging.      The following orders were placed and all results were independently analyzed by me:  Orders Placed This Encounter   Procedures   • XR Ribs Left With PA Chest       Medications Given in the Emergency Department:  Medications - No data to display     ED Course:         Labs:    Lab Results (last 24 hours)     ** No " results found for the last 24 hours. **           Imaging:    XR Ribs Left With PA Chest    Result Date: 2/11/2023  PROCEDURE: XR RIBS LEFT W PA CHEST  COMPARISON: [facility], CT, CT CHEST WO CONTRAST DIAGNOSTIC, 12/05/2022, 12:30.  [facility], CR, XR CHEST 2 VW, 1/27/2022, 9:54.  INDICATIONS: Minor trauma 3 days ago with left lower anterior lateral rib pain worse with inspiration  FINDINGS:   The lungs are well-expanded. The heart and pulmonary vasculature are within normal limits. No pleural effusions are identified. There are no active appearing infiltrates.  Scoliosis is present.  No evidence of pneumothorax.  No displaced left rib fractures are identified.  IMPRESSION: No active disease.  ALEX VILLALOBOS MD       Electronically Signed and Approved By: ALEX VILLALOBOS MD on 2/11/2023 at 10:17                 Differential Diagnosis and Discussion:    Abdominal Pain: Based on the patient's signs and symptoms, I considered abdominal aortic aneurysm, small bowel obstruction, pancreatitis, acute cholecystitis, acute appendecitis, peptic ulcer disease, gastritis, colitis, endocrine disorders, irritable bowel syndrome and other differential diagnosis an etiology of the patient's abdominal pain.    All labs were reviewed and interpreted by me.    MDM         Patient Care Considerations:    CT ABDOMEN AND PELVIS: I considered ordering a CT scan of the abdomen and pelvis however Patient is not tender to palpation in her abdomen CT CHEST: I considered ordering a CT scan of the chest, however The mechanism of injury is mild and her tenderness is only mild.  In addition, her trauma was 3 days ago and her vital signs are stable.      Consultants/Shared Management Plan:    None    Social Determinants of Health:    Patient is independent, reliable, and has access to care.       Disposition and Care Coordination:    Discharged: The patient is suitable and stable for discharge with no  need for consideration of observation or admission.    I have explained the patient´s condition, diagnoses and treatment plan based on the information available to me at this time. I have answered questions and addressed any concerns. The patient has a good  understanding of the patient´s diagnosis, condition, and treatment plan as can be expected at this point. The vital signs have been stable. The patient´s condition is stable and appropriate for discharge from the emergency department.      The patient will pursue further outpatient evaluation with the primary care physician or other designated or consulting physician as outlined in the discharge instructions. They are agreeable to this plan of care and follow-up instructions have been explained in detail. The patient has received these instructions in written format and have expressed an understanding of the discharge instructions. The patient is aware that any significant change in condition or worsening of symptoms should prompt an immediate return to this or the closest emergency department or call to 911.    Final diagnoses:   Chest wall contusion, left, initial encounter        ED Disposition     ED Disposition   Discharge    Condition   Stable    Comment   --             This medical record created using voice recognition software.             Davidson Sánchez  02/11/23 0935       Davidson Sánchez  02/11/23 0936       Davidson Sánchez  02/11/23 0940       Princess, Davidson  02/11/23 0943       Marquez Key MD  02/11/23 [id number]

## 2023-03-05 ENCOUNTER — APPOINTMENT (OUTPATIENT)
Dept: CT IMAGING | Facility: HOSPITAL | Age: 58
End: 2023-03-05
Payer: COMMERCIAL

## 2023-03-05 ENCOUNTER — HOSPITAL ENCOUNTER (EMERGENCY)
Facility: HOSPITAL | Age: 58
Discharge: HOME | End: 2023-03-05
Attending: EMERGENCY MEDICINE | Admitting: EMERGENCY MEDICINE
Payer: COMMERCIAL

## 2023-03-05 VITALS
DIASTOLIC BLOOD PRESSURE: 74 MMHG | HEART RATE: 68 BPM | TEMPERATURE: 98 F | RESPIRATION RATE: 20 BRPM | SYSTOLIC BLOOD PRESSURE: 128 MMHG | OXYGEN SATURATION: 93 %

## 2023-03-05 DIAGNOSIS — R10.9 RIGHT-SIDED ABDOMINAL PAIN OF UNKNOWN ETIOLOGY: Primary | ICD-10-CM

## 2023-03-05 LAB
ALBUMIN SERPL-MCNC: 4.5 G/DL (ref 3.5–5.2)
ALBUMIN/GLOB SERPL: 1.5 G/DL
ALP SERPL-CCNC: 58 U/L (ref 39–117)
ALT SERPL W P-5'-P-CCNC: 35 U/L (ref 1–33)
ANION GAP SERPL CALCULATED.3IONS-SCNC: 10.5 MMOL/L (ref 5–15)
AST SERPL-CCNC: 37 U/L (ref 1–32)
BASOPHILS # BLD AUTO: 0.08 10*3/MM3 (ref 0–0.2)
BASOPHILS NFR BLD AUTO: 1 % (ref 0–1.5)
BILIRUB SERPL-MCNC: 0.7 MG/DL (ref 0–1.2)
BUN SERPL-MCNC: 9 MG/DL (ref 6–20)
BUN/CREAT SERPL: 14.5 (ref 7–25)
CALCIUM SPEC-SCNC: 10.2 MG/DL (ref 8.6–10.5)
CHLORIDE SERPL-SCNC: 103 MMOL/L (ref 98–107)
CO2 SERPL-SCNC: 27.5 MMOL/L (ref 22–29)
CREAT SERPL-MCNC: 0.62 MG/DL (ref 0.57–1)
D-LACTATE SERPL-SCNC: 1 MMOL/L (ref 0.5–2)
DEPRECATED RDW RBC AUTO: 40.8 FL (ref 37–54)
EGFRCR SERPLBLD CKD-EPI 2021: 102.7 ML/MIN/1.73
EOSINOPHIL # BLD AUTO: 0.17 10*3/MM3 (ref 0–0.4)
EOSINOPHIL NFR BLD AUTO: 2 % (ref 0.3–6.2)
ERYTHROCYTE [DISTWIDTH] IN BLOOD BY AUTOMATED COUNT: 13.8 % (ref 12.3–15.4)
GLOBULIN UR ELPH-MCNC: 3 GM/DL
GLUCOSE SERPL-MCNC: 97 MG/DL (ref 65–99)
HCT VFR BLD AUTO: 45.7 % (ref 34–46.6)
HGB BLD-MCNC: 15.6 G/DL (ref 12–15.9)
HOLD SPECIMEN: NORMAL
HOLD SPECIMEN: NORMAL
IMM GRANULOCYTES # BLD AUTO: 0.01 10*3/MM3 (ref 0–0.05)
IMM GRANULOCYTES NFR BLD AUTO: 0.1 % (ref 0–0.5)
LIPASE SERPL-CCNC: 33 U/L (ref 13–60)
LYMPHOCYTES # BLD AUTO: 3.04 10*3/MM3 (ref 0.7–3.1)
LYMPHOCYTES NFR BLD AUTO: 36.1 % (ref 19.6–45.3)
MCH RBC QN AUTO: 28 PG (ref 26.6–33)
MCHC RBC AUTO-ENTMCNC: 34.1 G/DL (ref 31.5–35.7)
MCV RBC AUTO: 82 FL (ref 79–97)
MONOCYTES # BLD AUTO: 0.75 10*3/MM3 (ref 0.1–0.9)
MONOCYTES NFR BLD AUTO: 8.9 % (ref 5–12)
NEUTROPHILS NFR BLD AUTO: 4.37 10*3/MM3 (ref 1.7–7)
NEUTROPHILS NFR BLD AUTO: 51.9 % (ref 42.7–76)
NRBC BLD AUTO-RTO: 0 /100 WBC (ref 0–0.2)
PLATELET # BLD AUTO: 294 10*3/MM3 (ref 140–450)
PMV BLD AUTO: 10.5 FL (ref 6–12)
POTASSIUM SERPL-SCNC: 4.1 MMOL/L (ref 3.5–5.2)
PROT SERPL-MCNC: 7.5 G/DL (ref 6–8.5)
RBC # BLD AUTO: 5.57 10*6/MM3 (ref 3.77–5.28)
SODIUM SERPL-SCNC: 141 MMOL/L (ref 136–145)
WBC NRBC COR # BLD: 8.42 10*3/MM3 (ref 3.4–10.8)
WHOLE BLOOD HOLD COAG: NORMAL
WHOLE BLOOD HOLD SPECIMEN: NORMAL

## 2023-03-05 PROCEDURE — 80053 COMPREHEN METABOLIC PANEL: CPT | Performed by: EMERGENCY MEDICINE

## 2023-03-05 PROCEDURE — 83690 ASSAY OF LIPASE: CPT | Performed by: EMERGENCY MEDICINE

## 2023-03-05 PROCEDURE — 85025 COMPLETE CBC W/AUTO DIFF WBC: CPT | Performed by: EMERGENCY MEDICINE

## 2023-03-05 PROCEDURE — 25010000002 KETOROLAC TROMETHAMINE PER 15 MG: Performed by: EMERGENCY MEDICINE

## 2023-03-05 PROCEDURE — 83605 ASSAY OF LACTIC ACID: CPT | Performed by: EMERGENCY MEDICINE

## 2023-03-05 PROCEDURE — 99283 EMERGENCY DEPT VISIT LOW MDM: CPT

## 2023-03-05 PROCEDURE — 0 IOHEXOL 300 MG/ML SOLUTION: Performed by: EMERGENCY MEDICINE

## 2023-03-05 PROCEDURE — 96374 THER/PROPH/DIAG INJ IV PUSH: CPT

## 2023-03-05 PROCEDURE — 74177 CT ABD & PELVIS W/CONTRAST: CPT

## 2023-03-05 RX ORDER — KETOROLAC TROMETHAMINE 15 MG/ML
15 INJECTION, SOLUTION INTRAMUSCULAR; INTRAVENOUS ONCE
Status: COMPLETED | OUTPATIENT
Start: 2023-03-05 | End: 2023-03-05

## 2023-03-05 RX ORDER — SODIUM CHLORIDE 0.9 % (FLUSH) 0.9 %
10 SYRINGE (ML) INJECTION AS NEEDED
Status: DISCONTINUED | OUTPATIENT
Start: 2023-03-05 | End: 2023-03-05 | Stop reason: HOSPADM

## 2023-03-05 RX ADMIN — Medication 10 ML: at 14:01

## 2023-03-05 RX ADMIN — KETOROLAC TROMETHAMINE 15 MG: 15 INJECTION, SOLUTION INTRAMUSCULAR; INTRAVENOUS at 14:01

## 2023-03-05 RX ADMIN — IOHEXOL 100 ML: 300 INJECTION, SOLUTION INTRAVENOUS at 14:40

## 2023-03-05 NOTE — DISCHARGE INSTRUCTIONS
Activity as tolerated.  Diet as tolerated.  Continue with your current home medications.  Follow your family doctor in 1 week.  Return to the ER for increasing pain, fever, vomiting, or any other concerns issues that may arise.

## 2023-03-05 NOTE — ED PROVIDER NOTES
Time: 12:41 PM EST  Date of encounter:  3/5/2023  Independent Historian/Clinical History and Information was obtained by:   Patient  Chief Complaint: abdominal pain    History is limited by: N/A    History of Present Illness:  Patient is a 57 y.o. year old female who presents to the emergency department for evaluation of right side abdominal pain.  Patient points to the lateral aspect of her right side.  The symptom onset began last night. Patient denies trauma or injury.  The patient denies any fever.  She denies any urinary symptoms including dysuria or hematuria.  She denies any nausea or vomiting or diarrhea.      History provided by:  Patient   used: No        Patient Care Team  Primary Care Provider: Sera Loza APRN    Past Medical History:     Allergies   Allergen Reactions   • Penicillins Shortness Of Breath and Rash   • Sulfa Antibiotics Hives   • Cephalexin Rash   • Indomethacin Rash     Past Medical History:   Diagnosis Date   • Abnormal Pap smear of cervix    • Allergic     Drug allergies   • Arthritis    • Asthma    • CTS (carpal tunnel syndrome)    • Depression    • Difficulty walking    • Diverticulitis of colon    • Fatty (change of) liver, not elsewhere classified    • GERD (gastroesophageal reflux disease)    • Hearing loss    • Hyperlipemia    • Hypertension    • Hypothyroidism    • Low back pain    • Lumbar radiculopathy 02/15/2022   • Memory loss    • Menopause    • Migraine    • Murmur    • Osteopenia    • Osteoporosis    • Pneumonia    • Primary central sleep apnea    • Scoliosis    • Seizures (HCC)     last when 10 years old   • Sleep apnea, obstructive    • Tremor    • Visual impairment    • Vitamin D deficiency      Past Surgical History:   Procedure Laterality Date   • COLONOSCOPY  2018    glenna   • EPIDURAL BLOCK     • ESSURE TUBAL LIGATION     • SKIN BIOPSY     • THYMECTOMY     • TUBAL ABDOMINAL LIGATION       Family History   Problem Relation Age of  Onset   • Hypertension Father    • Osteoporosis Father    • Alcohol abuse Father    • Skin cancer Father    • Thyroid cancer Mother    • Migraines Mother    • Stroke Mother    • Skin cancer Mother    • Skin cancer Brother    • Dementia Sister    • Migraines Sister    • Migraines Sister    • Uterine cancer Maternal Grandmother    • Colon cancer Neg Hx    • Breast cancer Neg Hx    • Prostate cancer Neg Hx    • Clotting disorder Neg Hx        Home Medications:  Prior to Admission medications    Medication Sig Start Date End Date Taking? Authorizing Provider   albuterol sulfate  (90 Base) MCG/ACT inhaler Inhale 2 puffs Every 4 (Four) Hours As Needed for Wheezing. 5/31/22   Hailey Watson APRN   benzonatate (TESSALON) 200 MG capsule TAKE 1 CAPSULE BY MOUTH EVERY 8 HOURS AS NEEDED for cough 10/24/22   Thaddeus Carter MD   Calcium + Vitamin D3 600-10 MG-MCG tablet TAKE 1 TABLET BY MOUTH TWICE DAILY 1/30/23   Sera Loza APRN   Cholecalciferol (Vitamin D3) 50 MCG (2000 UT) capsule Take 1 capsule by mouth Daily. 5/11/22 5/11/23  Sera Loza APRN   cyclobenzaprine (FLEXERIL) 10 MG tablet Take 1 tablet by mouth 2 (Two) Times a Day As Needed for Muscle Spasms. 2/11/23   Marquez Key MD   Fremanezumab-vfrm (Ajovy) 225 MG/1.5ML solution auto-injector Inject 225 mg under the skin into the appropriate area as directed Every 30 (Thirty) Days. 8/1/22   Lizzy iLndo APRN   HYDROcodone-acetaminophen (NORCO) 5-325 MG per tablet Take 1 tablet by mouth 2 (Two) Times a Day As Needed. 6/4/21   ProviderThaddeus MD   ibandronate (Boniva) 150 MG tablet Take 1 tablet by mouth Every 30 (Thirty) Days. Take it first thing in the morning without any other medication and sit up 30 to 60 minutes after taking the medication 5/13/22   Sera Loza APRN   levothyroxine (SYNTHROID, LEVOTHROID) 75 MCG tablet TAKE 1 TABLET BY MOUTH DAILY 11/7/22   Sera Loza APRN   lisinopril  (PRINIVIL,ZESTRIL) 2.5 MG tablet TAKE 1 TABLET BY MOUTH DAILY 11/7/22   Sera Loza APRN   multivitamin with minerals tablet tablet Take 1 tablet by mouth Daily.    Provider, MD Thaddeus   polyethylene glycol 17 g packet TAKE 17 GRAMS (ONE PACKET) BY MOUTH DAILY] 11/7/22   Tessie Kruger APRN   pramipexole (MIRAPEX) 0.25 MG tablet TAKE 1 TABLET BY MOUTH EVERY NIGHT 11/2/22   Sera Loza APRN   predniSONE 5 MG (48) tablet therapy pack dose pack Take as directed on package instructions. 11/28/22   Sera Loza APRN   rizatriptan (Maxalt) 10 MG tablet Take 1 tablet by mouth 1 (One) Time As Needed for Migraine. May repeat in 2 hours if needed 8/1/22   Lizzy Lindo APRN   vitamin E 100 UNIT capsule Take 100 Units by mouth Daily.    Provider, MD Thaddeus        Social History:   Social History     Tobacco Use   • Smoking status: Never     Passive exposure: Never   • Smokeless tobacco: Never   Vaping Use   • Vaping Use: Never used   Substance Use Topics   • Alcohol use: Never   • Drug use: Never         Review of Systems:  Review of Systems   Constitutional: Negative for chills and fever.   HENT: Negative for congestion, rhinorrhea and sore throat.    Eyes: Negative for photophobia.   Respiratory: Negative for apnea, cough, chest tightness and shortness of breath.    Cardiovascular: Negative for chest pain and palpitations.   Gastrointestinal: Positive for abdominal pain (right side). Negative for diarrhea, nausea and vomiting.   Endocrine: Negative.    Genitourinary: Negative for difficulty urinating and dysuria.   Musculoskeletal: Negative for back pain, joint swelling and myalgias.   Skin: Negative for color change and wound.   Allergic/Immunologic: Negative.    Neurological: Negative for seizures and headaches.   Psychiatric/Behavioral: Negative.    All other systems reviewed and are negative.       Physical Exam:  /71   Pulse 66   Temp 98 °F (36.7 °C) (Oral)    Resp 18   SpO2 93%     Physical Exam  Vitals and nursing note reviewed.   Constitutional:       General: She is awake.      Appearance: Normal appearance.   HENT:      Head: Normocephalic and atraumatic.      Nose: Nose normal.      Mouth/Throat:      Mouth: Mucous membranes are moist.   Eyes:      Extraocular Movements: Extraocular movements intact.      Pupils: Pupils are equal, round, and reactive to light.   Cardiovascular:      Rate and Rhythm: Normal rate and regular rhythm.      Heart sounds: Normal heart sounds.   Pulmonary:      Effort: Pulmonary effort is normal. No respiratory distress.      Breath sounds: Normal breath sounds. No wheezing, rhonchi or rales.   Abdominal:      General: Bowel sounds are normal.      Palpations: Abdomen is soft.      Tenderness: There is abdominal tenderness (mild, right side along the lateral side mid axillary line region.). There is no right CVA tenderness, left CVA tenderness, guarding or rebound.      Comments: No rigidity   Musculoskeletal:         General: No tenderness. Normal range of motion.      Cervical back: Normal range of motion and neck supple.   Skin:     General: Skin is warm and dry.      Coloration: Skin is not jaundiced.   Neurological:      General: No focal deficit present.      Mental Status: She is alert and oriented to person, place, and time. Mental status is at baseline.      Sensory: Sensation is intact.      Motor: Motor function is intact.      Coordination: Coordination is intact.   Psychiatric:         Attention and Perception: Attention and perception normal.         Mood and Affect: Mood and affect normal.         Speech: Speech normal.         Behavior: Behavior normal.         Judgment: Judgment normal.                  Procedures:  Procedures      Medical Decision Making:      Comorbidities that affect care:    Struct of sleep apnea, Asthma, Hypertension    External Notes reviewed:    None      The following orders were placed and all  results were independently analyzed by me:  Orders Placed This Encounter   Procedures   • CT Abdomen Pelvis With Contrast   • [place] Draw   • Comprehensive Metabolic Panel   • Lipase   • Urinalysis With Microscopic If Indicated (No Culture) - Urine, Clean Catch   • Lactic Acid, Plasma   • CBC Auto Differential   • NPO Diet NPO Type: Strict NPO   • Undress & Gown   • Insert Peripheral IV   • CBC & Differential   • Green Top (Gel)   • Lavender Top   • Gold Top - SST   • Light Blue Top       Medications Given in the Emergency Department:  Medications   sodium chloride 0.9 % flush 10 mL (10 mL Intravenous Given 3/5/23 1401)   ketorolac (TORADOL) injection 15 mg (15 mg Intravenous Given 3/5/23 1401)   iohexol (OMNIPAQUE) 300 MG/ML injection 100 mL (100 mL Intravenous Given 3/5/23 1440)        ED Course:     The patient was seen and evaluated the ED by me.  The above history and physical examination was performed as document.  Diagnostic data was obtained.  Results reviewed.  Discussed with the patient.  Patient reports improvement of her symptoms.  Patient's ED work-up was unremarkable for any acute findings.  Patient for discharge home with outpatient management and follow-up.  Patient is agreeable to this treatment plan.  Patient was advised to return to the ER for any change or worsening symptoms    Labs:    Lab Results (last 24 hours)     Procedure Component Value Units Date/Time    CBC & Differential [[id number]]  (Abnormal) Collected: 03/05/23 1217    Specimen: Blood Updated: 03/05/23 1226    Narrative:      The following orders were created for panel order CBC & Differential.  Procedure                               Abnormality         Status                     ---------                               -----------         ------                     CBC Auto Differential[[id number]]        Abnormal            Final result                 Please view results for these tests on the individual orders.    Comprehensive  Metabolic Panel [[id number]]  (Abnormal) Collected: 03/05/23 1217    Specimen: Blood Updated: 03/05/23 1257     Glucose 97 mg/dL      BUN 9 mg/dL      Creatinine 0.62 mg/dL      Sodium 141 mmol/L      Potassium 4.1 mmol/L      Comment: Specimen hemolyzed.  Results may be affected.        Chloride 103 mmol/L      CO2 27.5 mmol/L      Calcium 10.2 mg/dL      Total Protein 7.5 g/dL      Albumin 4.5 g/dL      ALT (SGPT) 35 U/L      Comment: Specimen hemolyzed.  Results may be affected.        AST (SGOT) 37 U/L      Comment: Specimen hemolyzed.  Results may be affected.        Alkaline Phosphatase 58 U/L      Total Bilirubin 0.7 mg/dL      Globulin 3.0 gm/dL      A/G Ratio 1.5 g/dL      BUN/Creatinine Ratio 14.5     Anion Gap 10.5 mmol/L      eGFR 102.7 mL/min/1.73     Narrative:      GFR Normal >60  Chronic Kidney Disease <60  Kidney Failure <15      Lipase [[id number]]  (Normal) Collected: 03/05/23 1217    Specimen: Blood Updated: 03/05/23 1256     Lipase 33 U/L     Lactic Acid, Plasma [[id number]]  (Normal) Collected: 03/05/23 1217    Specimen: Blood Updated: 03/05/23 1254     Lactate 1.0 mmol/L     CBC Auto Differential [[id number]]  (Abnormal) Collected: 03/05/23 1217    Specimen: Blood Updated: 03/05/23 1226     WBC 8.42 10*3/mm3      RBC 5.57 10*6/mm3      Hemoglobin 15.6 g/dL      Hematocrit 45.7 %      MCV 82.0 fL      MCH 28.0 pg      MCHC 34.1 g/dL      RDW 13.8 %      RDW-SD 40.8 fl      MPV 10.5 fL      Platelets 294 10*3/mm3      Neutrophil % 51.9 %      Lymphocyte % 36.1 %      Monocyte % 8.9 %      Eosinophil % 2.0 %      Basophil % 1.0 %      Immature Grans % 0.1 %      Neutrophils, Absolute 4.37 10*3/mm3      Lymphocytes, Absolute 3.04 10*3/mm3      Monocytes, Absolute 0.75 10*3/mm3      Eosinophils, Absolute 0.17 10*3/mm3      Basophils, Absolute 0.08 10*3/mm3      Immature Grans, Absolute 0.01 10*3/mm3      nRBC 0.0 /100 WBC            Imaging:    CT Abdomen Pelvis With Contrast    Result Date:  3/5/2023  PROCEDURE: CT ABDOMEN PELVIS W CONTRAST  COMPARISON: [facility], CT, ABDOMEN/PELVIS WITH CONTRAST, 6/07/2020, 20:27.  INDICATIONS: Right lateral abdominal pain  TECHNIQUE: After obtaining the patient's consent, CT images were created with non-ionic intravenous contrast material.   PROTOCOL:   Standard imaging protocol performed    RADIATION:   DLP: 652.4 mGy*cm   Automated exposure control was utilized to minimize radiation dose. CONTRAST: 100 cc Isovue 370 I.V. LABS:   eGFR: >60 ml/min/1.73m2  FINDINGS:  Visualized lung bases are clear.  The liver, pancreas, and spleen are within normal limits.  Gallbladder is normal.  Bilateral adrenal glands appear normal.  Kidneys appear within normal limits.  No abdominal retroperitoneal adenopathy.  The upper GI tract is within normal limits.  Pelvis:  The urinary bladder is within normal limits.  Uterus and left ovary are normal.  Right ovary contains a 2.9 cm cyst.   The case short-term the GI tract including the appendix is normal.  No pelvic or inguinal adenopathy.  No free intraperitoneal fluid.        1. No acute process seen within the abdomen or pelvis 2. Normal appendix 3. Right ovary contains a 2.9 cm cyst.     LESLIE FARIA MD       Electronically Signed and Approved By: LESLIE FARIA MD on 3/05/2023 at 15:29                 Differential Diagnosis and Discussion:    Abdominal Pain: Based on the patient's signs and symptoms, I considered abdominal aortic aneurysm, small bowel obstruction, pancreatitis, acute cholecystitis, acute appendecitis, peptic ulcer disease, gastritis, colitis, endocrine disorders, irritable bowel syndrome and other differential diagnosis an etiology of the patient's abdominal pain.    All labs were reviewed and interpreted by me.  CT scan radiology interpretation was reviewed by me.    [facility]         Patient Care Considerations:    ANTIBIOTICS: I considered prescribing antibiotics as an outpatient however No evidence of  acute bacterial infection was identified      Consultants/Shared Management Plan:    None    Social Determinants of Health:    Patient is independent, reliable, and has access to care.       Disposition and Care Coordination:    Discharged: The patient is suitable and stable for discharge with no need for consideration of observation or admission.    I have explained the patient´s condition, diagnoses and treatment plan based on the information available to me at this time. I have answered questions and addressed any concerns. The patient has a good  understanding of the patient´s diagnosis, condition, and treatment plan as can be expected at this point. The vital signs have been stable. The patient´s condition is stable and appropriate for discharge from the emergency department.      The patient will pursue further outpatient evaluation with the primary care physician or other designated or consulting physician as outlined in the discharge instructions. They are agreeable to this plan of care and follow-up instructions have been explained in detail. The patient has received these instructions in written format and have expressed an understanding of the discharge instructions. The patient is aware that any significant change in condition or worsening of symptoms should prompt an immediate return to this or the closest emergency department or call to 911.  I have explained discharge medications and the need for follow up with the patient/caretakers. This was also printed in the discharge instructions. Patient was discharged with the following medications and follow up:      Medication List      No changes were made to your prescriptions during this visit.      Sera Loza, APRN  [address] [place] [address] [phone]    In 1 week         Final diagnoses:   Right-sided abdominal pain of unknown etiology        ED Disposition     ED Disposition   Discharge    Condition   Stable    Comment   --              This medical record created using voice recognition software.        Documentation assistance provided by Sandor Tejada acting as scribe for Zach Tellez DO. Information recorded by the scribe was done at my direction and has been verified and validated by me.          Sandor Tejada  03/05/23 1244       Zach Tellez DO  03/05/23 2472

## 2023-03-08 ENCOUNTER — HOSPITAL ENCOUNTER (OUTPATIENT)
Dept: MRI IMAGING | Facility: HOSPITAL | Age: 58
Discharge: HOME | End: 2023-03-08
Admitting: NURSE PRACTITIONER
Payer: COMMERCIAL

## 2023-03-08 DIAGNOSIS — M50.30 DDD (DEGENERATIVE DISC DISEASE), CERVICAL: ICD-10-CM

## 2023-03-08 DIAGNOSIS — R29.898 TRANSIENT RIGHT LEG WEAKNESS: ICD-10-CM

## 2023-03-08 DIAGNOSIS — M62.81 MUSCLE WEAKNESS OF RIGHT UPPER EXTREMITY: ICD-10-CM

## 2023-03-08 PROCEDURE — 72141 MRI NECK SPINE W/O DYE: CPT

## 2023-03-09 ENCOUNTER — OFFICE VISIT (OUTPATIENT)
Dept: NEUROLOGY | Facility: CLINIC | Age: 58
End: 2023-03-09
Payer: COMMERCIAL

## 2023-03-09 VITALS
DIASTOLIC BLOOD PRESSURE: 70 MMHG | HEART RATE: 82 BPM | SYSTOLIC BLOOD PRESSURE: 120 MMHG | WEIGHT: 157.7 LBS | HEIGHT: 63 IN | BODY MASS INDEX: 27.94 KG/M2

## 2023-03-09 DIAGNOSIS — G43.719 INTRACTABLE CHRONIC MIGRAINE WITHOUT AURA AND WITHOUT STATUS MIGRAINOSUS: Primary | ICD-10-CM

## 2023-03-09 PROCEDURE — 99213 OFFICE O/P EST LOW 20 MIN: CPT | Performed by: NURSE PRACTITIONER

## 2023-03-09 PROCEDURE — 3074F SYST BP LT 130 MM HG: CPT | Performed by: NURSE PRACTITIONER

## 2023-03-09 PROCEDURE — 1160F RVW MEDS BY RX/DR IN RCRD: CPT | Performed by: NURSE PRACTITIONER

## 2023-03-09 PROCEDURE — 3078F DIAST BP <80 MM HG: CPT | Performed by: NURSE PRACTITIONER

## 2023-03-09 PROCEDURE — 1159F MED LIST DOCD IN RCRD: CPT | Performed by: NURSE PRACTITIONER

## 2023-03-09 RX ORDER — FREMANEZUMAB-VFRM 225 MG/1.5ML
225 INJECTION SUBCUTANEOUS
Qty: 4.5 ML | Refills: 1 | Status: SHIPPED | OUTPATIENT
Start: 2023-03-09

## 2023-03-09 RX ORDER — RIZATRIPTAN BENZOATE 10 MG/1
10 TABLET ORAL ONCE AS NEEDED
Qty: 9 TABLET | Refills: 5 | Status: SHIPPED | OUTPATIENT
Start: 2023-03-09

## 2023-03-09 NOTE — PROGRESS NOTES
"Chief Complaint  Migraine    Subjective          Valentina Shruti Stevens presents to [facility]  History of Present Illness  Following up for migraines.  Has less than 1 headache days per month on Ajovy.  Maxalt is effective abortive therapy.  Denies side effects.       Objective   Vital Signs:   /70   Pulse 82   Ht 160 cm (63\")   Wt 71.5 kg (157 lb 11.2 oz)   BMI 27.94 kg/m²     Physical Exam  HENT:      Head: Normocephalic.   Pulmonary:      Effort: Pulmonary effort is normal.   Neurological:      Mental Status: She is alert and oriented to person, place, and time.      Sensory: Sensation is intact.      Motor: Motor function is intact.      Coordination: Coordination is intact.      Deep Tendon Reflexes: Reflexes are normal and symmetric.        Neurologic Exam     Mental Status   Oriented to person, place, and time.        Result Review :               Assessment and Plan    Diagnoses and all orders for this visit:    1. Intractable chronic migraine without aura and without status migrainosus (Primary)  Assessment & Plan:  Continue Ajovy for preventative therapy.  We will continue Maxalt as needed for abortive therapy.          Other orders  -     Fremanezumab-vfrm (Ajovy) 225 MG/1.5ML solution auto-injector; Inject 225 mg under the skin into the appropriate area as directed Every 30 (Thirty) Days.  Dispense: 4.5 mL; Refill: 1  -     rizatriptan (Maxalt) 10 MG tablet; Take 1 tablet by mouth 1 (One) Time As Needed for Migraine. May repeat in 2 hours if needed  Dispense: 9 tablet; Refill: 5      Follow Up   Return in about 6 months (around 9/9/2023) for Migraine f/u.  Patient was given instructions and counseling regarding her condition or for health maintenance advice. Please see specific information pulled into the AVS if appropriate.       "

## 2023-03-09 NOTE — ASSESSMENT & PLAN NOTE
Continue Ajovy for preventative therapy.  We will continue Maxalt as needed for abortive therapy.

## 2023-03-09 NOTE — PROGRESS NOTES
Please mail letter to patient stating    Valentina the cervical spine MRI shows moderate cervical spondylosis with mild spinal canal narrowing at multiple levels and then degenerative reversal of the mid cervical curvature and a few scattered osteophytes which are spurs

## 2023-03-23 ENCOUNTER — OFFICE VISIT (OUTPATIENT)
Dept: NEUROSURGERY | Facility: CLINIC | Age: 58
End: 2023-03-23
Payer: COMMERCIAL

## 2023-03-23 VITALS
HEIGHT: 63 IN | WEIGHT: 157.9 LBS | BODY MASS INDEX: 27.98 KG/M2 | DIASTOLIC BLOOD PRESSURE: 68 MMHG | SYSTOLIC BLOOD PRESSURE: 126 MMHG

## 2023-03-23 DIAGNOSIS — M47.812 CERVICAL SPONDYLOSIS WITHOUT MYELOPATHY: Primary | ICD-10-CM

## 2023-03-23 PROCEDURE — 1160F RVW MEDS BY RX/DR IN RCRD: CPT | Performed by: NEUROLOGICAL SURGERY

## 2023-03-23 PROCEDURE — 99213 OFFICE O/P EST LOW 20 MIN: CPT | Performed by: NEUROLOGICAL SURGERY

## 2023-03-23 PROCEDURE — 3078F DIAST BP <80 MM HG: CPT | Performed by: NEUROLOGICAL SURGERY

## 2023-03-23 PROCEDURE — 1159F MED LIST DOCD IN RCRD: CPT | Performed by: NEUROLOGICAL SURGERY

## 2023-03-23 PROCEDURE — 3074F SYST BP LT 130 MM HG: CPT | Performed by: NEUROLOGICAL SURGERY

## 2023-03-23 NOTE — PROGRESS NOTES
Valentina Stevens is a 57 y.o. female that presents with Back Pain       She has noticed 2-3 month of weakness on her right side (arm and leg, not the face). She has had some falls and started using a cane. She denies numbness and tingling into the arms and leg. Her weakness is present all the time. She has no arm pain. She had a new cervical MRI. She has had no treatment for the weakness.      Review of Systems   Musculoskeletal: Positive for back pain, myalgias and neck pain.   Neurological: Positive for weakness and numbness.        Vitals:    03/23/23 1424   BP: 126/68        Physical Exam  Constitutional:       Comments: BMI 27.97   Cardiovascular:      Comments: No edema  Pulmonary:      Effort: Pulmonary effort is normal.   Musculoskeletal:      Cervical back: Normal range of motion.   Neurological:      Mental Status: She is alert.      Sensory: No sensory deficit.      Motor: No weakness (I can't detect any weakness to confrontational testing).      Deep Tendon Reflexes: Reflexes normal (neg Hoffmans, no clonus).   Psychiatric:         Mood and Affect: Mood normal.     I personally reviewed the patient's MRI scan which shows mild multilevel stenosis with some foraminal narrowing.         Assessment and Plan {CC Problem List  Visit Diagnosis  ROS  Review (Popup)  Health Maintenance  Quality  BestPractice  Medications  SmartSets  SnapShot Encounters  Media :23}   Problem List Items Addressed This Visit    None  Visit Diagnoses     Cervical spondylosis without myelopathy    -  Primary      I don't feel the neck findings account for her right sided weakness. She has no signs of myelopathy on exam.    She could consider an EMG/NCV to evaluate if the weakness worsens.     Follow Up {Instructions Charge Capture  Follow-up Communications :23}   Return if symptoms worsen or fail to improve.

## 2023-03-29 ENCOUNTER — OFFICE VISIT (OUTPATIENT)
Dept: PULMONOLOGY | Facility: CLINIC | Age: 58
End: 2023-03-29
Payer: COMMERCIAL

## 2023-03-29 VITALS
SYSTOLIC BLOOD PRESSURE: 117 MMHG | OXYGEN SATURATION: 95 % | RESPIRATION RATE: 16 BRPM | BODY MASS INDEX: 28.21 KG/M2 | DIASTOLIC BLOOD PRESSURE: 67 MMHG | HEART RATE: 74 BPM | HEIGHT: 63 IN | TEMPERATURE: 97.2 F | WEIGHT: 159.2 LBS

## 2023-03-29 DIAGNOSIS — Z99.89 OSA ON CPAP: ICD-10-CM

## 2023-03-29 DIAGNOSIS — J20.9 ACUTE BRONCHITIS, UNSPECIFIED ORGANISM: ICD-10-CM

## 2023-03-29 DIAGNOSIS — G47.33 OSA ON CPAP: ICD-10-CM

## 2023-03-29 DIAGNOSIS — J45.909 MILD ASTHMA, UNSPECIFIED WHETHER COMPLICATED, UNSPECIFIED WHETHER PERSISTENT: ICD-10-CM

## 2023-03-29 DIAGNOSIS — J45.20 MILD INTERMITTENT ASTHMA, UNSPECIFIED WHETHER COMPLICATED: Primary | ICD-10-CM

## 2023-03-29 PROCEDURE — 3074F SYST BP LT 130 MM HG: CPT | Performed by: INTERNAL MEDICINE

## 2023-03-29 PROCEDURE — 1160F RVW MEDS BY RX/DR IN RCRD: CPT | Performed by: INTERNAL MEDICINE

## 2023-03-29 PROCEDURE — 3078F DIAST BP <80 MM HG: CPT | Performed by: INTERNAL MEDICINE

## 2023-03-29 PROCEDURE — 99213 OFFICE O/P EST LOW 20 MIN: CPT | Performed by: INTERNAL MEDICINE

## 2023-03-29 PROCEDURE — 1159F MED LIST DOCD IN RCRD: CPT | Performed by: INTERNAL MEDICINE

## 2023-03-29 RX ORDER — ALBUTEROL SULFATE 90 UG/1
2 AEROSOL, METERED RESPIRATORY (INHALATION) EVERY 4 HOURS PRN
Qty: 18 G | Refills: 5 | Status: SHIPPED | OUTPATIENT
Start: 2023-03-29

## 2023-03-29 RX ORDER — AZITHROMYCIN 250 MG/1
TABLET, FILM COATED ORAL
Qty: 6 TABLET | Refills: 0 | Status: SHIPPED | OUTPATIENT
Start: 2023-03-29

## 2023-04-20 ENCOUNTER — OFFICE VISIT (OUTPATIENT)
Dept: FAMILY MEDICINE CLINIC | Facility: CLINIC | Age: 58
End: 2023-04-20
Payer: COMMERCIAL

## 2023-04-20 VITALS
OXYGEN SATURATION: 98 % | WEIGHT: 160 LBS | BODY MASS INDEX: 28.34 KG/M2 | SYSTOLIC BLOOD PRESSURE: 120 MMHG | HEART RATE: 97 BPM | TEMPERATURE: 96.7 F | DIASTOLIC BLOOD PRESSURE: 76 MMHG

## 2023-04-20 DIAGNOSIS — L84 CORN OF TOE: Primary | ICD-10-CM

## 2023-04-20 PROCEDURE — 1159F MED LIST DOCD IN RCRD: CPT | Performed by: NURSE PRACTITIONER

## 2023-04-20 PROCEDURE — 99213 OFFICE O/P EST LOW 20 MIN: CPT | Performed by: NURSE PRACTITIONER

## 2023-04-20 PROCEDURE — 3078F DIAST BP <80 MM HG: CPT | Performed by: NURSE PRACTITIONER

## 2023-04-20 PROCEDURE — 3074F SYST BP LT 130 MM HG: CPT | Performed by: NURSE PRACTITIONER

## 2023-04-20 PROCEDURE — 1160F RVW MEDS BY RX/DR IN RCRD: CPT | Performed by: NURSE PRACTITIONER

## 2023-04-20 NOTE — PROGRESS NOTES
Answers for HPI/ROS submitted by the patient on 4/19/2023  Please describe your symptoms.: Corn on toe  Have you had these symptoms before?: No  How long have you been having these symptoms?: 5-7 days  What is the primary reason for your visit?: Other    Chief Complaint  Toe Pain (Right Pinky Toe has a corn on it and it is hurting.  )    Subjective            Valentina Stevens presents to [facility]  History of Present Illness  Patient is here today for a conrn on her right fifth digit on her foot and reports that started 5 to 7 days ago    Used multiple things OTC: corn pads, corn remover topical and then trimmed it and still there and hurting --hurts worse with standing at work     And pt used to see DR HECTOR Cui and she would liek to RTO of that podiatrist       PHQ-2 Total Score: 0  PHQ-9 Total Score: 0    Past Medical History:   Diagnosis Date   • Abnormal Pap smear of cervix    • Allergic     Drug allergies   • Arthritis    • Asthma    • Asthma, intrinsic    • CTS (carpal tunnel syndrome)    • Depression    • Difficulty walking    • Diverticulitis of colon    • Fatty (change of) liver, not elsewhere classified    • GERD (gastroesophageal reflux disease)    • Hearing loss    • Hyperlipemia    • Hypertension    • Hypothyroidism    • Low back pain    • Lumbar radiculopathy 02/15/2022   • Memory loss    • Menopause    • Migraine    • Murmur    • Osteopenia    • Osteoporosis    • Pneumonia    • Primary central sleep apnea    • Scoliosis    • Seizures     last when 10 years old   • Sleep apnea, obstructive    • Tremor    • Vision loss    • Visual impairment    • Vitamin D deficiency        Allergies   Allergen Reactions   • Penicillins Shortness Of Breath and Rash   • Sulfa Antibiotics Hives   • Cephalexin Rash   • Indomethacin Rash        Past Surgical History:   Procedure Laterality Date   • COLONOSCOPY  2018    glenna   • EPIDURAL BLOCK     • ESSURE TUBAL LIGATION     • SKIN  BIOPSY     • THYMECTOMY     • TUBAL ABDOMINAL LIGATION          Social History     Tobacco Use   • Smoking status: Never     Passive exposure: Never   • Smokeless tobacco: Never   Vaping Use   • Vaping Use: Never used   Substance Use Topics   • Alcohol use: Never   • Drug use: Never       Family History   Problem Relation Age of Onset   • Hypertension Father    • Osteoporosis Father    • Alcohol abuse Father    • Skin cancer Father    • Thyroid cancer Mother    • Migraines Mother    • Stroke Mother    • Skin cancer Mother    • Cancer Mother    • Skin cancer Brother    • Dementia Sister    • Migraines Sister    • Migraines Sister    • Uterine cancer Maternal Grandmother    • Colon cancer Neg Hx    • Breast cancer Neg Hx    • Prostate cancer Neg Hx    • Clotting disorder Neg Hx         Health Maintenance Due   Topic Date Due   • ANNUAL PHYSICAL  10/19/2022        Current Outpatient Medications on File Prior to Visit   Medication Sig   • albuterol sulfate  (90 Base) MCG/ACT inhaler Inhale 2 puffs Every 4 (Four) Hours As Needed for Wheezing.   • azithromycin (ZITHROMAX) 250 MG tablet Take 2 by mouth today then 1 daily for 4 days   • Calcium + Vitamin D3 600-10 MG-MCG tablet TAKE 1 TABLET BY MOUTH TWICE DAILY   • Cholecalciferol (Vitamin D3) 50 MCG (2000 UT) capsule Take 1 capsule by mouth Daily.   • cyclobenzaprine (FLEXERIL) 10 MG tablet Take 1 tablet by mouth 2 (Two) Times a Day As Needed for Muscle Spasms.   • Fremanezumab-vfrm (Ajovy) 225 MG/1.5ML solution auto-injector Inject 225 mg under the skin into the appropriate area as directed Every 30 (Thirty) Days.   • HYDROcodone-acetaminophen (NORCO) 5-325 MG per tablet Take 1 tablet by mouth 2 (Two) Times a Day As Needed.   • ibandronate (Boniva) 150 MG tablet Take 1 tablet by mouth Every 30 (Thirty) Days. Take it first thing in the morning without any other medication and sit up 30 to 60 minutes after taking the medication   • levothyroxine (SYNTHROID,  LEVOTHROID) 75 MCG tablet TAKE 1 TABLET BY MOUTH DAILY   • lisinopril (PRINIVIL,ZESTRIL) 2.5 MG tablet TAKE 1 TABLET BY MOUTH DAILY   • multivitamin with minerals tablet tablet Take 1 tablet by mouth Daily.   • pramipexole (MIRAPEX) 0.25 MG tablet TAKE 1 TABLET BY MOUTH EVERY NIGHT   • rizatriptan (Maxalt) 10 MG tablet Take 1 tablet by mouth 1 (One) Time As Needed for Migraine. May repeat in 2 hours if needed   • vitamin E 100 UNIT capsule Take 1 capsule by mouth Daily.     No current facility-administered medications on file prior to visit.       Immunization History   Administered Date(s) Administered   • COVID-19 (PFIZER) PURPLE CAP 05/27/2021, 06/17/2021, 06/17/2021   • FluLaval/Fluzone >6mos 10/19/2021, 09/30/2022   • Hepatitis A 05/17/2018, 12/20/2018   • Influenza, Unspecified 10/19/2020, 10/19/2020   • Pneumococcal Polysaccharide (PPSV23) 05/10/2022   • Shingrix 11/12/2021, 01/21/2022   • Tdap 03/20/2018, 10/19/2020   • Zoster, Unspecified 11/12/2021, 01/21/2022       Review of Systems   Skin:        Corn to the  Inner aspect right 5th toe        Objective     /76 (BP Location: Right arm, Patient Position: Sitting, Cuff Size: Adult)   Pulse 97   Temp 96.7 °F (35.9 °C) (Temporal)   Wt 72.6 kg (160 lb)   SpO2 98%   BMI 28.34 kg/m²       Physical Exam  Vitals and nursing note reviewed.   Constitutional:       Appearance: Normal appearance.   HENT:      Head: Normocephalic.      Right Ear: External ear normal.      Left Ear: External ear normal.      Nose: Nose normal.      Mouth/Throat:      Mouth: Mucous membranes are moist.   Eyes:      Pupils: Pupils are equal, round, and reactive to light.   Cardiovascular:      Rate and Rhythm: Normal rate.   Pulmonary:      Effort: Pulmonary effort is normal.   Musculoskeletal:        Feet:       Comments: Firm callused area of the inner aspect right 5th toe--also chronic kyphosis and low back pain and walks/ambulates with a cane    Skin:     General: Skin is  warm and dry.   Neurological:      Mental Status: She is alert and oriented to person, place, and time.   Psychiatric:         Mood and Affect: Mood normal.         Behavior: Behavior normal.         Thought Content: Thought content normal.         Judgment: Judgment normal.         Result Review :                          Assessment and Plan      Diagnoses and all orders for this visit:    1. Corn of toe (Primary)  -     Ambulatory Referral to Podiatry    Advised patient to continue attempting to use corn pads until she can see Dr. Cui her podiatrist        Follow Up     Return if symptoms worsen or fail to improve.

## 2023-04-30 ENCOUNTER — APPOINTMENT (OUTPATIENT)
Dept: CT IMAGING | Facility: HOSPITAL | Age: 58
End: 2023-04-30
Payer: COMMERCIAL

## 2023-04-30 ENCOUNTER — HOSPITAL ENCOUNTER (EMERGENCY)
Facility: HOSPITAL | Age: 58
Discharge: HOME | End: 2023-04-30
Attending: EMERGENCY MEDICINE | Admitting: EMERGENCY MEDICINE
Payer: COMMERCIAL

## 2023-04-30 VITALS
TEMPERATURE: 97.7 F | HEART RATE: 60 BPM | OXYGEN SATURATION: 97 % | DIASTOLIC BLOOD PRESSURE: 69 MMHG | HEIGHT: 63 IN | WEIGHT: 159.83 LBS | SYSTOLIC BLOOD PRESSURE: 79 MMHG | BODY MASS INDEX: 28.32 KG/M2 | RESPIRATION RATE: 15 BRPM

## 2023-04-30 DIAGNOSIS — N39.0 ACUTE UTI: Primary | ICD-10-CM

## 2023-04-30 LAB
ALBUMIN SERPL-MCNC: 4.2 G/DL (ref 3.5–5.2)
ALBUMIN/GLOB SERPL: 1.4 G/DL
ALP SERPL-CCNC: 50 U/L (ref 39–117)
ALT SERPL W P-5'-P-CCNC: 38 U/L (ref 1–33)
ANION GAP SERPL CALCULATED.3IONS-SCNC: 12 MMOL/L (ref 5–15)
AST SERPL-CCNC: 31 U/L (ref 1–32)
BACTERIA UR QL AUTO: ABNORMAL /HPF
BASOPHILS # BLD AUTO: 0.04 10*3/MM3 (ref 0–0.2)
BASOPHILS NFR BLD AUTO: 0.4 % (ref 0–1.5)
BILIRUB SERPL-MCNC: 0.6 MG/DL (ref 0–1.2)
BILIRUB UR QL STRIP: NEGATIVE
BUN SERPL-MCNC: 11 MG/DL (ref 6–20)
BUN/CREAT SERPL: 17.2 (ref 7–25)
CALCIUM SPEC-SCNC: 9.5 MG/DL (ref 8.6–10.5)
CHLORIDE SERPL-SCNC: 104 MMOL/L (ref 98–107)
CLARITY UR: ABNORMAL
CO2 SERPL-SCNC: 23 MMOL/L (ref 22–29)
COLOR UR: YELLOW
CREAT SERPL-MCNC: 0.64 MG/DL (ref 0.57–1)
DEPRECATED RDW RBC AUTO: 41.1 FL (ref 37–54)
EGFRCR SERPLBLD CKD-EPI 2021: 101.9 ML/MIN/1.73
EOSINOPHIL # BLD AUTO: 0.06 10*3/MM3 (ref 0–0.4)
EOSINOPHIL NFR BLD AUTO: 0.6 % (ref 0.3–6.2)
ERYTHROCYTE [DISTWIDTH] IN BLOOD BY AUTOMATED COUNT: 14.2 % (ref 12.3–15.4)
GLOBULIN UR ELPH-MCNC: 3 GM/DL
GLUCOSE SERPL-MCNC: 117 MG/DL (ref 65–99)
GLUCOSE UR STRIP-MCNC: NEGATIVE MG/DL
HCT VFR BLD AUTO: 43.6 % (ref 34–46.6)
HGB BLD-MCNC: 15 G/DL (ref 12–15.9)
HGB UR QL STRIP.AUTO: NEGATIVE
HOLD SPECIMEN: NORMAL
HOLD SPECIMEN: NORMAL
HYALINE CASTS UR QL AUTO: ABNORMAL /LPF
IMM GRANULOCYTES # BLD AUTO: 0.02 10*3/MM3 (ref 0–0.05)
IMM GRANULOCYTES NFR BLD AUTO: 0.2 % (ref 0–0.5)
KETONES UR QL STRIP: NEGATIVE
LEUKOCYTE ESTERASE UR QL STRIP.AUTO: ABNORMAL
LIPASE SERPL-CCNC: 30 U/L (ref 13–60)
LYMPHOCYTES # BLD AUTO: 2.15 10*3/MM3 (ref 0.7–3.1)
LYMPHOCYTES NFR BLD AUTO: 22.6 % (ref 19.6–45.3)
MCH RBC QN AUTO: 27.9 PG (ref 26.6–33)
MCHC RBC AUTO-ENTMCNC: 34.4 G/DL (ref 31.5–35.7)
MCV RBC AUTO: 81.2 FL (ref 79–97)
MONOCYTES # BLD AUTO: 0.68 10*3/MM3 (ref 0.1–0.9)
MONOCYTES NFR BLD AUTO: 7.1 % (ref 5–12)
NEUTROPHILS NFR BLD AUTO: 6.57 10*3/MM3 (ref 1.7–7)
NEUTROPHILS NFR BLD AUTO: 69.1 % (ref 42.7–76)
NITRITE UR QL STRIP: NEGATIVE
NRBC BLD AUTO-RTO: 0 /100 WBC (ref 0–0.2)
PH UR STRIP.AUTO: >=9 [PH] (ref 5–8)
PLATELET # BLD AUTO: 258 10*3/MM3 (ref 140–450)
PMV BLD AUTO: 10.4 FL (ref 6–12)
POTASSIUM SERPL-SCNC: 4.2 MMOL/L (ref 3.5–5.2)
PROT SERPL-MCNC: 7.2 G/DL (ref 6–8.5)
PROT UR QL STRIP: ABNORMAL
RBC # BLD AUTO: 5.37 10*6/MM3 (ref 3.77–5.28)
RBC # UR STRIP: ABNORMAL /HPF
REF LAB TEST METHOD: ABNORMAL
SODIUM SERPL-SCNC: 139 MMOL/L (ref 136–145)
SP GR UR STRIP: 1.02 (ref 1–1.03)
SQUAMOUS #/AREA URNS HPF: ABNORMAL /HPF
TROPONIN T SERPL HS-MCNC: <6 NG/L
UROBILINOGEN UR QL STRIP: ABNORMAL
WBC # UR STRIP: ABNORMAL /HPF
WBC NRBC COR # BLD: 9.52 10*3/MM3 (ref 3.4–10.8)
WHOLE BLOOD HOLD COAG: NORMAL
WHOLE BLOOD HOLD SPECIMEN: NORMAL

## 2023-04-30 PROCEDURE — 99284 EMERGENCY DEPT VISIT MOD MDM: CPT

## 2023-04-30 PROCEDURE — 70450 CT HEAD/BRAIN W/O DYE: CPT

## 2023-04-30 PROCEDURE — 80053 COMPREHEN METABOLIC PANEL: CPT | Performed by: EMERGENCY MEDICINE

## 2023-04-30 PROCEDURE — 85025 COMPLETE CBC W/AUTO DIFF WBC: CPT | Performed by: EMERGENCY MEDICINE

## 2023-04-30 PROCEDURE — 84484 ASSAY OF TROPONIN QUANT: CPT | Performed by: EMERGENCY MEDICINE

## 2023-04-30 PROCEDURE — 93005 ELECTROCARDIOGRAM TRACING: CPT | Performed by: EMERGENCY MEDICINE

## 2023-04-30 PROCEDURE — 83690 ASSAY OF LIPASE: CPT | Performed by: EMERGENCY MEDICINE

## 2023-04-30 PROCEDURE — 81001 URINALYSIS AUTO W/SCOPE: CPT | Performed by: EMERGENCY MEDICINE

## 2023-04-30 RX ORDER — ACETAMINOPHEN 325 MG/1
650 TABLET ORAL ONCE
Status: COMPLETED | OUTPATIENT
Start: 2023-04-30 | End: 2023-04-30

## 2023-04-30 RX ORDER — NITROFURANTOIN 25; 75 MG/1; MG/1
100 CAPSULE ORAL 2 TIMES DAILY
Qty: 20 CAPSULE | Refills: 0 | Status: SHIPPED | OUTPATIENT
Start: 2023-04-30

## 2023-04-30 RX ORDER — SODIUM CHLORIDE 0.9 % (FLUSH) 0.9 %
10 SYRINGE (ML) INJECTION AS NEEDED
Status: DISCONTINUED | OUTPATIENT
Start: 2023-04-30 | End: 2023-04-30 | Stop reason: HOSPADM

## 2023-04-30 RX ORDER — NITROFURANTOIN 25; 75 MG/1; MG/1
100 CAPSULE ORAL ONCE
Status: COMPLETED | OUTPATIENT
Start: 2023-04-30 | End: 2023-04-30

## 2023-04-30 RX ADMIN — ACETAMINOPHEN 650 MG: 325 TABLET ORAL at 14:57

## 2023-04-30 RX ADMIN — NITROFURANTOIN MONOHYDRATE/MACROCRYSTALLINE 100 MG: 25; 75 CAPSULE ORAL at 14:57

## 2023-04-30 NOTE — Clinical Note
[facility] EMERGENCY ROOM  [address]  [place] [address]  Phone: [phone]    Valentina Stevens was seen and treated in our emergency department on 4/30/2023.  She may return to work on 05/03/2023.         Thank you for choosing [facility].    Alexi Barnes MD

## 2023-04-30 NOTE — ED PROVIDER NOTES
"Time: 11:47 AM EDT  Date of encounter:  4/30/2023  Independent Historian/Clinical History and Information was obtained by: Patient and chart  Chief Complaint: head injury or trauma  History is limited by: N/A  Room number: 33/33     History of Present Illness:  HPI  Patient is a 57 y.o. year old female who presents to the Emergency Department via EMS. for evaluation of head injury or trauma. Patient has no one at bedside on initial evaluation. Patient has a medical history of vitamin D deficiency, seizure disorder, liver disease, audio and  visual impairment, memory loss, diverticulitis, chronic back pain with difficulty ambulating, arthritis, asthma, depression, gastroesophageal reflux disease (GERD), hyperlipidemia (HLD), hypertension (HTN), hypothyroidism, migraine, obstructive sleep apnea (DEVENDRA), osteopenia and osteoporosis. Patient has a surgical history of thymectomy, tubal ligation. Patient has a social history of no clinical significance.    Patient is experiencing symptoms related to head trauma or injury with dizziness, nausea, vomiting that approximately a couple hours ago. Patient states she was \"head butted\" by a dog a few days ago. Patient denies loss of consciousness. Patient states they are in no pain at this time. Patient admits diarrhea but states she is at her baseline for this symptom. Patient denies symptoms of abdominal pain, dysuria, urinary urgency, urinary frequency, headache, numbness, paresthesia. All other review of systems are negative.    Patient Care Team  Primary Care Provider: Sera Loza APRN    Past Medical History:  Allergies   Allergen Reactions   • Penicillins Shortness Of Breath and Rash   • Sulfa Antibiotics Hives   • Cephalexin Rash   • Indomethacin Rash     Past Medical History:   Diagnosis Date   • Abnormal Pap smear of cervix    • Allergic     Drug allergies   • Arthritis    • Asthma    • Asthma, intrinsic    • CTS (carpal tunnel syndrome)    • Depression    • " Difficulty walking    • Diverticulitis of colon    • Fatty (change of) liver, not elsewhere classified    • GERD (gastroesophageal reflux disease)    • Hearing loss    • Hyperlipemia    • Hypertension    • Hypothyroidism    • Low back pain    • Lumbar radiculopathy 02/15/2022   • Memory loss    • Menopause    • Migraine    • Murmur    • Osteopenia    • Osteoporosis    • Pneumonia    • Primary central sleep apnea    • Scoliosis    • Seizures     last when 10 years old   • Sleep apnea, obstructive    • Tremor    • Vision loss    • Visual impairment    • Vitamin D deficiency      Past Surgical History:   Procedure Laterality Date   • COLONOSCOPY  2018 [facility]   • EPIDURAL BLOCK     • ESSURE TUBAL LIGATION     • SKIN BIOPSY     • THYMECTOMY     • TUBAL ABDOMINAL LIGATION       Family History   Problem Relation Age of Onset   • Hypertension Father    • Osteoporosis Father    • Alcohol abuse Father    • Skin cancer Father    • Thyroid cancer Mother    • Migraines Mother    • Stroke Mother    • Skin cancer Mother    • Cancer Mother    • Skin cancer Brother    • Dementia Sister    • Migraines Sister    • Migraines Sister    • Uterine cancer Maternal Grandmother    • Colon cancer Neg Hx    • Breast cancer Neg Hx    • Prostate cancer Neg Hx    • Clotting disorder Neg Hx        Home Medications:  Prior to Admission medications    Medication Sig Start Date End Date Taking? Authorizing Provider   albuterol sulfate  (90 Base) MCG/ACT inhaler Inhale 2 puffs Every 4 (Four) Hours As Needed for Wheezing. 3/29/23   Flo Simon MD   azithromycin (ZITHROMAX) 250 MG tablet Take 2 by mouth today then 1 daily for 4 days 3/29/23   Flo Simon MD   Calcium + Vitamin D3 600-10 MG-MCG tablet TAKE 1 TABLET BY MOUTH TWICE DAILY 1/30/23   Sera Loza APRN   Cholecalciferol (Vitamin D3) 50 MCG (2000 UT) capsule Take 1 capsule by mouth Daily. 5/11/22 5/11/23  Sera Loza APRN   cyclobenzaprine (FLEXERIL) 10  MG tablet Take 1 tablet by mouth 2 (Two) Times a Day As Needed for Muscle Spasms. 2/11/23   Marquez Key MD   Fremanezumab-vfrm (Ajovy) 225 MG/1.5ML solution auto-injector Inject 225 mg under the skin into the appropriate area as directed Every 30 (Thirty) Days. 3/9/23   Lizzy Lindo APRN   HYDROcodone-acetaminophen (NORCO) 5-325 MG per tablet Take 1 tablet by mouth 2 (Two) Times a Day As Needed. 6/4/21   ProviderThaddeus MD   ibandronate (Boniva) 150 MG tablet Take 1 tablet by mouth Every 30 (Thirty) Days. Take it first thing in the morning without any other medication and sit up 30 to 60 minutes after taking the medication 5/13/22   Sera Loza APRN   levothyroxine (SYNTHROID, LEVOTHROID) 75 MCG tablet TAKE 1 TABLET BY MOUTH DAILY 11/7/22   Sera Loza APRN   lisinopril (PRINIVIL,ZESTRIL) 2.5 MG tablet TAKE 1 TABLET BY MOUTH DAILY 11/7/22   Sera Loza APRN   multivitamin with minerals tablet tablet Take 1 tablet by mouth Daily.    ProviderThaddeus MD   pramipexole (MIRAPEX) 0.25 MG tablet TAKE 1 TABLET BY MOUTH EVERY NIGHT 11/2/22   Sera Loza APRN   rizatriptan (Maxalt) 10 MG tablet Take 1 tablet by mouth 1 (One) Time As Needed for Migraine. May repeat in 2 hours if needed 3/9/23   Lizzy Lindo APRN   vitamin E 100 UNIT capsule Take 1 capsule by mouth Daily.    ProviderThaddeus MD        Social History:  Social History     Tobacco Use   • Smoking status: Never     Passive exposure: Never   • Smokeless tobacco: Never   Vaping Use   • Vaping Use: Never used   Substance Use Topics   • Alcohol use: Never   • Drug use: Never       Review of Systems:  Review of Systems   Constitutional: Negative.    HENT: Negative.    Eyes: Negative.    Respiratory: Negative.    Cardiovascular: Negative.    Gastrointestinal: Positive for diarrhea (at baseline), nausea and vomiting. Negative for abdominal pain.   Endocrine: Negative.    Genitourinary: Negative.   "Negative for dysuria, frequency and urgency.   Musculoskeletal: Negative.    Skin: Negative.    Allergic/Immunologic: Negative.    Neurological: Positive for dizziness. Negative for numbness (and negative paresthesia) and headaches.   Hematological: Negative.    Psychiatric/Behavioral: Negative.    All other systems reviewed and are negative.         Physical Exam:  BP (!) 79/69   Pulse 60   Temp 97.7 °F (36.5 °C) (Oral)   Resp 15   Ht 160 cm (63\")   Wt 72.5 kg (159 lb 13.3 oz)   SpO2 97%   BMI 28.31 kg/m²     Physical Exam  Vitals and nursing note reviewed.   Constitutional:       General: She is not in acute distress.     Appearance: Normal appearance. She is not toxic-appearing.   HENT:      Head: Normocephalic and atraumatic.      Jaw: There is normal jaw occlusion.   Eyes:      General: Lids are normal.      Extraocular Movements: Extraocular movements intact.      Conjunctiva/sclera: Conjunctivae normal.      Pupils: Pupils are equal, round, and reactive to light.   Cardiovascular:      Rate and Rhythm: Normal rate and regular rhythm.      Pulses: Normal pulses.      Heart sounds: Normal heart sounds.   Pulmonary:      Effort: Pulmonary effort is normal. No respiratory distress.      Breath sounds: Normal breath sounds. No wheezing or rhonchi.   Abdominal:      General: Abdomen is flat.      Palpations: Abdomen is soft.      Tenderness: There is no abdominal tenderness. There is no guarding or rebound.   Musculoskeletal:         General: Normal range of motion.      Cervical back: Normal range of motion and neck supple.      Right lower leg: No edema.      Left lower leg: No edema.   Skin:     General: Skin is warm and dry.   Neurological:      Mental Status: She is alert and oriented to person, place, and time. Mental status is at baseline.   Psychiatric:         Mood and Affect: Mood normal.                Procedures:  Procedures    Medical Decision Making:    Comorbidities that affect care:     " vitamin D deficiency, seizure disorder, liver disease, audio and  visual impairment, memory loss, diverticulitis, chronic back pain with difficulty ambulating, arthritis, asthma, depression, gastroesophageal reflux disease (GERD), hyperlipidemia (HLD), hypertension (HTN), hypothyroidism, migraine, obstructive sleep apnea (DEVENDRA), osteopenia and osteoporosis    External Notes reviewed:    Previous Clinic Note: Patient was seen by her PCP for evaluation of back pain.    The following orders were placed and all results were independently analyzed by me:  Orders Placed This Encounter   Procedures   • CT Head Without Contrast   • Kincaid Draw   • Comprehensive Metabolic Panel   • Lipase   • Single High Sensitivity Troponin T   • Urinalysis With Microscopic If Indicated (No Culture) - Urine, Clean Catch   • CBC Auto Differential   • Urinalysis, Microscopic Only - Urine, Clean Catch   • NPO Diet NPO Type: Strict NPO   • Undress & Gown   • Cardiac Monitoring   • Continuous Pulse Oximetry   • Vital Signs   • Oxygen Therapy- Nasal Cannula; 2 LPM; Titrate for SPO2: 92%, Greater Than or Equal To   • ECG 12 Lead ED Triage Standing Order; Abdominal Pain (Upper)   • Insert Peripheral IV   • CBC & Differential   • Green Top (Gel)   • Lavender Top   • Gold Top - SST   • Light Blue Top       Medications Given in the Emergency Department:  Medications   sodium chloride 0.9 % flush 10 mL (has no administration in time range)   acetaminophen (TYLENOL) tablet 650 mg (has no administration in time range)   nitrofurantoin (macrocrystal-monohydrate) (MACROBID) capsule 100 mg (has no administration in time range)       ED Course:  ED Course as of 04/30/23 1453   Sun Apr 30, 2023   1453 EKG:    Rhythm: sinus  Rate: 62  Axis: normal  Intervals: normal  ST Segment: no elevations    Interpreted by me   [BN]      ED Course User Index  [BN] Alexi Barnes MD       Labs:  Lab Results (last 24 hours)     Procedure Component Value Units Date/Time     CBC & Differential [[id number]]  (Abnormal) Collected: 04/30/23 1140    Specimen: Blood Updated: 04/30/23 1147    Narrative:      The following orders were created for panel order CBC & Differential.  Procedure                               Abnormality         Status                     ---------                               -----------         ------                     CBC Auto Differential[[id number]]        Abnormal            Final result                 Please view results for these tests on the individual orders.    Comprehensive Metabolic Panel [[id number]]  (Abnormal) Collected: 04/30/23 1140    Specimen: Blood Updated: 04/30/23 1212     Glucose 117 mg/dL      BUN 11 mg/dL      Creatinine 0.64 mg/dL      Sodium 139 mmol/L      Potassium 4.2 mmol/L      Chloride 104 mmol/L      CO2 23.0 mmol/L      Calcium 9.5 mg/dL      Total Protein 7.2 g/dL      Albumin 4.2 g/dL      ALT (SGPT) 38 U/L      AST (SGOT) 31 U/L      Alkaline Phosphatase 50 U/L      Total Bilirubin 0.6 mg/dL      Globulin 3.0 gm/dL      A/G Ratio 1.4 g/dL      BUN/Creatinine Ratio 17.2     Anion Gap 12.0 mmol/L      eGFR 101.9 mL/min/1.73     Narrative:      GFR Normal >60  Chronic Kidney Disease <60  Kidney Failure <15      Lipase [[id number]]  (Normal) Collected: 04/30/23 1140    Specimen: Blood Updated: 04/30/23 1212     Lipase 30 U/L     Single High Sensitivity Troponin T [[id number]]  (Normal) Collected: 04/30/23 1140    Specimen: Blood Updated: 04/30/23 1212     HS Troponin T <6 ng/L     Narrative:      High Sensitive Troponin T Reference Range:  <10.0 ng/L- Negative Female for AMI  <15.0 ng/L- Negative Male for AMI  >=10 - Abnormal Female indicating possible myocardial injury.  >=15 - Abnormal Male indicating possible myocardial injury.   Clinicians would have to utilize clinical acumen, EKG, Troponin, and serial changes to determine if it is an Acute Myocardial Infarction or myocardial injury due to an underlying chronic condition.          CBC Auto Differential [[id number]]  (Abnormal) Collected: 04/30/23 1140    Specimen: Blood Updated: 04/30/23 1147     WBC 9.52 10*3/mm3      RBC 5.37 10*6/mm3      Hemoglobin 15.0 g/dL      Hematocrit 43.6 %      MCV 81.2 fL      MCH 27.9 pg      MCHC 34.4 g/dL      RDW 14.2 %      RDW-SD 41.1 fl      MPV 10.4 fL      Platelets 258 10*3/mm3      Neutrophil % 69.1 %      Lymphocyte % 22.6 %      Monocyte % 7.1 %      Eosinophil % 0.6 %      Basophil % 0.4 %      Immature Grans % 0.2 %      Neutrophils, Absolute 6.57 10*3/mm3      Lymphocytes, Absolute 2.15 10*3/mm3      Monocytes, Absolute 0.68 10*3/mm3      Eosinophils, Absolute 0.06 10*3/mm3      Basophils, Absolute 0.04 10*3/mm3      Immature Grans, Absolute 0.02 10*3/mm3      nRBC 0.0 /100 WBC     Urinalysis With Microscopic If Indicated (No Culture) - Urine, Clean Catch [[id number]]  (Abnormal) Collected: 04/30/23 1356    Specimen: Urine, Clean Catch Updated: 04/30/23 1413     Color, UA Yellow     Appearance, UA Turbid     pH, UA >=9.0     Specific Gravity, UA 1.020     Glucose, UA Negative     Ketones, UA Negative     Bilirubin, UA Negative     Blood, UA Negative     Protein, UA 30 mg/dL (1+)     Leuk Esterase, UA Moderate (2+)     Nitrite, UA Negative     Urobilinogen, UA 0.2 E.U./dL    Urinalysis, Microscopic Only - Urine, Clean Catch [[id number]]  (Abnormal) Collected: 04/30/23 1356    Specimen: Urine, Clean Catch Updated: 04/30/23 1413     RBC, UA 6-12 /HPF      WBC, UA Too Numerous to Count /HPF      Bacteria, UA None Seen /HPF      Squamous Epithelial Cells, UA 0-2 /HPF      Hyaline Casts, UA 7-12 /LPF      Methodology Automated Microscopy           Imaging:  CT Head Without Contrast    Result Date: 4/30/2023  PROCEDURE: CT HEAD WO CONTRAST  COMPARISON:  [facility], MR, MRI BRAIN W WO CONTRAST, 11/04/2022, 17:15.  [facility], CT, HEAD W/O CONTRAST, 5/15/2018, 12:20. INDICATIONS: HEADACHE. VOMITING. HEAD-BUTTED BY A DOG X 2  DAYS AGO.  PROTOCOL:   Standard imaging protocol performed    RADIATION:   DLP: 954.2mGy*cm   MA and/or KV was adjusted to minimize radiation dose.     TECHNIQUE: Axial images of the head without intravenous contrast.  FINDINGS:  The ventricles have a normal size and configuration. There is no evidence of acute intracranial hemorrhage, mass or midline shift. No extra-axial fluid collections are identified. There are no skull fractures. The visualized paranasal sinuses and mastoid air cells are grossly clear.  IMPRESSION: Normal exam.  ALEX VILLALOBOS MD       Electronically Signed and Approved By: ALEX VILLALOBOS MD on 4/30/2023 at 12:49               Differential Diagnosis and Discussion:    Dizziness: Based on the patient's history, signs, and symptoms, the diffential diagnosis includes but is not limited to meningitis, stroke, sepsis, subarachnoid hemorrhage, intracranial bleeding, encephalitis, vertigo, electrolyte imbalance, and metabolic disorders.  Vomiting: Differential diagnosis includes but is not limited to migraine, labyrinthine disorders, psychogenic, metabolic and endocrine causes, peptic ulcer, gastric outlet obstruction, gastritis, gastroenteritis, appendicitis, intestinal obstruction, paralytic ileus, food poisoning, cholecystitis, acute hepatitis, acute pancreatitis, acute febrile illness, and myocardial infarction.    All labs were reviewed and interpreted by me.  All X-rays impressions were independently interpreted by me.  CT scan radiology impression was interpreted by me.     The patient´s CBC that was reviewed and interpreted by me shows no abnormalities of critical concern. Of note, there is no anemia requiring a blood transfusion and the platelet count is acceptable.The patient´s CMP that was reviewed and interpretted by me shows no abnormalities of critical concern. Of note, the patient´s sodium and potassium are acceptable. The patient´s liver enzymes are unremarkable. The patient´s  "renal function (creatinine) is preserved. The patient has a normal anion gap.  Urinalysis shows too numerous to count white blood cells with moderate leukocyte esterase.  CT head is negative for acute intracranial abnormalities.    MDM     Decision making regarding discharge:    Patient's headache and nausea are consistent with possible concussive symptoms after her head injury. A concussion is a mild traumatic brain injury typically caused by a blow to the head, a fall, or any event that results in a rapid acceleration or deceleration of the brain within the skull. Symptoms of a concussion can vary and may manifest immediately or develop over the course of several hours to days. Common symptoms include headache, dizziness, confusion, disorientation, memory loss (amnesia), difficulty concentrating, and a sensation of mental \"fogginess.\" Additionally, individuals may experience sensitivity to light and noise, ringing in the ears (tinnitus), nausea, vomiting, fatigue, irritability, mood changes, and sleep disturbances.  Patient was found to have moderate leukocyte esterase and too numerous to count white blood cells on urinalysis.  No bacteria was noted.  However with the patient's symptoms, age, and history of hypertension, it is better to treat then to leave a untreated urinary tract infection.    An untreated urinary tract infection (UTI) can lead to several complications and pose significant dangers, including:    Kidney infection (pyelonephritis): If a UTI is left untreated, the infection can travel up the urinary tract and infect the kidneys. This can lead to severe symptoms, such as high fever, chills, nausea, vomiting, and back or flank pain. Kidney infections can cause permanent kidney damage if not treated promptly.    Sepsis: In some cases, an untreated UTI can progress to a systemic infection known as sepsis. Sepsis occurs when the infection spreads throughout the body, leading to a potentially " life-threatening situation. Symptoms of sepsis include high fever, rapid heart rate, rapid breathing, and confusion. Severe sepsis can lead to organ failure and death if not treated aggressively.    Recurrent infections: An untreated UTI may not resolve on its own and can lead to recurrent infections. Recurrent UTIs can cause complications such as kidney damage and increase the risk of antibiotic resistance, making future infections more difficult to treat.    Urethral stricture: In some cases, an untreated UTI can lead to scarring of the urethra (the tube that carries urine out of the body), resulting in a narrowing known as a urethral stricture. This can cause difficulty urinating and increase the risk of future UTIs.    Antibiotic resistance: Delayed or inadequate treatment of a UTI can contribute to the development of antibiotic-resistant bacteria, making future infections more challenging to treat and increasing the risk of complications.    These possibilities leave the patient with severe increased risk if a urinary tract infection was left untreated.  The patient is resting comfortably and feels better, is alert, talkative and in no distress. The repeat examination is unremarkable and benign. The patient is neurologically intact, has a normal mental status and this ambulatory in the ED. The history, exam, diagnostic testing in the patient's current condition do not suggest meningitis, stroke, sepsis, subarachnoid hemorrhage, intracranial bleeding, encephalitis or other significant pathology that would warrant further testing, continued ED treatment, admission, neurological consultation, or other specialist evaluation at this point. The vital signs have been stable. The patient's condition is stable and appropriate for discharge. The patient will pursue further outpatient evaluation with the primary care physician or other designated or consulting position as indicated in the discharge  instructions.        Patient Care Considerations:    MRI: I considered ordering an MRI however Patient has a normal neurological exam and is currently at baseline.    Consultants/Shared Management Plan:    None    Social Determinants of Health:    Patient is independent, reliable, and has access to care.     Disposition and Care Coordination:    Discharged: I considered escalation of care by admitting this patient for observation, however the patient has improved and is suitable and  stable for discharge.    I have explained the patient´s condition, diagnoses and treatment plan based on the information available to me at this time. I have answered questions and addressed any concerns. The patient has a good  understanding of the patient´s diagnosis, condition, and treatment plan as can be expected at this point. The vital signs have been stable. The patient´s condition is stable and appropriate for discharge from the emergency department.      The patient will pursue further outpatient evaluation with the primary care physician or other designated or consulting physician as outlined in the discharge instructions. They are agreeable to this plan of care and follow-up instructions have been explained in detail. The patient has received these instructions in written format and have expressed an understanding of the discharge instructions. The patient is aware that any significant change in condition or worsening of symptoms should prompt an immediate return to this or the closest emergency department or call to 911.  I have explained discharge medications and the need for follow up with the patient/caretakers. This was also printed in the discharge instructions. Patient was discharged with the following medications and follow up:      Medication List      New Prescriptions    nitrofurantoin (macrocrystal-monohydrate) 100 MG capsule  Commonly known as: MACROBID  Take 1 capsule by mouth 2 (Two) Times a Day.            Where to Get Your Medications      These medications were sent to [facility], [facility], [place] - [address] [place] [address] - [phone]  - [phone]   [address] [place] [address] [place] [address]    Phone: [phone]   · nitrofurantoin (macrocrystal-monohydrate) 100 MG capsule      Dago Seraphil Brown, APRN  [address] [place] [address] [phone]             Final diagnoses:   Acute UTI        ED Disposition     ED Disposition   Discharge    Condition   Stable    Comment   --             This medical record created using voice recognition software.    Documentation assistance provided by Tara Paul acting a scribe for Alexi Barnes MD. Information recorded by the scribe was verified and validated at my direction.       Tara Paul  04/30/23 [id number]       Alexi Barnes MD  04/30/23 [id number]

## 2023-05-03 LAB — QT INTERVAL: 398 MS

## 2023-05-04 DIAGNOSIS — E03.9 HYPOTHYROIDISM, UNSPECIFIED TYPE: ICD-10-CM

## 2023-05-04 DIAGNOSIS — I10 PRIMARY HYPERTENSION: ICD-10-CM

## 2023-05-04 RX ORDER — LEVOTHYROXINE SODIUM 0.07 MG/1
75 TABLET ORAL DAILY
Qty: 30 TABLET | Refills: 2 | Status: SHIPPED | OUTPATIENT
Start: 2023-05-04

## 2023-05-04 RX ORDER — LISINOPRIL 2.5 MG/1
TABLET ORAL
Qty: 30 TABLET | Refills: 2 | Status: SHIPPED | OUTPATIENT
Start: 2023-05-04

## 2023-05-17 ENCOUNTER — OFFICE VISIT (OUTPATIENT)
Dept: FAMILY MEDICINE CLINIC | Facility: CLINIC | Age: 58
End: 2023-05-17
Payer: COMMERCIAL

## 2023-05-17 VITALS
HEART RATE: 123 BPM | OXYGEN SATURATION: 97 % | HEIGHT: 63 IN | SYSTOLIC BLOOD PRESSURE: 118 MMHG | WEIGHT: 161 LBS | BODY MASS INDEX: 28.53 KG/M2 | TEMPERATURE: 97.1 F | DIASTOLIC BLOOD PRESSURE: 76 MMHG

## 2023-05-17 DIAGNOSIS — N39.0 URINARY TRACT INFECTION WITHOUT HEMATURIA, SITE UNSPECIFIED: ICD-10-CM

## 2023-05-17 DIAGNOSIS — R31.9 HEMATURIA, UNSPECIFIED TYPE: ICD-10-CM

## 2023-05-17 DIAGNOSIS — Z09 FOLLOW-UP EXAM: ICD-10-CM

## 2023-05-17 DIAGNOSIS — E03.9 HYPOTHYROIDISM, UNSPECIFIED TYPE: ICD-10-CM

## 2023-05-17 DIAGNOSIS — Z09 ENCOUNTER FOR EXAMINATION FOLLOWING TREATMENT AT HOSPITAL: Primary | ICD-10-CM

## 2023-05-17 LAB
BILIRUB BLD-MCNC: NEGATIVE MG/DL
CLARITY, POC: ABNORMAL
COLOR UR: ABNORMAL
EXPIRATION DATE: ABNORMAL
GLUCOSE UR STRIP-MCNC: NEGATIVE MG/DL
KETONES UR QL: NEGATIVE
LEUKOCYTE EST, POC: NEGATIVE
Lab: ABNORMAL
NITRITE UR-MCNC: NEGATIVE MG/ML
PH UR: 6.5 [PH] (ref 5–8)
PROT UR STRIP-MCNC: NEGATIVE MG/DL
RBC # UR STRIP: ABNORMAL /UL
SP GR UR: 1.01 (ref 1–1.03)
TSH SERPL DL<=0.05 MIU/L-ACNC: 1.72 UIU/ML (ref 0.27–4.2)
UROBILINOGEN UR QL: ABNORMAL

## 2023-05-17 PROCEDURE — 87086 URINE CULTURE/COLONY COUNT: CPT | Performed by: NURSE PRACTITIONER

## 2023-05-17 PROCEDURE — 84443 ASSAY THYROID STIM HORMONE: CPT | Performed by: NURSE PRACTITIONER

## 2023-05-17 NOTE — PROGRESS NOTES
Chief Complaint  Dizziness (Patient is here for a follow up on a UTI ER visit. )    Subjective            Valentina Stevens presents to [facility]  History of Present Illness  Pt is here for the F/U ont he UTI Dx in the ER dept with regards to head injury where her dog head butted her and knocked her over she ended up with a bruise and a swollen area in the upper right forehead and was in the ER and evaluated and while there they also discovered a urinary tract infection she was placed on Macrobid and completed all of the regimen here for follow-up    Then also pt concerned gaining weight--was concerned was her thyroid levels and currently on 75 mcg daily and then pt is walking exercising and also using tread mill and also eating healthy and then also admits been drinking a lot of OJ             PHQ-2 Total Score:    PHQ-9 Total Score:      Past Medical History:   Diagnosis Date   • Abnormal Pap smear of cervix    • Allergic     Drug allergies   • Arthritis    • Asthma    • Asthma, intrinsic    • CTS (carpal tunnel syndrome)    • Depression    • Difficulty walking    • Diverticulitis of colon    • Fatty (change of) liver, not elsewhere classified    • GERD (gastroesophageal reflux disease)    • Hearing loss    • Hyperlipemia    • Hypertension    • Hypothyroidism    • Low back pain    • Lumbar radiculopathy 02/15/2022   • Memory loss    • Menopause    • Migraine    • Murmur    • Osteopenia    • Osteoporosis    • Pneumonia    • Primary central sleep apnea    • Scoliosis    • Seizures     last when 10 years old   • Sleep apnea, obstructive    • Tremor    • Vision loss    • Visual impairment    • Vitamin D deficiency        Allergies   Allergen Reactions   • Penicillins Shortness Of Breath and Rash   • Sulfa Antibiotics Hives   • Cephalexin Rash   • Indomethacin Rash        Past Surgical History:   Procedure Laterality Date   • COLONOSCOPY  2018    glenna   • EPIDURAL BLOCK     •  ESSURE TUBAL LIGATION     • SKIN BIOPSY     • THYMECTOMY     • TUBAL ABDOMINAL LIGATION          Social History     Tobacco Use   • Smoking status: Never     Passive exposure: Never   • Smokeless tobacco: Never   Vaping Use   • Vaping Use: Never used   Substance Use Topics   • Alcohol use: Never   • Drug use: Never       Family History   Problem Relation Age of Onset   • Hypertension Father    • Osteoporosis Father    • Alcohol abuse Father    • Skin cancer Father    • Thyroid cancer Mother    • Migraines Mother    • Stroke Mother    • Skin cancer Mother    • Cancer Mother    • Skin cancer Brother    • Dementia Sister    • Migraines Sister    • Migraines Sister    • Uterine cancer Maternal Grandmother    • Colon cancer Neg Hx    • Breast cancer Neg Hx    • Prostate cancer Neg Hx    • Clotting disorder Neg Hx         Health Maintenance Due   Topic Date Due   • COVID-19 Vaccine (4 - Booster for Pfizer series) 08/12/2021   • ANNUAL PHYSICAL  10/19/2022   • Pneumococcal Vaccine 0-64 (2 - PCV) 05/10/2023        Current Outpatient Medications on File Prior to Visit   Medication Sig   • albuterol sulfate  (90 Base) MCG/ACT inhaler Inhale 2 puffs Every 4 (Four) Hours As Needed for Wheezing.   • Calcium + Vitamin D3 600-10 MG-MCG tablet TAKE 1 TABLET BY MOUTH TWICE DAILY   • cyclobenzaprine (FLEXERIL) 10 MG tablet Take 1 tablet by mouth 2 (Two) Times a Day As Needed for Muscle Spasms.   • Fremanezumab-vfrm (Ajovy) 225 MG/1.5ML solution auto-injector Inject 225 mg under the skin into the appropriate area as directed Every 30 (Thirty) Days.   • HYDROcodone-acetaminophen (NORCO) 5-325 MG per tablet Take 1 tablet by mouth 2 (Two) Times a Day As Needed.   • ibandronate (Boniva) 150 MG tablet Take 1 tablet by mouth Every 30 (Thirty) Days. Take it first thing in the morning without any other medication and sit up 30 to 60 minutes after taking the medication   • levothyroxine (SYNTHROID, LEVOTHROID) 75 MCG tablet TAKE 1  "TABLET BY MOUTH DAILY   • lisinopril (PRINIVIL,ZESTRIL) 2.5 MG tablet TAKE 1 TABLET BY MOUTH EVERY DAY   • multivitamin with minerals tablet tablet Take 1 tablet by mouth Daily.   • nitrofurantoin, macrocrystal-monohydrate, (MACROBID) 100 MG capsule Take 1 capsule by mouth 2 (Two) Times a Day.   • pramipexole (MIRAPEX) 0.25 MG tablet TAKE 1 TABLET BY MOUTH EVERY NIGHT   • rizatriptan (Maxalt) 10 MG tablet Take 1 tablet by mouth 1 (One) Time As Needed for Migraine. May repeat in 2 hours if needed   • vitamin E 100 UNIT capsule Take 1 capsule by mouth Daily.   • azithromycin (ZITHROMAX) 250 MG tablet Take 2 by mouth today then 1 daily for 4 days (Patient not taking: Reported on 5/17/2023)     No current facility-administered medications on file prior to visit.       Immunization History   Administered Date(s) Administered   • COVID-19 (PFIZER) Purple Cap Monovalent 05/27/2021, 06/17/2021, 06/17/2021   • FluLaval/Fluzone >6mos 10/19/2021, 09/30/2022   • Hepatitis A 05/17/2018, 12/20/2018   • Influenza, Unspecified 10/19/2020, 10/19/2020   • Pneumococcal Polysaccharide (PPSV23) 05/10/2022   • Shingrix 11/12/2021, 01/21/2022   • Tdap 03/20/2018, 10/19/2020   • Zoster, Unspecified 11/12/2021, 01/21/2022       Review of Systems   Constitutional: Positive for unexpected weight gain. Negative for chills and fever.   HENT: Positive for trouble swallowing.         They are working this up and did the swallow study    Respiratory: Positive for shortness of breath. Negative for choking.         SOBOE associated with the asthma    Cardiovascular: Negative for chest pain.   Gastrointestinal: Negative for abdominal pain.   Genitourinary: Negative for dysuria.   Neurological: Negative for dizziness, light-headedness and headache.        Objective     /76 (BP Location: Right arm, Patient Position: Sitting, Cuff Size: Adult)   Pulse (!) 123   Temp 97.1 °F (36.2 °C) (Temporal)   Ht 160 cm (63\")   Wt 73 kg (161 lb)   SpO2 97% "   BMI 28.52 kg/m²       Physical Exam  Vitals and nursing note reviewed.   Constitutional:       Appearance: Normal appearance.   HENT:      Head: Normocephalic.      Right Ear: External ear normal.      Left Ear: External ear normal.      Nose: Nose normal.      Mouth/Throat:      Mouth: Mucous membranes are moist.   Eyes:      Pupils: Pupils are equal, round, and reactive to light.   Cardiovascular:      Rate and Rhythm: Normal rate and regular rhythm.      Heart sounds: Normal heart sounds.   Pulmonary:      Effort: Pulmonary effort is normal.      Breath sounds: Normal breath sounds.   Abdominal:      Palpations: Abdomen is soft.      Tenderness: There is no abdominal tenderness.   Musculoskeletal:      Cervical back: Normal range of motion and neck supple.      Comments: Uses the cane to ambulate    Skin:     General: Skin is warm and dry.   Neurological:      Mental Status: She is alert and oriented to person, place, and time.   Psychiatric:         Mood and Affect: Mood normal.         Behavior: Behavior normal.         Thought Content: Thought content normal.         Judgment: Judgment normal.         Result Review :           CT Head Without Contrast (04/30/2023 12:41)    Urinalysis, Microscopic Only - Urine, Clean Catch (04/30/2023 13:56)  Urinalysis With Microscopic If Indicated (No Culture) - Urine, Clean Catch (04/30/2023 13:56)  Single High Sensitivity Troponin T (04/30/2023 11:40)  Lipase (04/30/2023 11:40)  Comprehensive Metabolic Panel (04/30/2023 11:40)  CBC & Differential (04/30/2023 11:40)    ED with Alexi Barnes MD (04/30/2023)               Assessment and Plan      Diagnoses and all orders for this visit:    1. Encounter for examination following treatment at hospital (Primary)  -     Urine Culture - Urine, Urine, Clean Catch    2. Urinary tract infection without hematuria, site unspecified  -     POCT urinalysis dipstick, automated  -     Urine Culture - Urine, Urine, Clean Catch    3.  Hypothyroidism, unspecified type  -     TSH    4. Follow-up exam  -     TSH  -     Urine Culture - Urine, Urine, Clean Catch    5. Hematuria, unspecified type  -     Urine Culture - Urine, Urine, Clean Catch    Showed trace hematuria and everything else was negative we will go ahead and do the follow-up urine culture and then proceed with rechecking her thyroid levels        Follow Up     Return if symptoms worsen or fail to improve.      Answers for HPI/ROS submitted by the patient on 5/11/2023  Please describe your symptoms.: After er visit check up  Have you had these symptoms before?: No  How long have you been having these symptoms?: 5-7 days  What is the primary reason for your visit?: Other

## 2023-05-17 NOTE — PROGRESS NOTES
Venipuncture Blood Specimen Collection  Venipuncture performed in left arm  by Crystal Angel with good hemostasis. Patient tolerated the procedure well without complications.   05/17/23   Crystal Angel

## 2023-05-18 LAB — BACTERIA SPEC AEROBE CULT: NO GROWTH

## 2023-05-18 NOTE — PROGRESS NOTES
Please mail letter to patient stating    Valentina the urine culture thus far is showing no bacterial growth and the office will if anything further changes regarding the urine culture

## 2023-05-31 ENCOUNTER — TELEPHONE (OUTPATIENT)
Dept: SLEEP MEDICINE | Facility: HOSPITAL | Age: 58
End: 2023-05-31

## 2023-05-31 NOTE — TELEPHONE ENCOUNTER
Left message for patient to schedule annual follow up visit with Dr. Smith for September

## 2023-06-01 DIAGNOSIS — G25.81 RLS (RESTLESS LEGS SYNDROME): ICD-10-CM

## 2023-06-01 DIAGNOSIS — M81.6 LOCALIZED OSTEOPOROSIS WITHOUT CURRENT PATHOLOGICAL FRACTURE: ICD-10-CM

## 2023-06-01 RX ORDER — IBANDRONATE SODIUM 150 MG/1
TABLET, FILM COATED ORAL
Qty: 1 TABLET | Refills: 2 | Status: SHIPPED | OUTPATIENT
Start: 2023-06-01

## 2023-06-01 RX ORDER — PRAMIPEXOLE DIHYDROCHLORIDE 0.25 MG/1
0.25 TABLET ORAL NIGHTLY
Qty: 30 TABLET | Refills: 2 | Status: SHIPPED | OUTPATIENT
Start: 2023-06-01

## 2023-06-08 ENCOUNTER — OFFICE VISIT (OUTPATIENT)
Dept: FAMILY MEDICINE CLINIC | Facility: CLINIC | Age: 58
End: 2023-06-08
Payer: COMMERCIAL

## 2023-06-08 VITALS
HEIGHT: 63 IN | DIASTOLIC BLOOD PRESSURE: 72 MMHG | HEART RATE: 102 BPM | OXYGEN SATURATION: 97 % | TEMPERATURE: 97.3 F | BODY MASS INDEX: 28.17 KG/M2 | SYSTOLIC BLOOD PRESSURE: 120 MMHG | WEIGHT: 159 LBS

## 2023-06-08 DIAGNOSIS — J45.40 MODERATE PERSISTENT ASTHMA WITHOUT COMPLICATION: Primary | ICD-10-CM

## 2023-06-08 PROCEDURE — 3074F SYST BP LT 130 MM HG: CPT | Performed by: NURSE PRACTITIONER

## 2023-06-08 PROCEDURE — 3078F DIAST BP <80 MM HG: CPT | Performed by: NURSE PRACTITIONER

## 2023-06-08 PROCEDURE — 99214 OFFICE O/P EST MOD 30 MIN: CPT | Performed by: NURSE PRACTITIONER

## 2023-06-08 RX ORDER — BUDESONIDE 90 UG/1
1 AEROSOL, POWDER RESPIRATORY (INHALATION)
Qty: 1 EACH | Refills: 0 | Status: SHIPPED | OUTPATIENT
Start: 2023-06-08

## 2023-06-08 NOTE — PROGRESS NOTES
Chief Complaint  Cough (She has had a cough for over 5 months and she has tried over the counter medicine but it doesn't seem to work. )    Subjective          Valentina Stevens presents to [facility]  History of Present Illness  Patient presents with complaint of cough.    She states the cough has been present for more than 5 months.  It is a dry cough.  Sometimes she coughs so hard that she throws up a little.  She reports no fever, no wheezing.  She feels short of breath sometimes when she lays down or when she is more active.  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  She states she also gets short of breath when she talks a lot.    She routinely sees pulmonology.  She last saw him about 6 months ago.  She is treated for asthma.  She has only been using albuterol.  She uses it 1-2 times daily.  She does not recall ever using a steroid inhaler.    She does not feel sick.  No sore throat, ear pain, nausea vomiting or diarrhea.  No body aches.       Cough  This is a new problem. The current episode started more than 1 month ago. The problem has been gradually worsening. The problem occurs every few hours. The cough is Non-productive. Pertinent negatives include no chest pain, chills, ear congestion, ear pain, fever, headaches, heartburn, hemoptysis, myalgias, nasal congestion, postnasal drip, rash, rhinorrhea, sore throat, shortness of breath, sweats, weight loss or wheezing. The symptoms are aggravated by exercise and lying down. Risk factors for lung disease include animal exposure.     Past Medical History:   Diagnosis Date    Abnormal Pap smear of cervix     Allergic     Drug allergies    Arthritis     Asthma     Asthma, intrinsic     CTS (carpal tunnel syndrome)     Depression     Difficulty walking     Diverticulitis of colon     Fatty (change of) liver, not elsewhere classified     GERD (gastroesophageal reflux disease)     Hearing loss      Hyperlipemia     Hypertension     Hypothyroidism     Low back pain     Lumbar radiculopathy 02/15/2022    Memory loss     Menopause     Migraine     Murmur     Osteopenia     Osteoporosis     Pneumonia     Primary central sleep apnea     Scoliosis     Seizures     last when 10 years old    Sleep apnea, obstructive     Tremor     Vision loss     Visual impairment     Vitamin D deficiency        Allergies   Allergen Reactions    Penicillins Shortness Of Breath and Rash    Sulfa Antibiotics Hives    Cephalexin Rash    Indomethacin Rash        Past Surgical History:   Procedure Laterality Date    COLONOSCOPY  2018    glenna    EPIDURAL BLOCK      ESSURE TUBAL LIGATION      SKIN BIOPSY      THYMECTOMY      TUBAL ABDOMINAL LIGATION          Social History     Socioeconomic History    Marital status:     Number of children: 2   Tobacco Use    Smoking status: Never     Passive exposure: Never    Smokeless tobacco: Never   Vaping Use    Vaping Use: Never used   Substance and Sexual Activity    Alcohol use: Never    Drug use: Never    Sexual activity: Not Currently     Partners: Male     Birth control/protection: Other     Comment: Tubes tided       Family History   Problem Relation Age of Onset    Hypertension Father     Osteoporosis Father     Alcohol abuse Father     Skin cancer Father     Thyroid cancer Mother     Migraines Mother     Stroke Mother     Skin cancer Mother     Cancer Mother     Skin cancer Brother     Dementia Sister     Migraines Sister     Migraines Sister     Uterine cancer Maternal Grandmother     Colon cancer Neg Hx     Breast cancer Neg Hx     Prostate cancer Neg Hx     Clotting disorder Neg Hx         Health Maintenance Due   Topic Date Due    COVID-19 Vaccine (4 - Pfizer series) 08/12/2021    ANNUAL PHYSICAL  10/19/2022    Pneumococcal Vaccine 0-64 (2 - PCV) 05/10/2023        Last Completed Pap Smear            PAP SMEAR (Every 3 Years) Next due on 10/26/2025      10/26/2022  Amaris Hogue  HPV    10/19/2021  IgP, Aptima HPV    10/09/2018  Outside Procedure: LA CYTOPATH CERV/VAG THIN LAYER                    Last Completed Mammogram            MAMMOGRAM (Every 2 Years) Next due on 12/14/2024 12/14/2022  Mammo Screening Digital Tomosynthesis Bilateral With CAD    11/18/2021  Mammo Diagnostic Digital Tomosynthesis Bilateral With CAD    10/26/2020  Mammo Screening Bilateral With CAD    10/24/2019  Mammo Screening Bilateral With CAD    10/16/2018  Mammo Screening Bilateral With CAD    Only the first 5 history entries have been loaded, but more history exists.                    Last Completed Colonoscopy            COLORECTAL CANCER SCREENING (COLONOSCOPY - Every 10 Years) Next due on 11/13/2028 11/13/2018  SCANNED - COLONOSCOPY    09/18/2018  Outside Procedure: CHG BLOOD,OCCULT,FECAL HGB,FECES,1-3 SIMULT                    Current Outpatient Medications on File Prior to Visit   Medication Sig    albuterol sulfate  (90 Base) MCG/ACT inhaler Inhale 2 puffs Every 4 (Four) Hours As Needed for Wheezing.    azithromycin (ZITHROMAX) 250 MG tablet Take 2 by mouth today then 1 daily for 4 days    Calcium + Vitamin D3 600-10 MG-MCG tablet TAKE 1 TABLET BY MOUTH TWICE DAILY    cyclobenzaprine (FLEXERIL) 10 MG tablet Take 1 tablet by mouth 2 (Two) Times a Day As Needed for Muscle Spasms.    Fremanezumab-vfrm (Ajovy) 225 MG/1.5ML solution auto-injector Inject 225 mg under the skin into the appropriate area as directed Every 30 (Thirty) Days.    HYDROcodone-acetaminophen (NORCO) 5-325 MG per tablet Take 1 tablet by mouth 2 (Two) Times a Day As Needed.    ibandronate (BONIVA) 150 MG tablet TAKE 1 TABLET BY MOUTH EVERY 30 DAYS. TAKE FIRST THING IN THE MORNING WITHOUT ANY OTHER MEDICATION AND SIT UP FOR 30 - 60 MINUTES AFTER TAKING THE MEDICATION.    levothyroxine (SYNTHROID, LEVOTHROID) 75 MCG tablet TAKE 1 TABLET BY MOUTH DAILY    lisinopril (PRINIVIL,ZESTRIL) 2.5 MG tablet TAKE 1 TABLET BY MOUTH  "EVERY DAY    multivitamin with minerals tablet tablet Take 1 tablet by mouth Daily.    nitrofurantoin, macrocrystal-monohydrate, (MACROBID) 100 MG capsule Take 1 capsule by mouth 2 (Two) Times a Day.    pramipexole (MIRAPEX) 0.25 MG tablet TAKE 1 TABLET BY MOUTH EVERY NIGHT    rizatriptan (Maxalt) 10 MG tablet Take 1 tablet by mouth 1 (One) Time As Needed for Migraine. May repeat in 2 hours if needed    vitamin E 100 UNIT capsule Take 1 capsule by mouth Daily.     No current facility-administered medications on file prior to visit.       Immunization History   Administered Date(s) Administered    COVID-19 (PFIZER) Purple Cap Monovalent 05/27/2021, 06/17/2021, 06/17/2021    FluLaval/Fluzone >6mos 10/19/2021, 09/30/2022    Hepatitis A 05/17/2018, 12/20/2018    Influenza, Unspecified 10/19/2020, 10/19/2020    Pneumococcal Polysaccharide (PPSV23) 05/10/2022    Shingrix 11/12/2021, 01/21/2022    Tdap 03/20/2018, 10/19/2020    Zoster, Unspecified 11/12/2021, 01/21/2022       Review of Systems   Constitutional:  Negative for chills, fever and unexpected weight loss.   HENT:  Negative for ear pain, postnasal drip, rhinorrhea and sore throat.    Respiratory:  Positive for cough. Negative for hemoptysis, shortness of breath and wheezing.    Cardiovascular:  Negative for chest pain.   Musculoskeletal:  Negative for myalgias.   Skin:  Negative for rash.      Objective     /72 (BP Location: Left arm, Patient Position: Sitting, Cuff Size: Adult)   Pulse 102   Temp 97.3 °F (36.3 °C) (Temporal)   Ht 160 cm (63\")   Wt 72.1 kg (159 lb)   SpO2 97%   BMI 28.17 kg/m²         Physical Exam  Vitals and nursing note reviewed.   Constitutional:       General: She is not in acute distress.  HENT:      Head: Normocephalic.      Right Ear: Tympanic membrane normal.      Left Ear: Tympanic membrane normal.      Nose: Nose normal.      Mouth/Throat:      Mouth: Mucous membranes are moist.   Cardiovascular:      Rate and Rhythm: " Normal rate and regular rhythm.      Heart sounds: Normal heart sounds.   Pulmonary:      Effort: Pulmonary effort is normal.      Breath sounds: Normal breath sounds.   Musculoskeletal:      Cervical back: Normal range of motion and neck supple.   Skin:     General: Skin is warm and dry.   Neurological:      Mental Status: She is alert.   Psychiatric:         Mood and Affect: Mood normal.         Result Review :                           Assessment and Plan        Diagnoses and all orders for this visit:    1. Moderate persistent asthma without complication (Primary)    Other orders  -     budesonide (Pulmicort Flexhaler) 90 MCG/ACT inhaler; Inhale 1 puff 2 (Two) Times a Day.  Dispense: 1 each; Refill: 0              Follow Up     It appears that her asthma is flaring up.  Started Pulmicort, discussed instructions for use.  Advised that she should schedule a follow-up with her pulmonologist.    No follow-ups on file.    Patient was given instructions and counseling regarding her condition or for health maintenance advice. Please see specific information pulled into the AVS if appropriate.

## 2023-07-03 PROBLEM — R29.6 FREQUENT FALLS: Status: ACTIVE | Noted: 2022-10-29

## 2023-07-23 DIAGNOSIS — E03.9 HYPOTHYROIDISM, UNSPECIFIED TYPE: ICD-10-CM

## 2023-07-23 DIAGNOSIS — I10 PRIMARY HYPERTENSION: ICD-10-CM

## 2023-07-24 RX ORDER — LISINOPRIL 2.5 MG/1
TABLET ORAL
Qty: 30 TABLET | Refills: 2 | OUTPATIENT
Start: 2023-07-24

## 2023-07-24 RX ORDER — LEVOTHYROXINE SODIUM 0.07 MG/1
TABLET ORAL
Qty: 30 TABLET | Refills: 2 | OUTPATIENT
Start: 2023-07-24

## 2023-07-26 NOTE — PROGRESS NOTES
Letter sent to patients home address asking them to contact our office regarding recent lab results.     Physical Therapy Daily Treatment Note/30 day re-assess      Patient: Valentina Stevens   : 1965  Referring practitioner: WILFRID Reina*  Date of Initial Visit: Type: THERAPY  Noted: 2022  Today's Date: 2022  Patient seen for 2 sessions       Visit Diagnoses:    ICD-10-CM ICD-9-CM   1. Chronic right shoulder pain  M25.511 719.41    G89.29 338.29   2. Shoulder stiffness, right  M25.611 719.51   3. Arthrosis of right acromioclavicular joint  M19.011 715.91       Subjective Evaluation    History of Present Illness    Subjective comment: shoulder doing well. arthritic hands hindering her gripping. QDASH score just 4% now. cx earlier PT appt due to COVD and pneumonia. Able to manage COVID at home. Will see pulmonologist for PFT as she is still getting some O2 drops per her CPAP machine.        Objective   See Exercise, Manual, and Modality Logs for complete treatment.       Assessment & Plan     Assessment    Assessment details: Goals  Plan Goals: SHOULDER  PROBLEMS:     1. The patient has limited ROM of the right shoulder.                         LTG 1: 8 weeks:  The patient will demonstrate 170 degrees of right shoulder abduction.                          STATUS:  MET. 180              TREATMENT: Manual therapy, therapeutic exercise, home exercise instruction, and modalities as needed to include: electrical stimulation, ultrasound, moist heat, and ice.    2. The patient has limited strength of the right shoulder.              LTG 2: 8 weeks:  The patient will demonstrate 4+ /5 strength for right shoulder flexion, abduction, external rotation, and internal rotation as well as scapular retraction in order to demonstrate improved shoulder stability.                          STATUS:  MET. Lifted her 19# dog over a gate with no issue              STG2a:  2 weeks:  The patient will be independent with home exercises.                           STATUS:  Ongoing              TREATMENT: Manual  therapy, therapeutic exercise, home exercise instruction, and modalities as needed to include: electrical stimulation, ultrasound, moist heat, and ice.                3. The patient complains of pain to the right shoulder with overhead lifting at work.              LTG 3: 8 weeks:  The patient will report a pain rating of 2 /10 or better with overhead lifting in order to improve sleep quality and tolerance to performance of activities of daily living.                          STATUS:  MET 0/10              STG 3a: 4 weeks:  The patient will report a pain rating of 4 /10 or better with overhead lifting.                            STATUS:  MET              TREATMENT: Manual therapy, therapeutic exercise, home exercise instruction, and modalities as needed to include: electrical stimulation, ultrasound, moist heat, and ice.    4. Carrying, Moving, and Handling Objects Functional Limitation                               LTG 4: 8 weeks:  The patient will demonstrate <10 % limitation by achieving a score of 11/55 on the QuickDASH.                          STATUS:  Partially met. 13/55              TREATMENT:  Manual therapy, therapeutic exercise, home exercise instruction, and modalities as needed to include: moist heat, electrical stimulation, and ultrasound.    Doing well with her shoulder today. SATS 96%. 97% after UBE.   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 Given info on theraputty for her arthritic hands.    strength 40-45# which is not bad for female age 56.    Bigger issue is her using a cane for balance issues. MRI to be done to lumbar spine. Cane lowered as it was raising her shoulder too high.     P: see next week for sh. Consider referral for back and balance rx.           Timed:         Manual Therapy:         mins  95023;     Therapeutic Exercise:    30     mins  75774;     Neuromuscular Debra:        mins  09424;     Therapeutic Activity:          mins  42916;     Gait Training:           mins  42325;     Ultrasound:          mins  86197;    Ionto                                   mins   67744  Self Care                            mins   93903  Canalith Repos         mins 91211      Un-Timed:  Electrical Stimulation:         mins  96211 ( );  Dry Needling          mins self-pay  Traction          mins 26902      Timed Treatment:   30   mins   Total Treatment:     30   mins    Zorre Zeno Kimura, PT  [place] License: [id number]    [place] License:  [id number]

## 2023-08-01 ENCOUNTER — PATIENT MESSAGE (OUTPATIENT)
Dept: FAMILY MEDICINE CLINIC | Facility: CLINIC | Age: 58
End: 2023-08-01
Payer: COMMERCIAL

## 2023-08-01 DIAGNOSIS — G25.81 RLS (RESTLESS LEGS SYNDROME): ICD-10-CM

## 2023-08-01 RX ORDER — PRAMIPEXOLE DIHYDROCHLORIDE 0.5 MG/1
0.25 TABLET ORAL NIGHTLY
Qty: 30 TABLET | Refills: 2 | Status: SHIPPED | OUTPATIENT
Start: 2023-08-01

## 2023-08-09 ENCOUNTER — OFFICE VISIT (OUTPATIENT)
Dept: FAMILY MEDICINE CLINIC | Facility: CLINIC | Age: 58
End: 2023-08-09
Payer: COMMERCIAL

## 2023-08-09 VITALS
SYSTOLIC BLOOD PRESSURE: 110 MMHG | HEART RATE: 101 BPM | TEMPERATURE: 97.4 F | BODY MASS INDEX: 27.81 KG/M2 | WEIGHT: 157 LBS | DIASTOLIC BLOOD PRESSURE: 70 MMHG | OXYGEN SATURATION: 100 %

## 2023-08-09 DIAGNOSIS — L60.0 INGROWN TOENAIL OF RIGHT FOOT: Primary | ICD-10-CM

## 2023-08-09 DIAGNOSIS — Z23 NEED FOR PNEUMOCOCCAL 20-VALENT CONJUGATE VACCINATION: ICD-10-CM

## 2023-08-09 NOTE — PROGRESS NOTES
Chief Complaint  Toe Pain (RT GT toe )    Subjective            Valentina Stevens presents to [facility]    Toe Pain   The incident occurred more than 1 week ago. The incident occurred at home. Injury mechanism: She hit her toe on the leg of the couch. The pain is present in the right toes (right great toe). The pain is moderate. The pain has been Fluctuating since onset. Pertinent negatives include no inability to bear weight, loss of motion, loss of sensation, muscle weakness, numbness or tingling. She reports no foreign bodies present. The symptoms are aggravated by weight bearing. She has tried nothing for the symptoms. The treatment provided no relief.     She initially started to experience the toe pain after stubbing her foot on the leg of her couch.  She does not feel that she hit the couch hard enough to have caused any type of fracture.  As time has passed, the pain has been fairly consistent, occurring daily.  When she got to looking closer at her toe she thought she might have an ingrown toenail.    Past Medical History:   Diagnosis Date    Arthritis     Asthma     Asthma, intrinsic     CTS (carpal tunnel syndrome)     Depression     Difficulty walking     Diverticulitis of colon     Fatty (change of) liver, not elsewhere classified     GERD (gastroesophageal reflux disease)     Hearing loss     Hyperlipemia     Hypertension     Hypothyroidism     Low back pain     Lumbar radiculopathy 02/15/2022    Memory loss     Menopause     Migraine     Murmur     Osteopenia     Osteoporosis     Primary central sleep apnea     Scoliosis     Seizures     last when 10 years old    Sleep apnea, obstructive     Vitamin D deficiency        Allergies   Allergen Reactions    Penicillins Shortness Of Breath and Rash    Sulfa Antibiotics Hives    Cephalexin Rash    Indomethacin Rash        Past Surgical History:   Procedure Laterality Date    COLONOSCOPY  2018    [facility]    EPIDURAL BLOCK       ESSURE TUBAL LIGATION      SKIN BIOPSY      THYMECTOMY      TUBAL ABDOMINAL LIGATION          Social History     Tobacco Use    Smoking status: Never     Passive exposure: Never    Smokeless tobacco: Never   Vaping Use    Vaping Use: Never used   Substance Use Topics    Alcohol use: Never    Drug use: Never       Family History   Problem Relation Age of Onset    Hypertension Father     Osteoporosis Father     Alcohol abuse Father     Skin cancer Father     Thyroid cancer Mother     Migraines Mother     Stroke Mother     Skin cancer Mother     Cancer Mother     Skin cancer Brother     Dementia Sister     Migraines Sister     Migraines Sister     Uterine cancer Maternal Grandmother     Colon cancer Neg Hx     Breast cancer Neg Hx     Prostate cancer Neg Hx     Clotting disorder Neg Hx         Health Maintenance Due   Topic Date Due    COVID-19 Vaccine (4 - Pfizer series) 08/12/2021    ANNUAL PHYSICAL  10/19/2022        Current Outpatient Medications on File Prior to Visit   Medication Sig    albuterol sulfate  (90 Base) MCG/ACT inhaler Inhale 2 puffs Every 4 (Four) Hours As Needed for Wheezing.    budesonide (Pulmicort Flexhaler) 90 MCG/ACT inhaler Inhale 1 puff 2 (Two) Times a Day.    Calcium + Vitamin D3 600-10 MG-MCG tablet TAKE 1 TABLET BY MOUTH TWICE DAILY    Fremanezumab-vfrm (Ajovy) 225 MG/1.5ML solution auto-injector Inject 225 mg under the skin into the appropriate area as directed Every 30 (Thirty) Days.    HYDROcodone-acetaminophen (NORCO) 5-325 MG per tablet Take 1 tablet by mouth 2 (Two) Times a Day As Needed.    ibandronate (BONIVA) 150 MG tablet TAKE 1 TABLET BY MOUTH EVERY 30 DAYS. TAKE FIRST THING IN THE MORNING WITHOUT ANY OTHER MEDICATION AND SIT UP FOR 30 - 60 MINUTES AFTER TAKING THE MEDICATION.    levothyroxine (SYNTHROID, LEVOTHROID) 75 MCG tablet TAKE 1 TABLET BY MOUTH DAILY    lisinopril (PRINIVIL,ZESTRIL) 2.5 MG tablet TAKE 1 TABLET BY MOUTH EVERY DAY    multivitamin with minerals  tablet tablet Take 1 tablet by mouth Daily.    pramipexole (MIRAPEX) 0.5 MG tablet Take 0.5 tablets by mouth Every Night.    rizatriptan (Maxalt) 10 MG tablet Take 1 tablet by mouth 1 (One) Time As Needed for Migraine. May repeat in 2 hours if needed    vitamin E 100 UNIT capsule Take 1 capsule by mouth Daily.    cyclobenzaprine (FLEXERIL) 10 MG tablet Take 1 tablet by mouth 2 (Two) Times a Day As Needed for Muscle Spasms.     No current facility-administered medications on file prior to visit.       Immunization History   Administered Date(s) Administered    COVID-19 (PFIZER) Purple Cap Monovalent 05/27/2021, 06/17/2021, 06/17/2021    Fluzone >6mos 10/19/2021, 09/30/2022    Hepatitis A 05/17/2018, 12/20/2018    Influenza, Unspecified 10/19/2020, 10/19/2020    Pneumococcal Conjugate 20-Valent (PCV20) 08/09/2023    Pneumococcal Polysaccharide (PPSV23) 05/10/2022    Shingrix 11/12/2021, 01/21/2022    Tdap 03/20/2018, 10/19/2020    Zoster, Unspecified 11/12/2021, 01/21/2022       Review of Systems   Neurological:  Negative for tingling and numbness.      Objective     /70   Pulse 101   Temp 97.4 øF (36.3 øC)   Wt 71.2 kg (157 lb)   SpO2 100%   BMI 27.81 kg/mý       Physical Exam  Vitals reviewed.   Constitutional:       General: She is not in acute distress.     Appearance: She is well-developed and overweight.   HENT:      Head: Normocephalic and atraumatic.   Eyes:      General: No scleral icterus.     Extraocular Movements: Extraocular movements intact.      Conjunctiva/sclera: Conjunctivae normal.   Pulmonary:      Effort: Pulmonary effort is normal.   Musculoskeletal:         General: Normal range of motion.      Cervical back: Normal range of motion.      Right lower leg: No edema.      Left lower leg: No edema.      Right foot: Normal range of motion. Bunion, tenderness and bony tenderness present. No swelling, deformity or laceration.      Comments: No tenderness in the area of bunion. She is TTP at  the distal aspect of the great toe. The toenail does curve downward into the skin. There is TTP in this area, but no redness, drainage, or exudates.    Skin:     General: Skin is warm and dry.   Neurological:      Mental Status: She is alert and oriented to person, place, and time.   Psychiatric:         Mood and Affect: Mood and affect normal.         Behavior: Behavior normal.         Thought Content: Thought content normal.         Judgment: Judgment normal.       Result Review :                         Assessment and Plan      Diagnoses and all orders for this visit:    1. Ingrown toenail of right foot (Primary)  -     Ambulatory Referral to Podiatry    2. Need for pneumococcal 20-valent conjugate vaccination  -     Pneumococcal Conjugate Vaccine 20-Valent (PCV20)            Follow Up     Return if symptoms worsen or fail to improve.    We discussed x-ray to further assess for fracture; however, she states that she does not feel that she hit her foot hard enough to cause a fracture.  She really believes that it is the ingrown toenail that is contributing to her symptoms.  Her podiatrist is Dr. Cui, so I will place a new referral to him for the toenail concern.    Patient was given instructions and counseling regarding her condition or for health maintenance advice. Please see specific information pulled into the AVS if appropriate.

## 2023-08-22 DIAGNOSIS — G25.81 RLS (RESTLESS LEGS SYNDROME): ICD-10-CM

## 2023-08-22 DIAGNOSIS — M81.6 LOCALIZED OSTEOPOROSIS WITHOUT CURRENT PATHOLOGICAL FRACTURE: ICD-10-CM

## 2023-08-22 RX ORDER — IBANDRONATE SODIUM 150 MG/1
TABLET, FILM COATED ORAL
Qty: 1 TABLET | Refills: 0 | Status: SHIPPED | OUTPATIENT
Start: 2023-08-22

## 2023-08-22 RX ORDER — PRAMIPEXOLE DIHYDROCHLORIDE 0.5 MG/1
0.5 TABLET ORAL NIGHTLY
Qty: 30 TABLET | Refills: 0 | Status: SHIPPED | OUTPATIENT
Start: 2023-08-22

## 2023-08-29 NOTE — PROGRESS NOTES
Chief Complaint   Follow up    History of Present Illness       Valentina Stevens is a 57 y.o. female who presents to [facility] for follow-up with a history of chronic idiopathic constipation, diverticula and a history of diverticulitis.  Patient continues with 1 fiber pill per day and using MiraLAX as needed with fairly normal bowel movements.  She reports a few months back having a bout of diverticulitis and seeing primary care.  She reported during that time she had lower abdominal pain and change in bowel habits.  She was not prescribed antibiotics at that time and the pain resolved itself.  Patient's last colonoscopy was in 2018.  Patient denies fever, nausea, vomiting, weight loss, night sweats, melena, hematochezia, hematemesis.    Colonoscopy: Review of the patient's most recent colonoscopy performed by Dr. Stephens on 11/13/2018 grade 1 internal hemorrhoids of moderate diverticulosis.       CT abdomen and pelvis w/c on 03.05.2023  1. No acute process seen within the abdomen or pelvis  2. Normal appendix  3. Right ovary contains a 2.9 cm cyst.      Most recent labs on 04.30.2023  Results       Result Review :   The following data was reviewed by: DEMAR Nieto on 08/31/2023:    CMP          10/26/2022    16:18 3/5/2023    12:17 4/30/2023    11:40   CMP   Glucose 88  97  117    BUN 8  9  11    Creatinine 0.65  0.62  0.64    EGFR 101.6  102.7  101.9    Sodium 142  141  139    Potassium 3.8  4.1  4.2    Chloride 104  103  104    Calcium 9.6  10.2  9.5    Total Protein 7.5  7.5  7.2    Albumin 4.70  4.5  4.2    Globulin 2.8  3.0  3.0    Total Bilirubin 0.4  0.7  0.6    Alkaline Phosphatase 53  58  50    AST (SGOT) 23  37  31    ALT (SGPT) 28  35  38    Albumin/Globulin Ratio 1.7  1.5  1.4    BUN/Creatinine Ratio 12.3  14.5  17.2    Anion Gap 10.5  10.5  12.0      CBC          10/26/2022    16:18 3/5/2023    12:17 4/30/2023    11:40   CBC   WBC 10.34  8.42  9.52    RBC 5.39   5.57  5.37    Hemoglobin 15.2  15.6  15.0    Hematocrit 45.6  45.7  43.6    MCV 84.6  82.0  81.2    MCH 28.2  28.0  27.9    MCHC 33.3  34.1  34.4    RDW 13.1  13.8  14.2    Platelets 262  294  258        Iron Profile   Iron   Date Value Ref Range Status   07/15/2022 68 37 - 145 mcg/dL Final     TIBC   Date Value Ref Range Status   07/15/2022 344 298 - 536 mcg/dL Final     Iron Saturation (TSAT)   Date Value Ref Range Status   07/15/2022 20 20 - 50 % Final     Transferrin   Date Value Ref Range Status   07/15/2022 231 200 - 360 mg/dL Final     Ferritin   Ferritin   Date Value Ref Range Status   07/15/2022 118.00 13.00 - 150.00 ng/mL Final               Past Medical History       Past Medical History:   Diagnosis Date    Arthritis     Asthma     Asthma, intrinsic     CTS (carpal tunnel syndrome)     Depression     Difficulty walking     Diverticulitis of colon     Fatty (change of) liver, not elsewhere classified     GERD (gastroesophageal reflux disease)     Hearing loss     Hyperlipemia     Hypertension     Hypothyroidism     Low back pain     Lumbar radiculopathy 02/15/2022    Memory loss     Menopause     Migraine     Murmur     Osteopenia     Osteoporosis     Primary central sleep apnea     Scoliosis     Seizures     last when 10 years old    Sleep apnea, obstructive     Vitamin D deficiency        Past Surgical History:   Procedure Laterality Date    COLONOSCOPY  2018    [facility]    EPIDURAL BLOCK      ESSURE TUBAL LIGATION      SKIN BIOPSY      THYMECTOMY      TUMOR REMOVAL      in between heart and lungs         Current Outpatient Medications:     albuterol sulfate  (90 Base) MCG/ACT inhaler, Inhale 2 puffs Every 4 (Four) Hours As Needed for Wheezing., Disp: 18 g, Rfl: 5    azithromycin (Zithromax Z-Pablo) 250 MG tablet, Take 2 tablets by mouth on day 1, then 1 tablet daily on days 2-5, Disp: 6 tablet, Rfl: 0    benzonatate (Tessalon Perles) 100 MG capsule, Take 1 capsule by mouth 3 (Three) Times a Day As  Needed for Cough., Disp: 30 capsule, Rfl: 0    budesonide (Pulmicort Flexhaler) 90 MCG/ACT inhaler, Inhale 1 puff 2 (Two) Times a Day., Disp: 1 each, Rfl: 0    Calcium + Vitamin D3 600-10 MG-MCG tablet, TAKE 1 TABLET BY MOUTH TWICE DAILY, Disp: 60 tablet, Rfl: 5    Fremanezumab-vfrm (Ajovy) 225 MG/1.5ML solution auto-injector, Inject 225 mg under the skin into the appropriate area as directed Every 30 (Thirty) Days., Disp: 4.5 mL, Rfl: 1    HYDROcodone-acetaminophen (NORCO) 5-325 MG per tablet, Take 1 tablet by mouth 2 (Two) Times a Day As Needed., Disp: , Rfl:     ibandronate (BONIVA) 150 MG tablet, TAKE 1 TABLET BY MOUTH EVERY 30 DAYS. TAKE FIRST THING IN THE MORNING WITHOUT ANY OTHER MEDICATION AND SIT UP FOR 30 - 60 MINUTES AFTER TAKING THE MEDICATION., Disp: 1 tablet, Rfl: 0    levothyroxine (SYNTHROID, LEVOTHROID) 75 MCG tablet, TAKE 1 TABLET BY MOUTH DAILY, Disp: 30 tablet, Rfl: 2    lisinopril (PRINIVIL,ZESTRIL) 2.5 MG tablet, TAKE 1 TABLET BY MOUTH EVERY DAY, Disp: 30 tablet, Rfl: 2    methylPREDNISolone (MEDROL) 4 MG dose pack, Take as directed on package instructions., Disp: 21 each, Rfl: 0    multivitamin with minerals tablet tablet, Take 1 tablet by mouth Daily., Disp: , Rfl:     pramipexole (MIRAPEX) 0.5 MG tablet, Take 0.5 tablets by mouth Every Night., Disp: 30 tablet, Rfl: 2    rizatriptan (Maxalt) 10 MG tablet, Take 1 tablet by mouth 1 (One) Time As Needed for Migraine. May repeat in 2 hours if needed, Disp: 9 tablet, Rfl: 5    vitamin E 100 UNIT capsule, Take 1 capsule by mouth Daily., Disp: , Rfl:     Sod Picosulfate-Mag Ox-Cit Acd (Clenpiq) 10-3.5-12 MG-GM -GM/160ML solution, Take 175 mL by mouth 1 (One) Time for 1 dose. As directed by office., Disp: 350 mL, Rfl: 0     Allergies   Allergen Reactions    Penicillins Shortness Of Breath and Rash    Sulfa Antibiotics Hives    Cephalexin Rash    Indomethacin Rash       Family History   Problem Relation Age of Onset    Hypertension Father      "Osteoporosis Father     Alcohol abuse Father     Skin cancer Father     Thyroid cancer Mother     Migraines Mother     Stroke Mother     Skin cancer Mother     Cancer Mother     Skin cancer Brother     Dementia Sister     Migraines Sister     Migraines Sister     Uterine cancer Maternal Grandmother     Colon cancer Neg Hx     Breast cancer Neg Hx     Prostate cancer Neg Hx     Clotting disorder Neg Hx         Social History     Social History Narrative    Not on file       Objective     Vital Signs:   /64 (BP Location: Right arm, Patient Position: Sitting, Cuff Size: Adult)   Pulse 92   Ht 160 cm (63\")   Wt 70.8 kg (156 lb)   SpO2 95%   BMI 27.63 kg/mý       Physical Exam  Constitutional:       General: She is not in acute distress.     Appearance: Normal appearance. She is well-developed and normal weight.   Eyes:      Conjunctiva/sclera: Conjunctivae normal.      Pupils: Pupils are equal, round, and reactive to light.      Visual Fields: Right eye visual fields normal and left eye visual fields normal.   Cardiovascular:      Rate and Rhythm: Normal rate and regular rhythm.      Heart sounds: Normal heart sounds.   Pulmonary:      Effort: Pulmonary effort is normal. No retractions.      Breath sounds: Normal breath sounds and air entry.      Comments: Inspection of chest: normal appearance  Abdominal:      General: Bowel sounds are normal.      Palpations: Abdomen is soft.      Tenderness: There is no abdominal tenderness.      Comments: No appreciable hepatosplenomegaly   Musculoskeletal:      Cervical back: Neck supple.      Right lower leg: No edema.      Left lower leg: No edema.   Lymphadenopathy:      Cervical: No cervical adenopathy.   Skin:     Findings: No lesion.      Comments: Turgor normal   Neurological:      Mental Status: She is alert and oriented to person, place, and time.   Psychiatric:         Mood and Affect: Mood and affect normal.         Assessment & Plan          Assessment and " Plan    Diagnoses and all orders for this visit:    1. Chronic idiopathic constipation (Primary)  -     Case Request; Standing  -     Follow Anesthesia Guidelines / Protocol; Standing  -     Obtain Informed Consent; Standing  -     Verify NPO; Standing  -     Verify Bowel Prep Was Successful; Standing  -     Give Tap Water Enema If Bowel Prep Insufficient; Standing  -     Case Request    2. History of diverticulitis  -     Case Request; Standing  -     Follow Anesthesia Guidelines / Protocol; Standing  -     Obtain Informed Consent; Standing  -     Verify NPO; Standing  -     Verify Bowel Prep Was Successful; Standing  -     Give Tap Water Enema If Bowel Prep Insufficient; Standing  -     Case Request    3. Diverticula of colon  -     Case Request; Standing  -     Follow Anesthesia Guidelines / Protocol; Standing  -     Obtain Informed Consent; Standing  -     Verify NPO; Standing  -     Verify Bowel Prep Was Successful; Standing  -     Give Tap Water Enema If Bowel Prep Insufficient; Standing  -     Case Request    4. Altered bowel habits  -     Case Request; Standing  -     Follow Anesthesia Guidelines / Protocol; Standing  -     Obtain Informed Consent; Standing  -     Verify NPO; Standing  -     Verify Bowel Prep Was Successful; Standing  -     Give Tap Water Enema If Bowel Prep Insufficient; Standing  -     Case Request    Other orders  -     Sod Picosulfate-Mag Ox-Cit Acd (Clenpiq) 10-3.5-12 MG-GM -GM/160ML solution; Take 175 mL by mouth 1 (One) Time for 1 dose. As directed by office.  Dispense: 350 mL; Refill: 0      57-year-old female presenting the office today for follow-up with a history of chronic idiopathic constipation, diverticula and a history of diverticulitis.  I have recommended that the patient undergo further evaluation with a colonoscopy.  I have discussed this procedure in detail with the patient.  I have discussed the risks, benefits and alternatives.  I have discussed the risk of anesthesia,  bleeding and perforation.  Patient understands these risks, benefits and alternatives and wishes to proceed.  I will schedule her at her earliest convenience.  Patient will continue fiber supplement.  Patient will follow-up in office after endoscopy.  Patient agreeable to this plan will call with any questions or concerns.            Follow Up       Follow Up   Return for Follow up after endoscopy in office.  Patient was given instructions and counseling regarding her condition or for health maintenance advice. Please see specific information pulled into the AVS if appropriate.

## 2023-08-30 ENCOUNTER — OFFICE VISIT (OUTPATIENT)
Dept: FAMILY MEDICINE CLINIC | Facility: CLINIC | Age: 58
End: 2023-08-30
Payer: COMMERCIAL

## 2023-08-30 VITALS
TEMPERATURE: 97 F | OXYGEN SATURATION: 99 % | BODY MASS INDEX: 27.46 KG/M2 | WEIGHT: 155 LBS | DIASTOLIC BLOOD PRESSURE: 72 MMHG | HEART RATE: 99 BPM | HEIGHT: 63 IN | SYSTOLIC BLOOD PRESSURE: 116 MMHG

## 2023-08-30 DIAGNOSIS — R53.83 OTHER FATIGUE: ICD-10-CM

## 2023-08-30 DIAGNOSIS — R09.81 CONGESTION OF NASAL SINUS: ICD-10-CM

## 2023-08-30 DIAGNOSIS — Z20.818 STREP THROAT EXPOSURE: ICD-10-CM

## 2023-08-30 DIAGNOSIS — Z20.822 CLOSE EXPOSURE TO COVID-19 VIRUS: ICD-10-CM

## 2023-08-30 DIAGNOSIS — R05.1 ACUTE COUGH: ICD-10-CM

## 2023-08-30 DIAGNOSIS — J02.0 STREP PHARYNGITIS: Primary | ICD-10-CM

## 2023-08-30 LAB
EXPIRATION DATE: ABNORMAL
EXPIRATION DATE: NORMAL
FLUAV AG NPH QL: NEGATIVE
FLUBV AG NPH QL: NEGATIVE
INTERNAL CONTROL: ABNORMAL
INTERNAL CONTROL: NORMAL
Lab: ABNORMAL
Lab: NORMAL
S PYO AG THROAT QL: POSITIVE
SARS-COV-2 RNA RESP QL NAA+PROBE: NOT DETECTED

## 2023-08-30 PROCEDURE — 87635 SARS-COV-2 COVID-19 AMP PRB: CPT | Performed by: NURSE PRACTITIONER

## 2023-08-30 RX ORDER — BENZONATATE 100 MG/1
100 CAPSULE ORAL 3 TIMES DAILY PRN
Qty: 30 CAPSULE | Refills: 0 | Status: SHIPPED | OUTPATIENT
Start: 2023-08-30

## 2023-08-30 RX ORDER — AZITHROMYCIN 250 MG/1
TABLET, FILM COATED ORAL
Qty: 6 TABLET | Refills: 0 | Status: SHIPPED | OUTPATIENT
Start: 2023-08-30

## 2023-08-30 RX ORDER — METHYLPREDNISOLONE 4 MG/1
TABLET ORAL
Qty: 21 EACH | Refills: 0 | Status: SHIPPED | OUTPATIENT
Start: 2023-08-30

## 2023-08-30 NOTE — PROGRESS NOTES
Chief Complaint  Sore Throat (Sore throat, cough, congestion, she has been exposed to strep throat, covid and pink eye. )    Subjective            Valentina Stevens presents to [facility]  History of Present Illness  Patient is here today for an acute visit, same-day visit, with complaint/concern of sore throat cough congestion and she has also been exposed to COVID and strep throat and pinkeye and currently patient reports that all of her symptoms started Saturday and she has been just staying at home but she does keep her grandchildren    PHQ-2 Total Score: 0  PHQ-9 Total Score: 0    Past Medical History:   Diagnosis Date    Arthritis     Asthma     Asthma, intrinsic     CTS (carpal tunnel syndrome)     Depression     Difficulty walking     Diverticulitis of colon     Fatty (change of) liver, not elsewhere classified     GERD (gastroesophageal reflux disease)     Hearing loss     Hyperlipemia     Hypertension     Hypothyroidism     Low back pain     Lumbar radiculopathy 02/15/2022    Memory loss     Menopause     Migraine     Murmur     Osteopenia     Osteoporosis     Primary central sleep apnea     Scoliosis     Seizures     last when 10 years old    Sleep apnea, obstructive     Vitamin D deficiency        Allergies   Allergen Reactions    Penicillins Shortness Of Breath and Rash    Sulfa Antibiotics Hives    Cephalexin Rash    Indomethacin Rash        Past Surgical History:   Procedure Laterality Date    COLONOSCOPY  2018    [facility]    EPIDURAL BLOCK      ESSURE TUBAL LIGATION      SKIN BIOPSY      THYMECTOMY      TUBAL ABDOMINAL LIGATION          Social History     Tobacco Use    Smoking status: Never     Passive exposure: Never    Smokeless tobacco: Never   Vaping Use    Vaping Use: Never used   Substance Use Topics    Alcohol use: Never    Drug use: Never       Family History   Problem Relation Age of Onset    Hypertension Father     Osteoporosis Father     Alcohol abuse  Father     Skin cancer Father     Thyroid cancer Mother     Migraines Mother     Stroke Mother     Skin cancer Mother     Cancer Mother     Skin cancer Brother     Dementia Sister     Migraines Sister     Migraines Sister     Uterine cancer Maternal Grandmother     Colon cancer Neg Hx     Breast cancer Neg Hx     Prostate cancer Neg Hx     Clotting disorder Neg Hx         Health Maintenance Due   Topic Date Due    COVID-19 Vaccine (4 - Pfizer series) 08/12/2021    ANNUAL PHYSICAL  10/19/2022        Current Outpatient Medications on File Prior to Visit   Medication Sig    albuterol sulfate  (90 Base) MCG/ACT inhaler Inhale 2 puffs Every 4 (Four) Hours As Needed for Wheezing.    budesonide (Pulmicort Flexhaler) 90 MCG/ACT inhaler Inhale 1 puff 2 (Two) Times a Day.    Calcium + Vitamin D3 600-10 MG-MCG tablet TAKE 1 TABLET BY MOUTH TWICE DAILY    Fremanezumab-vfrm (Ajovy) 225 MG/1.5ML solution auto-injector Inject 225 mg under the skin into the appropriate area as directed Every 30 (Thirty) Days.    HYDROcodone-acetaminophen (NORCO) 5-325 MG per tablet Take 1 tablet by mouth 2 (Two) Times a Day As Needed.    ibandronate (BONIVA) 150 MG tablet TAKE 1 TABLET BY MOUTH EVERY 30 DAYS. TAKE FIRST THING IN THE MORNING WITHOUT ANY OTHER MEDICATION AND SIT UP FOR 30 - 60 MINUTES AFTER TAKING THE MEDICATION.    levothyroxine (SYNTHROID, LEVOTHROID) 75 MCG tablet TAKE 1 TABLET BY MOUTH DAILY    lisinopril (PRINIVIL,ZESTRIL) 2.5 MG tablet TAKE 1 TABLET BY MOUTH EVERY DAY    multivitamin with minerals tablet tablet Take 1 tablet by mouth Daily.    pramipexole (MIRAPEX) 0.5 MG tablet Take 0.5 tablets by mouth Every Night.    pramipexole (MIRAPEX) 0.5 MG tablet Take 1 tablet by mouth Every Night.    rizatriptan (Maxalt) 10 MG tablet Take 1 tablet by mouth 1 (One) Time As Needed for Migraine. May repeat in 2 hours if needed    vitamin E 100 UNIT capsule Take 1 capsule by mouth Daily.     No current facility-administered  "medications on file prior to visit.       Immunization History   Administered Date(s) Administered    COVID-19 (PFIZER) Purple Cap Monovalent 05/27/2021, 06/17/2021, 06/17/2021    Fluzone >6mos 10/19/2021, 09/30/2022    Hepatitis A 05/17/2018, 12/20/2018    Influenza, Unspecified 10/19/2020, 10/19/2020    Pneumococcal Conjugate 20-Valent (PCV20) 08/09/2023    Pneumococcal Polysaccharide (PPSV23) 05/10/2022    Shingrix 11/12/2021, 01/21/2022    Tdap 03/20/2018, 10/19/2020    Zoster, Unspecified 11/12/2021, 01/21/2022       Review of Systems   Constitutional:  Positive for fatigue. Negative for chills and fever.   HENT:  Positive for congestion, sore throat and voice change. Negative for ear pain.    Eyes:  Negative for discharge and redness.   Respiratory:  Positive for cough.    Cardiovascular:  Negative for chest pain.   Gastrointestinal:  Negative for diarrhea, nausea and vomiting.   Musculoskeletal:  Negative for arthralgias.        No body aches    Skin:  Negative for rash.   Neurological:  Positive for headache.      Objective     /72 (BP Location: Left arm, Patient Position: Sitting, Cuff Size: Adult)   Pulse 99   Temp 97 øF (36.1 øC) (Temporal)   Ht 160 cm (63\")   Wt 70.3 kg (155 lb)   SpO2 99%   BMI 27.46 kg/mý       Physical Exam  Vitals and nursing note reviewed.   Constitutional:       Appearance: Normal appearance.   HENT:      Head: Normocephalic.      Right Ear: External ear normal.      Left Ear: External ear normal.      Ears:      Comments: Wearing hearing aids      Nose: Nose normal.      Mouth/Throat:      Mouth: Mucous membranes are moist.      Pharynx: Posterior oropharyngeal erythema present. No oropharyngeal exudate.   Eyes:      Pupils: Pupils are equal, round, and reactive to light.   Cardiovascular:      Rate and Rhythm: Normal rate and regular rhythm.      Heart sounds: Normal heart sounds.   Pulmonary:      Effort: Pulmonary effort is normal.      Breath sounds: Normal breath " sounds.   Abdominal:      Palpations: Abdomen is soft.   Musculoskeletal:      Cervical back: Normal range of motion and neck supple.      Comments: (+) kyphotic Tspine    Skin:     General: Skin is warm and dry.   Neurological:      Mental Status: She is alert and oriented to person, place, and time.   Psychiatric:         Mood and Affect: Mood normal.         Behavior: Behavior normal.         Thought Content: Thought content normal.         Judgment: Judgment normal.       Result Review :                      COVID-19,CEPHEID/AVANI,COR/HERBERT/PAD/LORRIE/MAD IN-HOUSE(OR EMERGENT/ADD-ON),NP SWAB IN  (TRANSPORT MEDIA 3-4 HR TAT, RT-PCR - Swab, Nasopharynx (08/30/2023 08:58)--ordered   POCT Influenza A/B (08/30/2023 08:58)  POCT rapid strep A (08/30/2023 08:58)     Assessment and Plan      Diagnoses and all orders for this visit:    1. Strep pharyngitis (Primary)  -     POCT rapid strep A  -     POCT Influenza A/B  -     COVID-19,CEPHEID/AVANI,COR/HERBERT/PAD/LORRIE/MAD IN-HOUSE(OR EMERGENT/ADD-ON),NP SWAB IN TRANSPORT MEDIA 3-4 HR TAT, RT-PCR - Swab, Nasopharynx; Future  -     azithromycin (Zithromax Z-Pablo) 250 MG tablet; Take 2 tablets by mouth on day 1, then 1 tablet daily on days 2-5  Dispense: 6 tablet; Refill: 0  -     methylPREDNISolone (MEDROL) 4 MG dose pack; Take as directed on package instructions.  Dispense: 21 each; Refill: 0  -     COVID-19,CEPHEID/AVANI,COR/HERBERT/PAD/LORRIE/MAD IN-HOUSE(OR EMERGENT/ADD-ON),NP SWAB IN TRANSPORT MEDIA 3-4 HR TAT, RT-PCR - Swab, Nasopharynx    2. Close exposure to COVID-19 virus  -     POCT rapid strep A  -     POCT Influenza A/B  -     COVID-19,CEPHEID/AVANI,COR/HERBERT/PAD/LORRIE/MAD IN-HOUSE(OR EMERGENT/ADD-ON),NP SWAB IN TRANSPORT MEDIA 3-4 HR TAT, RT-PCR - Swab, Nasopharynx; Future  -     benzonatate (Tessalon Perles) 100 MG capsule; Take 1 capsule by mouth 3 (Three) Times a Day As Needed for Cough.  Dispense: 30 capsule; Refill: 0  -     methylPREDNISolone (MEDROL) 4 MG dose pack; Take as  directed on package instructions.  Dispense: 21 each; Refill: 0  -     COVID-19,CEPHEID/AVANI,COR/HERBERT/PAD/LORRIE/MAD IN-HOUSE(OR EMERGENT/ADD-ON),NP SWAB IN TRANSPORT MEDIA 3-4 HR TAT, RT-PCR - Swab, Nasopharynx    3. Strep throat exposure  -     POCT rapid strep A  -     POCT Influenza A/B  -     COVID-19,CEPHEID/AVANI,COR/HERBERT/PAD/LORRIE/MAD IN-HOUSE(OR EMERGENT/ADD-ON),NP SWAB IN TRANSPORT MEDIA 3-4 HR TAT, RT-PCR - Swab, Nasopharynx; Future  -     COVID-19,CEPHEID/AVANI,COR/HERBERT/PAD/LORRIE/MAD IN-HOUSE(OR EMERGENT/ADD-ON),NP SWAB IN TRANSPORT MEDIA 3-4 HR TAT, RT-PCR - Swab, Nasopharynx    4. Congestion of nasal sinus  -     POCT rapid strep A  -     POCT Influenza A/B  -     COVID-19,CEPHEID/AVANI,COR/HERBERT/PAD/LORRIE/MAD IN-HOUSE(OR EMERGENT/ADD-ON),NP SWAB IN TRANSPORT MEDIA 3-4 HR TAT, RT-PCR - Swab, Nasopharynx; Future  -     methylPREDNISolone (MEDROL) 4 MG dose pack; Take as directed on package instructions.  Dispense: 21 each; Refill: 0  -     COVID-19,CEPHEID/AVANI,COR/HERBERT/PAD/LORRIE/MAD IN-HOUSE(OR EMERGENT/ADD-ON),NP SWAB IN TRANSPORT MEDIA 3-4 HR TAT, RT-PCR - Swab, Nasopharynx    5. Acute cough  -     POCT rapid strep A  -     POCT Influenza A/B  -     COVID-19,CEPHEID/AVANI,COR/HERBERT/PAD/LORRIE/MAD IN-HOUSE(OR EMERGENT/ADD-ON),NP SWAB IN TRANSPORT MEDIA 3-4 HR TAT, RT-PCR - Swab, Nasopharynx; Future  -     benzonatate (Tessalon Perles) 100 MG capsule; Take 1 capsule by mouth 3 (Three) Times a Day As Needed for Cough.  Dispense: 30 capsule; Refill: 0  -     methylPREDNISolone (MEDROL) 4 MG dose pack; Take as directed on package instructions.  Dispense: 21 each; Refill: 0  -     COVID-19,CEPHEID/AVANI,COR/HERBERT/PAD/LORRIE/MAD IN-HOUSE(OR EMERGENT/ADD-ON),NP SWAB IN TRANSPORT MEDIA 3-4 HR TAT, RT-PCR - Swab, Nasopharynx    6. Other fatigue  -     POCT rapid strep A  -     POCT Influenza A/B  -     COVID-19,CEPHEID/AVANI,COR/HERBERT/PAD/LORRIE/MAD IN-HOUSE(OR EMERGENT/ADD-ON),NP SWAB IN TRANSPORT MEDIA 3-4 HR TAT, RT-PCR - Swab, Nasopharynx;  Future  -     COVID-19,CEPHEID/AVANI,COR/HERBERT/PAD/LORRIE/MAD IN-HOUSE(OR EMERGENT/ADD-ON),NP SWAB IN TRANSPORT MEDIA 3-4 HR TAT, RT-PCR - Swab, Nasopharynx    Patient is negative for flu a and flu B and the COVID was a send out and is still pending and then she was strep positive and advised patient to change her toothbrush after on the antibiotics for 24 to 48 hours and to continue quarantining at home and her symptoms actually started on Saturday so she would come out of quarantine after tomorrow if she does end up being positive for COVID and then to stay well-hydrated rest and symptomatic supportive treatment        Follow Up     Return if symptoms worsen or fail to improve.    Patient was given instructions and counseling regarding her condition or for health maintenance advice. Please see specific information pulled into the AVS if appropriate.            Valentina Stevens  reports that she has never smoked. She has never been exposed to tobacco smoke. She has never used smokeless tobacco.

## 2023-08-31 ENCOUNTER — OFFICE VISIT (OUTPATIENT)
Dept: GASTROENTEROLOGY | Facility: CLINIC | Age: 58
End: 2023-08-31
Payer: COMMERCIAL

## 2023-08-31 VITALS
HEIGHT: 63 IN | DIASTOLIC BLOOD PRESSURE: 64 MMHG | OXYGEN SATURATION: 95 % | HEART RATE: 92 BPM | WEIGHT: 156 LBS | BODY MASS INDEX: 27.64 KG/M2 | SYSTOLIC BLOOD PRESSURE: 133 MMHG

## 2023-08-31 DIAGNOSIS — K57.30 DIVERTICULA OF COLON: ICD-10-CM

## 2023-08-31 DIAGNOSIS — R19.4 ALTERED BOWEL HABITS: ICD-10-CM

## 2023-08-31 DIAGNOSIS — Z87.19 HISTORY OF DIVERTICULITIS: ICD-10-CM

## 2023-08-31 DIAGNOSIS — K59.04 CHRONIC IDIOPATHIC CONSTIPATION: Primary | ICD-10-CM

## 2023-08-31 RX ORDER — SODIUM PICOSULFATE, MAGNESIUM OXIDE, AND ANHYDROUS CITRIC ACID 10; 3.5; 12 MG/160ML; G/160ML; G/160ML
175 LIQUID ORAL ONCE
Qty: 350 ML | Refills: 0 | Status: SHIPPED | OUTPATIENT
Start: 2023-08-31 | End: 2023-08-31

## 2023-08-31 NOTE — PATIENT INSTRUCTIONS
Diverticulosis    Diverticulosis is a condition that develops when small pouches (diverticula) form in the wall of the large intestine (colon). The colon is where water is absorbed and stool (feces) is formed. The pouches form when the inside layer of the colon pushes through weak spots in the outer layers of the colon. You may have a few pouches or many of them.  The pouches usually do not cause problems unless they become inflamed or infected. When this happens, the condition is called diverticulitis.  What are the causes?  The cause of this condition is not known.  What increases the risk?  The following factors may make you more likely to develop this condition:  Being older than age 60. Your risk for this condition increases with age. Diverticulosis is rare among people younger than age 30. By age 80, many people have it.  Eating a low-fiber diet.  Having frequent constipation.  Being overweight.  Not getting enough exercise.  Smoking.  Taking over-the-counter pain medicines, like aspirin and ibuprofen.  Having a family history of diverticulosis.  What are the signs or symptoms?  In most people, there are no symptoms of this condition. If you do have symptoms, they may include:  Bloating.  Cramps in the abdomen.  Constipation or diarrhea.  Pain in the lower left side of the abdomen.  How is this diagnosed?  Because diverticulosis usually has no symptoms, it is most often diagnosed during an exam for other colon problems. The condition may be diagnosed by:  Using a flexible scope to examine the colon (colonoscopy).  Taking an X-ray of the colon after dye has been put into the colon (barium enema).  Having a CT scan.  How is this treated?  You may not need treatment for this condition. Your health care provider may recommend treatment to prevent problems. You may need treatment if you have symptoms or if you previously had diverticulitis. Treatment may include:  Eating a high-fiber diet.  Taking a fiber  supplement.  Taking a live bacteria supplement (probiotic).  Taking medicine to relax your colon.  Follow these instructions at home:  Medicines  Take over-the-counter and prescription medicines only as told by your health care provider.  If told by your health care provider, take a fiber supplement or probiotic.  Constipation prevention  Your condition may cause constipation. To prevent or treat constipation, you may need to:  Drink enough fluid to keep your urine pale yellow.  Take over-the-counter or prescription medicines.  Eat foods that are high in fiber, such as beans, whole grains, and fresh fruits and vegetables.  Limit foods that are high in fat and processed sugars, such as fried or sweet foods.    General instructions  Try not to strain when you have a bowel movement.  Keep all follow-up visits as told by your health care provider. This is important.  Contact a health care provider if you:  Have pain in your abdomen.  Have bloating.  Have cramps.  Have not had a bowel movement in 3 days.  Get help right away if:  Your pain gets worse.  Your bloating becomes very bad.  You have a fever or chills, and your symptoms suddenly get worse.  You vomit.  You have bowel movements that are bloody or black.  You have bleeding from your rectum.  Summary  Diverticulosis is a condition that develops when small pouches (diverticula) form in the wall of the large intestine (colon).  You may have a few pouches or many of them.  This condition is most often diagnosed during an exam for other colon problems.  Treatment may include increasing the fiber in your diet, taking supplements, or taking medicines.  This information is not intended to replace advice given to you by your health care provider. Make sure you discuss any questions you have with your health care provider.  Document Revised: 07/16/2020 Document Reviewed: 07/16/2020  [employer] Patient Education c 2023 [employer]

## 2023-09-11 ENCOUNTER — OFFICE VISIT (OUTPATIENT)
Dept: NEUROLOGY | Facility: CLINIC | Age: 58
End: 2023-09-11
Payer: COMMERCIAL

## 2023-09-11 ENCOUNTER — OFFICE VISIT (OUTPATIENT)
Dept: FAMILY MEDICINE CLINIC | Facility: CLINIC | Age: 58
End: 2023-09-11
Payer: COMMERCIAL

## 2023-09-11 VITALS
TEMPERATURE: 97 F | HEART RATE: 90 BPM | HEIGHT: 63 IN | OXYGEN SATURATION: 99 % | WEIGHT: 155.2 LBS | DIASTOLIC BLOOD PRESSURE: 70 MMHG | BODY MASS INDEX: 27.5 KG/M2 | SYSTOLIC BLOOD PRESSURE: 120 MMHG

## 2023-09-11 VITALS
HEART RATE: 82 BPM | DIASTOLIC BLOOD PRESSURE: 66 MMHG | HEIGHT: 63 IN | WEIGHT: 156.7 LBS | BODY MASS INDEX: 27.77 KG/M2 | SYSTOLIC BLOOD PRESSURE: 123 MMHG

## 2023-09-11 DIAGNOSIS — I10 PRIMARY HYPERTENSION: Primary | ICD-10-CM

## 2023-09-11 DIAGNOSIS — Z87.39 HISTORY OF OSTEOPOROSIS: ICD-10-CM

## 2023-09-11 DIAGNOSIS — G25.81 RLS (RESTLESS LEGS SYNDROME): ICD-10-CM

## 2023-09-11 DIAGNOSIS — M85.80 OSTEOPENIA, UNSPECIFIED LOCATION: ICD-10-CM

## 2023-09-11 DIAGNOSIS — E03.9 HYPOTHYROIDISM, UNSPECIFIED TYPE: ICD-10-CM

## 2023-09-11 DIAGNOSIS — G43.719 INTRACTABLE CHRONIC MIGRAINE WITHOUT AURA AND WITHOUT STATUS MIGRAINOSUS: Primary | ICD-10-CM

## 2023-09-11 PROBLEM — F45.42 PAIN DISORDER ASSOCIATED WITH PSYCHOLOGICAL FACTORS: Status: ACTIVE | Noted: 2023-07-05

## 2023-09-11 LAB
ALBUMIN SERPL-MCNC: 4 G/DL (ref 3.5–5.2)
ALBUMIN/GLOB SERPL: 1.4 G/DL
ALP SERPL-CCNC: 39 U/L (ref 39–117)
ALT SERPL W P-5'-P-CCNC: 31 U/L (ref 1–33)
ANION GAP SERPL CALCULATED.3IONS-SCNC: 11.5 MMOL/L (ref 5–15)
AST SERPL-CCNC: 27 U/L (ref 1–32)
BASOPHILS # BLD AUTO: 0.07 10*3/MM3 (ref 0–0.2)
BASOPHILS NFR BLD AUTO: 1.1 % (ref 0–1.5)
BILIRUB SERPL-MCNC: 0.6 MG/DL (ref 0–1.2)
BILIRUB UR QL STRIP: NEGATIVE
BUN SERPL-MCNC: 7 MG/DL (ref 6–20)
BUN/CREAT SERPL: 9.5 (ref 7–25)
CALCIUM SPEC-SCNC: 9.4 MG/DL (ref 8.6–10.5)
CHLORIDE SERPL-SCNC: 108 MMOL/L (ref 98–107)
CHOLEST SERPL-MCNC: 179 MG/DL (ref 0–200)
CLARITY UR: ABNORMAL
CO2 SERPL-SCNC: 22.5 MMOL/L (ref 22–29)
COLOR UR: YELLOW
CREAT SERPL-MCNC: 0.74 MG/DL (ref 0.57–1)
DEPRECATED RDW RBC AUTO: 41.9 FL (ref 37–54)
EGFRCR SERPLBLD CKD-EPI 2021: 93.3 ML/MIN/1.73
EOSINOPHIL # BLD AUTO: 0.14 10*3/MM3 (ref 0–0.4)
EOSINOPHIL NFR BLD AUTO: 2.2 % (ref 0.3–6.2)
ERYTHROCYTE [DISTWIDTH] IN BLOOD BY AUTOMATED COUNT: 13.8 % (ref 12.3–15.4)
GLOBULIN UR ELPH-MCNC: 2.9 GM/DL
GLUCOSE SERPL-MCNC: 102 MG/DL (ref 65–99)
GLUCOSE UR STRIP-MCNC: NEGATIVE MG/DL
HCT VFR BLD AUTO: 44.1 % (ref 34–46.6)
HDLC SERPL-MCNC: 36 MG/DL (ref 40–60)
HGB BLD-MCNC: 14.5 G/DL (ref 12–15.9)
HGB UR QL STRIP.AUTO: NEGATIVE
IMM GRANULOCYTES # BLD AUTO: 0.01 10*3/MM3 (ref 0–0.05)
IMM GRANULOCYTES NFR BLD AUTO: 0.2 % (ref 0–0.5)
KETONES UR QL STRIP: NEGATIVE
LDLC SERPL CALC-MCNC: 103 MG/DL (ref 0–100)
LDLC/HDLC SERPL: 2.69 {RATIO}
LEUKOCYTE ESTERASE UR QL STRIP.AUTO: NEGATIVE
LYMPHOCYTES # BLD AUTO: 2.48 10*3/MM3 (ref 0.7–3.1)
LYMPHOCYTES NFR BLD AUTO: 39.8 % (ref 19.6–45.3)
MCH RBC QN AUTO: 27.4 PG (ref 26.6–33)
MCHC RBC AUTO-ENTMCNC: 32.9 G/DL (ref 31.5–35.7)
MCV RBC AUTO: 83.2 FL (ref 79–97)
MONOCYTES # BLD AUTO: 0.57 10*3/MM3 (ref 0.1–0.9)
MONOCYTES NFR BLD AUTO: 9.1 % (ref 5–12)
NEUTROPHILS NFR BLD AUTO: 2.96 10*3/MM3 (ref 1.7–7)
NEUTROPHILS NFR BLD AUTO: 47.6 % (ref 42.7–76)
NITRITE UR QL STRIP: NEGATIVE
NRBC BLD AUTO-RTO: 0 /100 WBC (ref 0–0.2)
PH UR STRIP.AUTO: 5.5 [PH] (ref 5–8)
PLATELET # BLD AUTO: 271 10*3/MM3 (ref 140–450)
PMV BLD AUTO: 12.1 FL (ref 6–12)
POTASSIUM SERPL-SCNC: 3.9 MMOL/L (ref 3.5–5.2)
PROT SERPL-MCNC: 6.9 G/DL (ref 6–8.5)
PROT UR QL STRIP: NEGATIVE
RBC # BLD AUTO: 5.3 10*6/MM3 (ref 3.77–5.28)
SODIUM SERPL-SCNC: 142 MMOL/L (ref 136–145)
SP GR UR STRIP: >=1.03 (ref 1–1.03)
TRIGL SERPL-MCNC: 230 MG/DL (ref 0–150)
TSH SERPL DL<=0.05 MIU/L-ACNC: 2.01 UIU/ML (ref 0.27–4.2)
UROBILINOGEN UR QL STRIP: ABNORMAL
VLDLC SERPL-MCNC: 40 MG/DL (ref 5–40)
WBC NRBC COR # BLD: 6.23 10*3/MM3 (ref 3.4–10.8)

## 2023-09-11 PROCEDURE — 84443 ASSAY THYROID STIM HORMONE: CPT | Performed by: NURSE PRACTITIONER

## 2023-09-11 PROCEDURE — 80053 COMPREHEN METABOLIC PANEL: CPT | Performed by: NURSE PRACTITIONER

## 2023-09-11 PROCEDURE — 85025 COMPLETE CBC W/AUTO DIFF WBC: CPT | Performed by: NURSE PRACTITIONER

## 2023-09-11 PROCEDURE — 80061 LIPID PANEL: CPT | Performed by: NURSE PRACTITIONER

## 2023-09-11 PROCEDURE — 81003 URINALYSIS AUTO W/O SCOPE: CPT | Performed by: NURSE PRACTITIONER

## 2023-09-11 RX ORDER — LEVOTHYROXINE SODIUM 0.07 MG/1
75 TABLET ORAL DAILY
Qty: 30 TABLET | Refills: 5 | Status: SHIPPED | OUTPATIENT
Start: 2023-09-11

## 2023-09-11 RX ORDER — IBANDRONATE SODIUM 150 MG/1
150 TABLET, FILM COATED ORAL
Qty: 1 TABLET | Refills: 11 | Status: SHIPPED | OUTPATIENT
Start: 2023-09-11

## 2023-09-11 RX ORDER — RIZATRIPTAN BENZOATE 10 MG/1
10 TABLET ORAL ONCE AS NEEDED
Qty: 9 TABLET | Refills: 11 | Status: SHIPPED | OUTPATIENT
Start: 2023-09-11

## 2023-09-11 RX ORDER — LISINOPRIL 2.5 MG/1
2.5 TABLET ORAL DAILY
Qty: 30 TABLET | Refills: 5 | Status: SHIPPED | OUTPATIENT
Start: 2023-09-11

## 2023-09-11 RX ORDER — PRAMIPEXOLE DIHYDROCHLORIDE 0.5 MG/1
0.25 TABLET ORAL NIGHTLY
Qty: 30 TABLET | Refills: 5 | Status: SHIPPED | OUTPATIENT
Start: 2023-09-11

## 2023-09-11 RX ORDER — SODIUM PICOSULFATE, MAGNESIUM OXIDE, AND ANHYDROUS CITRIC ACID 12; 3.5; 1 G/175ML; G/175ML; MG/175ML
LIQUID ORAL
COMMUNITY
Start: 2023-08-31

## 2023-09-11 RX ORDER — FREMANEZUMAB-VFRM 225 MG/1.5ML
225 INJECTION SUBCUTANEOUS
Qty: 4.5 ML | Refills: 3 | Status: SHIPPED | OUTPATIENT
Start: 2023-09-11

## 2023-09-11 NOTE — PROGRESS NOTES
Venipuncture Blood Specimen Collection  Venipuncture performed in left arm by Cornelia Coleman with good hemostasis. Patient tolerated the procedure well without complications.   09/11/23   Cornelia Coleman

## 2023-09-11 NOTE — PROGRESS NOTES
"Chief Complaint  Migraine    Subjective          Valentina Shruti Stevens presents to [facility]  History of Present Illness  Following up for migraines.  Has less than 1 headache days per month on Ajovy.  Maxalt is effective abortive therapy.  Denies side effects.     Objective   Vital Signs:   /66   Pulse 82   Ht 160 cm (63\")   Wt 71.1 kg (156 lb 11.2 oz)   BMI 27.76 kg/m²     Physical Exam  HENT:      Head: Normocephalic.   Pulmonary:      Effort: Pulmonary effort is normal.   Neurological:      Mental Status: She is alert and oriented to person, place, and time.      Sensory: Sensation is intact.      Motor: Motor function is intact.      Coordination: Coordination is intact.      Deep Tendon Reflexes: Reflexes are normal and symmetric.      Neurologic Exam     Mental Status   Oriented to person, place, and time.      Result Review :               Assessment and Plan    Diagnoses and all orders for this visit:    1. Intractable chronic migraine without aura and without status migrainosus (Primary)  Assessment & Plan:  Continue Ajovy for preventative therapy.  We will continue Maxalt as needed for abortive therapy.          Other orders  -     Fremanezumab-vfrm (Ajovy) 225 MG/1.5ML solution auto-injector; Inject 225 mg under the skin into the appropriate area as directed Every 30 (Thirty) Days.  Dispense: 4.5 mL; Refill: 3  -     rizatriptan (Maxalt) 10 MG tablet; Take 1 tablet by mouth 1 (One) Time As Needed for Migraine. May repeat in 2 hours if needed  Dispense: 9 tablet; Refill: 11        Follow Up   Return in about 1 year (around 9/11/2024) for Migraine f/u.  Patient was given instructions and counseling regarding her condition or for health maintenance advice. Please see specific information pulled into the AVS if appropriate.       "

## 2023-09-11 NOTE — PROGRESS NOTES
Chief Complaint  Annual Exam, Hypertension, Hypothyroidism, and Hyperlipidemia    Subjective            Valentina Stevens presents to [facility]  History of Present Illness  Patient reports that she is not prepared to do her annual physical exam and Pap smear well woman exam today but that she is only here today for her medication refills and labs regarding the chronic comorbid conditions managed from the primary care standpoint as she has 2 of her grandchildren with her today were both in a stroller    -- Hypertension: Denies chest pain syncopal episodes dizziness or lightheadedness or shortness of breath and tolerating medication well with no side effects no issues overall stable    -- Hypothyroidism: Tolerates medication well with no side effects no issues reported overall stable    -- Restless leg syndrome: Tolerates medication well with no side effects no issues reported overall stable    -- History of osteoporosis and currently with osteopenia and the bone density is not due yet and does need a refill on the Boniva of which she tolerates very well with no side effects no issues reported    PHQ-2 Total Score:    PHQ-9 Total Score:      Past Medical History:   Diagnosis Date    Arthritis     Asthma     Asthma, intrinsic     CTS (carpal tunnel syndrome)     Depression     Difficulty walking     Diverticulitis of colon     Fatty (change of) liver, not elsewhere classified     GERD (gastroesophageal reflux disease)     Hearing loss     Hyperlipemia     Hypertension     Hypothyroidism     Low back pain     Lumbar radiculopathy 02/15/2022    Memory loss     Menopause     Migraine     Murmur     Osteopenia     Osteoporosis     Primary central sleep apnea     Scoliosis     Seizures     last when 10 years old    Sleep apnea, obstructive     Vitamin D deficiency        Allergies   Allergen Reactions    Penicillins Shortness Of Breath and Rash    Sulfa Antibiotics Hives    Cephalexin Rash     Indomethacin Rash        Past Surgical History:   Procedure Laterality Date    COLONOSCOPY  2018    [facility]    EPIDURAL BLOCK      ESSURE TUBAL LIGATION      SKIN BIOPSY      THYMECTOMY      TUMOR REMOVAL      in between heart and lungs        Social History     Tobacco Use    Smoking status: Never     Passive exposure: Never    Smokeless tobacco: Never   Vaping Use    Vaping Use: Never used   Substance Use Topics    Alcohol use: Never    Drug use: Never       Family History   Problem Relation Age of Onset    Hypertension Father     Osteoporosis Father     Alcohol abuse Father     Skin cancer Father     Thyroid cancer Mother     Migraines Mother     Stroke Mother     Skin cancer Mother     Cancer Mother     Skin cancer Brother     Dementia Sister     Migraines Sister     Migraines Sister     Uterine cancer Maternal Grandmother     Colon cancer Neg Hx     Breast cancer Neg Hx     Prostate cancer Neg Hx     Clotting disorder Neg Hx         Health Maintenance Due   Topic Date Due    COVID-19 Vaccine (4 - Pfizer series) 08/12/2021        Current Outpatient Medications on File Prior to Visit   Medication Sig    albuterol sulfate  (90 Base) MCG/ACT inhaler Inhale 2 puffs Every 4 (Four) Hours As Needed for Wheezing.    budesonide (Pulmicort Flexhaler) 90 MCG/ACT inhaler Inhale 1 puff 2 (Two) Times a Day.    Calcium + Vitamin D3 600-10 MG-MCG tablet TAKE 1 TABLET BY MOUTH TWICE DAILY    Clenpiq 10-3.5-12 MG-GM -GM/175ML solution     Fremanezumab-vfrm (Ajovy) 225 MG/1.5ML solution auto-injector Inject 225 mg under the skin into the appropriate area as directed Every 30 (Thirty) Days.    HYDROcodone-acetaminophen (NORCO) 5-325 MG per tablet Take 1 tablet by mouth 2 (Two) Times a Day As Needed.    multivitamin with minerals tablet tablet Take 1 tablet by mouth Daily.    rizatriptan (Maxalt) 10 MG tablet Take 1 tablet by mouth 1 (One) Time As Needed for Migraine. May repeat in 2 hours if needed    vitamin E 100 UNIT  capsule Take 1 capsule by mouth Daily.    [DISCONTINUED] ibandronate (BONIVA) 150 MG tablet TAKE 1 TABLET BY MOUTH EVERY 30 DAYS. TAKE FIRST THING IN THE MORNING WITHOUT ANY OTHER MEDICATION AND SIT UP FOR 30 - 60 MINUTES AFTER TAKING THE MEDICATION.    [DISCONTINUED] levothyroxine (SYNTHROID, LEVOTHROID) 75 MCG tablet TAKE 1 TABLET BY MOUTH DAILY    [DISCONTINUED] lisinopril (PRINIVIL,ZESTRIL) 2.5 MG tablet TAKE 1 TABLET BY MOUTH EVERY DAY    [DISCONTINUED] pramipexole (MIRAPEX) 0.5 MG tablet Take 0.5 tablets by mouth Every Night.    [DISCONTINUED] azithromycin (Zithromax Z-Pablo) 250 MG tablet Take 2 tablets by mouth on day 1, then 1 tablet daily on days 2-5 (Patient not taking: Reported on 9/11/2023)    [DISCONTINUED] benzonatate (Tessalon Perles) 100 MG capsule Take 1 capsule by mouth 3 (Three) Times a Day As Needed for Cough. (Patient not taking: Reported on 9/11/2023)    [DISCONTINUED] methylPREDNISolone (MEDROL) 4 MG dose pack Take as directed on package instructions. (Patient not taking: Reported on 9/11/2023)     No current facility-administered medications on file prior to visit.       Immunization History   Administered Date(s) Administered    COVID-19 (PFIZER) Purple Cap Monovalent 05/27/2021, 06/17/2021, 06/17/2021    Fluzone >6mos 10/19/2021, 09/30/2022    Hepatitis A 05/17/2018, 12/20/2018    Influenza, Unspecified 10/19/2020, 10/19/2020    Pneumococcal Conjugate 20-Valent (PCV20) 08/09/2023    Pneumococcal Polysaccharide (PPSV23) 05/10/2022    Shingrix 11/12/2021, 01/21/2022    Tdap 03/20/2018, 10/19/2020    Zoster, Unspecified 11/12/2021, 01/21/2022       Review of Systems   Constitutional:  Negative for fatigue, unexpected weight gain and unexpected weight loss.        Pt reports not worked in 3 months--and she reports not much in the house and then feeds her kids first and then she goes without at times--and then reports applying for food stamps tomorrow    HENT:  Negative for trouble swallowing.   "  Eyes:  Negative for blurred vision.        Some slight retina detachment and sees eye MD    Respiratory:  Negative for choking and shortness of breath.         Except the asthma    Cardiovascular:  Negative for chest pain.   Gastrointestinal:  Positive for constipation. Negative for diarrhea.   Endocrine: Negative for polydipsia, polyphagia and polyuria.   Genitourinary:  Negative for dysuria.   Musculoskeletal:  Positive for arthralgias, back pain and neck pain.   Neurological:  Negative for dizziness, seizures, syncope and light-headedness.   Hematological:  Bruises/bleeds easily.   Psychiatric/Behavioral:  Negative for self-injury and suicidal ideas.       Objective     /70 (BP Location: Right arm, Patient Position: Sitting, Cuff Size: Adult)   Pulse 90   Temp 97 °F (36.1 °C)   Ht 160 cm (63\")   Wt 70.4 kg (155 lb 3.2 oz)   SpO2 99%   BMI 27.49 kg/m²       Physical Exam  Vitals and nursing note reviewed.   Constitutional:       Appearance: Normal appearance.      Comments: BMI 27   HENT:      Head: Normocephalic.      Right Ear: Tympanic membrane, ear canal and external ear normal.      Left Ear: Tympanic membrane, ear canal and external ear normal.      Ears:      Comments: Wears hearing aids      Nose: Nose normal.      Mouth/Throat:      Mouth: Mucous membranes are moist.   Eyes:      Pupils: Pupils are equal, round, and reactive to light.   Cardiovascular:      Rate and Rhythm: Normal rate and regular rhythm.      Heart sounds: Normal heart sounds.   Pulmonary:      Effort: Pulmonary effort is normal.      Breath sounds: Normal breath sounds.   Abdominal:      Palpations: Abdomen is soft.      Tenderness: There is no abdominal tenderness.   Musculoskeletal:      Cervical back: Normal range of motion and neck supple.      Comments: Kyphotic back and chronic back pain and chronic neck pain   Skin:     General: Skin is warm and dry.   Neurological:      Mental Status: She is alert and oriented to " person, place, and time.   Psychiatric:         Mood and Affect: Mood normal.         Behavior: Behavior normal.         Thought Content: Thought content normal.         Judgment: Judgment normal.       Result Review :                      Office Visit with Lupe Sandoval APRN (08/31/2023)      Assessment and Plan      Diagnoses and all orders for this visit:    1. Primary hypertension (Primary)  -     lisinopril (PRINIVIL,ZESTRIL) 2.5 MG tablet; Take 1 tablet by mouth Daily.  Dispense: 30 tablet; Refill: 5  -     CBC & Differential  -     Comprehensive Metabolic Panel  -     Lipid Panel  -     TSH Rfx On Abnormal To Free T4  -     Urinalysis With Culture If Indicated - Urine, Clean Catch    2. RLS (restless legs syndrome)  -     pramipexole (MIRAPEX) 0.5 MG tablet; Take 0.5 tablets by mouth Every Night.  Dispense: 30 tablet; Refill: 5  -     CBC & Differential  -     Comprehensive Metabolic Panel    3. Hypothyroidism, unspecified type  -     levothyroxine (SYNTHROID, LEVOTHROID) 75 MCG tablet; Take 1 tablet by mouth Daily.  Dispense: 30 tablet; Refill: 5  -     TSH Rfx On Abnormal To Free T4    4. Osteopenia, unspecified location  -     ibandronate (BONIVA) 150 MG tablet; Take 1 tablet by mouth Every 30 (Thirty) Days.  Dispense: 1 tablet; Refill: 11  -     Comprehensive Metabolic Panel    5. History of osteoporosis  -     ibandronate (BONIVA) 150 MG tablet; Take 1 tablet by mouth Every 30 (Thirty) Days.  Dispense: 1 tablet; Refill: 11  -     Comprehensive Metabolic Panel    6. Body mass index (BMI) of 27.0-27.9 in adult  Comments:  counseled diet and exercise    Patient will follow-up in a few weeks to next month to do her annual well woman exam        Follow Up     Return in about 4 weeks (around 10/9/2023), or if symptoms worsen or fail to improve, for Recheck, Annual physical.    Patient was given instructions and counseling regarding her condition or for health maintenance advice. Please see specific information  pulled into the AVS if appropriate.            Valentina Stevens  reports that she has never smoked. She has never been exposed to tobacco smoke. She has never used smokeless tobacco.         Answers submitted by the patient for this visit:  Other (Submitted on 9/9/2023)  Please describe your symptoms.: Need blood work done for medicine  Have you had these symptoms before?: Yes  How long have you been having these symptoms?: Greater than 2 weeks  Primary Reason for Visit (Submitted on 9/9/2023)  What is the primary reason for your visit?: Other

## 2023-09-12 NOTE — PROGRESS NOTES
Please mail letter to patient stating    Valentina the blood counts were all normal with the exception of just a borderline elevated red blood cell count at 5.30 and 5.28 is normal and then the thyroid levels were normal range; the comprehensive panel shows fasting glucose of 102 and normal electrolytes and normal kidney and liver function tests; the cholesterol panel was quite elevated with your triglycerides at 230 and this is the highest they have been in almost 2 years and then the HDL was down to 36 and last time you are in the 50s and then the LDL was improved at 103 and it should be less than 100 and your estimated risk for cardiovascular related event over the next 10 years is estimated to be at 4.6% and anyone at 5% or higher needs to start a statin therapy so I would recommend right now staying as active as she can and and doing a low-cholesterol diet--And the urinalysis was normal

## 2023-09-27 ENCOUNTER — OFFICE VISIT (OUTPATIENT)
Dept: SLEEP MEDICINE | Facility: HOSPITAL | Age: 58
End: 2023-09-27
Payer: COMMERCIAL

## 2023-09-27 VITALS
BODY MASS INDEX: 27.75 KG/M2 | DIASTOLIC BLOOD PRESSURE: 77 MMHG | SYSTOLIC BLOOD PRESSURE: 120 MMHG | HEIGHT: 63 IN | OXYGEN SATURATION: 96 % | HEART RATE: 90 BPM | WEIGHT: 156.6 LBS

## 2023-09-27 DIAGNOSIS — G47.33 OSA ON CPAP: Primary | ICD-10-CM

## 2023-09-27 PROCEDURE — G0463 HOSPITAL OUTPT CLINIC VISIT: HCPCS

## 2023-09-27 NOTE — PROGRESS NOTES
"  [address] [address]  Phone: [phone]  Fax: [phone]      SLEEP CLINIC FOLLOW UP PROGRESS NOTE.    Valentina Stevens  [id number]   1965  57 y.o.  female      PCP: Sera Loza APRN      Date of visit: 9/27/2023    Chief Complaint   Patient presents with    Sleep Apnea       HPI:  This is a 57 y.o. years old patient is here for the management of obstructive sleep apnea. Patient is using positive airway pressure therapy with auto CPAP and the symptoms of sleep apnea have improved significantly on the therapy. Normally patient goes to bed at 10 PM and wakes up at 530 AM .  The patient wakes up 2 time(s) during the night and has no problem going back to sleep.  Feels refreshed after waking up.  She works at [employer] in [place]    Medications and allergies are reviewed by me and documented in the encounter.     SOCIAL (habits pertaining to sleep medicine)  History tobacco use:No   History of alcohol use: 0 per week  Caffeine use: 2     REVIEW OF SYSTEMS:   Pertaining positive symptoms are:  [place] Sleepiness Scale :Total score: 8         PHYSICAL EXAMINATION:  CONSTITUTIONAL:  Vitals:    09/27/23 0900   BP: 120/77   Pulse: 90   SpO2: 96%   Weight: 71 kg (156 lb 9.6 oz)   Height: 160 cm (62.99\")    Body mass index is 27.75 kg/m².   NOSE: nasal passages are clear, No deformities noted   RESP SYSTEM: Not in any respiratory distress, no chest deformities noted,   CARDIOVASULAR: No edema noted  NEURO: Oriented x 3, gait normal,  Mood and affect appeared appropriate      Data reviewed:  The Smart card downloaded on 9/27/2023 has been reviewed independently by me for compliance and discussed the data with the patient.   Compliance; 100%  More than 4 hr use, 100%  Average use of the device 6 hours and 56 minutes per night  Residual AHI: 4 /hr (goal < 5.0 /hr)  Mask type: Fullface  Device: DreamStation  DME: [employer]      ASSESSMENT AND " PLAN:  Obstructive sleep apnea ( G 47.33).  The symptoms of sleep apnea have improved with the device and the treatment.  Patient's compliance with the device is excellent for treatment of sleep apnea.  I have independently reviewed the smart card down load and discussed with the patient the download data and encouarged the patient to continue to use the device.The residual AHI is acceptable. The device is benefiting the patient and the device is medically necessary.  Without proper control of sleep apnea and good compliance there is a increased risk for hypertension, diabetes mellitus and nonrestorative sleep with hypersomnia which can increase risk for motor vehicle accidents.  Untreated sleep apnea is also a risk factor for development of atrial fibrillation, pulmonary hypertension, insulin resistance and stroke. The patient is also instructed to get the supplies from the DME company and and change them on a regular basis.  A prescription for supplies has been sent to the DME company.  I have also discussed the good sleep hygiene habits and adequate amount of sleep needed for good health.  Return in about 1 year (around 9/27/2024) for with smart card down load. . Patient's questions were answered.    9/27/2023  Mariajose Smith MD  Sleep Medicine.  Medical Director,   [facility] sleep centers.

## 2023-10-02 ENCOUNTER — OFFICE VISIT (OUTPATIENT)
Dept: PULMONOLOGY | Facility: CLINIC | Age: 58
End: 2023-10-02
Payer: COMMERCIAL

## 2023-10-02 VITALS
HEIGHT: 63 IN | RESPIRATION RATE: 18 BRPM | OXYGEN SATURATION: 96 % | HEART RATE: 72 BPM | TEMPERATURE: 98 F | DIASTOLIC BLOOD PRESSURE: 73 MMHG | BODY MASS INDEX: 27.64 KG/M2 | SYSTOLIC BLOOD PRESSURE: 121 MMHG | WEIGHT: 156 LBS

## 2023-10-02 DIAGNOSIS — G47.33 OSA ON CPAP: ICD-10-CM

## 2023-10-02 DIAGNOSIS — J45.20 MILD INTERMITTENT ASTHMA, UNSPECIFIED WHETHER COMPLICATED: Primary | ICD-10-CM

## 2023-10-02 DIAGNOSIS — J45.909 MILD ASTHMA, UNSPECIFIED WHETHER COMPLICATED, UNSPECIFIED WHETHER PERSISTENT: ICD-10-CM

## 2023-10-02 PROCEDURE — 3074F SYST BP LT 130 MM HG: CPT | Performed by: INTERNAL MEDICINE

## 2023-10-02 PROCEDURE — 1160F RVW MEDS BY RX/DR IN RCRD: CPT | Performed by: INTERNAL MEDICINE

## 2023-10-02 PROCEDURE — 1159F MED LIST DOCD IN RCRD: CPT | Performed by: INTERNAL MEDICINE

## 2023-10-02 PROCEDURE — 3078F DIAST BP <80 MM HG: CPT | Performed by: INTERNAL MEDICINE

## 2023-10-02 PROCEDURE — 99213 OFFICE O/P EST LOW 20 MIN: CPT | Performed by: INTERNAL MEDICINE

## 2023-10-02 RX ORDER — ALBUTEROL SULFATE 90 UG/1
2 AEROSOL, METERED RESPIRATORY (INHALATION) EVERY 4 HOURS PRN
Qty: 18 G | Refills: 5 | Status: SHIPPED | OUTPATIENT
Start: 2023-10-02

## 2023-10-02 NOTE — PROGRESS NOTES
Pulmonary Office Follow-up    Subjective     Valentina Stevens is seen today at the office for   Chief Complaint   Patient presents with    DEVENDRA on CPAP    Follow-up     6 Month          HPI  Valentina Stevens is a 57 y.o. female with a PMH significant for bronchial asthma and obstructive sleep apnea presents for follow-up patient is compliant with her CPAP and is using it regularly she uses albuterol along with Pulmicort inhaler  Patient has been doing well and denies any shortness of breath cough or wheezing she is staying active and is losing weight intentionally      Tobacco use history:  Never smoker      Patient Active Problem List   Diagnosis    DDD (degenerative disc disease), lumbar    Hearing loss    Mixed hyperlipidemia    Hypertension    Hypothyroidism    Intractable chronic migraine without aura and without status migrainosus    Menopause    Low back pain    Arthritis    Osteoporosis    RLS (restless legs syndrome)    Memory loss    Arthritis of right acromioclavicular joint    Tendinitis of shoulder, right    Fatty (change of) liver, not elsewhere classified    Scoliosis, unspecified    Lumbar radiculopathy    Scoliosis    Migraine    CTS (carpal tunnel syndrome)    GERD (gastroesophageal reflux disease)    DEVENDRA on CPAP    Primary central sleep apnea    Seizures    Difficulty walking    Vitamin D deficiency    Murmur    DDD (degenerative disc disease), cervical    History of learning disability    Lumbar spondylosis    Cervical polyp    Body mass index (BMI) of 27.0-27.9 in adult    Frequent falls    Chronic idiopathic constipation    History of diverticulitis    Diverticula of colon    Altered bowel habits    Pain disorder associated with psychological factors    Osteopenia    History of osteoporosis       Review of Systems  Review of Systems   Respiratory:  Positive for wheezing.    All other systems reviewed and are negative.  As described in the HPI. Otherwise, remainder of ROS (14 systems) were  negative.    Medications, Allergies, Social, and Family Histories reviewed as per EMR.    Objective     Vitals:    10/02/23 1331   BP: 121/73   Pulse: 72   Resp: 18   Temp: 98 °F (36.7 °C)   SpO2: 96%         10/02/23  1331   Weight: 70.8 kg (156 lb)       Physical Exam  Vitals and nursing note reviewed.   Constitutional:       Appearance: Normal appearance.   HENT:      Head: Normocephalic and atraumatic.      Nose: Nose normal.      Mouth/Throat:      Mouth: Mucous membranes are moist.      Pharynx: Oropharynx is clear.   Eyes:      Extraocular Movements: Extraocular movements intact.      Conjunctiva/sclera: Conjunctivae normal.      Pupils: Pupils are equal, round, and reactive to light.   Cardiovascular:      Rate and Rhythm: Normal rate and regular rhythm.      Pulses: Normal pulses.      Heart sounds: Normal heart sounds.   Pulmonary:      Effort: Pulmonary effort is normal.      Breath sounds: Normal breath sounds.   Abdominal:      General: Abdomen is flat. Bowel sounds are normal.      Palpations: Abdomen is soft.   Musculoskeletal:         General: Normal range of motion.      Cervical back: Normal range of motion and neck supple.   Skin:     General: Skin is warm.      Capillary Refill: Capillary refill takes 2 to 3 seconds.   Neurological:      General: No focal deficit present.      Mental Status: She is alert and oriented to person, place, and time.   Psychiatric:         Mood and Affect: Mood normal.         Behavior: Behavior normal.       No radiology results for the last 90 days.     Assessment & Plan     Diagnoses and all orders for this visit:    1. Mild intermittent asthma, unspecified whether complicated (Primary)    2. DEVENDRA on CPAP    3. Mild asthma, unspecified whether complicated, unspecified whether persistent  -     albuterol sulfate  (90 Base) MCG/ACT inhaler; Inhale 2 puffs Every 4 (Four) Hours As Needed for Wheezing.  Dispense: 18 g; Refill: 5         Discussion/ Recommendations:    Patient is advised to continue regular exercise  Will refill albuterol  Continue Pulmicort flex inhaler  Compliance with the CPAP  Vaccinations discussed and recommended             Return in about 6 months (around 4/2/2024).          This document has been electronically signed by Flo Simon MD on October 2, 2023 13:39 EDT

## 2023-10-04 RX ORDER — BUDESONIDE 90 UG/1
1 AEROSOL, POWDER RESPIRATORY (INHALATION)
Qty: 1 EACH | Refills: 3 | Status: SHIPPED | OUTPATIENT
Start: 2023-10-04

## 2023-10-16 ENCOUNTER — OFFICE VISIT (OUTPATIENT)
Dept: FAMILY MEDICINE CLINIC | Facility: CLINIC | Age: 58
End: 2023-10-16
Payer: COMMERCIAL

## 2023-10-16 VITALS
DIASTOLIC BLOOD PRESSURE: 70 MMHG | HEART RATE: 83 BPM | OXYGEN SATURATION: 100 % | BODY MASS INDEX: 27.29 KG/M2 | WEIGHT: 154 LBS | SYSTOLIC BLOOD PRESSURE: 144 MMHG | HEIGHT: 63 IN | TEMPERATURE: 97.3 F

## 2023-10-16 DIAGNOSIS — R06.09 DYSPNEA ON EXERTION: Primary | ICD-10-CM

## 2023-10-16 DIAGNOSIS — I10 PRIMARY HYPERTENSION: ICD-10-CM

## 2023-10-16 PROCEDURE — 3077F SYST BP >= 140 MM HG: CPT | Performed by: FAMILY MEDICINE

## 2023-10-16 PROCEDURE — 3078F DIAST BP <80 MM HG: CPT | Performed by: FAMILY MEDICINE

## 2023-10-16 PROCEDURE — 99214 OFFICE O/P EST MOD 30 MIN: CPT | Performed by: FAMILY MEDICINE

## 2023-10-16 RX ORDER — LISINOPRIL 2.5 MG/1
5 TABLET ORAL DAILY
Start: 2023-10-16 | End: 2023-10-16

## 2023-10-16 RX ORDER — LISINOPRIL 5 MG/1
5 TABLET ORAL DAILY
Start: 2023-10-16

## 2023-10-16 NOTE — PROGRESS NOTES
"Chief Complaint    Hypertension (Was seen at [facility] yesterday due to increased BP/SOA.  They did a EKG and told her to follow-up with our office for referral to cardiologist.)    Subjective      Valentina Stevens presents to [facility]    History of Present Illness    1.) UNCONTROLLED HTN : Patient presents after recent visit to an acute care facility secondary to elevated blood pressure.  She reports systolic blood pressure measurements at home as high as the 170s.  Patient is currently prescribed Lisinopril 2.5 mg.  Reports taking medication daily.  Also presented with complaints of shortness of breath.  History of asthma - no wheezing or recent respiratory illness per pt. EKG performed during urgent care visit showed sinus rhythm with first-degree AV block.  Patient does admit that she continues to experience oscillations in her blood pressure measurements at home, since her urgent care visit.  She notes that she has been experiencing intermittent shortness of breath with exertional activities such as attempting to walk her driveway.     Objective     Vital Signs:     /70 (BP Location: Left arm, Patient Position: Sitting, Cuff Size: Adult)   Pulse 83   Temp 97.3 °F (36.3 °C) (Temporal)   Ht 160 cm (63\")   Wt 69.9 kg (154 lb)   SpO2 100%   BMI 27.28 kg/m²       Physical Exam  Vitals reviewed.   Constitutional:       General: She is not in acute distress.     Appearance: Normal appearance. She is well-developed.   HENT:      Head: Normocephalic and atraumatic.      Right Ear: Hearing and external ear normal.      Left Ear: Hearing and external ear normal.      Nose: Nose normal.   Eyes:      General: Lids are normal.         Right eye: No discharge.         Left eye: No discharge.      Conjunctiva/sclera: Conjunctivae normal.   Cardiovascular:      Rate and Rhythm: Normal rate and regular rhythm.   Pulmonary:      Effort: Pulmonary effort is normal. No " respiratory distress.      Breath sounds: No stridor. No wheezing, rhonchi or rales.   Abdominal:      General: There is no distension.   Musculoskeletal:         General: No swelling.      Cervical back: Neck supple.   Skin:     Coloration: Skin is not jaundiced.      Findings: No erythema.   Neurological:      Mental Status: She is alert. Mental status is at baseline.   Psychiatric:         Mood and Affect: Mood and affect normal.         Thought Content: Thought content normal.       Assessment and Plan     Diagnoses and all orders for this visit:    1. Dyspnea on exertion (Primary)  Comments:  Referred to cardiology for an eval and recommendations.  Orders:  -     Cancel: Ambulatory Referral to Cardiology  -     Ambulatory Referral to Cardiology    2. Primary hypertension  Comments:  Dose of lisinopril increased to 5 mg daily. Patient will continue to log blood pressure and return in 2 weeks for review and recommendations.  Orders:  -     Discontinue: lisinopril (PRINIVIL,ZESTRIL) 2.5 MG tablet; Take 2 tablets by mouth Daily.  -     Ambulatory Referral to Cardiology    Other orders  -     lisinopril (PRINIVIL,ZESTRIL) 5 MG tablet; Take 1 tablet by mouth Daily.    Follow Up : 2 weeks    Patient was given instructions and counseling regarding her condition or for health maintenance advice. Please see specific information pulled into the AVS if appropriate.

## 2023-10-17 ENCOUNTER — ANESTHESIA (OUTPATIENT)
Dept: GASTROENTEROLOGY | Facility: HOSPITAL | Age: 58
End: 2023-10-17
Payer: COMMERCIAL

## 2023-10-17 ENCOUNTER — HOSPITAL ENCOUNTER (OUTPATIENT)
Facility: HOSPITAL | Age: 58
Setting detail: HOSPITAL OUTPATIENT SURGERY
Discharge: HOME | End: 2023-10-17
Attending: INTERNAL MEDICINE | Admitting: INTERNAL MEDICINE
Payer: COMMERCIAL

## 2023-10-17 ENCOUNTER — ANESTHESIA EVENT (OUTPATIENT)
Dept: GASTROENTEROLOGY | Facility: HOSPITAL | Age: 58
End: 2023-10-17
Payer: COMMERCIAL

## 2023-10-17 VITALS
HEART RATE: 69 BPM | OXYGEN SATURATION: 100 % | SYSTOLIC BLOOD PRESSURE: 115 MMHG | WEIGHT: 152.34 LBS | DIASTOLIC BLOOD PRESSURE: 74 MMHG | RESPIRATION RATE: 16 BRPM | BODY MASS INDEX: 26.99 KG/M2 | TEMPERATURE: 97.2 F

## 2023-10-17 DIAGNOSIS — R19.4 ALTERED BOWEL HABITS: ICD-10-CM

## 2023-10-17 DIAGNOSIS — Z87.19 HISTORY OF DIVERTICULITIS: ICD-10-CM

## 2023-10-17 DIAGNOSIS — K57.30 DIVERTICULA OF COLON: ICD-10-CM

## 2023-10-17 DIAGNOSIS — K59.04 CHRONIC IDIOPATHIC CONSTIPATION: ICD-10-CM

## 2023-10-17 PROCEDURE — 25810000003 LACTATED RINGERS PER 1000 ML

## 2023-10-17 PROCEDURE — 88305 TISSUE EXAM BY PATHOLOGIST: CPT | Performed by: INTERNAL MEDICINE

## 2023-10-17 PROCEDURE — 25010000002 PROPOFOL 10 MG/ML EMULSION

## 2023-10-17 RX ORDER — SODIUM CHLORIDE, SODIUM LACTATE, POTASSIUM CHLORIDE, CALCIUM CHLORIDE 600; 310; 30; 20 MG/100ML; MG/100ML; MG/100ML; MG/100ML
INJECTION, SOLUTION INTRAVENOUS CONTINUOUS PRN
Status: DISCONTINUED | OUTPATIENT
Start: 2023-10-17 | End: 2023-10-17 | Stop reason: SURG

## 2023-10-17 RX ORDER — LIDOCAINE HYDROCHLORIDE 20 MG/ML
INJECTION, SOLUTION EPIDURAL; INFILTRATION; INTRACAUDAL; PERINEURAL AS NEEDED
Status: DISCONTINUED | OUTPATIENT
Start: 2023-10-17 | End: 2023-10-17 | Stop reason: SURG

## 2023-10-17 RX ORDER — PROPOFOL 10 MG/ML
VIAL (ML) INTRAVENOUS AS NEEDED
Status: DISCONTINUED | OUTPATIENT
Start: 2023-10-17 | End: 2023-10-17 | Stop reason: SURG

## 2023-10-17 RX ADMIN — SODIUM CHLORIDE, POTASSIUM CHLORIDE, SODIUM LACTATE AND CALCIUM CHLORIDE: 600; 310; 30; 20 INJECTION, SOLUTION INTRAVENOUS at 10:11

## 2023-10-17 RX ADMIN — LIDOCAINE HYDROCHLORIDE 40 MG: 20 INJECTION, SOLUTION EPIDURAL; INFILTRATION; INTRACAUDAL; PERINEURAL at 10:15

## 2023-10-17 RX ADMIN — PROPOFOL 80 MG: 10 INJECTION, EMULSION INTRAVENOUS at 10:15

## 2023-10-17 RX ADMIN — PROPOFOL 150 MCG/KG/MIN: 10 INJECTION, EMULSION INTRAVENOUS at 10:15

## 2023-10-17 NOTE — ANESTHESIA PREPROCEDURE EVALUATION
Anesthesia Evaluation     Patient summary reviewed and Nursing notes reviewed   NPO Solid Status: > 8 hours  NPO Liquid Status: > 6 hours           Airway   Mallampati: I  TM distance: >3 FB  Neck ROM: full  No difficulty expected  Dental    (+) poor dentition        Pulmonary - normal exam    breath sounds clear to auscultation  (+) asthma,sleep apnea  Cardiovascular - normal exam  Exercise tolerance: good (4-7 METS)    Rhythm: regular  Rate: normal    (+) hypertension well controlled, valvular problems/murmurs murmur, hyperlipidemia      Neuro/Psych  (+) seizures (one time as a child - none since), headaches (migraines), numbness, psychiatric history  GI/Hepatic/Renal/Endo    (+) GERD well controlled, liver disease fatty liver disease, thyroid problem hypothyroidism    Musculoskeletal     Abdominal    Substance History      OB/GYN          Other   arthritis,                 Anesthesia Plan    ASA 3     general   total IV anesthesia  intravenous induction     Anesthetic plan, risks, benefits, and alternatives have been provided, discussed and informed consent has been obtained with: patient.  Pre-procedure education provided  Plan discussed with CRNA.    CODE STATUS:

## 2023-10-17 NOTE — ANESTHESIA POSTPROCEDURE EVALUATION
Patient: Valentina Stevens    Procedure Summary       Date: 10/17/23 Room / Location: [facility] ENDOSCOPY 2 / [facility] ENDOSCOPY    Anesthesia Start: 1011 Anesthesia Stop: 1037    Procedure: COLONOSCOPY WITH COLD SNARE POLYPECTOMY Diagnosis:       Chronic idiopathic constipation      History of diverticulitis      Diverticula of colon      Altered bowel habits      (Chronic idiopathic constipation [K59.04])      (History of diverticulitis [Z87.19])      (Diverticula of colon [K57.30])      (Altered bowel habits [R19.4])    Surgeons: Yossi Stephens MD Provider: Kelly Zaidi CRNA    Anesthesia Type: general ASA Status: 3            Anesthesia Type: general    Vitals  Vitals Value Taken Time   /74 10/17/23 1058   Temp 36.2 °C (97.2 °F) 10/17/23 1058   Pulse 72 10/17/23 1059   Resp 16 10/17/23 1058   SpO2 100 % 10/17/23 1059   Vitals shown include unfiled device data.        Post Anesthesia Care and Evaluation    Patient location during evaluation: bedside  Patient participation: complete - patient participated  Level of consciousness: awake  Pain management: adequate    Airway patency: patent  Anesthetic complications: No anesthetic complications  PONV Status: controlled  Cardiovascular status: acceptable and stable  Respiratory status: acceptable

## 2023-10-29 ENCOUNTER — APPOINTMENT (OUTPATIENT)
Dept: GENERAL RADIOLOGY | Facility: HOSPITAL | Age: 58
End: 2023-10-29
Payer: COMMERCIAL

## 2023-10-29 ENCOUNTER — HOSPITAL ENCOUNTER (EMERGENCY)
Facility: HOSPITAL | Age: 58
Discharge: HOME | End: 2023-10-29
Attending: EMERGENCY MEDICINE | Admitting: EMERGENCY MEDICINE
Payer: COMMERCIAL

## 2023-10-29 VITALS
RESPIRATION RATE: 18 BRPM | WEIGHT: 151.9 LBS | HEIGHT: 63 IN | TEMPERATURE: 98.6 F | OXYGEN SATURATION: 98 % | SYSTOLIC BLOOD PRESSURE: 133 MMHG | BODY MASS INDEX: 26.91 KG/M2 | DIASTOLIC BLOOD PRESSURE: 79 MMHG | HEART RATE: 66 BPM

## 2023-10-29 DIAGNOSIS — R42 LIGHTHEADEDNESS: Primary | ICD-10-CM

## 2023-10-29 DIAGNOSIS — Z13.9 ENCOUNTER FOR MEDICAL SCREENING EXAMINATION: ICD-10-CM

## 2023-10-29 LAB
ALBUMIN SERPL-MCNC: 4.4 G/DL (ref 3.5–5.2)
ALBUMIN/GLOB SERPL: 1.6 G/DL
ALP SERPL-CCNC: 43 U/L (ref 39–117)
ALT SERPL W P-5'-P-CCNC: 30 U/L (ref 1–33)
ANION GAP SERPL CALCULATED.3IONS-SCNC: 8.8 MMOL/L (ref 5–15)
AST SERPL-CCNC: 25 U/L (ref 1–32)
BACTERIA UR QL AUTO: ABNORMAL /HPF
BASOPHILS # BLD AUTO: 0.07 10*3/MM3 (ref 0–0.2)
BASOPHILS NFR BLD AUTO: 1 % (ref 0–1.5)
BILIRUB SERPL-MCNC: 0.6 MG/DL (ref 0–1.2)
BILIRUB UR QL STRIP: NEGATIVE
BUN SERPL-MCNC: 10 MG/DL (ref 8–23)
BUN/CREAT SERPL: 14.3 (ref 7–25)
CALCIUM SPEC-SCNC: 9.1 MG/DL (ref 8.6–10.5)
CHLORIDE SERPL-SCNC: 105 MMOL/L (ref 98–107)
CLARITY UR: CLEAR
CO2 SERPL-SCNC: 25.2 MMOL/L (ref 22–29)
COLOR UR: YELLOW
CREAT SERPL-MCNC: 0.7 MG/DL (ref 0.57–1)
DEPRECATED RDW RBC AUTO: 42 FL (ref 37–54)
EGFRCR SERPLBLD CKD-EPI 2021: 99.2 ML/MIN/1.73
EOSINOPHIL # BLD AUTO: 0.15 10*3/MM3 (ref 0–0.4)
EOSINOPHIL NFR BLD AUTO: 2.1 % (ref 0.3–6.2)
ERYTHROCYTE [DISTWIDTH] IN BLOOD BY AUTOMATED COUNT: 13.8 % (ref 12.3–15.4)
GLOBULIN UR ELPH-MCNC: 2.7 GM/DL
GLUCOSE SERPL-MCNC: 95 MG/DL (ref 65–99)
GLUCOSE UR STRIP-MCNC: NEGATIVE MG/DL
HCT VFR BLD AUTO: 44.7 % (ref 34–46.6)
HGB BLD-MCNC: 14.8 G/DL (ref 12–15.9)
HGB UR QL STRIP.AUTO: NEGATIVE
HOLD SPECIMEN: NORMAL
HOLD SPECIMEN: NORMAL
HYALINE CASTS UR QL AUTO: ABNORMAL /LPF
IMM GRANULOCYTES # BLD AUTO: 0.02 10*3/MM3 (ref 0–0.05)
IMM GRANULOCYTES NFR BLD AUTO: 0.3 % (ref 0–0.5)
KETONES UR QL STRIP: NEGATIVE
LEUKOCYTE ESTERASE UR QL STRIP.AUTO: ABNORMAL
LYMPHOCYTES # BLD AUTO: 2.39 10*3/MM3 (ref 0.7–3.1)
LYMPHOCYTES NFR BLD AUTO: 33 % (ref 19.6–45.3)
MAGNESIUM SERPL-MCNC: 1.9 MG/DL (ref 1.6–2.4)
MCH RBC QN AUTO: 27.7 PG (ref 26.6–33)
MCHC RBC AUTO-ENTMCNC: 33.1 G/DL (ref 31.5–35.7)
MCV RBC AUTO: 83.6 FL (ref 79–97)
MONOCYTES # BLD AUTO: 0.62 10*3/MM3 (ref 0.1–0.9)
MONOCYTES NFR BLD AUTO: 8.6 % (ref 5–12)
NEUTROPHILS NFR BLD AUTO: 3.99 10*3/MM3 (ref 1.7–7)
NEUTROPHILS NFR BLD AUTO: 55 % (ref 42.7–76)
NITRITE UR QL STRIP: NEGATIVE
NRBC BLD AUTO-RTO: 0 /100 WBC (ref 0–0.2)
PH UR STRIP.AUTO: 7 [PH] (ref 5–8)
PLATELET # BLD AUTO: 231 10*3/MM3 (ref 140–450)
PMV BLD AUTO: 11 FL (ref 6–12)
POTASSIUM SERPL-SCNC: 3.8 MMOL/L (ref 3.5–5.2)
PROT SERPL-MCNC: 7.1 G/DL (ref 6–8.5)
PROT UR QL STRIP: NEGATIVE
RBC # BLD AUTO: 5.35 10*6/MM3 (ref 3.77–5.28)
RBC # UR STRIP: ABNORMAL /HPF
REF LAB TEST METHOD: ABNORMAL
SODIUM SERPL-SCNC: 139 MMOL/L (ref 136–145)
SP GR UR STRIP: 1.01 (ref 1–1.03)
SQUAMOUS #/AREA URNS HPF: ABNORMAL /HPF
TROPONIN T SERPL HS-MCNC: 7 NG/L
UROBILINOGEN UR QL STRIP: ABNORMAL
WBC # UR STRIP: ABNORMAL /HPF
WBC NRBC COR # BLD: 7.24 10*3/MM3 (ref 3.4–10.8)
WHOLE BLOOD HOLD COAG: NORMAL
WHOLE BLOOD HOLD SPECIMEN: NORMAL

## 2023-10-29 PROCEDURE — 83735 ASSAY OF MAGNESIUM: CPT | Performed by: EMERGENCY MEDICINE

## 2023-10-29 PROCEDURE — 93005 ELECTROCARDIOGRAM TRACING: CPT

## 2023-10-29 PROCEDURE — 93005 ELECTROCARDIOGRAM TRACING: CPT | Performed by: EMERGENCY MEDICINE

## 2023-10-29 PROCEDURE — 99284 EMERGENCY DEPT VISIT MOD MDM: CPT

## 2023-10-29 PROCEDURE — 84484 ASSAY OF TROPONIN QUANT: CPT | Performed by: EMERGENCY MEDICINE

## 2023-10-29 PROCEDURE — 87086 URINE CULTURE/COLONY COUNT: CPT | Performed by: NURSE PRACTITIONER

## 2023-10-29 PROCEDURE — 80053 COMPREHEN METABOLIC PANEL: CPT | Performed by: EMERGENCY MEDICINE

## 2023-10-29 PROCEDURE — 81001 URINALYSIS AUTO W/SCOPE: CPT | Performed by: EMERGENCY MEDICINE

## 2023-10-29 PROCEDURE — 71045 X-RAY EXAM CHEST 1 VIEW: CPT

## 2023-10-29 PROCEDURE — 85025 COMPLETE CBC W/AUTO DIFF WBC: CPT | Performed by: EMERGENCY MEDICINE

## 2023-10-29 RX ORDER — SODIUM CHLORIDE 0.9 % (FLUSH) 0.9 %
10 SYRINGE (ML) INJECTION AS NEEDED
Status: DISCONTINUED | OUTPATIENT
Start: 2023-10-29 | End: 2023-10-29 | Stop reason: HOSPADM

## 2023-10-29 NOTE — ED PROVIDER NOTES
Time: 2:51 PM EDT  Date of encounter:  10/29/2023  Independent Historian/Clinical History and Information was obtained by:   Patient    History is limited by: N/A    Chief Complaint   Patient presents with    Hypertension    Dizziness         History of Present Illness:  Patient is a 58 y.o. year old female who presents to the emergency department for evaluation of dizziness/lightheadedness that started 5 minutes PTA.  Patient checked her blood pressure at home and found it to be 194/126.  This scared her and prompted her to come to the ER for evaluation.  Prior to coming to the hospital she did call her insurance company to see if she could take an extra dose of her lisinopril they also told her to go to the ER.  Denies nausea, vomiting, headache or vision changes.    Patient Care Team  Primary Care Provider: Sera Loza APRN    Past Medical History:     Allergies   Allergen Reactions    Penicillins Shortness Of Breath and Rash    Sulfa Antibiotics Hives    Cephalexin Rash    Indomethacin Rash     Past Medical History:   Diagnosis Date    Arthritis     Asthma     Asthma, intrinsic     CTS (carpal tunnel syndrome)     Depression     Difficulty walking     Diverticulitis of colon     Fatty (change of) liver, not elsewhere classified     GERD (gastroesophageal reflux disease)     Hearing loss     Hyperlipemia     Hypertension     Hypothyroidism     Low back pain     Lumbar radiculopathy 02/15/2022    Memory loss     Menopause     Migraine     Murmur     Osteopenia     Osteoporosis     Primary central sleep apnea     Scoliosis     Seizures     last when 10 years old    Sleep apnea, obstructive     Vitamin D deficiency      Past Surgical History:   Procedure Laterality Date    COLONOSCOPY  2018    glenna    COLONOSCOPY N/A 10/17/2023    Procedure: COLONOSCOPY WITH COLD SNARE POLYPECTOMY;  Surgeon: Yossi Stephens MD;  Location: [facility] ENDOSCOPY;  Service: Gastroenterology;  Laterality: N/A;   DIVERTICULOSIS, COLON POLYP    EPIDURAL BLOCK      ESSURE TUBAL LIGATION      SKIN BIOPSY      THYMECTOMY      TUMOR REMOVAL      in between heart and lungs     Family History   Problem Relation Age of Onset    Hypertension Father     Osteoporosis Father     Alcohol abuse Father     Skin cancer Father     Thyroid cancer Mother     Migraines Mother     Stroke Mother     Skin cancer Mother     Cancer Mother     Skin cancer Brother     Dementia Sister     Migraines Sister     Migraines Sister     Uterine cancer Maternal Grandmother     Colon cancer Neg Hx     Breast cancer Neg Hx     Prostate cancer Neg Hx     Clotting disorder Neg Hx        Home Medications:  Prior to Admission medications    Medication Sig Start Date End Date Taking? Authorizing Provider   albuterol sulfate  (90 Base) MCG/ACT inhaler Inhale 2 puffs Every 4 (Four) Hours As Needed for Wheezing. 10/2/23   Flo Simon MD   budesonide (Pulmicort Flexhaler) 90 MCG/ACT inhaler Inhale 1 puff 2 (Two) Times a Day. 10/4/23   Sera Loza APRN   Calcium + Vitamin D3 600-10 MG-MCG tablet TAKE 1 TABLET BY MOUTH TWICE DAILY 1/30/23   Sera Loza APRN   Fremanezumab-vfrm (Ajovy) 225 MG/1.5ML solution auto-injector Inject 225 mg under the skin into the appropriate area as directed Every 30 (Thirty) Days. 9/11/23   Lizzy Lindo APRN   HYDROcodone-acetaminophen (NORCO) 5-325 MG per tablet Take 1 tablet by mouth 2 (Two) Times a Day As Needed. 6/4/21   ProviderThaddeus MD   ibandronate (BONIVA) 150 MG tablet Take 1 tablet by mouth Every 30 (Thirty) Days. 9/11/23   Sera Loza APRN   levothyroxine (SYNTHROID, LEVOTHROID) 75 MCG tablet Take 1 tablet by mouth Daily. 9/11/23   Sera Loza APRN   lisinopril (PRINIVIL,ZESTRIL) 5 MG tablet Take 1 tablet by mouth Daily. 10/16/23   Louie Flaherty, DO   multivitamin with minerals tablet tablet Take 1 tablet by mouth Daily.    ProviderThaddeus MD   pramipexole  "(MIRAPEX) 0.5 MG tablet Take 0.5 tablets by mouth Every Night. 9/11/23   Sera Loza APRN   rizatriptan (Maxalt) 10 MG tablet Take 1 tablet by mouth 1 (One) Time As Needed for Migraine. May repeat in 2 hours if needed 9/11/23   Lizzy Lindo APRN   vitamin E 100 UNIT capsule Take 1 capsule by mouth Daily.    Provider, Historical, MD        Social History:   Social History     Tobacco Use    Smoking status: Never     Passive exposure: Never    Smokeless tobacco: Never   Vaping Use    Vaping Use: Never used   Substance Use Topics    Alcohol use: Never    Drug use: Never         Review of Systems:  Review of Systems   Constitutional:  Negative for chills and fever.   HENT:  Negative for congestion, ear pain and sore throat.    Eyes:  Negative for pain and visual disturbance.   Respiratory:  Negative for cough, chest tightness and shortness of breath.    Cardiovascular:  Negative for chest pain.   Gastrointestinal:  Negative for abdominal pain, diarrhea, nausea and vomiting.   Genitourinary:  Negative for flank pain and hematuria.   Musculoskeletal:  Negative for joint swelling.   Skin:  Negative for pallor.   Neurological:  Positive for dizziness and light-headedness. Negative for seizures and headaches.   All other systems reviewed and are negative.       Physical Exam:  /79 (BP Location: Left arm, Patient Position: Sitting)   Pulse 62   Temp 98.6 °F (37 °C) (Oral)   Resp 18   Ht 160 cm (63\")   Wt 68.9 kg (151 lb 14.4 oz)   SpO2 97%   BMI 26.91 kg/m²   Vital signs were reviewed under triage note.  General appearance - Patient appears well-developed and well-nourished.  Patient is in no acute distress.  Head - Normocephalic, atraumatic.  Pupils - Equal, round, reactive to light.  Extraocular muscles are intact.  Conjunctiva is clear.  Nasal - Normal inspection.  No evidence of trauma or epistaxis.  Tympanic membranes - Gray, intact without erythema or retractions.  Oral mucosa - Pink and " moist without lesions or erythema.  Uvula is midline.  Chest wall - Atraumatic.  Chest wall is nontender.  There are no vesicular rashes noted.  Neck - Supple.  Trachea was midline.  There is no palpable lymphadenopathy or thyromegaly.  There are no meningeal signs  Lungs - Clear to auscultation and percussion bilaterally.  Heart - Regular rate and rhythm without any murmurs, clicks, or gallops.  Abdomen - Soft.  Bowel sounds are present.  There is no palpable tenderness.  There is no rebound, guarding, or rigidity.  There are no palpable masses.  There are no pulsatile masses.  Back - Spine is straight and midline.  There is no CVA tenderness.  Extremities - Intact x4 with full range of motion.  There is no palpable edema.  Pulses are intact x4 and equal.  Neurologic - Patient is awake, alert, and oriented x3.  Cranial nerves II through XII are grossly intact.  Motor and sensory functions grossly intact.  Cerebellar function was normal.  Integument - There are no rashes.  There are no petechia or purpura lesions noted.  There are no vesicular lesions noted.            Procedures:  Procedures      Medical Decision Making:      Comorbidities that affect care:    Depression, GERD, hypothyroidism, osteoporosis, vitamin D deficiency, hypertension, hyperlipidemia, sleep apnea, degenerative disc disease    External Notes reviewed:    Previous Clinic Note: Office visit with Dr. Luo date 10/16/2023 was reviewed by me.      The following orders were placed and all results were independently analyzed by me:  Orders Placed This Encounter   Procedures    XR Chest 1 View    [place] Draw    Comprehensive Metabolic Panel    Single High Sensitivity Troponin T    Magnesium    Urinalysis With Microscopic If Indicated (No Culture) - Urine, Clean Catch    CBC Auto Differential    Urinalysis, Microscopic Only - Urine, Clean Catch    NPO Diet NPO Type: Strict NPO    Undress & Gown    Continuous Pulse Oximetry    Vital Signs     Orthostatic Blood Pressure    Oxygen Therapy- Nasal Cannula; Titrate 1-6 LPM Per SpO2; 90 - 95%    POC Glucose Once    ECG 12 Lead ED Triage Standing Order; Weak / Dizzy / AMS    Insert Peripheral IV    Fall Precautions    CBC & Differential    Green Top (Gel)    Lavender Top    Gold Top - SST    Light Blue Top       Medications Given in the Emergency Department:  Medications   sodium chloride 0.9 % flush 10 mL (has no administration in time range)        ED Course:    The patient was initially evaluated in the triage area where orders were placed. The patient was later dispositioned by Zach Tellez DO.      The patient was advised to stay for completion of workup which includes but is not limited to communication of labs and radiological results, reassessment and plan. The patient was advised that leaving prior to disposition by a provider could result in critical findings that are not communicated to the patient.     ED Course as of 10/29/23 1803   Sun Oct 29, 2023   1452 --- PROVIDER IN TRIAGE NOTE ---    The patient was evaluated by Xu tate in triage. Orders were placed and the patient is currently awaiting disposition.    [AJ]   1802 EKG performed at 1502 was interpreted by me to show a normal sinus rhythm with a ventricular rate of 72 bpm.  The TN interval is prolonged at 215 ms which represents a first-degree block.  P waves are normal.  QRS interval is normal.  Axis was at 3 degrees.  There is no acute ischemic ST or T wave change identified.  QT corrected was 406 ms. [TB]      ED Course User Index  [AJ] Xu Lynn PA-C  [TB] Zach Tellez DO     The patient was seen and evaluated in the ED by me.  The above history and physical examination was performed as documented.  Diagnostic data was obtained.  Results reviewed.  Discussed with the patient.  The patient's blood pressure has been normal during the ED visit.  Patient's work-up was also unremarkable.  Patient is asymptomatic.  Patient  stable for discharge home with outpatient treatment follow-up.    Labs:    Lab Results (last 24 hours)       Procedure Component Value Units Date/Time    CBC & Differential [[id number]]  (Abnormal) Collected: 10/29/23 1552    Specimen: Blood Updated: 10/29/23 1605    Narrative:      The following orders were created for panel order CBC & Differential.  Procedure                               Abnormality         Status                     ---------                               -----------         ------                     CBC Auto Differential[[id number]]        Abnormal            Final result                 Please view results for these tests on the individual orders.    Comprehensive Metabolic Panel [[id number]] Collected: 10/29/23 1552    Specimen: Blood Updated: 10/29/23 1622     Glucose 95 mg/dL      BUN 10 mg/dL      Creatinine 0.70 mg/dL      Sodium 139 mmol/L      Potassium 3.8 mmol/L      Chloride 105 mmol/L      CO2 25.2 mmol/L      Calcium 9.1 mg/dL      Total Protein 7.1 g/dL      Albumin 4.4 g/dL      ALT (SGPT) 30 U/L      AST (SGOT) 25 U/L      Alkaline Phosphatase 43 U/L      Total Bilirubin 0.6 mg/dL      Globulin 2.7 gm/dL      A/G Ratio 1.6 g/dL      BUN/Creatinine Ratio 14.3     Anion Gap 8.8 mmol/L      eGFR 99.2 mL/min/1.73     Narrative:      GFR Normal >60  Chronic Kidney Disease <60  Kidney Failure <15      Single High Sensitivity Troponin T [[id number]]  (Normal) Collected: 10/29/23 1552    Specimen: Blood Updated: 10/29/23 1622     HS Troponin T 7 ng/L     Narrative:      High Sensitive Troponin T Reference Range:  <10.0 ng/L- Negative Female for AMI  <15.0 ng/L- Negative Male for AMI  >=10 - Abnormal Female indicating possible myocardial injury.  >=15 - Abnormal Male indicating possible myocardial injury.   Clinicians would have to utilize clinical acumen, EKG, Troponin, and serial changes to determine if it is an Acute Myocardial Infarction or myocardial injury due to an underlying  chronic condition.         Magnesium [[id number]]  (Normal) Collected: 10/29/23 1552    Specimen: Blood Updated: 10/29/23 1622     Magnesium 1.9 mg/dL     CBC Auto Differential [[id number]]  (Abnormal) Collected: 10/29/23 1552    Specimen: Blood Updated: 10/29/23 1605     WBC 7.24 10*3/mm3      RBC 5.35 10*6/mm3      Hemoglobin 14.8 g/dL      Hematocrit 44.7 %      MCV 83.6 fL      MCH 27.7 pg      MCHC 33.1 g/dL      RDW 13.8 %      RDW-SD 42.0 fl      MPV 11.0 fL      Platelets 231 10*3/mm3      Neutrophil % 55.0 %      Lymphocyte % 33.0 %      Monocyte % 8.6 %      Eosinophil % 2.1 %      Basophil % 1.0 %      Immature Grans % 0.3 %      Neutrophils, Absolute 3.99 10*3/mm3      Lymphocytes, Absolute 2.39 10*3/mm3      Monocytes, Absolute 0.62 10*3/mm3      Eosinophils, Absolute 0.15 10*3/mm3      Basophils, Absolute 0.07 10*3/mm3      Immature Grans, Absolute 0.02 10*3/mm3      nRBC 0.0 /100 WBC     Urinalysis With Microscopic If Indicated (No Culture) - Urine, Clean Catch [[id number]]  (Abnormal) Collected: 10/29/23 1725    Specimen: Urine, Clean Catch Updated: 10/29/23 1740     Color, UA Yellow     Appearance, UA Clear     pH, UA 7.0     Specific Gravity, UA 1.011     Glucose, UA Negative     Ketones, UA Negative     Bilirubin, UA Negative     Blood, UA Negative     Protein, UA Negative     Leuk Esterase, UA Moderate (2+)     Nitrite, UA Negative     Urobilinogen, UA 0.2 E.U./dL    Urinalysis, Microscopic Only - Urine, Clean Catch [[id number]]  (Abnormal) Collected: 10/29/23 1725    Specimen: Urine, Clean Catch Updated: 10/29/23 1740     RBC, UA 3-5 /HPF      WBC, UA 6-10 /HPF      Bacteria, UA None Seen /HPF      Squamous Epithelial Cells, UA 0-2 /HPF      Hyaline Casts, UA 0-2 /LPF      Methodology Automated Microscopy             Imaging:    XR Chest 1 View    Result Date: 10/29/2023  PROCEDURE: XR CHEST 1 VW  COMPARISON: [facility], CT, CT CHEST WO CONTRAST DIAGNOSTIC, 12/05/2022, 12:30.   [facility], CR, XR CHEST 2 VW, 1/27/2022, 9:54.  [facility], CR, XR RIBS LEFT W PA CHEST, 2/11/2023, 10:01.  INDICATIONS: HYPERTENSION  FINDINGS:  There is residual chronic atelectasis/scarring in the lingula.  No new consolidations or pleural effusions are observed. The cardiac silhouette and mediastinum are unchanged. No definitive acute osseous abnormalities are seen on this single view.        1. Stable chronic scarring/atelectasis in the lingula.  No definitive evidence for acute cardiopulmonary process.         JAXON BECKER MD       Electronically Signed and Approved By: JAXON BECKER MD on 10/29/2023 at 15:45                Differential Diagnosis and Discussion:      Dizziness: Based on the patient's history, signs, and symptoms, the diffential diagnosis includes but is not limited to meningitis, stroke, sepsis, subarachnoid hemorrhage, intracranial bleeding, encephalitis, vertigo, electrolyte imbalance, and metabolic disorders.  Metabolic: Differential diagnosis includes but is not limited to hypertension, hyperglycemia, hyperkalemia, hypocalcemia, metabolic acidosis, hypokalemia, hypoglycemia, malnutrition, hypothyroidism, hyperthyroidism, and adrenal insufficiency.     All labs were reviewed and interpreted by me.  All X-rays impressions were independently interpreted by me.  EKG was interpreted by me.    MDM     Amount and/or Complexity of Data Reviewed  Clinical lab tests: reviewed  Tests in the radiology section of CPT®: reviewed  Tests in the medicine section of CPT®: reviewed             Patient Care Considerations:          Consultants/Shared Management Plan:    None    Social Determinants of Health:    Patient is independent, reliable, and has access to care.       Disposition and Care Coordination:    Discharged: I considered escalation of care by admitting this patient for observation, however the patient has improved and is suitable and  stable for  discharge.    I have explained the patient´s condition, diagnoses and treatment plan based on the information available to me at this time. I have answered questions and addressed any concerns. The patient has a good  understanding of the patient´s diagnosis, condition, and treatment plan as can be expected at this point. The vital signs have been stable. The patient´s condition is stable and appropriate for discharge from the emergency department.      The patient will pursue further outpatient evaluation with the primary care physician or other designated or consulting physician as outlined in the discharge instructions. They are agreeable to this plan of care and follow-up instructions have been explained in detail. The patient has received these instructions in written format and have expressed an understanding of the discharge instructions. The patient is aware that any significant change in condition or worsening of symptoms should prompt an immediate return to this or the closest emergency department or call to 911.    Final diagnoses:   Lightheadedness   Encounter for medical screening examination        ED Disposition       ED Disposition   Discharge    Condition   Stable    Comment   --               This medical record created using voice recognition software.             Zach Tellez DO  10/30/23 1106

## 2023-10-29 NOTE — DISCHARGE INSTRUCTIONS
Continue with your current home medications as prescribed.  Follow-up your primary care provider in 1 week.  Return to the ER for any change or worsening symptoms or any other concerns arise.

## 2023-10-30 ENCOUNTER — OFFICE VISIT (OUTPATIENT)
Dept: FAMILY MEDICINE CLINIC | Facility: CLINIC | Age: 58
End: 2023-10-30
Payer: COMMERCIAL

## 2023-10-30 VITALS
TEMPERATURE: 97.2 F | WEIGHT: 152 LBS | BODY MASS INDEX: 26.93 KG/M2 | SYSTOLIC BLOOD PRESSURE: 112 MMHG | OXYGEN SATURATION: 98 % | HEIGHT: 63 IN | HEART RATE: 87 BPM | DIASTOLIC BLOOD PRESSURE: 52 MMHG

## 2023-10-30 DIAGNOSIS — I10 HYPERTENSION, UNSPECIFIED TYPE: ICD-10-CM

## 2023-10-30 DIAGNOSIS — Z12.31 SCREENING MAMMOGRAM FOR BREAST CANCER: ICD-10-CM

## 2023-10-30 DIAGNOSIS — Z01.419 WELL FEMALE EXAM WITH ROUTINE GYNECOLOGICAL EXAM: Primary | ICD-10-CM

## 2023-10-30 DIAGNOSIS — R31.9 HEMATURIA, UNSPECIFIED TYPE: ICD-10-CM

## 2023-10-30 LAB
BILIRUB BLD-MCNC: NEGATIVE MG/DL
CLARITY, POC: ABNORMAL
COLOR UR: ABNORMAL
EXPIRATION DATE: ABNORMAL
GLUCOSE UR STRIP-MCNC: NEGATIVE MG/DL
KETONES UR QL: NEGATIVE
LEUKOCYTE EST, POC: ABNORMAL
Lab: ABNORMAL
NITRITE UR-MCNC: NEGATIVE MG/ML
PH UR: 7 [PH] (ref 5–8)
PROT UR STRIP-MCNC: ABNORMAL MG/DL
RBC # UR STRIP: ABNORMAL /UL
SP GR UR: 1.01 (ref 1–1.03)
UROBILINOGEN UR QL: ABNORMAL

## 2023-10-30 PROCEDURE — 87624 HPV HI-RISK TYP POOLED RSLT: CPT | Performed by: NURSE PRACTITIONER

## 2023-10-30 PROCEDURE — G0123 SCREEN CERV/VAG THIN LAYER: HCPCS | Performed by: NURSE PRACTITIONER

## 2023-10-30 RX ORDER — LISINOPRIL 10 MG/1
10 TABLET ORAL DAILY
Qty: 30 TABLET | Refills: 4
Start: 2023-10-30 | End: 2023-10-31 | Stop reason: SDUPTHER

## 2023-10-30 NOTE — PROGRESS NOTES
Answers submitted by the patient for this visit:  Other (Submitted on 10/23/2023)  Please describe your symptoms.: Yearly exam  Have you had these symptoms before?: Yes  How long have you been having these symptoms?: 1-4 days  Primary Reason for Visit (Submitted on 10/23/2023)  What is the primary reason for your visit?: Other  Chief Complaint  Gynecologic Exam    Subjective            Valentina Stevens presents to [facility]  History of Present Illness  Patient is here today for her annual wellness and Pap smear    Pt reports with regards to the HTN--was only on lisinopril 5 mg QD --takes this at night--and then started having issues and was seen at fast pace and BP was 200/100--and then was instructed to increase to 10 mg daily--and then last night reports BP was elevated again and went to the ER dept--and was also lightheaded--and they noted her BP was in the 190's/120's and then did not change any of her meds--and did the work up and then she was instructed could take an extra 5mg lisinopril if needed when BP elevates    Then also at the fast pace they found 1st degree AVB and then when she F/U here with Dr Flaherty referred her to cardiology and they will be seeing her in Dec 2023       PHQ-2 Total Score: 0  PHQ-9 Total Score: 0    Past Medical History:   Diagnosis Date    Arthritis     Asthma     Asthma, intrinsic     CTS (carpal tunnel syndrome)     Depression     Difficulty walking     Diverticulitis of colon     Fatty (change of) liver, not elsewhere classified     GERD (gastroesophageal reflux disease)     Hearing loss     Hyperlipemia     Hypertension     Hypothyroidism     Low back pain     Lumbar radiculopathy 02/15/2022    Memory loss     Menopause     Migraine     Murmur     Osteopenia     Osteoporosis     Primary central sleep apnea     Scoliosis     Seizures     last when 10 years old    Sleep apnea, obstructive     Vitamin D deficiency        Allergies   Allergen  Reactions    Penicillins Shortness Of Breath and Rash    Sulfa Antibiotics Hives    Cephalexin Rash    Indomethacin Rash        Past Surgical History:   Procedure Laterality Date    COLONOSCOPY  2018    glenna    COLONOSCOPY N/A 10/17/2023    Procedure: COLONOSCOPY WITH COLD SNARE POLYPECTOMY;  Surgeon: Yossi Stephens MD;  Location: [facility] ENDOSCOPY;  Service: Gastroenterology;  Laterality: N/A;  DIVERTICULOSIS, COLON POLYP    EPIDURAL BLOCK      ESSURE TUBAL LIGATION      SKIN BIOPSY      THYMECTOMY      TUMOR REMOVAL      in between heart and lungs        Social History     Tobacco Use    Smoking status: Never     Passive exposure: Never    Smokeless tobacco: Never   Vaping Use    Vaping Use: Never used   Substance Use Topics    Alcohol use: Never    Drug use: Never       Family History   Problem Relation Age of Onset    Hypertension Father     Osteoporosis Father     Alcohol abuse Father     Skin cancer Father     Thyroid cancer Mother     Migraines Mother     Stroke Mother     Skin cancer Mother     Cancer Mother     Skin cancer Brother     Dementia Sister     Migraines Sister     Migraines Sister     Uterine cancer Maternal Grandmother     Colon cancer Neg Hx     Breast cancer Neg Hx     Prostate cancer Neg Hx     Clotting disorder Neg Hx         There are no preventive care reminders to display for this patient.     Current Outpatient Medications on File Prior to Visit   Medication Sig    albuterol sulfate  (90 Base) MCG/ACT inhaler Inhale 2 puffs Every 4 (Four) Hours As Needed for Wheezing.    budesonide (Pulmicort Flexhaler) 90 MCG/ACT inhaler Inhale 1 puff 2 (Two) Times a Day.    Calcium + Vitamin D3 600-10 MG-MCG tablet TAKE 1 TABLET BY MOUTH TWICE DAILY    Fremanezumab-vfrm (Ajovy) 225 MG/1.5ML solution auto-injector Inject 225 mg under the skin into the appropriate area as directed Every 30 (Thirty) Days.    HYDROcodone-acetaminophen (NORCO) 5-325 MG per tablet Take 1 tablet by mouth 2  "(Two) Times a Day As Needed.    ibandronate (BONIVA) 150 MG tablet Take 1 tablet by mouth Every 30 (Thirty) Days.    levothyroxine (SYNTHROID, LEVOTHROID) 75 MCG tablet Take 1 tablet by mouth Daily.    multivitamin with minerals tablet tablet Take 1 tablet by mouth Daily.    pramipexole (MIRAPEX) 0.5 MG tablet Take 0.5 tablets by mouth Every Night.    rizatriptan (Maxalt) 10 MG tablet Take 1 tablet by mouth 1 (One) Time As Needed for Migraine. May repeat in 2 hours if needed    vitamin E 100 UNIT capsule Take 1 capsule by mouth Daily.    [DISCONTINUED] lisinopril (PRINIVIL,ZESTRIL) 5 MG tablet Take 1 tablet by mouth Daily.     Current Facility-Administered Medications on File Prior to Visit   Medication    [DISCONTINUED] sodium chloride 0.9 % flush 10 mL       Immunization History   Administered Date(s) Administered    COVID-19 (PFIZER) Purple Cap Monovalent 05/27/2021, 06/17/2021, 06/17/2021    Flu Vaccine Intradermal Quad 18-64YR 09/01/2023    Fluzone (or Fluarix & Flulaval for VFC) >6mos 10/19/2021, 09/30/2022    Hepatitis A 05/17/2018, 12/20/2018    Influenza, Unspecified 10/19/2020, 10/19/2020    Pneumococcal Conjugate 20-Valent (PCV20) 08/09/2023    Pneumococcal Polysaccharide (PPSV23) 05/10/2022    Shingrix 11/12/2021, 01/21/2022    Tdap 03/20/2018, 10/19/2020    Zoster, Unspecified 11/12/2021, 01/21/2022       Review of Systems   Constitutional:  Positive for fatigue.   HENT:  Positive for trouble swallowing.         Saw gastro in the past and they D/W her in the past a possible EGD and then did the swallow study as well--and then nothing was scheduled after that--per pt report    Eyes:  Negative for blurred vision and double vision.   Respiratory:  Positive for shortness of breath.         SOBOE    Cardiovascular:  Negative for chest pain.        At times admits to her chest \"bothering her at work if too stressed\"    Gastrointestinal:  Negative for abdominal pain and blood in stool.   Endocrine: Negative " "for polydipsia, polyphagia and polyuria.   Genitourinary:  Negative for breast discharge, breast lump, breast pain, dysuria and vaginal bleeding.   Musculoskeletal:  Positive for back pain and neck pain.   Neurological:  Negative for dizziness, seizures, syncope and light-headedness.   Hematological:  Bruises/bleeds easily.   Psychiatric/Behavioral:  Negative for self-injury and suicidal ideas.         Objective     /52 (BP Location: Left arm, Patient Position: Sitting, Cuff Size: Adult)   Pulse 87   Temp 97.2 °F (36.2 °C) (Temporal)   Ht 160 cm (63\")   Wt 68.9 kg (152 lb)   SpO2 98%   BMI 26.93 kg/m²       Physical Exam  Vitals and nursing note reviewed.   Constitutional:       Appearance: Normal appearance.   HENT:      Head: Normocephalic.      Right Ear: Tympanic membrane, ear canal and external ear normal.      Left Ear: Tympanic membrane, ear canal and external ear normal.      Ears:      Comments: Wearing hearing aids      Nose: Nose normal.      Mouth/Throat:      Mouth: Mucous membranes are moist.   Eyes:      Pupils: Pupils are equal, round, and reactive to light.      Comments: Glasses    Cardiovascular:      Rate and Rhythm: Normal rate and regular rhythm.      Heart sounds: Normal heart sounds.   Pulmonary:      Effort: Pulmonary effort is normal.      Breath sounds: Normal breath sounds.   Chest:   Breasts:     Zana Score is 5.      Right: Normal. No swelling, bleeding, inverted nipple, nipple discharge, skin change or tenderness.      Left: Normal. No swelling, bleeding, inverted nipple, nipple discharge, skin change or tenderness.   Abdominal:      General: Bowel sounds are normal.      Palpations: Abdomen is soft.      Tenderness: There is no abdominal tenderness.      Hernia: There is no hernia in the left inguinal area or right inguinal area.   Genitourinary:     General: Normal vulva.      Exam position: Lithotomy position.      Zana stage (genital): 5.      Labia:         Right: " No rash, tenderness, lesion or injury.         Left: No rash, tenderness, lesion or injury.       Vagina: No signs of injury and foreign body. No erythema, tenderness, bleeding, lesions or prolapsed vaginal walls.      Cervix: Friability present. No cervical motion tenderness, discharge, lesion, erythema, cervical bleeding or eversion.      Uterus: Not deviated, not enlarged, not fixed, not tender and no uterine prolapse.       Adnexa:         Right: No mass, tenderness or fullness.          Left: No mass, tenderness or fullness.        Comments: Pap smear done and tolerated well and then with pap mild faint bleeding   Musculoskeletal:         General: Normal range of motion.      Cervical back: Normal range of motion and neck supple.   Lymphadenopathy:      Upper Body:      Right upper body: No supraclavicular or axillary adenopathy.      Left upper body: No supraclavicular or axillary adenopathy.      Lower Body: No right inguinal adenopathy. No left inguinal adenopathy.   Skin:     General: Skin is warm and dry.   Neurological:      Mental Status: She is alert and oriented to person, place, and time.   Psychiatric:         Mood and Affect: Mood normal.         Behavior: Behavior normal.         Thought Content: Thought content normal.         Judgment: Judgment normal.         Result Review :                      POCT urinalysis dipstick, automated (10/30/2023 08:06)   SCANNED EKG (10/16/2023)  SCANNED - TELEMETRY (10/17/2023)  ECG 12 Lead ED Triage Standing Order; Weak / Dizzy / AMS (10/29/2023 15:02)     Assessment and Plan      Diagnoses and all orders for this visit:    1. Well female exam with routine gynecological exam (Primary)  -     POCT urinalysis dipstick, automated  -     IgP, Aptima HPV  -     Mammo Screening Digital Tomosynthesis Bilateral With CAD; Future  -     Urine Culture - Urine, Urine, Clean Catch    2. Screening mammogram for breast cancer  -     Mammo Screening Digital Tomosynthesis  Bilateral With CAD; Future    3. Hypertension, unspecified type  -     lisinopril (PRINIVIL,ZESTRIL) 10 MG tablet; Take 1 tablet by mouth Daily.  Dispense: 30 tablet; Refill: 4    4. Hematuria, unspecified type  -     Urine Culture - Urine, Urine, Clean Catch    We did changer her Rx from the 5 mg to the 10 mg since taking this on daily basis now and then if needed as per ER provider--if BP remains >140/90 consistently then could take an extra 1/2 tablet PRN--and then if having to do this on a regular basis--pt to F/U with me--prior to seeing cardiology         Follow Up     Return if symptoms worsen or fail to improve.    Patient was given instructions and counseling regarding her condition or for health maintenance advice. Please see specific information pulled into the AVS if appropriate.            Valentina Stevens  reports that she has never smoked. She has never been exposed to tobacco smoke. She has never used smokeless tobacco.. I have educated her on the risk of diseases from using tobacco products such as cancer, COPD, and heart disease.   .

## 2023-10-31 DIAGNOSIS — I10 HYPERTENSION, UNSPECIFIED TYPE: ICD-10-CM

## 2023-10-31 LAB — BACTERIA SPEC AEROBE CULT: NO GROWTH

## 2023-10-31 RX ORDER — LISINOPRIL 10 MG/1
10 TABLET ORAL DAILY
Qty: 30 TABLET | Refills: 4 | Status: SHIPPED | OUTPATIENT
Start: 2023-10-31

## 2023-10-31 NOTE — TELEPHONE ENCOUNTER
Caller: Valentina Stevens    Relationship: Self    Best call back number: [phone]     Requested Prescriptions:   Requested Prescriptions     Pending Prescriptions Disp Refills    lisinopril (PRINIVIL,ZESTRIL) 10 MG tablet       Sig: Take 1 tablet by mouth Daily.        Pharmacy where request should be sent: [address] - [phone] [facility] [phone] [facility]     Last office visit with prescribing clinician: 10/30/2023   Last telemedicine visit with prescribing clinician: Visit date not found   Next office visit with prescribing clinician: Visit date not found     Does the patient have less than a 3 day supply:  [x] Yes  [] No    Rafita Vargas Rep   10/31/23 11:43 EDT

## 2023-11-01 NOTE — PROGRESS NOTES
Please mail letter to patient stating    Valentina the Pap smear was read as negative for intraepithelial lesion or malignancy of the cervix and the HPV screening was negative

## 2023-11-02 LAB
QT INTERVAL: 371 MS
QTC INTERVAL: 406 MS

## 2023-11-16 ENCOUNTER — TELEPHONE (OUTPATIENT)
Dept: SLEEP MEDICINE | Facility: HOSPITAL | Age: 58
End: 2023-11-16
Payer: COMMERCIAL

## 2023-11-29 ENCOUNTER — OFFICE VISIT (OUTPATIENT)
Dept: FAMILY MEDICINE CLINIC | Facility: CLINIC | Age: 58
End: 2023-11-29
Payer: COMMERCIAL

## 2023-11-29 VITALS
HEIGHT: 63 IN | TEMPERATURE: 98.2 F | HEART RATE: 89 BPM | OXYGEN SATURATION: 96 % | DIASTOLIC BLOOD PRESSURE: 78 MMHG | BODY MASS INDEX: 26.75 KG/M2 | WEIGHT: 151 LBS | SYSTOLIC BLOOD PRESSURE: 128 MMHG

## 2023-11-29 DIAGNOSIS — R09.81 NASAL CONGESTION: ICD-10-CM

## 2023-11-29 DIAGNOSIS — J02.9 SORE THROAT: ICD-10-CM

## 2023-11-29 DIAGNOSIS — J45.41 MODERATE PERSISTENT ASTHMA WITH EXACERBATION: Primary | ICD-10-CM

## 2023-11-29 DIAGNOSIS — Z20.828 EXPOSURE TO RESPIRATORY SYNCYTIAL VIRUS (RSV): ICD-10-CM

## 2023-11-29 LAB
EXPIRATION DATE: NORMAL
FLUAV SUBTYP SPEC NAA+PROBE: NOT DETECTED
FLUBV RNA ISLT QL NAA+PROBE: NOT DETECTED
INTERNAL CONTROL: NORMAL
Lab: NORMAL
RSV RNA NPH QL NAA+NON-PROBE: NOT DETECTED
S PYO AG THROAT QL: NEGATIVE
SARS-COV-2 RNA RESP QL NAA+PROBE: NOT DETECTED

## 2023-11-29 PROCEDURE — 3078F DIAST BP <80 MM HG: CPT | Performed by: NURSE PRACTITIONER

## 2023-11-29 PROCEDURE — 1159F MED LIST DOCD IN RCRD: CPT | Performed by: NURSE PRACTITIONER

## 2023-11-29 PROCEDURE — 99213 OFFICE O/P EST LOW 20 MIN: CPT | Performed by: NURSE PRACTITIONER

## 2023-11-29 PROCEDURE — 87637 SARSCOV2&INF A&B&RSV AMP PRB: CPT | Performed by: NURSE PRACTITIONER

## 2023-11-29 PROCEDURE — 1160F RVW MEDS BY RX/DR IN RCRD: CPT | Performed by: NURSE PRACTITIONER

## 2023-11-29 PROCEDURE — 87880 STREP A ASSAY W/OPTIC: CPT | Performed by: NURSE PRACTITIONER

## 2023-11-29 PROCEDURE — 3074F SYST BP LT 130 MM HG: CPT | Performed by: NURSE PRACTITIONER

## 2023-11-29 RX ORDER — BENZONATATE 100 MG/1
100 CAPSULE ORAL 3 TIMES DAILY PRN
Qty: 30 CAPSULE | Refills: 0 | Status: SHIPPED | OUTPATIENT
Start: 2023-11-29

## 2023-11-29 RX ORDER — AZITHROMYCIN 250 MG/1
TABLET, FILM COATED ORAL
Qty: 6 TABLET | Refills: 0 | Status: SHIPPED | OUTPATIENT
Start: 2023-11-29

## 2023-11-29 RX ORDER — METHYLPREDNISOLONE 4 MG/1
TABLET ORAL
Qty: 21 EACH | Refills: 0 | Status: SHIPPED | OUTPATIENT
Start: 2023-11-29

## 2023-11-29 NOTE — PROGRESS NOTES
Chief Complaint  Sore Throat (Sore throat, cough and has been exposure to RSV.)    Subjective            Valentina Stevens presents to [facility]  History of Present Illness  Acute onset s/s started approx 4 days ago--and then progressing and persistent cough and then productive--and some mild SOB and not had to use the inhaler--and the pt reports known exposure to RSV       PHQ-2 Total Score:    PHQ-9 Total Score:      Past Medical History:   Diagnosis Date    Arthritis     Asthma     Asthma, intrinsic     CTS (carpal tunnel syndrome)     Depression     Difficulty walking     Diverticulitis of colon     Fatty (change of) liver, not elsewhere classified     GERD (gastroesophageal reflux disease)     Hearing loss     Hyperlipemia     Hypertension     Hypothyroidism     Low back pain     Lumbar radiculopathy 02/15/2022    Memory loss     Menopause     Migraine     Murmur     Osteopenia     Osteoporosis     Primary central sleep apnea     Scoliosis     Seizures     last when 10 years old    Sleep apnea, obstructive     Vitamin D deficiency        Allergies   Allergen Reactions    Penicillins Shortness Of Breath and Rash    Sulfa Antibiotics Hives    Cephalexin Rash    Indomethacin Rash        Past Surgical History:   Procedure Laterality Date    COLONOSCOPY  2018    glenna    COLONOSCOPY N/A 10/17/2023    Procedure: COLONOSCOPY WITH COLD SNARE POLYPECTOMY;  Surgeon: Yossi Stephens MD;  Location: [facility] ENDOSCOPY;  Service: Gastroenterology;  Laterality: N/A;  DIVERTICULOSIS, COLON POLYP    EPIDURAL BLOCK      ESSURE TUBAL LIGATION      SKIN BIOPSY      THYMECTOMY      TUMOR REMOVAL      in between heart and lungs        Social History     Tobacco Use    Smoking status: Never     Passive exposure: Never    Smokeless tobacco: Never   Vaping Use    Vaping Use: Never used   Substance Use Topics    Alcohol use: Never    Drug use: Never       Family History   Problem Relation Age of  Onset    Hypertension Father     Osteoporosis Father     Alcohol abuse Father     Skin cancer Father     Thyroid cancer Mother     Migraines Mother     Stroke Mother     Skin cancer Mother     Cancer Mother     Skin cancer Brother     Dementia Sister     Migraines Sister     Migraines Sister     Uterine cancer Maternal Grandmother     Colon cancer Neg Hx     Breast cancer Neg Hx     Prostate cancer Neg Hx     Clotting disorder Neg Hx         There are no preventive care reminders to display for this patient.     Current Outpatient Medications on File Prior to Visit   Medication Sig    albuterol sulfate  (90 Base) MCG/ACT inhaler Inhale 2 puffs Every 4 (Four) Hours As Needed for Wheezing.    budesonide (Pulmicort Flexhaler) 90 MCG/ACT inhaler Inhale 1 puff 2 (Two) Times a Day.    Calcium + Vitamin D3 600-10 MG-MCG tablet TAKE 1 TABLET BY MOUTH TWICE DAILY    Fremanezumab-vfrm (Ajovy) 225 MG/1.5ML solution auto-injector Inject 225 mg under the skin into the appropriate area as directed Every 30 (Thirty) Days.    HYDROcodone-acetaminophen (NORCO) 5-325 MG per tablet Take 1 tablet by mouth 2 (Two) Times a Day As Needed.    ibandronate (BONIVA) 150 MG tablet Take 1 tablet by mouth Every 30 (Thirty) Days.    levothyroxine (SYNTHROID, LEVOTHROID) 75 MCG tablet Take 1 tablet by mouth Daily.    lisinopril (PRINIVIL,ZESTRIL) 10 MG tablet Take 1 tablet by mouth Daily.    multivitamin with minerals tablet tablet Take 1 tablet by mouth Daily.    pramipexole (MIRAPEX) 0.5 MG tablet Take 0.5 tablets by mouth Every Night.    rizatriptan (Maxalt) 10 MG tablet Take 1 tablet by mouth 1 (One) Time As Needed for Migraine. May repeat in 2 hours if needed    vitamin E 100 UNIT capsule Take 1 capsule by mouth Daily.     No current facility-administered medications on file prior to visit.       Immunization History   Administered Date(s) Administered    COVID-19 (PFIZER) Purple Cap Monovalent 05/27/2021, 06/17/2021, 06/17/2021    Flu  "Vaccine Intradermal Quad 18-64YR 09/01/2023    Fluzone (or Fluarix & Flulaval for VFC) >6mos 10/19/2021, 09/30/2022    Hepatitis A 05/17/2018, 12/20/2018    Influenza, Unspecified 10/19/2020, 10/19/2020    Pneumococcal Conjugate 20-Valent (PCV20) 08/09/2023    Pneumococcal Polysaccharide (PPSV23) 05/10/2022    Shingrix 11/12/2021, 01/21/2022    Tdap 03/20/2018, 10/19/2020    Zoster, Unspecified 11/12/2021, 01/21/2022       Review of Systems   Constitutional:  Positive for fatigue. Negative for chills and fever.   HENT:  Positive for congestion, postnasal drip and sore throat. Negative for rhinorrhea and sinus pressure.    Respiratory:  Positive for cough and shortness of breath. Negative for wheezing.         Sputum clear --the SOB is chronic and has not used the inhaler yet    Cardiovascular:  Negative for chest pain.   Gastrointestinal:  Negative for diarrhea, nausea and vomiting.   Musculoskeletal:  Positive for arthralgias.   Neurological:  Positive for headache.        Objective     /78 (BP Location: Left arm, Patient Position: Sitting, Cuff Size: Adult)   Pulse 89   Temp 98.2 °F (36.8 °C) (Temporal)   Ht 160 cm (63\")   Wt 68.5 kg (151 lb)   SpO2 96%   BMI 26.75 kg/m²       Physical Exam  Vitals and nursing note reviewed.   Constitutional:       Appearance: Normal appearance.   HENT:      Head: Normocephalic.      Right Ear: Tympanic membrane, ear canal and external ear normal.      Left Ear: Tympanic membrane, ear canal and external ear normal.      Nose: Nose normal. No congestion or rhinorrhea.      Right Sinus: No maxillary sinus tenderness or frontal sinus tenderness.      Left Sinus: No maxillary sinus tenderness or frontal sinus tenderness.      Mouth/Throat:      Mouth: Mucous membranes are moist.      Pharynx: No oropharyngeal exudate or posterior oropharyngeal erythema.   Eyes:      Pupils: Pupils are equal, round, and reactive to light.   Cardiovascular:      Rate and Rhythm: Normal rate " and regular rhythm.      Heart sounds: Normal heart sounds.   Pulmonary:      Effort: Pulmonary effort is normal.      Breath sounds: Normal breath sounds.   Abdominal:      Palpations: Abdomen is soft.   Musculoskeletal:         General: Normal range of motion.      Cervical back: Normal range of motion and neck supple.   Skin:     General: Skin is warm and dry.   Neurological:      Mental Status: She is alert and oriented to person, place, and time.   Psychiatric:         Mood and Affect: Mood normal.         Behavior: Behavior normal.         Thought Content: Thought content normal.         Judgment: Judgment normal.         Result Review :                      POCT rapid strep A (11/29/2023 10:18)      Assessment and Plan      Diagnoses and all orders for this visit:    1. Moderate persistent asthma with exacerbation (Primary)  -     azithromycin (Zithromax Z-Pablo) 250 MG tablet; Take 2 tablets by mouth on day 1, then 1 tablet daily on days 2-5  Dispense: 6 tablet; Refill: 0  -     methylPREDNISolone (MEDROL) 4 MG dose pack; Take as directed on package instructions.  Dispense: 21 each; Refill: 0  -     benzonatate (Tessalon Perles) 100 MG capsule; Take 1 capsule by mouth 3 (Three) Times a Day As Needed for Cough.  Dispense: 30 capsule; Refill: 0    2. Nasal congestion  -     COVID-19, FLU A/B, RSV PCR 1 HR TAT - Swab, Nasopharynx; Future  -     POCT rapid strep A  -     methylPREDNISolone (MEDROL) 4 MG dose pack; Take as directed on package instructions.  Dispense: 21 each; Refill: 0    3. Exposure to respiratory syncytial virus (RSV)  -     COVID-19, FLU A/B, RSV PCR 1 HR TAT - Swab, Nasopharynx; Future  -     POCT rapid strep A  -     methylPREDNISolone (MEDROL) 4 MG dose pack; Take as directed on package instructions.  Dispense: 21 each; Refill: 0    4. Sore throat  -     COVID-19, FLU A/B, RSV PCR 1 HR TAT - Swab, Nasopharynx; Future  -     POCT rapid strep A  -     methylPREDNISolone (MEDROL) 4 MG dose pack;  Take as directed on package instructions.  Dispense: 21 each; Refill: 0    We will go ahead and empirically treat the patient with a Z-Pablo and Medrol Dosepak as she does have a full-blown asthma exacerbation and has been feeling shortness of breath and I have encouraged her to use her albuterol inhaler as well as stay very well-hydrated and also was negative for strep and she is having more sputum production than normal and we will call with the other results        Follow Up     Return if symptoms worsen or fail to improve.    Patient was given instructions and counseling regarding her condition or for health maintenance advice. Please see specific information pulled into the AVS if appropriate.            Valentinalora Stevens  reports that she has never smoked. She has never been exposed to tobacco smoke. She has never used smokeless tobacco.. I have educated her on the risk of diseases from using tobacco products such as cancer, COPD, and heart disease.

## 2023-12-08 ENCOUNTER — OFFICE VISIT (OUTPATIENT)
Dept: CARDIOLOGY | Facility: CLINIC | Age: 58
End: 2023-12-08
Payer: COMMERCIAL

## 2023-12-08 VITALS
BODY MASS INDEX: 27.11 KG/M2 | WEIGHT: 153 LBS | HEART RATE: 79 BPM | DIASTOLIC BLOOD PRESSURE: 59 MMHG | HEIGHT: 63 IN | SYSTOLIC BLOOD PRESSURE: 110 MMHG

## 2023-12-08 DIAGNOSIS — I10 HYPERTENSION, ESSENTIAL: ICD-10-CM

## 2023-12-08 DIAGNOSIS — R06.02 SHORTNESS OF BREATH: Primary | ICD-10-CM

## 2023-12-08 DIAGNOSIS — I10 HYPERTENSION, UNSPECIFIED TYPE: ICD-10-CM

## 2023-12-08 PROCEDURE — 1160F RVW MEDS BY RX/DR IN RCRD: CPT | Performed by: SPECIALIST

## 2023-12-08 PROCEDURE — 99204 OFFICE O/P NEW MOD 45 MIN: CPT | Performed by: SPECIALIST

## 2023-12-08 PROCEDURE — 3078F DIAST BP <80 MM HG: CPT | Performed by: SPECIALIST

## 2023-12-08 PROCEDURE — 1159F MED LIST DOCD IN RCRD: CPT | Performed by: SPECIALIST

## 2023-12-08 PROCEDURE — 3074F SYST BP LT 130 MM HG: CPT | Performed by: SPECIALIST

## 2023-12-08 RX ORDER — LISINOPRIL 10 MG/1
10 TABLET ORAL 2 TIMES DAILY
Qty: 60 TABLET | Refills: 4 | Status: SHIPPED | OUTPATIENT
Start: 2023-12-08

## 2023-12-08 NOTE — PROGRESS NOTES
[facility]   Cardiology Consult Note    Patient Name: Valentina Stevens  : 1965  Referring Physician: Louie Flaherty DO  Subjective   Subjective     Reason for Consult/ Chief Complaint:   Chief Complaint   Patient presents with    Shortness of Breath    Chest Pain       HPI:  Valentina Stevens is a 58 y.o. female with history of shortness of breath on exertion.  She has history of COPD.  He also has some nonspecific nonexertional chest pain which last for a few minutes.  No syncopal or presyncopal episodes.    Review of Systems:    Constitutional no fever,  no weight loss   Skin no rash   Otolaryngeal no difficulty swallowing   Cardiovascular See HPI   Pulmonary no cough, no sputum production   Gastrointestinal no constipation, no diarrhea   Genitourinary no dysuria, no hematuria   Hematologic no easy bruisability, no abnormal bleeding   Musculoskeletal no muscle pain   Neurologic no dizziness, no falls       Personal History     Past Medical History:  Past Medical History:   Diagnosis Date    Arthritis     Asthma     Asthma, intrinsic     CTS (carpal tunnel syndrome)     Depression     Difficulty walking     Diverticulitis of colon     Fatty (change of) liver, not elsewhere classified     GERD (gastroesophageal reflux disease)     Hearing loss     Hyperlipemia     Hypertension     Hypothyroidism     Low back pain     Lumbar radiculopathy 02/15/2022    Memory loss     Menopause     Migraine     Murmur     Osteopenia     Osteoporosis     Primary central sleep apnea     Scoliosis     Seizures     last when 10 years old    Sleep apnea, obstructive     Vitamin D deficiency        Family History:   Family History   Problem Relation Age of Onset    Hypertension Father     Osteoporosis Father     Alcohol abuse Father     Skin cancer Father     Thyroid cancer Mother     Migraines Mother     Stroke Mother     Skin cancer Mother     Cancer Mother     Skin cancer Brother     Dementia Sister     Migraines Sister      Migraines Sister     Uterine cancer Maternal Grandmother     Colon cancer Neg Hx     Breast cancer Neg Hx     Prostate cancer Neg Hx     Clotting disorder Neg Hx        Social History:  reports that she has never smoked. She has never been exposed to tobacco smoke. She has never used smokeless tobacco. She reports that she does not drink alcohol and does not use drugs.    Home Medications:  Fremanezumab-vfrm, HYDROcodone-acetaminophen, albuterol sulfate HFA, budesonide, calcium carb-cholecalciferol, ibandronate, levothyroxine, lisinopril, multivitamin with minerals, pramipexole, and vitamin E    Allergies:  Allergies   Allergen Reactions    Penicillins Shortness Of Breath and Rash    Sulfa Antibiotics Hives    Cephalexin Rash    Indomethacin Rash       Objective    Objective     Vitals:   Heart Rate:  [79] 79  BP: (110)/(59) 110/59  Body mass index is 27.1 kg/m².  PHYSICAL EXAM:    General Appearance:   well developed  well nourished  HENT:   oropharynx moist  lips not cyanotic  Neck:  thyroid not enlarged  supple  Respiratory:  no respiratory distress  normal breath sounds  no rales  Cardiovascular:  no jugular venous distention  regular rhythm  apical impulse normal  S1 normal, S2 normal  no S3, no S4   no murmur  no rub, no thrill  carotid pulses normal; no bruit  pedal pulses normal  lower extremity edema: none    Skin:   warm, dry  Psychiatric:  judgement and insight appropriate  normal mood and affect    RESULTS:    EKG reviewed by me and shows sinus rhythm       Result Review    Result Review:  I have personally reviewed the available results:  [x]  Laboratory  [x]  EKG/Telemetry   [x]  Cardiology/Vascular   [x] Medications  [x]  Old records  Lab Results   Component Value Date    CHOL 179 09/11/2023    CHOL 192 10/26/2022    CHOL 188 05/10/2022     Lab Results   Component Value Date    TRIG 230 (H) 09/11/2023    TRIG 141 10/26/2022    TRIG 174 (H) 05/10/2022     Lab Results   Component Value Date    HDL 36  (L) 09/11/2023    HDL 51 10/26/2022    HDL 47 05/10/2022     Lab Results   Component Value Date     (H) 09/11/2023     (H) 10/26/2022     (H) 05/10/2022     Lab Results   Component Value Date    VLDL 40 09/11/2023    VLDL 25 10/26/2022    VLDL 30 05/10/2022       Procedures     Impression/Plan  1.  Shortness of breath/precordial atypical chest pain: Echocardiogram to evaluate left ventricular systolic function.  Sestamibi stress test to evaluate any significant ischemia.  2.  Essential hypertension Uncontrolled: Blood pressure is high at home.  Increase lisinopril to 10 mg twice a day.  Monitor blood pressure regularly.  3.  Hypothyroidism: Continue Synthroid 75 mcg once a day.  4.  Hyperlipidemia: Monitor lipid and hepatic profile.  Lipid profile shows LDL of 103.      Electronically signed by Stefano Ackerman MD, 12/08/23, 11:08 AM EST.               Alert-The patient is alert, awake and responds to voice. The patient is oriented to time, place, and person. The triage nurse is able to obtain subjective information.

## 2023-12-28 NOTE — TELEPHONE ENCOUNTER
Caller: PATIENT    Relationship: SELF    Best call back number: [phone]    What was the call regarding: PT CALLING- STATES SHE WAS HAVING ISSUES WITH HER INSURANCE NOT PAYING FOR HER MEDICATION- NOW THE PHARMACY ISNT EVEN TRYING ANYMORE- THEY HAVE FILLED THE MEDICATION THAT PATIENT WAS TAKEN OFF OF- PLEASE CALL PT TO DISCUSS.     Do you require a callback: YES    
Spoke with patient, stated that pharmacy wanted to fill her topamax but Lizzy took her off of it so she wouldn't. Informed patient that we are still waiting on determination from ins and once we get approval we will send her a message on [employer] and she can then call her pharmacy and have them run it again.   
.

## 2024-01-02 ENCOUNTER — PRIOR AUTHORIZATION (OUTPATIENT)
Dept: NEUROLOGY | Facility: CLINIC | Age: 59
End: 2024-01-02
Payer: COMMERCIAL

## 2024-01-09 ENCOUNTER — OFFICE VISIT (OUTPATIENT)
Dept: FAMILY MEDICINE CLINIC | Facility: CLINIC | Age: 59
End: 2024-01-09
Payer: COMMERCIAL

## 2024-01-09 VITALS
SYSTOLIC BLOOD PRESSURE: 110 MMHG | BODY MASS INDEX: 27.4 KG/M2 | DIASTOLIC BLOOD PRESSURE: 70 MMHG | TEMPERATURE: 98.4 F | HEART RATE: 83 BPM | OXYGEN SATURATION: 95 % | WEIGHT: 154.7 LBS

## 2024-01-09 DIAGNOSIS — R06.02 SOBOE (SHORTNESS OF BREATH ON EXERTION): ICD-10-CM

## 2024-01-09 DIAGNOSIS — M43.02 CERVICAL SPONDYLOLYSIS: ICD-10-CM

## 2024-01-09 DIAGNOSIS — J45.40 MODERATE PERSISTENT ASTHMA WITHOUT COMPLICATION: ICD-10-CM

## 2024-01-09 DIAGNOSIS — M50.30 DDD (DEGENERATIVE DISC DISEASE), CERVICAL: Primary | ICD-10-CM

## 2024-01-09 PROCEDURE — 1160F RVW MEDS BY RX/DR IN RCRD: CPT | Performed by: NURSE PRACTITIONER

## 2024-01-09 PROCEDURE — 3074F SYST BP LT 130 MM HG: CPT | Performed by: NURSE PRACTITIONER

## 2024-01-09 PROCEDURE — 99213 OFFICE O/P EST LOW 20 MIN: CPT | Performed by: NURSE PRACTITIONER

## 2024-01-09 PROCEDURE — 1159F MED LIST DOCD IN RCRD: CPT | Performed by: NURSE PRACTITIONER

## 2024-01-09 PROCEDURE — 3078F DIAST BP <80 MM HG: CPT | Performed by: NURSE PRACTITIONER

## 2024-01-09 RX ORDER — RIZATRIPTAN BENZOATE 10 MG/1
10 TABLET ORAL ONCE AS NEEDED
COMMUNITY
Start: 2023-12-30

## 2024-01-09 NOTE — PROGRESS NOTES
Answers submitted by the patient for this visit:  Other (Submitted on 1/2/2024)  Please describe your symptoms.: Neck problem  Have you had these symptoms before?: No  How long have you been having these symptoms?: 1-4 days  Primary Reason for Visit (Submitted on 1/2/2024)  What is the primary reason for your visit?: Other  Chief Complaint  Neck Pain    Subjective            Valentina Stevens presents to [facility]  History of Present Illness  Patient reports she is here today for neck pain--and patient with known degenerative disc disease and known mild narrowing of the cervical spine per x-rays and MRI--and pt reports already in pain mgt for her lower back and on pain meds--and really the neck not hurting but is grinding--    And then last MRI was last yr and showed mild narrowing of the spine and then DDD    Also pulmonary Dx'd her with asthma and then the cardiology recently said she also has a little COPD       PHQ-2 Total Score:    PHQ-9 Total Score:      Past Medical History:   Diagnosis Date    Arthritis     Asthma     Asthma, intrinsic     CTS (carpal tunnel syndrome)     Depression     Difficulty walking     Diverticulitis of colon     Fatty (change of) liver, not elsewhere classified     GERD (gastroesophageal reflux disease)     Hearing loss     Hyperlipemia     Hypertension     Hypothyroidism     Low back pain     Lumbar radiculopathy 02/15/2022    Memory loss     Menopause     Migraine     Murmur     Osteopenia     Osteoporosis     Primary central sleep apnea     Scoliosis     Seizures     last when 10 years old    Sleep apnea, obstructive     Vitamin D deficiency        Allergies   Allergen Reactions    Penicillins Shortness Of Breath and Rash    Sulfa Antibiotics Hives    Cephalexin Rash    Indomethacin Rash        Past Surgical History:   Procedure Laterality Date    COLONOSCOPY  2018    glenna    COLONOSCOPY N/A 10/17/2023    Procedure: COLONOSCOPY WITH COLD SNARE  POLYPECTOMY;  Surgeon: Yossi Stephens MD;  Location: [facility] ENDOSCOPY;  Service: Gastroenterology;  Laterality: N/A;  DIVERTICULOSIS, COLON POLYP    EPIDURAL BLOCK      ESSURE TUBAL LIGATION      SKIN BIOPSY      THYMECTOMY      TUMOR REMOVAL      in between heart and lungs        Social History     Tobacco Use    Smoking status: Never     Passive exposure: Never    Smokeless tobacco: Never   Vaping Use    Vaping Use: Never used   Substance Use Topics    Alcohol use: Never    Drug use: Never       Family History   Problem Relation Age of Onset    Hypertension Father     Osteoporosis Father     Alcohol abuse Father     Skin cancer Father     Thyroid cancer Mother     Migraines Mother     Stroke Mother     Skin cancer Mother     Cancer Mother     Skin cancer Brother     Dementia Sister     Migraines Sister     Migraines Sister     Uterine cancer Maternal Grandmother     Colon cancer Neg Hx     Breast cancer Neg Hx     Prostate cancer Neg Hx     Clotting disorder Neg Hx         Health Maintenance Due   Topic Date Due    COVID-19 Vaccine (6 - 2023-24 season) 09/01/2023        Current Outpatient Medications on File Prior to Visit   Medication Sig    albuterol sulfate  (90 Base) MCG/ACT inhaler Inhale 2 puffs Every 4 (Four) Hours As Needed for Wheezing.    budesonide (Pulmicort Flexhaler) 90 MCG/ACT inhaler Inhale 1 puff 2 (Two) Times a Day.    Calcium + Vitamin D3 600-10 MG-MCG tablet TAKE 1 TABLET BY MOUTH TWICE DAILY    Fremanezumab-vfrm (Ajovy) 225 MG/1.5ML solution auto-injector Inject 225 mg under the skin into the appropriate area as directed Every 30 (Thirty) Days.    HYDROcodone-acetaminophen (NORCO) 5-325 MG per tablet Take 1 tablet by mouth 2 (Two) Times a Day As Needed.    ibandronate (BONIVA) 150 MG tablet Take 1 tablet by mouth Every 30 (Thirty) Days.    levothyroxine (SYNTHROID, LEVOTHROID) 75 MCG tablet Take 1 tablet by mouth Daily.    lisinopril (PRINIVIL,ZESTRIL) 10 MG tablet Take 1 tablet  by mouth 2 (Two) Times a Day.    multivitamin with minerals tablet tablet Take 1 tablet by mouth Daily.    pramipexole (MIRAPEX) 0.5 MG tablet Take 0.5 tablets by mouth Every Night.    rizatriptan (MAXALT) 10 MG tablet Take 1 tablet by mouth 1 (One) Time As Needed.    vitamin E 100 UNIT capsule Take 1 capsule by mouth Daily.     No current facility-administered medications on file prior to visit.       Immunization History   Administered Date(s) Administered    31-influenza Vac Quardvalent Preservativ 11/05/2015, 11/09/2016, 10/19/2021, 09/30/2022    COVID-19 (PFIZER) Purple Cap Monovalent 05/27/2021, 06/17/2021, 06/17/2021    COVID-19 (UNSPECIFIED) 05/27/2021, 06/17/2021    Flu Vaccine Intradermal Quad 18-64YR 09/01/2023    Fluzone (or Fluarix & Flulaval for VFC) >6mos 10/19/2021, 09/30/2022    Hepatitis A 05/17/2018, 12/20/2018    Influenza Seasonal Injectable 10/10/2014    Influenza, Unspecified 10/19/2020, 10/19/2020, 09/01/2023    Pneumococcal Conjugate 20-Valent (PCV20) 08/09/2023    Pneumococcal Polysaccharide (PPSV23) 05/10/2022    Shingrix 11/12/2021, 01/21/2022    Tdap 03/20/2018, 10/19/2020    Zoster, Unspecified 11/12/2021, 01/21/2022       Review of Systems   Constitutional:  Negative for chills and fever.   Respiratory:  Positive for shortness of breath. Negative for cough.         Increased SOB--and Hx of asthma and COPD    Musculoskeletal:  Positive for arthralgias, back pain, myalgias and neck pain.   Neurological:  Negative for numbness.        Hands go numb at times--but Hx of carpal tunnel         Objective     /70 (BP Location: Right arm, Patient Position: Sitting, Cuff Size: Adult)   Pulse 83   Temp 98.4 °F (36.9 °C)   Wt 70.2 kg (154 lb 11.2 oz)   SpO2 95%   BMI 27.40 kg/m²       Physical Exam  Vitals and nursing note reviewed.   Constitutional:       Appearance: Normal appearance.   HENT:      Head: Normocephalic.      Right Ear: External ear normal.      Left Ear: External ear  normal.      Nose: Nose normal.      Mouth/Throat:      Mouth: Mucous membranes are moist.   Eyes:      Pupils: Pupils are equal, round, and reactive to light.   Cardiovascular:      Rate and Rhythm: Normal rate.   Pulmonary:      Effort: Pulmonary effort is normal.   Musculoskeletal:         General: Normal range of motion.        Arms:       Cervical back: Normal range of motion.      Comments: Pt denies any pain of the cervical spine    Skin:     General: Skin is warm and dry.   Neurological:      Mental Status: She is alert and oriented to person, place, and time.   Psychiatric:         Mood and Affect: Mood normal.         Behavior: Behavior normal.         Judgment: Judgment normal.         Result Review :                     Office Visit with Flo Simon MD (10/02/2023)   Office Visit with Flo Simon MD (03/29/2023)   CT Chest Without Contrast Diagnostic (12/05/2022 13:00)    XR Ribs Left With PA Chest (02/11/2023 10:08)   Office Visit with Stefano Ackerman MD (12/08/2023)   XR Spine Cervical 2 or 3 View (09/09/2019 12:29)   MRI Cervical Spine Without Contrast (03/08/2023 17:17)   IMPRESSION:                 1. Moderate cervical spondylosis with mild spinal canal narrowing at multiple levels and varying   degrees of neural foraminal narrowing as described in detail above.  2. Degenerative reversal of the mid cervical lordosis.  3. No acute osseous abnormalities.            ODALYS MORRIS MD         Electronically Signed and Approved By: ODALYS MORRIS MD on 3/09/2023 at 3:48        Assessment and Plan      Diagnoses and all orders for this visit:    1. DDD (degenerative disc disease), cervical (Primary)    2. Cervical spondylolysis    3. SOBOE (shortness of breath on exertion)    4. Moderate persistent asthma without complication    With regards to her SOBOE and sometimes at rest the Pt going for the stress and echo soon and then also F/U with pulmonary in April and then just saw cardiology  last month --and pt will cont the BID steroid inhaler she's on and then the PRN use of the rescue inhaler     Then also with regards to the neck--offered to refer her PT or pain mgt nad she declined at this time--as she reports PT never really helped int he past and then she is already seeing pain mgt for her back and not really having neck pain--just noticed the grinding--and no other s/s--and I explained to her that was the DDD--         Follow Up     Return if symptoms worsen or fail to improve.    Patient was given instructions and counseling regarding her condition or for health maintenance advice. Please see specific information pulled into the AVS if appropriate.            Valentina Stevens  reports that she has never smoked. She has never been exposed to tobacco smoke. She has never used smokeless tobacco.. I have educated her on the risk of diseases from using tobacco products such as cancer, COPD, and heart disease.

## 2024-01-22 ENCOUNTER — HOSPITAL ENCOUNTER (OUTPATIENT)
Dept: MAMMOGRAPHY | Facility: HOSPITAL | Age: 59
Discharge: HOME | End: 2024-01-22
Admitting: NURSE PRACTITIONER
Payer: COMMERCIAL

## 2024-01-22 DIAGNOSIS — Z12.31 SCREENING MAMMOGRAM FOR BREAST CANCER: ICD-10-CM

## 2024-01-22 DIAGNOSIS — Z01.419 WELL FEMALE EXAM WITH ROUTINE GYNECOLOGICAL EXAM: ICD-10-CM

## 2024-01-22 PROCEDURE — 77067 SCR MAMMO BI INCL CAD: CPT

## 2024-01-22 PROCEDURE — 77063 BREAST TOMOSYNTHESIS BI: CPT

## 2024-01-23 NOTE — PROGRESS NOTES
Please mail letter to patient stating    Valentina your mammogram was read as negative/benign and for you to continue doing your yearly mammographic screenings and I would like to continue doing your monthly self breast exams please

## 2024-02-20 ENCOUNTER — OFFICE VISIT (OUTPATIENT)
Dept: FAMILY MEDICINE CLINIC | Facility: CLINIC | Age: 59
End: 2024-02-20
Payer: COMMERCIAL

## 2024-02-20 VITALS
HEIGHT: 63 IN | OXYGEN SATURATION: 96 % | BODY MASS INDEX: 27.29 KG/M2 | HEART RATE: 79 BPM | DIASTOLIC BLOOD PRESSURE: 78 MMHG | SYSTOLIC BLOOD PRESSURE: 110 MMHG | TEMPERATURE: 97.3 F | WEIGHT: 154 LBS

## 2024-02-20 DIAGNOSIS — H93.8X2 EAR FULLNESS, LEFT: Primary | ICD-10-CM

## 2024-02-20 PROCEDURE — 99213 OFFICE O/P EST LOW 20 MIN: CPT | Performed by: NURSE PRACTITIONER

## 2024-02-20 PROCEDURE — 1160F RVW MEDS BY RX/DR IN RCRD: CPT | Performed by: NURSE PRACTITIONER

## 2024-02-20 PROCEDURE — 1159F MED LIST DOCD IN RCRD: CPT | Performed by: NURSE PRACTITIONER

## 2024-02-20 PROCEDURE — 3078F DIAST BP <80 MM HG: CPT | Performed by: NURSE PRACTITIONER

## 2024-02-20 PROCEDURE — 3074F SYST BP LT 130 MM HG: CPT | Performed by: NURSE PRACTITIONER

## 2024-02-20 RX ORDER — LORATADINE 10 MG/1
10 TABLET ORAL DAILY
Qty: 30 TABLET | Refills: 0 | Status: SHIPPED | OUTPATIENT
Start: 2024-02-20 | End: 2024-02-20

## 2024-02-20 RX ORDER — CETIRIZINE HYDROCHLORIDE 10 MG/1
10 TABLET ORAL DAILY
Qty: 30 TABLET | Refills: 0 | Status: SHIPPED | OUTPATIENT
Start: 2024-02-20

## 2024-02-20 RX ORDER — FLUTICASONE PROPIONATE 50 MCG
2 SPRAY, SUSPENSION (ML) NASAL DAILY
Qty: 9.9 ML | Refills: 0 | Status: SHIPPED | OUTPATIENT
Start: 2024-02-20

## 2024-02-20 NOTE — PROGRESS NOTES
Answers submitted by the patient for this visit:  Primary Reason for Visit (Submitted on 2/13/2024)  What is the primary reason for your visit?: Ear Pain  Ear Pain Questionnaire (Submitted on 2/13/2024)  Chief Complaint: Ear pain  hoarse voice: No  jaw pain: No  Chief Complaint  Ear Fullness (Left ear feels full.)    Subjective            Valentina Stevens presents to [facility]  History of Present Illness  Left ear fullness and like water in it and then wears hearing aids bilat --started 1.5 weeks ago intermittently then now all the time --and actually no pain per pt report   Earache   There is pain in the left ear. This is a new problem. The current episode started in the past 7 days. The problem occurs intermittently. The problem has been unchanged. There has been no fever. The pain is at a severity of 1/10. Associated symptoms include neck pain. Pertinent negatives include no coughing, drainage, headaches, hearing loss, rash, rhinorrhea or sore throat. Associated symptoms comments: Pt sees pain mgt for the neck--this is chronic . She has tried nothing for the symptoms.   Ear Fullness   There is pain in the left ear. This is a new problem. The current episode started 1 to 4 weeks ago (1.5 weeks ago). The problem occurs intermittently. The problem has been comes and goes. There has been no fever. The patient is experiencing no pain. Associated symptoms include neck pain. Pertinent negatives include no coughing, drainage, headaches, hearing loss, rash, rhinorrhea or sore throat. Associated symptoms comments: Pt sees pain mgt for the neck--this is chronic . Treatments tried: taking her allergy meds.       PHQ-2 Total Score: 0  PHQ-9 Total Score: 0    Past Medical History:   Diagnosis Date    Arthritis     Asthma     Asthma, intrinsic     CTS (carpal tunnel syndrome)     Depression     Difficulty walking     Diverticulitis of colon     Fatty (change of) liver, not elsewhere  classified     GERD (gastroesophageal reflux disease)     Hearing loss     Hyperlipemia     Hypertension     Hypothyroidism     Low back pain     Lumbar radiculopathy 02/15/2022    Memory loss     Menopause     Migraine     Murmur     Osteopenia     Osteoporosis     Primary central sleep apnea     Scoliosis     Seizures     last when 10 years old    Sleep apnea, obstructive     Vitamin D deficiency        Allergies   Allergen Reactions    Penicillins Shortness Of Breath and Rash    Sulfa Antibiotics Hives    Cephalexin Rash    Indomethacin Rash        Past Surgical History:   Procedure Laterality Date    COLONOSCOPY  2018    glenna    COLONOSCOPY N/A 10/17/2023    Procedure: COLONOSCOPY WITH COLD SNARE POLYPECTOMY;  Surgeon: Yossi Stephens MD;  Location: [facility] ENDOSCOPY;  Service: Gastroenterology;  Laterality: N/A;  DIVERTICULOSIS, COLON POLYP    EPIDURAL BLOCK      ESSURE TUBAL LIGATION      SKIN BIOPSY      THYMECTOMY      TUMOR REMOVAL      in between heart and lungs        Social History     Tobacco Use    Smoking status: Never     Passive exposure: Never    Smokeless tobacco: Never   Vaping Use    Vaping Use: Never used   Substance Use Topics    Alcohol use: Never    Drug use: Never       Family History   Problem Relation Age of Onset    Hypertension Father     Osteoporosis Father     Alcohol abuse Father     Skin cancer Father     Thyroid cancer Mother     Migraines Mother     Stroke Mother     Skin cancer Mother     Cancer Mother     Skin cancer Brother     Dementia Sister     Migraines Sister     Migraines Sister     Uterine cancer Maternal Grandmother     Colon cancer Neg Hx     Breast cancer Neg Hx     Prostate cancer Neg Hx     Clotting disorder Neg Hx         Health Maintenance Due   Topic Date Due    COVID-19 Vaccine (6 - 2023-24 season) 09/01/2023    RSV Vaccine - Adults (1 - 1-dose 60+ series) Never done    DXA SCAN  01/18/2024        Current Outpatient Medications on File Prior to Visit    Medication Sig    albuterol sulfate  (90 Base) MCG/ACT inhaler Inhale 2 puffs Every 4 (Four) Hours As Needed for Wheezing.    budesonide (Pulmicort Flexhaler) 90 MCG/ACT inhaler Inhale 1 puff 2 (Two) Times a Day.    Calcium + Vitamin D3 600-10 MG-MCG tablet TAKE 1 TABLET BY MOUTH TWICE DAILY    Fremanezumab-vfrm (Ajovy) 225 MG/1.5ML solution auto-injector Inject 225 mg under the skin into the appropriate area as directed Every 30 (Thirty) Days.    HYDROcodone-acetaminophen (NORCO) 5-325 MG per tablet Take 1 tablet by mouth 2 (Two) Times a Day As Needed.    ibandronate (BONIVA) 150 MG tablet Take 1 tablet by mouth Every 30 (Thirty) Days.    levothyroxine (SYNTHROID, LEVOTHROID) 75 MCG tablet Take 1 tablet by mouth Daily.    lisinopril (PRINIVIL,ZESTRIL) 10 MG tablet Take 1 tablet by mouth 2 (Two) Times a Day.    multivitamin with minerals tablet tablet Take 1 tablet by mouth Daily.    pramipexole (MIRAPEX) 0.5 MG tablet Take 0.5 tablets by mouth Every Night.    rizatriptan (MAXALT) 10 MG tablet Take 1 tablet by mouth 1 (One) Time As Needed.    vitamin E 100 UNIT capsule Take 1 capsule by mouth Daily.     No current facility-administered medications on file prior to visit.       Immunization History   Administered Date(s) Administered    31-influenza Vac Quardvalent Preservativ 11/05/2015, 11/09/2016, 10/19/2021, 09/30/2022    COVID-19 (PFIZER) Purple Cap Monovalent 05/27/2021, 06/17/2021, 06/17/2021    COVID-19 (UNSPECIFIED) 05/27/2021, 06/17/2021    Flu Vaccine Intradermal Quad 18-64YR 09/01/2023    Fluzone (or Fluarix & Flulaval for VFC) >6mos 10/19/2021, 09/30/2022    Hepatitis A 05/17/2018, 12/20/2018    Influenza Seasonal Injectable 10/10/2014    Influenza, Unspecified 10/19/2020, 10/19/2020, 09/01/2023    Pneumococcal Conjugate 20-Valent (PCV20) 08/09/2023    Pneumococcal Polysaccharide (PPSV23) 05/10/2022    Shingrix 11/12/2021, 01/21/2022    Tdap 03/20/2018, 10/19/2020    Zoster, Unspecified  "11/12/2021, 01/21/2022       Review of Systems   Constitutional:  Negative for chills and fever.   HENT:  Positive for ear pain. Negative for congestion, hearing loss, rhinorrhea, sore throat and tinnitus.    Respiratory:  Negative for cough.    Cardiovascular:  Negative for chest pain.   Musculoskeletal:  Positive for neck pain.        Pt sees pain mgt for the neck--this is chronic    Skin:  Negative for rash.   Neurological:  Negative for dizziness, syncope and light-headedness.   Hematological:  Negative for adenopathy.        Objective     /78   Pulse 79   Temp 97.3 °F (36.3 °C) (Temporal)   Ht 160 cm (63\")   Wt 69.9 kg (154 lb)   SpO2 96%   BMI 27.28 kg/m²       Physical Exam  Vitals and nursing note reviewed.   Constitutional:       Appearance: Normal appearance.   HENT:      Head: Normocephalic.      Right Ear: Tympanic membrane, ear canal and external ear normal. No swelling. No middle ear effusion. There is no impacted cerumen. Tympanic membrane is not injected or erythematous.      Left Ear: Tympanic membrane, ear canal and external ear normal. No swelling.  No middle ear effusion. There is no impacted cerumen. Tympanic membrane is not injected or erythematous.      Nose: Nose normal.      Mouth/Throat:      Mouth: Mucous membranes are moist.   Eyes:      Pupils: Pupils are equal, round, and reactive to light.   Cardiovascular:      Rate and Rhythm: Normal rate.   Pulmonary:      Effort: Pulmonary effort is normal.   Musculoskeletal:         General: Normal range of motion.      Cervical back: Normal range of motion.   Skin:     General: Skin is warm and dry.   Neurological:      Mental Status: She is alert and oriented to person, place, and time.   Psychiatric:         Mood and Affect: Mood normal.         Behavior: Behavior normal.         Judgment: Judgment normal.         Result Review :                           Assessment and Plan      Diagnoses and all orders for this visit:    1. Ear " fullness, left (Primary)  -     fluticasone (FLONASE) 50 MCG/ACT nasal spray; 2 sprays into the nostril(s) as directed by provider Daily.  Dispense: 9.9 mL; Refill: 0  -     Discontinue: loratadine (Claritin) 10 MG tablet; Take 1 tablet by mouth Daily.  Dispense: 30 tablet; Refill: 0  -     cetirizine (zyrTEC) 10 MG tablet; Take 1 tablet by mouth Daily.  Dispense: 30 tablet; Refill: 0    Trial of the zyrtec (daughter in home allergic to claritin) and then also the flonase and then F/U approx 3 weeks for her regular med refills and then labs and then re-eval and may require referral to ENT if not improved         Follow Up     Return in about 20 days (around 3/11/2024), or if symptoms worsen or fail to improve, for Recheck.    Patient was given instructions and counseling regarding her condition or for health maintenance advice. Please see specific information pulled into the AVS if appropriate.            Valentina Stevens  reports that she has never smoked. She has never been exposed to tobacco smoke. She has never used smokeless tobacco.. I have educated her on the risk of diseases from using tobacco products such as cancer, COPD, and heart disease.

## 2024-02-22 DIAGNOSIS — E03.9 HYPOTHYROIDISM, UNSPECIFIED TYPE: ICD-10-CM

## 2024-02-22 RX ORDER — LEVOTHYROXINE SODIUM 0.07 MG/1
75 TABLET ORAL DAILY
Qty: 30 TABLET | Refills: 5 | Status: SHIPPED | OUTPATIENT
Start: 2024-02-22

## 2024-02-27 ENCOUNTER — HOSPITAL ENCOUNTER (OUTPATIENT)
Dept: NUCLEAR MEDICINE | Facility: HOSPITAL | Age: 59
Discharge: HOME | End: 2024-02-27
Payer: COMMERCIAL

## 2024-02-27 ENCOUNTER — HOSPITAL ENCOUNTER (OUTPATIENT)
Dept: CARDIOLOGY | Facility: HOSPITAL | Age: 59
Discharge: HOME | End: 2024-02-27
Payer: COMMERCIAL

## 2024-02-27 DIAGNOSIS — R06.02 SHORTNESS OF BREATH: ICD-10-CM

## 2024-02-27 LAB
BH CV ECHO MEAS - AO ROOT DIAM: 2.7 CM
BH CV ECHO MEAS - EDV(CUBED): 101.2 ML
BH CV ECHO MEAS - EDV(MOD-SP2): 39.5 ML
BH CV ECHO MEAS - EDV(MOD-SP4): 43.9 ML
BH CV ECHO MEAS - EF(MOD-BP): 66.9 %
BH CV ECHO MEAS - EF(MOD-SP2): 63.3 %
BH CV ECHO MEAS - EF(MOD-SP4): 66.3 %
BH CV ECHO MEAS - ESV(CUBED): 40 ML
BH CV ECHO MEAS - ESV(MOD-SP2): 14.5 ML
BH CV ECHO MEAS - ESV(MOD-SP4): 14.8 ML
BH CV ECHO MEAS - FS: 26.6 %
BH CV ECHO MEAS - IVS/LVPW: 1.29 CM
BH CV ECHO MEAS - IVSD: 0.93 CM
BH CV ECHO MEAS - LA DIMENSION: 2.9 CM
BH CV ECHO MEAS - LAT PEAK E' VEL: 11.5 CM/SEC
BH CV ECHO MEAS - LV DIASTOLIC VOL/BSA (35-75): 25.3 CM2
BH CV ECHO MEAS - LV MASS(C)D: 125.7 GRAMS
BH CV ECHO MEAS - LV SYSTOLIC VOL/BSA (12-30): 8.5 CM2
BH CV ECHO MEAS - LVIDD: 4.7 CM
BH CV ECHO MEAS - LVIDS: 3.4 CM
BH CV ECHO MEAS - LVPWD: 0.72 CM
BH CV ECHO MEAS - MED PEAK E' VEL: 8.7 CM/SEC
BH CV ECHO MEAS - MV A MAX VEL: 45.4 CM/SEC
BH CV ECHO MEAS - MV DEC SLOPE: 427.7 CM/SEC2
BH CV ECHO MEAS - MV DEC TIME: 0.15 SEC
BH CV ECHO MEAS - MV E MAX VEL: 62.1 CM/SEC
BH CV ECHO MEAS - MV E/A: 1.37
BH CV ECHO MEAS - RVDD: 2.12 CM
BH CV ECHO MEAS - SI(MOD-SP2): 14.4 ML/M2
BH CV ECHO MEAS - SI(MOD-SP4): 16.8 ML/M2
BH CV ECHO MEAS - SV(MOD-SP2): 25 ML
BH CV ECHO MEAS - SV(MOD-SP4): 29.1 ML
BH CV ECHO MEASUREMENTS AVERAGE E/E' RATIO: 6.15
BH CV IMMEDIATE POST TECH DATA BLOOD PRESSURE: NORMAL MMHG
BH CV IMMEDIATE POST TECH DATA HEART RATE: 93 BPM
BH CV IMMEDIATE POST TECH DATA OXYGEN SATS: 97 %
BH CV REST NUCLEAR ISOTOPE DOSE: 10.3 MCI
BH CV SIX MINUTE RECOVERY TECH DATA BLOOD PRESSURE: NORMAL
BH CV SIX MINUTE RECOVERY TECH DATA HEART RATE: 83 BPM
BH CV SIX MINUTE RECOVERY TECH DATA OXYGEN SATURATION: 96 %
BH CV STRESS BP STAGE 1: NORMAL
BH CV STRESS COMMENTS STAGE 1: NORMAL
BH CV STRESS DOSE REGADENOSON STAGE 1: 0.4
BH CV STRESS DURATION MIN STAGE 1: 0
BH CV STRESS DURATION SEC STAGE 1: 10
BH CV STRESS HR STAGE 1: 83
BH CV STRESS NUCLEAR ISOTOPE DOSE: 35.1 MCI
BH CV STRESS O2 STAGE 1: 98
BH CV STRESS PROTOCOL 1: NORMAL
BH CV STRESS RECOVERY BP: NORMAL MMHG
BH CV STRESS RECOVERY HR: 83 BPM
BH CV STRESS RECOVERY O2: 96 %
BH CV STRESS STAGE 1: 1
BH CV THREE MINUTE POST TECH DATA BLOOD PRESSURE: NORMAL MMHG
BH CV THREE MINUTE POST TECH DATA HEART RATE: 91 BPM
BH CV THREE MINUTE POST TECH DATA OXYGEN SATURATION: 98 %
LEFT ATRIUM VOLUME INDEX: 18.9 ML/M2
LV EF NUC BP: 72 %
MAXIMAL PREDICTED HEART RATE: 160 BPM
PERCENT MAX PREDICTED HR: 58.13 %
STRESS BASELINE BP: NORMAL MMHG
STRESS BASELINE HR: 65 BPM
STRESS O2 SAT REST: 98 %
STRESS PERCENT HR: 68 %
STRESS POST O2 SAT PEAK: 98 %
STRESS POST PEAK BP: NORMAL MMHG
STRESS POST PEAK HR: 93 BPM
STRESS TARGET HR: 136 BPM

## 2024-02-27 PROCEDURE — 93017 CV STRESS TEST TRACING ONLY: CPT

## 2024-02-27 PROCEDURE — 93018 CV STRESS TEST I&R ONLY: CPT | Performed by: SPECIALIST

## 2024-02-27 PROCEDURE — 93016 CV STRESS TEST SUPVJ ONLY: CPT | Performed by: NURSE PRACTITIONER

## 2024-02-27 PROCEDURE — 0 TECHNETIUM TETROFOSMIN KIT: Performed by: SPECIALIST

## 2024-02-27 PROCEDURE — 78452 HT MUSCLE IMAGE SPECT MULT: CPT

## 2024-02-27 PROCEDURE — 93306 TTE W/DOPPLER COMPLETE: CPT

## 2024-02-27 PROCEDURE — 78452 HT MUSCLE IMAGE SPECT MULT: CPT | Performed by: SPECIALIST

## 2024-02-27 PROCEDURE — A9502 TC99M TETROFOSMIN: HCPCS | Performed by: SPECIALIST

## 2024-02-27 PROCEDURE — 25010000002 REGADENOSON 0.4 MG/5ML SOLUTION: Performed by: SPECIALIST

## 2024-02-27 RX ORDER — REGADENOSON 0.08 MG/ML
0.4 INJECTION, SOLUTION INTRAVENOUS
Status: COMPLETED | OUTPATIENT
Start: 2024-02-27 | End: 2024-02-27

## 2024-02-27 RX ADMIN — REGADENOSON 0.4 MG: 0.08 INJECTION, SOLUTION INTRAVENOUS at 10:14

## 2024-02-27 RX ADMIN — TETROFOSMIN 1 DOSE: 1.38 INJECTION, POWDER, LYOPHILIZED, FOR SOLUTION INTRAVENOUS at 10:14

## 2024-02-27 RX ADMIN — TETROFOSMIN 1 DOSE: 1.38 INJECTION, POWDER, LYOPHILIZED, FOR SOLUTION INTRAVENOUS at 08:48

## 2024-03-11 ENCOUNTER — OFFICE VISIT (OUTPATIENT)
Dept: FAMILY MEDICINE CLINIC | Facility: CLINIC | Age: 59
End: 2024-03-11
Payer: COMMERCIAL

## 2024-03-11 VITALS
BODY MASS INDEX: 27.82 KG/M2 | OXYGEN SATURATION: 95 % | HEART RATE: 80 BPM | SYSTOLIC BLOOD PRESSURE: 118 MMHG | WEIGHT: 157 LBS | TEMPERATURE: 98.6 F | DIASTOLIC BLOOD PRESSURE: 76 MMHG | HEIGHT: 63 IN

## 2024-03-11 DIAGNOSIS — R26.89 BALANCE PROBLEM: ICD-10-CM

## 2024-03-11 DIAGNOSIS — G25.81 RLS (RESTLESS LEGS SYNDROME): ICD-10-CM

## 2024-03-11 DIAGNOSIS — M85.80 OSTEOPENIA, UNSPECIFIED LOCATION: ICD-10-CM

## 2024-03-11 DIAGNOSIS — E03.9 HYPOTHYROIDISM, UNSPECIFIED TYPE: ICD-10-CM

## 2024-03-11 DIAGNOSIS — M81.0 AGE-RELATED OSTEOPOROSIS WITHOUT CURRENT PATHOLOGICAL FRACTURE: ICD-10-CM

## 2024-03-11 DIAGNOSIS — R29.6 FREQUENT FALLS: ICD-10-CM

## 2024-03-11 DIAGNOSIS — I10 HYPERTENSION, UNSPECIFIED TYPE: Primary | ICD-10-CM

## 2024-03-11 DIAGNOSIS — E55.9 VITAMIN D DEFICIENCY: ICD-10-CM

## 2024-03-11 LAB
25(OH)D3 SERPL-MCNC: 31.3 NG/ML (ref 30–100)
ALBUMIN SERPL-MCNC: 4.6 G/DL (ref 3.5–5.2)
ALBUMIN/GLOB SERPL: 1.5 G/DL
ALP SERPL-CCNC: 44 U/L (ref 39–117)
ALT SERPL W P-5'-P-CCNC: 36 U/L (ref 1–33)
ANION GAP SERPL CALCULATED.3IONS-SCNC: 10 MMOL/L (ref 5–15)
AST SERPL-CCNC: 32 U/L (ref 1–32)
BASOPHILS # BLD AUTO: 0.06 10*3/MM3 (ref 0–0.2)
BASOPHILS NFR BLD AUTO: 0.8 % (ref 0–1.5)
BILIRUB SERPL-MCNC: 0.6 MG/DL (ref 0–1.2)
BILIRUB UR QL STRIP: NEGATIVE
BUN SERPL-MCNC: 9 MG/DL (ref 8–23)
BUN/CREAT SERPL: 12.3 (ref 7–25)
CALCIUM SPEC-SCNC: 9.7 MG/DL (ref 8.6–10.5)
CHLORIDE SERPL-SCNC: 103 MMOL/L (ref 98–107)
CHOLEST SERPL-MCNC: 186 MG/DL (ref 0–200)
CLARITY UR: CLEAR
CO2 SERPL-SCNC: 27 MMOL/L (ref 22–29)
COLOR UR: YELLOW
CREAT SERPL-MCNC: 0.73 MG/DL (ref 0.57–1)
DEPRECATED RDW RBC AUTO: 40.4 FL (ref 37–54)
EGFRCR SERPLBLD CKD-EPI 2021: 94.3 ML/MIN/1.73
EOSINOPHIL # BLD AUTO: 0.17 10*3/MM3 (ref 0–0.4)
EOSINOPHIL NFR BLD AUTO: 2.4 % (ref 0.3–6.2)
ERYTHROCYTE [DISTWIDTH] IN BLOOD BY AUTOMATED COUNT: 13.2 % (ref 12.3–15.4)
FOLATE SERPL-MCNC: >20 NG/ML (ref 4.78–24.2)
GLOBULIN UR ELPH-MCNC: 3 GM/DL
GLUCOSE SERPL-MCNC: 90 MG/DL (ref 65–99)
GLUCOSE UR STRIP-MCNC: NEGATIVE MG/DL
HCT VFR BLD AUTO: 46 % (ref 34–46.6)
HDLC SERPL-MCNC: 41 MG/DL (ref 40–60)
HGB BLD-MCNC: 15.5 G/DL (ref 12–15.9)
HGB UR QL STRIP.AUTO: NEGATIVE
HOLD SPECIMEN: NORMAL
IMM GRANULOCYTES # BLD AUTO: 0.01 10*3/MM3 (ref 0–0.05)
IMM GRANULOCYTES NFR BLD AUTO: 0.1 % (ref 0–0.5)
KETONES UR QL STRIP: NEGATIVE
LDLC SERPL CALC-MCNC: 101 MG/DL (ref 0–100)
LDLC/HDLC SERPL: 2.29 {RATIO}
LEUKOCYTE ESTERASE UR QL STRIP.AUTO: NEGATIVE
LYMPHOCYTES # BLD AUTO: 2.46 10*3/MM3 (ref 0.7–3.1)
LYMPHOCYTES NFR BLD AUTO: 34.8 % (ref 19.6–45.3)
MCH RBC QN AUTO: 28.3 PG (ref 26.6–33)
MCHC RBC AUTO-ENTMCNC: 33.7 G/DL (ref 31.5–35.7)
MCV RBC AUTO: 84.1 FL (ref 79–97)
MONOCYTES # BLD AUTO: 0.51 10*3/MM3 (ref 0.1–0.9)
MONOCYTES NFR BLD AUTO: 7.2 % (ref 5–12)
NEUTROPHILS NFR BLD AUTO: 3.86 10*3/MM3 (ref 1.7–7)
NEUTROPHILS NFR BLD AUTO: 54.7 % (ref 42.7–76)
NITRITE UR QL STRIP: NEGATIVE
NRBC BLD AUTO-RTO: 0 /100 WBC (ref 0–0.2)
PH UR STRIP.AUTO: 8.5 [PH] (ref 5–8)
PLATELET # BLD AUTO: 297 10*3/MM3 (ref 140–450)
PMV BLD AUTO: 11.3 FL (ref 6–12)
POTASSIUM SERPL-SCNC: 4.3 MMOL/L (ref 3.5–5.2)
PROT SERPL-MCNC: 7.6 G/DL (ref 6–8.5)
PROT UR QL STRIP: NEGATIVE
RBC # BLD AUTO: 5.47 10*6/MM3 (ref 3.77–5.28)
SODIUM SERPL-SCNC: 140 MMOL/L (ref 136–145)
SP GR UR STRIP: 1.01 (ref 1–1.03)
TRIGL SERPL-MCNC: 256 MG/DL (ref 0–150)
TSH SERPL DL<=0.05 MIU/L-ACNC: 1.64 UIU/ML (ref 0.27–4.2)
UROBILINOGEN UR QL STRIP: ABNORMAL
VIT B12 BLD-MCNC: 659 PG/ML (ref 211–946)
VLDLC SERPL-MCNC: 44 MG/DL (ref 5–40)
WBC NRBC COR # BLD AUTO: 7.07 10*3/MM3 (ref 3.4–10.8)

## 2024-03-11 PROCEDURE — 82607 VITAMIN B-12: CPT | Performed by: NURSE PRACTITIONER

## 2024-03-11 PROCEDURE — 85025 COMPLETE CBC W/AUTO DIFF WBC: CPT | Performed by: NURSE PRACTITIONER

## 2024-03-11 PROCEDURE — 80053 COMPREHEN METABOLIC PANEL: CPT | Performed by: NURSE PRACTITIONER

## 2024-03-11 PROCEDURE — 80061 LIPID PANEL: CPT | Performed by: NURSE PRACTITIONER

## 2024-03-11 PROCEDURE — 84443 ASSAY THYROID STIM HORMONE: CPT | Performed by: NURSE PRACTITIONER

## 2024-03-11 PROCEDURE — 82306 VITAMIN D 25 HYDROXY: CPT | Performed by: NURSE PRACTITIONER

## 2024-03-11 PROCEDURE — 82746 ASSAY OF FOLIC ACID SERUM: CPT | Performed by: NURSE PRACTITIONER

## 2024-03-11 PROCEDURE — 81003 URINALYSIS AUTO W/O SCOPE: CPT | Performed by: NURSE PRACTITIONER

## 2024-03-11 RX ORDER — LISINOPRIL 10 MG/1
10 TABLET ORAL 2 TIMES DAILY
Qty: 60 TABLET | Refills: 5 | Status: CANCELLED | OUTPATIENT
Start: 2024-03-11

## 2024-03-11 RX ORDER — LEVOTHYROXINE SODIUM 0.07 MG/1
75 TABLET ORAL DAILY
Qty: 30 TABLET | Refills: 5 | Status: CANCELLED | OUTPATIENT
Start: 2024-03-11

## 2024-03-11 RX ORDER — PRAMIPEXOLE DIHYDROCHLORIDE 0.5 MG/1
0.5 TABLET ORAL NIGHTLY
Qty: 30 TABLET | Refills: 5 | Status: SHIPPED | OUTPATIENT
Start: 2024-03-11

## 2024-03-11 RX ORDER — LORATADINE 10 MG/1
10 TABLET ORAL DAILY
COMMUNITY
Start: 2024-02-20

## 2024-03-11 NOTE — PROGRESS NOTES
Venipuncture Blood Specimen Collection  Venipuncture performed in left arm by Crystal Angel with good hemostasis. Patient tolerated the procedure well without complications.   03/11/24   Crystal Angel

## 2024-03-11 NOTE — PROGRESS NOTES
Answers submitted by the patient for this visit:  Other (Submitted on 3/4/2024)  Please describe your symptoms.: Follow up  Have you had these symptoms before?: No  How long have you been having these symptoms?: 5-7 days  Primary Reason for Visit (Submitted on 3/4/2024)  What is the primary reason for your visit?: Other  Chief Complaint  Ear Fullness (Pt states that since her last visit, the ears are doing much better.) and Follow-up (3 week f/u)    Subjective            Valentina Shruti Stevens presents to [facility]  History of Present Illness  Patient is here today for medication refills on the chronic comorbid conditions managed from the primary care standpoint and she will be due for her fasting labs as well    Also follow-up with regards to the ear which she did say was much better--resolved    --HTN: Denies chest pain syncopal episodes dizziness or lightheadedness and no changes in her normal level of shortness of breath on exertion-tolerating medication well with no side effects no issues reported overall stable    --RLS: Tolerating medication well without side effects no issues reported but she feels as though the medication is not working as effectively as it once was and she is on the 0.25 mg nightly of the Mirapex and would like to increase this time    --hypothyroidism: Denies having any side effects or issues with the medication tolerating this very well no hair or nail skin issues reported overall stable    But the patient does report that she noticed that she is either having a balance problem or just falling for no reason and she is not tripping she does not feel dizzy or lightheaded but she does suddenly falls unsure as to what is going on-with these frequent falls and balance issue and has been going on for a while but worsened over the past 1 month--and is not due to follow-up with neurology until September      PHQ-2 Total Score:    PHQ-9 Total Score:      Past Medical  History:   Diagnosis Date    Arthritis     Asthma     Asthma, intrinsic     CTS (carpal tunnel syndrome)     Depression     Difficulty walking     Diverticulitis of colon     Fatty (change of) liver, not elsewhere classified     GERD (gastroesophageal reflux disease)     Hearing loss     Hyperlipemia     Hypertension     Hypothyroidism     Low back pain     Lumbar radiculopathy 02/15/2022    Memory loss     Menopause     Migraine     Murmur     Osteopenia     Osteoporosis     Primary central sleep apnea     Scoliosis     Seizures     last when 10 years old    Sleep apnea, obstructive     Vitamin D deficiency        Allergies   Allergen Reactions    Penicillins Shortness Of Breath and Rash    Sulfa Antibiotics Hives    Cephalexin Rash    Indomethacin Rash        Past Surgical History:   Procedure Laterality Date    COLONOSCOPY  2018    glenna    COLONOSCOPY N/A 10/17/2023    Procedure: COLONOSCOPY WITH COLD SNARE POLYPECTOMY;  Surgeon: Yossi Stephens MD;  Location: [facility] END[facility];  Service: Gastroenterology;  Laterality: N/A;  DIVERTICULOSIS, COLON POLYP    EPIDURAL BLOCK      ESSURE TUBAL LIGATION      SKIN BIOPSY      THYMECTOMY      TUMOR REMOVAL      in between heart and lungs        Social History     Tobacco Use    Smoking status: Never     Passive exposure: Never    Smokeless tobacco: Never   Vaping Use    Vaping status: Never Used   Substance Use Topics    Alcohol use: Never    Drug use: Never       Family History   Problem Relation Age of Onset    Hypertension Father     Osteoporosis Father     Alcohol abuse Father     Skin cancer Father     Thyroid cancer Mother     Migraines Mother     Stroke Mother     Skin cancer Mother     Cancer Mother     Skin cancer Brother     Dementia Sister     Migraines Sister     Migraines Sister     Uterine cancer Maternal Grandmother     Colon cancer Neg Hx     Breast cancer Neg Hx     Prostate cancer Neg Hx     Clotting disorder Neg Hx         Health Maintenance  Due   Topic Date Due    COVID-19 Vaccine (6 - 2023-24 season) 09/01/2023    RSV Vaccine - Adults (1 - 1-dose 60+ series) Never done    DXA SCAN  01/18/2024        Current Outpatient Medications on File Prior to Visit   Medication Sig    albuterol sulfate  (90 Base) MCG/ACT inhaler Inhale 2 puffs Every 4 (Four) Hours As Needed for Wheezing.    Allergy Relief 10 MG tablet Take 1 tablet by mouth Daily.    budesonide (Pulmicort Flexhaler) 90 MCG/ACT inhaler Inhale 1 puff 2 (Two) Times a Day.    Calcium + Vitamin D3 600-10 MG-MCG tablet TAKE 1 TABLET BY MOUTH TWICE DAILY    cetirizine (zyrTEC) 10 MG tablet Take 1 tablet by mouth Daily.    fluticasone (FLONASE) 50 MCG/ACT nasal spray 2 sprays into the nostril(s) as directed by provider Daily.    Fremanezumab-vfrm (Ajovy) 225 MG/1.5ML solution auto-injector Inject 225 mg under the skin into the appropriate area as directed Every 30 (Thirty) Days.    HYDROcodone-acetaminophen (NORCO) 5-325 MG per tablet Take 1 tablet by mouth 2 (Two) Times a Day As Needed.    ibandronate (BONIVA) 150 MG tablet Take 1 tablet by mouth Every 30 (Thirty) Days.    levothyroxine (SYNTHROID, LEVOTHROID) 75 MCG tablet Take 1 tablet by mouth Daily.    lisinopril (PRINIVIL,ZESTRIL) 10 MG tablet Take 1 tablet by mouth 2 (Two) Times a Day.    multivitamin with minerals tablet tablet Take 1 tablet by mouth Daily.    rizatriptan (MAXALT) 10 MG tablet Take 1 tablet by mouth 1 (One) Time As Needed.    vitamin E 100 UNIT capsule Take 1 capsule by mouth Daily.    [DISCONTINUED] pramipexole (MIRAPEX) 0.5 MG tablet Take 0.5 tablets by mouth Every Night.     No current facility-administered medications on file prior to visit.       Immunization History   Administered Date(s) Administered    31-influenza Vac Quardvalent Preservativ 11/05/2015, 11/09/2016, 10/19/2021, 09/30/2022    COVID-19 (PFIZER) Purple Cap Monovalent 05/27/2021, 06/17/2021, 06/17/2021    COVID-19 (UNSPECIFIED) 05/27/2021, 06/17/2021     "Flu Vaccine Intradermal Quad 18-64YR 09/01/2023    Fluzone (or Fluarix & Flulaval for VFC) >6mos 10/19/2021, 09/30/2022    Hepatitis A 05/17/2018, 12/20/2018    Influenza Seasonal Injectable 10/10/2014    Influenza, Unspecified 10/19/2020, 10/19/2020, 09/01/2023    Pneumococcal Conjugate 20-Valent (PCV20) 08/09/2023    Pneumococcal Polysaccharide (PPSV23) 05/10/2022    Shingrix 11/12/2021, 01/21/2022    Tdap 03/20/2018, 10/19/2020    Zoster, Unspecified 11/12/2021, 01/21/2022       Review of Systems   Constitutional:  Negative for chills and fever.   HENT:  Negative for ear pain.    Respiratory:  Negative for shortness of breath.         SOBOE with the asthma and not exacerbated    Cardiovascular:  Negative for chest pain.   Gastrointestinal:  Negative for abdominal pain and blood in stool.   Genitourinary:  Negative for dysuria.   Musculoskeletal:  Positive for arthralgias, back pain, myalgias and neck pain.   Neurological:  Positive for headache. Negative for dizziness, tremors, seizures, syncope, light-headedness and numbness.        At time falling more--like a balance issue--and then HA's are better controlled    Psychiatric/Behavioral:  Negative for self-injury and suicidal ideas.         Objective     /76 (BP Location: Right arm, Patient Position: Sitting, Cuff Size: Adult)   Pulse 80   Temp 98.6 °F (37 °C) (Temporal)   Ht 160 cm (63\")   Wt 71.2 kg (157 lb)   SpO2 95%   BMI 27.81 kg/m²       Physical Exam  Vitals and nursing note reviewed.   Constitutional:       Appearance: Normal appearance.   HENT:      Head: Normocephalic.      Right Ear: Tympanic membrane, ear canal and external ear normal.      Left Ear: Tympanic membrane, ear canal and external ear normal.      Nose: Nose normal.      Mouth/Throat:      Mouth: Mucous membranes are moist.   Eyes:      Pupils: Pupils are equal, round, and reactive to light.   Cardiovascular:      Rate and Rhythm: Normal rate and regular rhythm.      Heart " sounds: Normal heart sounds.   Pulmonary:      Effort: Pulmonary effort is normal.      Breath sounds: Normal breath sounds.   Abdominal:      Palpations: Abdomen is soft.   Musculoskeletal:         General: Normal range of motion.      Cervical back: Normal range of motion.   Skin:     General: Skin is warm and dry.   Neurological:      Mental Status: She is alert and oriented to person, place, and time.   Psychiatric:         Mood and Affect: Mood normal.         Behavior: Behavior normal.         Thought Content: Thought content normal.         Judgment: Judgment normal.         Result Review :                      Office Visit with Stefano Ackerman MD (12/08/2023)   Stress Test With Myocardial Perfusion One Day (02/27/2024 10:59)   Office Visit with Lizzy Lindo APRN (09/11/2023)      Assessment and Plan      Diagnoses and all orders for this visit:    1. Hypertension, unspecified type (Primary)  -     CBC & Differential  -     Comprehensive Metabolic Panel  -     Lipid Panel  -     TSH Rfx On Abnormal To Free T4  -     Urinalysis With Culture If Indicated - Urine, Clean Catch    2. RLS (restless legs syndrome)  -     pramipexole (MIRAPEX) 0.5 MG tablet; Take 1 tablet by mouth Every Night.  Dispense: 30 tablet; Refill: 5  -     CBC & Differential  -     Comprehensive Metabolic Panel    3. Hypothyroidism, unspecified type  -     TSH Rfx On Abnormal To Free T4  -     Vitamin D,25-Hydroxy    4. Frequent falls  -     Vitamin B12 & Folate  -     Vitamin D,25-Hydroxy  -     MRI Brain With & Without Contrast; Future    5. Balance problem  -     Vitamin B12 & Folate  -     Vitamin D,25-Hydroxy  -     MRI Brain With & Without Contrast; Future    6. Vitamin D deficiency  -     Vitamin D,25-Hydroxy    7. Osteopenia, unspecified location  -     DEXA Bone Density Axial; Future    8. Age-related osteoporosis without current pathological fracture  -     DEXA Bone Density Axial; Future    Medication refill as noted  above and I already refilled this courtesy prior to this visit to other medications for obtaining the fasting labs today as well as with regards to the balance and frequent falls we will order the MRI of the brain with and without and B12 folic acid and vitamin D--and if everything ends up being negative/normal she will need to follow-up sooner rather than later with the neurologist for further evaluation as she just had her stress test with Dr. Ackerman/cardiology and all that was also stable--also ordered the BD as well for F/U on the osteopenia and osteoporosis of the L4 --also we are increasing the Mirapex from 0.25 mg nightly to 0.50 mg         Follow Up     Return if symptoms worsen or fail to improve.    Patient was given instructions and counseling regarding her condition or for health maintenance advice. Please see specific information pulled into the AVS if appropriate.            Valentina Stevens  reports that she has never smoked. She has never been exposed to tobacco smoke. She has never used smokeless tobacco.. I have educated her on the risk of diseases from using tobacco products such as cancer, COPD, and heart disease.

## 2024-03-12 NOTE — PROGRESS NOTES
Please mail letter to patient stating    Valentina urinalysis was negative for any blood or bacteria; and your comprehensive panel shows normal glucose and normal kidney function test and normal electrolytes and the liver function tests were all normal with the exception of the ALT slight modest elevated at 36 and should be 33 or less when fasting and we will keep an eye on this    And your cholesterol panel shows triglycerides elevated at 256 and should be less than 150 when fasting and HDL was in good range and the LDL is down to 101 --and your estimated risk for cardiovascular related event over the next 10 years is estimated at 4.3% and anyone at 5% or higher would need to start a statin therapy to lower their cholesterol levels     and normal is less than 100 and thyroid levels were also normal range; blood counts were in good range and vitamin B12 and folate acid and vitamin D all in good range and thyroid levels in good range

## 2024-04-01 ENCOUNTER — OFFICE VISIT (OUTPATIENT)
Dept: PULMONOLOGY | Facility: CLINIC | Age: 59
End: 2024-04-01
Payer: COMMERCIAL

## 2024-04-01 VITALS
SYSTOLIC BLOOD PRESSURE: 115 MMHG | WEIGHT: 155.6 LBS | BODY MASS INDEX: 27.57 KG/M2 | OXYGEN SATURATION: 97 % | RESPIRATION RATE: 16 BRPM | TEMPERATURE: 98.4 F | DIASTOLIC BLOOD PRESSURE: 73 MMHG | HEIGHT: 63 IN | HEART RATE: 80 BPM

## 2024-04-01 DIAGNOSIS — G47.33 OSA ON CPAP: ICD-10-CM

## 2024-04-01 DIAGNOSIS — J45.20 MILD INTERMITTENT ASTHMA, UNSPECIFIED WHETHER COMPLICATED: Primary | ICD-10-CM

## 2024-04-01 PROCEDURE — 3078F DIAST BP <80 MM HG: CPT | Performed by: INTERNAL MEDICINE

## 2024-04-01 PROCEDURE — 3074F SYST BP LT 130 MM HG: CPT | Performed by: INTERNAL MEDICINE

## 2024-04-01 PROCEDURE — 1160F RVW MEDS BY RX/DR IN RCRD: CPT | Performed by: INTERNAL MEDICINE

## 2024-04-01 PROCEDURE — 99213 OFFICE O/P EST LOW 20 MIN: CPT | Performed by: INTERNAL MEDICINE

## 2024-04-01 PROCEDURE — 1159F MED LIST DOCD IN RCRD: CPT | Performed by: INTERNAL MEDICINE

## 2024-04-01 NOTE — PROGRESS NOTES
Pulmonary Office Follow-up    Subjective     Valentina Stevens is seen today at the office for   Chief Complaint   Patient presents with    Follow-up     6 month    Asthma    Sleep Apnea    Shortness of Breath    Cough    Wheezing         HPI  Valentina Stevens is a 58 y.o. female with a PMH significant for asthma and obstructive sleep apnea presents for follow-up patient is recently getting over a cold from the allergies she denies any significant expectoration or wheezing and has been doing well on her bronchodilators she is using her CPAP regularly      Tobacco use history:  Never smoker      Patient Active Problem List   Diagnosis    DDD (degenerative disc disease), lumbar    Hearing loss    Mixed hyperlipidemia    Hypertension    Hypothyroidism    Intractable chronic migraine without aura and without status migrainosus    Menopause    Low back pain    Arthritis    Osteoporosis    RLS (restless legs syndrome)    Memory loss    Arthritis of right acromioclavicular joint    Tendinitis of shoulder, right    Fatty (change of) liver, not elsewhere classified    Scoliosis, unspecified    Lumbar radiculopathy    Scoliosis    Migraine    CTS (carpal tunnel syndrome)    GERD (gastroesophageal reflux disease)    DEVENDRA on CPAP    Primary central sleep apnea    Seizures    Difficulty walking    Vitamin D deficiency    Murmur    DDD (degenerative disc disease), cervical    History of learning disability    Lumbar spondylosis    Cervical polyp    Body mass index (BMI) of 27.0-27.9 in adult    Frequent falls    Chronic idiopathic constipation    History of diverticulitis    Diverticula of colon    Altered bowel habits    Pain disorder associated with psychological factors    Osteopenia    History of osteoporosis    Cervical spondylolysis       Review of Systems  Review of Systems   All other systems reviewed and are negative.    As described in the HPI. Otherwise, remainder of ROS (14 systems) were negative.    Medications,  Allergies, Social, and Family Histories reviewed as per EMR.    Result Review :            Objective     Vitals:    04/01/24 0753   BP: 115/73   Pulse: 80   Resp: 16   Temp: 98.4 °F (36.9 °C)   SpO2: 97%         04/01/24  0753   Weight: 70.6 kg (155 lb 9.6 oz)       Physical Exam  Vitals and nursing note reviewed.   Constitutional:       Appearance: Normal appearance.   HENT:      Head: Normocephalic and atraumatic.      Nose: Nose normal.      Mouth/Throat:      Mouth: Mucous membranes are moist.      Pharynx: Oropharynx is clear.   Eyes:      Extraocular Movements: Extraocular movements intact.      Conjunctiva/sclera: Conjunctivae normal.      Pupils: Pupils are equal, round, and reactive to light.   Cardiovascular:      Rate and Rhythm: Normal rate and regular rhythm.      Pulses: Normal pulses.      Heart sounds: Normal heart sounds.   Pulmonary:      Effort: Pulmonary effort is normal.      Breath sounds: Normal breath sounds.   Abdominal:      General: Abdomen is flat. Bowel sounds are normal.      Palpations: Abdomen is soft.   Musculoskeletal:         General: Normal range of motion.      Cervical back: Normal range of motion and neck supple.   Skin:     General: Skin is warm.      Capillary Refill: Capillary refill takes 2 to 3 seconds.   Neurological:      General: No focal deficit present.      Mental Status: She is alert and oriented to person, place, and time.   Psychiatric:         Mood and Affect: Mood normal.         Behavior: Behavior normal.         Mammo Screening Digital Tomosynthesis Bilateral With CAD    Result Date: 1/23/2024   Benign mammogram. Suggest routine mammographic screening.  RECOMMENDATION(S):  ROUTINE MAMMOGRAM AND CLINICAL EVALUATION IN 12 MONTHS.   BIRADS:  DIAGNOSTIC CATEGORY 1--NEGATIVE.   BREAST COMPOSITION: Scattered areas fibroglandular density.  PLEASE NOTE:  A NORMAL MAMMOGRAM DOES NOT EXCLUDE THE POSSIBILITY OF BREAST CANCER. ANY CLINICALLY SUSPICIOUS PALPABLE LUMP  SHOULD BE BIOPSIED.      JAXON BROOKS MD       Electronically Signed and Approved By: JAXON BROOKS MD on 1/23/2024 at 11:48               Assessment & Plan     Diagnoses and all orders for this visit:    1. Mild intermittent asthma, unspecified whether complicated (Primary)    2. DEVENDRA on CPAP         Discussion/ Recommendations:   Patient is stable  Advised to continue her medications  Continue Asmanex daily  Albuterol inhaler as needed  Continue CPAP  Vaccinations discussed and recommended    BMI is >= 25 and <30. (Overweight) The following options were offered after discussion;: exercise counseling/recommendations        Return in about 6 months (around 10/1/2024).          This document has been electronically signed by Flo Simon MD on April 1, 2024 07:59 EDT

## 2024-04-11 NOTE — PROGRESS NOTES
[facility]  Cardiology progress Note    Patient Name: Valentina Stevens  : 1965    CHIEF COMPLAINT  Hypertension        Subjective   Subjective     HISTORY OF PRESENT ILLNESS    Valentina Stevens is a 58 y.o. female with history of hypertension.  No chest pain or shortness of breath.    REVIEW OF SYSTEMS    Constitutional:    No fever, no weight loss  Skin:     No rash  Otolaryngeal:    No difficulty swallowing  Cardiovascular: See HPI.  Pulmonary:    No cough, no sputum production    Personal History     Social History:    reports that she has never smoked. She has never been exposed to tobacco smoke. She has never used smokeless tobacco. She reports that she does not drink alcohol and does not use drugs.    Home Medications:  Current Outpatient Medications on File Prior to Visit   Medication Sig    albuterol sulfate  (90 Base) MCG/ACT inhaler Inhale 2 puffs Every 4 (Four) Hours As Needed for Wheezing.    budesonide (Pulmicort Flexhaler) 90 MCG/ACT inhaler Inhale 1 puff 2 (Two) Times a Day.    Calcium + Vitamin D3 600-10 MG-MCG tablet TAKE 1 TABLET BY MOUTH TWICE DAILY    Fremanezumab-vfrm (Ajovy) 225 MG/1.5ML solution auto-injector Inject 225 mg under the skin into the appropriate area as directed Every 30 (Thirty) Days.    HYDROcodone-acetaminophen (NORCO) 5-325 MG per tablet Take 1 tablet by mouth 2 (Two) Times a Day As Needed.    ibandronate (BONIVA) 150 MG tablet Take 1 tablet by mouth Every 30 (Thirty) Days.    levothyroxine (SYNTHROID, LEVOTHROID) 75 MCG tablet Take 1 tablet by mouth Daily.    lisinopril (PRINIVIL,ZESTRIL) 10 MG tablet Take 1 tablet by mouth 2 (Two) Times a Day.    multivitamin with minerals tablet tablet Take 1 tablet by mouth Daily.    pramipexole (MIRAPEX) 0.5 MG tablet Take 1 tablet by mouth Every Night.    rizatriptan (MAXALT) 10 MG tablet Take 1 tablet by mouth 1 (One) Time As Needed.    vitamin E 100 UNIT capsule Take 1 capsule by mouth Daily.     [DISCONTINUED] Allergy Relief 10 MG tablet Take 1 tablet by mouth Daily.    [DISCONTINUED] cetirizine (zyrTEC) 10 MG tablet Take 1 tablet by mouth Daily.    [DISCONTINUED] fluticasone (FLONASE) 50 MCG/ACT nasal spray 2 sprays into the nostril(s) as directed by provider Daily.     No current facility-administered medications on file prior to visit.       Past Medical History:   Diagnosis Date    Arthritis     Asthma     Asthma, intrinsic     CTS (carpal tunnel syndrome)     Depression     Difficulty walking     Diverticulitis of colon     Fatty (change of) liver, not elsewhere classified     GERD (gastroesophageal reflux disease)     Hearing loss     Hyperlipemia     Hypertension     Hypothyroidism     Low back pain     Lumbar radiculopathy 02/15/2022    Memory loss     Menopause     Migraine     Murmur     Osteopenia     Osteoporosis     Primary central sleep apnea     Scoliosis     Seizures     last when 10 years old    Sleep apnea, obstructive     Vitamin D deficiency        Allergies:  Allergies   Allergen Reactions    Penicillins Shortness Of Breath and Rash    Sulfa Antibiotics Hives    Cephalexin Rash    Indomethacin Rash       Objective    Objective       Vitals:   Heart Rate:  [102] 102  BP: (115)/(66) 115/66  Body mass index is 27.46 kg/m².     PHYSICAL EXAM:    General Appearance:   well developed  well nourished  HENT:   oropharynx moist  lips not cyanotic  Neck:  thyroid not enlarged  supple  Respiratory:  no respiratory distress  normal breath sounds  no rales  Cardiovascular:  no jugular venous distention  regular rhythm  apical impulse normal  S1 normal, S2 normal  no S3, no S4   no murmur  no rub, no thrill  carotid pulses normal; no bruit  pedal pulses normal  lower extremity edema: none    Skin:   warm, dry  Psychiatric:  judgement and insight appropriate  normal mood and affect        Result Review:  I have personally reviewed the available results from  [x]  Laboratory  [x]  EKG  [x]   Cardiology  [x]  Medications  [x]  Old records  []  Other:     Procedures  Lab Results   Component Value Date    CHOL 186 03/11/2024    CHOL 179 09/11/2023    CHOL 192 10/26/2022     Lab Results   Component Value Date    TRIG 256 (H) 03/11/2024    TRIG 230 (H) 09/11/2023    TRIG 141 10/26/2022     Lab Results   Component Value Date    HDL 41 03/11/2024    HDL 36 (L) 09/11/2023    HDL 51 10/26/2022     Lab Results   Component Value Date     (H) 03/11/2024     (H) 09/11/2023     (H) 10/26/2022     Lab Results   Component Value Date    VLDL 44 (H) 03/11/2024    VLDL 40 09/11/2023    VLDL 25 10/26/2022     Results for orders placed during the hospital encounter of 02/27/24    Adult Transthoracic Echo Complete W/ Cont if Necessary Per Protocol    Interpretation Summary  Normal left ventricular systolic function.  No significant valve abnormalities noted.     Impression/Plan:  1.  Essential hypertension controlled: Continue lisinopril 10 mg twice a day.  Monitor blood pressure regularly.  2.  Hypothyroidism: Continue Synthroid 75 mcg once a day.  3.  Shortness of breath stable: Normal echo.        Stefano Ackerman MD   04/12/24   11:57 EDT

## 2024-04-12 ENCOUNTER — OFFICE VISIT (OUTPATIENT)
Dept: CARDIOLOGY | Facility: CLINIC | Age: 59
End: 2024-04-12
Payer: COMMERCIAL

## 2024-04-12 VITALS
HEIGHT: 63 IN | DIASTOLIC BLOOD PRESSURE: 66 MMHG | WEIGHT: 155 LBS | HEART RATE: 102 BPM | SYSTOLIC BLOOD PRESSURE: 115 MMHG | BODY MASS INDEX: 27.46 KG/M2

## 2024-04-12 DIAGNOSIS — E03.9 HYPOTHYROIDISM (ACQUIRED): ICD-10-CM

## 2024-04-12 DIAGNOSIS — I10 HYPERTENSION, ESSENTIAL: Primary | ICD-10-CM

## 2024-04-12 DIAGNOSIS — R06.02 SHORTNESS OF BREATH: ICD-10-CM

## 2024-04-12 PROCEDURE — 99213 OFFICE O/P EST LOW 20 MIN: CPT | Performed by: SPECIALIST

## 2024-04-12 PROCEDURE — 3078F DIAST BP <80 MM HG: CPT | Performed by: SPECIALIST

## 2024-04-12 PROCEDURE — 3074F SYST BP LT 130 MM HG: CPT | Performed by: SPECIALIST

## 2024-04-12 PROCEDURE — 1160F RVW MEDS BY RX/DR IN RCRD: CPT | Performed by: SPECIALIST

## 2024-04-12 PROCEDURE — 1159F MED LIST DOCD IN RCRD: CPT | Performed by: SPECIALIST

## 2024-04-18 ENCOUNTER — OFFICE VISIT (OUTPATIENT)
Dept: FAMILY MEDICINE CLINIC | Facility: CLINIC | Age: 59
End: 2024-04-18
Payer: COMMERCIAL

## 2024-04-18 VITALS
TEMPERATURE: 98.4 F | WEIGHT: 155 LBS | SYSTOLIC BLOOD PRESSURE: 118 MMHG | DIASTOLIC BLOOD PRESSURE: 72 MMHG | HEART RATE: 90 BPM | OXYGEN SATURATION: 97 % | BODY MASS INDEX: 27.46 KG/M2 | HEIGHT: 63 IN

## 2024-04-18 DIAGNOSIS — R26.2 DIFFICULTY WALKING: ICD-10-CM

## 2024-04-18 DIAGNOSIS — M51.36 DDD (DEGENERATIVE DISC DISEASE), LUMBAR: ICD-10-CM

## 2024-04-18 DIAGNOSIS — M41.9 SCOLIOSIS OF THORACIC SPINE, UNSPECIFIED SCOLIOSIS TYPE: ICD-10-CM

## 2024-04-18 DIAGNOSIS — M47.816 LUMBAR SPONDYLOSIS: Primary | ICD-10-CM

## 2024-04-18 DIAGNOSIS — R20.2 PARESTHESIA OF BOTH FEET: ICD-10-CM

## 2024-04-18 DIAGNOSIS — M54.16 LUMBAR RADICULOPATHY: ICD-10-CM

## 2024-04-18 PROCEDURE — 99214 OFFICE O/P EST MOD 30 MIN: CPT | Performed by: NURSE PRACTITIONER

## 2024-04-18 PROCEDURE — 3074F SYST BP LT 130 MM HG: CPT | Performed by: NURSE PRACTITIONER

## 2024-04-18 PROCEDURE — 1159F MED LIST DOCD IN RCRD: CPT | Performed by: NURSE PRACTITIONER

## 2024-04-18 PROCEDURE — 1160F RVW MEDS BY RX/DR IN RCRD: CPT | Performed by: NURSE PRACTITIONER

## 2024-04-18 PROCEDURE — 3078F DIAST BP <80 MM HG: CPT | Performed by: NURSE PRACTITIONER

## 2024-04-18 NOTE — PROGRESS NOTES
Answers submitted by the patient for this visit:  Other (Submitted on 4/16/2024)  Please describe your symptoms.: Foot numbness  Have you had these symptoms before?: No  How long have you been having these symptoms?: 1-4 days  Primary Reason for Visit (Submitted on 4/16/2024)  What is the primary reason for your visit?: Other  Chief Complaint  Numbness (Numbness with her feet after walking. )    Subjective            Valentina Stevens presents to [facility]  History of Present Illness  Pt is here with regards to the pt experiencing bilat feet numbness on this past Saturday --lasted approx 20 min--and pt [place]--and was just unsure if was her back or what --and her back was also hurting pretty bad after the walking --no speech issues no facial asymmetry     Pt is in pain mgt and last MRI L-spine was 2022--and showed the DDD and facet hypertrophy and disc bulging and narrowing     Daily basis still has pain--rates pain on that day 10/10 on pain scale--and then works weekends and working 5 hr shifts and still by the end of the day rates pain 10/10--and then on other days rates pain 5/10 --right now--but there are times much higher    Did the PT and spinal injections and still does her daily exercises they taught her at home--       PHQ-2 Total Score:    PHQ-9 Total Score:      Past Medical History:   Diagnosis Date    Arthritis     Asthma     Asthma, intrinsic     CTS (carpal tunnel syndrome)     Depression     Difficulty walking     Diverticulitis of colon     Fatty (change of) liver, not elsewhere classified     GERD (gastroesophageal reflux disease)     Hearing loss     Hyperlipemia     Hypertension     Hypothyroidism     Low back pain     Lumbar radiculopathy 02/15/2022    Memory loss     Menopause     Migraine     Murmur     Osteopenia     Osteoporosis     Primary central sleep apnea     Scoliosis     Seizures     last when 10 years old    Sleep  apnea, obstructive     Vitamin D deficiency        Allergies   Allergen Reactions    Penicillins Shortness Of Breath and Rash    Sulfa Antibiotics Hives    Cephalexin Rash    Indomethacin Rash        Past Surgical History:   Procedure Laterality Date    COLONOSCOPY  2018    glenna    COLONOSCOPY N/A 10/17/2023    Procedure: COLONOSCOPY WITH COLD SNARE POLYPECTOMY;  Surgeon: Yossi Stephens MD;  Location: [facility] ENDOSCOPY;  Service: Gastroenterology;  Laterality: N/A;  DIVERTICULOSIS, COLON POLYP    EPIDURAL BLOCK      ESSURE TUBAL LIGATION      SKIN BIOPSY      THYMECTOMY      TUMOR REMOVAL      in between heart and lungs        Social History     Tobacco Use    Smoking status: Never     Passive exposure: Never    Smokeless tobacco: Never   Vaping Use    Vaping status: Never Used   Substance Use Topics    Alcohol use: Never    Drug use: Never       Family History   Problem Relation Age of Onset    Hypertension Father     Osteoporosis Father     Alcohol abuse Father     Skin cancer Father     Thyroid cancer Mother     Migraines Mother     Stroke Mother     Skin cancer Mother     Cancer Mother     Skin cancer Brother     Dementia Sister     Migraines Sister     Migraines Sister     Uterine cancer Maternal Grandmother     Colon cancer Neg Hx     Breast cancer Neg Hx     Prostate cancer Neg Hx     Clotting disorder Neg Hx         Health Maintenance Due   Topic Date Due    DXA SCAN  01/18/2024        Current Outpatient Medications on File Prior to Visit   Medication Sig    albuterol sulfate  (90 Base) MCG/ACT inhaler Inhale 2 puffs Every 4 (Four) Hours As Needed for Wheezing.    budesonide (Pulmicort Flexhaler) 90 MCG/ACT inhaler Inhale 1 puff 2 (Two) Times a Day.    Calcium + Vitamin D3 600-10 MG-MCG tablet TAKE 1 TABLET BY MOUTH TWICE DAILY    Fremanezumab-vfrm (Ajovy) 225 MG/1.5ML solution auto-injector Inject 225 mg under the skin into the appropriate area as directed Every 30 (Thirty) Days.     HYDROcodone-acetaminophen (NORCO) 5-325 MG per tablet Take 1 tablet by mouth 2 (Two) Times a Day As Needed.    ibandronate (BONIVA) 150 MG tablet Take 1 tablet by mouth Every 30 (Thirty) Days.    levothyroxine (SYNTHROID, LEVOTHROID) 75 MCG tablet Take 1 tablet by mouth Daily.    lisinopril (PRINIVIL,ZESTRIL) 10 MG tablet Take 1 tablet by mouth 2 (Two) Times a Day.    multivitamin with minerals tablet tablet Take 1 tablet by mouth Daily.    pramipexole (MIRAPEX) 0.5 MG tablet Take 1 tablet by mouth Every Night.    rizatriptan (MAXALT) 10 MG tablet Take 1 tablet by mouth 1 (One) Time As Needed.    vitamin E 100 UNIT capsule Take 1 capsule by mouth Daily.     No current facility-administered medications on file prior to visit.       Immunization History   Administered Date(s) Administered    31-influenza Vac Quardvalent Preservativ 11/05/2015, 11/09/2016, 10/19/2021, 09/30/2022    COVID-19 (PFIZER) Purple Cap Monovalent 05/27/2021, 06/17/2021, 06/17/2021    COVID-19 (UNSPECIFIED) 05/27/2021, 06/17/2021    Flu Vaccine Intradermal Quad 18-64YR 09/01/2023    Fluzone (or Fluarix & Flulaval for VFC) >6mos 10/19/2021, 09/30/2022    Hepatitis A 05/17/2018, 12/20/2018    Influenza Seasonal Injectable 10/10/2014    Influenza, Unspecified 10/19/2020, 10/19/2020, 09/01/2023    Pneumococcal Conjugate 20-Valent (PCV20) 08/09/2023    Pneumococcal Polysaccharide (PPSV23) 05/10/2022    Shingrix 11/12/2021, 01/21/2022    Tdap 03/20/2018, 10/19/2020    Zoster, Unspecified 11/12/2021, 01/21/2022       Review of Systems   Constitutional:  Negative for chills and fever.   Respiratory:  Negative for shortness of breath.    Cardiovascular:  Negative for chest pain.   Gastrointestinal:         No loss of control of bowels    Genitourinary:  Negative for urinary incontinence.        2-3 months of rare urge incontinence    Musculoskeletal:  Positive for back pain, gait problem and neck pain.        Walks with can unless using the stroller when  "has her grandchildren    Neurological:  Positive for numbness. Negative for dizziness, tremors, seizures, syncope, facial asymmetry, speech difficulty, weakness, light-headedness, headache, memory problem and confusion.        Objective     /72   Pulse 90   Temp 98.4 °F (36.9 °C) (Temporal)   Ht 160 cm (63\")   Wt 70.3 kg (155 lb)   SpO2 97%   BMI 27.46 kg/m²       Physical Exam  Vitals and nursing note reviewed.   Constitutional:       Appearance: Normal appearance.   HENT:      Head: Normocephalic.      Right Ear: External ear normal.      Left Ear: External ear normal.      Nose: Nose normal.      Mouth/Throat:      Mouth: Mucous membranes are moist.   Eyes:      Pupils: Pupils are equal, round, and reactive to light.   Cardiovascular:      Rate and Rhythm: Normal rate.   Pulmonary:      Effort: Pulmonary effort is normal.   Musculoskeletal:         General: Tenderness and deformity present.      Cervical back: Normal range of motion.      Thoracic back: Scoliosis present.      Lumbar back: Tenderness and bony tenderness present. Scoliosis present.        Back:       Comments: Ambulates with cane --and only does not use when using the stroller when has her grandchildren in the Stroller --kyphosis of thoracic spine and more pronounced scoliosis of the thoracic spine in the lumbar   Skin:     General: Skin is warm and dry.   Neurological:      Mental Status: She is alert and oriented to person, place, and time.   Psychiatric:         Mood and Affect: Mood normal.         Behavior: Behavior normal.         Thought Content: Thought content normal.         Judgment: Judgment normal.         Result Review :                           Assessment and Plan      Diagnoses and all orders for this visit:    1. Lumbar spondylosis (Primary)  -     MRI Lumbar Spine Without Contrast; Future    2. Difficulty walking  Comments:  uses cane to walk or stroller to walk with if has grandchildern  Orders:  -     MRI Lumbar " Spine Without Contrast; Future    3. Lumbar radiculopathy  -     MRI Lumbar Spine Without Contrast; Future    4. DDD (degenerative disc disease), lumbar  -     MRI Lumbar Spine Without Contrast; Future    5. Paresthesia of both feet  Comments:  intermittent  Orders:  -     MRI Lumbar Spine Without Contrast; Future    6. Scoliosis of thoracic spine, unspecified scoliosis type  -     MRI Lumbar Spine Without Contrast; Future    Uses the cane to ambulate or the stroller--or will have increased falls --or cruises with holding  on to furniture or counter tops --and also we will order the MRI lumbar spine to see if anything significantly changed or stenosed and advise pt to let pain mgt know at next visit         Follow Up     Return if symptoms worsen or fail to improve.    Patient was given instructions and counseling regarding her condition or for health maintenance advice. Please see specific information pulled into the AVS if appropriate.            Valentina Shruti Hilda  reports that she has never smoked. She has never been exposed to tobacco smoke. She has never used smokeless tobacco. I have educated her on the risk of diseases from using tobacco products such as cancer, COPD, and heart disease.

## 2024-04-19 ENCOUNTER — HOSPITAL ENCOUNTER (OUTPATIENT)
Dept: BONE DENSITY | Facility: HOSPITAL | Age: 59
Discharge: HOME | End: 2024-04-19
Payer: COMMERCIAL

## 2024-04-19 ENCOUNTER — HOSPITAL ENCOUNTER (OUTPATIENT)
Dept: MRI IMAGING | Facility: HOSPITAL | Age: 59
Discharge: HOME | End: 2024-04-19
Payer: COMMERCIAL

## 2024-04-19 DIAGNOSIS — M81.0 AGE-RELATED OSTEOPOROSIS WITHOUT CURRENT PATHOLOGICAL FRACTURE: ICD-10-CM

## 2024-04-19 DIAGNOSIS — M85.80 OSTEOPENIA, UNSPECIFIED LOCATION: ICD-10-CM

## 2024-04-19 DIAGNOSIS — R29.6 FREQUENT FALLS: ICD-10-CM

## 2024-04-19 DIAGNOSIS — R26.89 BALANCE PROBLEM: ICD-10-CM

## 2024-04-19 LAB
CREAT BLDA-MCNC: 0.8 MG/DL (ref 0.6–1.3)
EGFRCR SERPLBLD CKD-EPI 2021: 84.5 ML/MIN/1.73

## 2024-04-19 PROCEDURE — 70553 MRI BRAIN STEM W/O & W/DYE: CPT

## 2024-04-19 PROCEDURE — 77080 DXA BONE DENSITY AXIAL: CPT

## 2024-04-19 PROCEDURE — 0 GADOBENATE DIMEGLUMINE 529 MG/ML SOLUTION: Performed by: NURSE PRACTITIONER

## 2024-04-19 PROCEDURE — 82565 ASSAY OF CREATININE: CPT

## 2024-04-19 PROCEDURE — A9577 INJ MULTIHANCE: HCPCS | Performed by: NURSE PRACTITIONER

## 2024-04-19 RX ADMIN — GADOBENATE DIMEGLUMINE 15 ML: 529 INJECTION, SOLUTION INTRAVENOUS at 11:12

## 2024-04-19 NOTE — PROGRESS NOTES
Pulmonary Office Follow-up    Subjective     Valentina Stevens is seen today at the office for   Chief Complaint   Patient presents with   • Shortness of Breath   • Cough   • Follow-up     6 month f/up          HPI  Valentina Stevens is a 57 y.o. female with a PMH significant for mild asthma along with obstructive sleep apnea presents for follow-up patient has been doing well but complains of increasing cough with mucoid expectoration worse at night patient denies any chest pain fever or hemoptysis      Tobacco use history:  Never smoker      Patient Active Problem List   Diagnosis   • DDD (degenerative disc disease), lumbar   • Hearing loss   • Mixed hyperlipidemia   • Hypertension   • Hypothyroidism   • Intractable chronic migraine without aura and without status migrainosus   • Menopause   • Low back pain   • Arthritis   • Osteoporosis   • RLS (restless legs syndrome)   • Memory loss   • Arthritis of right acromioclavicular joint   • Tendinitis of shoulder, right   • Fatty (change of) liver, not elsewhere classified   • Scoliosis, unspecified   • Lumbar radiculopathy   • Scoliosis   • Migraine   • CTS (carpal tunnel syndrome)   • GERD (gastroesophageal reflux disease)   • DEVENDRA on CPAP   • Primary central sleep apnea   • Seizures (HCC)   • Difficulty walking   • Vitamin D deficiency   • Murmur   • DDD (degenerative disc disease), cervical   • History of learning disability   • Lumbar spondylosis   • Cervical polyp       Review of Systems  Review of Systems   Respiratory: Positive for cough and shortness of breath.    All other systems reviewed and are negative.    As described in the HPI. Otherwise, remainder of ROS (14 systems) were negative.    Medications, Allergies, Social, and Family Histories reviewed as per EMR.    Objective     Vitals:    03/29/23 1449   BP: 117/67   Pulse: 74   Resp: 16   Temp: 97.2 °F (36.2 °C)   SpO2: 95%         03/29/23  1449   Weight: 72.2 kg (159 lb 3.2 oz)       Physical  Pre-Medication  Assessment:    Gina Velasco    When was the first day of your last period? No LMP recorded (lmp unknown). Patient is NOT sure of LMP date. ULTRASOUND NEEDED    When was your positive pregnancy test? 24       Was the test more than 52 days ago (before 24)? No    Were you using hormonal birth control, an IUD or emergency contraception when you got pregnant? No    Have you ever been diagnosed with an ectopic pregnancy? No    Have you ever had a tubal ligation or sterilization procedure? No    Have you ever been diagnosed with pelvic inflammatory disease? No    Are you having one-sided pelvic pain? No    During the past 3 months, has the time between periods varied from your typical cycles by more than a few days? Yes, cycles have been irregular.  ULTRASOUND NEEDED    Was LMP more than 10 weeks (70 days) ago (before 24)? YES - ULTRASOUND NEEDED    Did your last period start earlier or later than you would have expected? YES - ULTRASOUND NEEDED    Was your last period shorter than usual? YES - ULTRASOUND NEEDED    Was the amount of bleeding/cramping in your last period normal for you? Yes    Have you had any other recent bleeding that was not normal for you? No      Have you ever been diagnosed with:    An allergy or intolerance to mifepristone or misoprostol?  No    Chronic renal failure?  No    Hemorrhagic disorders? No    Inherited porphyria? No    Have you used systemic corticosteroid therapy long term?  No    Are you currently on anticoagulation therapy (excluding aspirin)?  No    Based on the responses given by the patient, an ultrasound is indicated.    LOCATION: [facility]  DATE: 2024     INDICATION: Dating, LMP about 5 mo ago, irregular menses, positive UPT  COMPARISON: None.  TECHNIQUE: Transabdominal scans were performed. Endovaginal ultrasound was performed to better visualize the embryo.     FINDINGS:  UTERUS: Single normal  Exam  Vitals and nursing note reviewed.   Constitutional:       Appearance: She is obese.   HENT:      Head: Normocephalic and atraumatic.      Nose: Nose normal.      Mouth/Throat:      Mouth: Mucous membranes are moist.   Eyes:      Conjunctiva/sclera: Conjunctivae normal.      Pupils: Pupils are equal, round, and reactive to light.   Cardiovascular:      Rate and Rhythm: Normal rate and regular rhythm.      Pulses: Normal pulses.      Heart sounds: Normal heart sounds.   Pulmonary:      Effort: Pulmonary effort is normal.      Breath sounds: Normal breath sounds.   Abdominal:      General: Abdomen is flat. Bowel sounds are normal.      Palpations: Abdomen is soft.   Musculoskeletal:         General: Normal range of motion.      Cervical back: Normal range of motion and neck supple.   Skin:     General: Skin is warm.      Capillary Refill: Capillary refill takes less than 2 seconds.   Neurological:      General: No focal deficit present.      Mental Status: She is alert and oriented to person, place, and time.   Psychiatric:         Mood and Affect: Mood normal.         MRI Cervical Spine Without Contrast    Result Date: 3/9/2023    1. Moderate cervical spondylosis with mild spinal canal narrowing at multiple levels and varying degrees of neural foraminal narrowing as described in detail above. 2. Degenerative reversal of the mid cervical lordosis. 3. No acute osseous abnormalities.     ODALYS MORRIS MD       Electronically Signed and Approved By: ODALYS MORRIS MD on 3/09/2023 at 3:48             CT Abdomen Pelvis With Contrast    Result Date: 3/5/2023    1. No acute process seen within the abdomen or pelvis 2. Normal appendix 3. Right ovary contains a 2.9 cm cyst.     LESLIE FARIA MD       Electronically Signed and Approved By: LESLIE FARIA MD on 3/05/2023 at 15:29                Assessment & Plan     Diagnoses and all orders for this visit:    1. Mild intermittent asthma, unspecified whether complicated  appearing intrauterine gestation sac.  CRL: Measures 2.1 cm, equals 8 weeks 6 days gestation.  FETAL HEART RATE: 174 bpm.  AMNIOTIC FLUID: Normal.  Yolk sac: Normal.  PLACENTA: Not yet formed. Small (3.9 x 1.5 x 1.3 cm) sub-chorionic hemorrhage noted on the left side of the gestational sac.     RIGHT OVARY: Normal, containing a normal appearing corpus luteum.  LEFT OVARY: Normal.                                                                      IMPRESSION:   1.  Single living intrauterine gestation at 8 weeks 6 days gestation, EDC 11/22/2024.    Based on the responses and denial of any contraindications given by patient proceeded with scheduling MTOP.  APPT: Tuesday, 4/23/24 at 3:50 PM with Dr Blum.  Pt denies any questions or concerns and told her to call if she does have any.    Shana Bales RN    (Primary)    2. DEVENDRA on CPAP    3. Acute bronchitis, unspecified organism    4. Mild asthma, unspecified whether complicated, unspecified whether persistent  -     albuterol sulfate  (90 Base) MCG/ACT inhaler; Inhale 2 puffs Every 4 (Four) Hours As Needed for Wheezing.  Dispense: 18 g; Refill: 5    Other orders  -     azithromycin (ZITHROMAX) 250 MG tablet; Take 2 by mouth today then 1 daily for 4 days  Dispense: 6 tablet; Refill: 0         Discussion/ Recommendations:   Patient is advised to reduce weight her BMI is 28.20  Advised compliance with CPAP  Continue albuterol as needed  Zithromax Z-ERIC  Vaccinations discussed and recommended    BMI is >= 25 and <30. (Overweight) The following options were offered after discussion;: exercise counseling/recommendations        Return in about 6 months (around 9/29/2023).          This document has been electronically signed by Flo Simon MD on March 29, 2023 14:58 EDT

## 2024-04-22 RX ORDER — BUDESONIDE 90 UG/1
AEROSOL, POWDER RESPIRATORY (INHALATION)
Qty: 1 EACH | Refills: 3 | Status: SHIPPED | OUTPATIENT
Start: 2024-04-22

## 2024-04-22 NOTE — PROGRESS NOTES
Please mail letter to pt statinwellington [place] the MRI of the brain with and without contrast--shows no acute or new findings and normal bilateral internal artery of the carotids (IAC)  --and only shows the mild chronic small vessel ischemic disease--and this is not anything new

## 2024-05-06 ENCOUNTER — OFFICE VISIT (OUTPATIENT)
Dept: FAMILY MEDICINE CLINIC | Facility: CLINIC | Age: 59
End: 2024-05-06
Payer: COMMERCIAL

## 2024-05-06 VITALS
WEIGHT: 156 LBS | DIASTOLIC BLOOD PRESSURE: 72 MMHG | HEIGHT: 63 IN | TEMPERATURE: 98 F | SYSTOLIC BLOOD PRESSURE: 110 MMHG | BODY MASS INDEX: 27.64 KG/M2 | OXYGEN SATURATION: 99 % | HEART RATE: 83 BPM
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
## 2024-05-06 DIAGNOSIS — M25.461 SWELLING OF RIGHT KNEE JOINT: Primary | ICD-10-CM

## 2024-05-06 PROCEDURE — 99213 OFFICE O/P EST LOW 20 MIN: CPT | Performed by: NURSE PRACTITIONER

## 2024-05-06 PROCEDURE — 1160F RVW MEDS BY RX/DR IN RCRD: CPT | Performed by: NURSE PRACTITIONER

## 2024-05-06 PROCEDURE — 1159F MED LIST DOCD IN RCRD: CPT | Performed by: NURSE PRACTITIONER

## 2024-05-06 PROCEDURE — 3078F DIAST BP <80 MM HG: CPT | Performed by: NURSE PRACTITIONER

## 2024-05-06 PROCEDURE — 3074F SYST BP LT 130 MM HG: CPT | Performed by: NURSE PRACTITIONER

## 2024-05-06 RX ORDER — MELOXICAM 15 MG/1
15 TABLET ORAL DAILY
Qty: 30 TABLET | Refills: 0 | Status: SHIPPED | OUTPATIENT
Start: 2024-05-06

## 2024-05-10 ENCOUNTER — OFFICE VISIT (OUTPATIENT)
Dept: FAMILY MEDICINE CLINIC | Facility: CLINIC | Age: 59
End: 2024-05-10
Payer: COMMERCIAL

## 2024-05-10 ENCOUNTER — HOSPITAL ENCOUNTER (OUTPATIENT)
Dept: MRI IMAGING | Facility: HOSPITAL | Age: 59
Discharge: HOME | End: 2024-05-10
Payer: COMMERCIAL

## 2024-05-10 VITALS
OXYGEN SATURATION: 99 % | TEMPERATURE: 98.2 F | DIASTOLIC BLOOD PRESSURE: 70 MMHG | HEART RATE: 88 BPM | BODY MASS INDEX: 28.22 KG/M2 | SYSTOLIC BLOOD PRESSURE: 122 MMHG | WEIGHT: 159.3 LBS

## 2024-05-10 DIAGNOSIS — R20.2 PARESTHESIA OF BOTH FEET: ICD-10-CM

## 2024-05-10 DIAGNOSIS — M47.816 LUMBAR SPONDYLOSIS: ICD-10-CM

## 2024-05-10 DIAGNOSIS — M41.9 SCOLIOSIS OF THORACIC SPINE, UNSPECIFIED SCOLIOSIS TYPE: ICD-10-CM

## 2024-05-10 DIAGNOSIS — R26.2 DIFFICULTY WALKING: ICD-10-CM

## 2024-05-10 DIAGNOSIS — J06.9 ACUTE URI: ICD-10-CM

## 2024-05-10 DIAGNOSIS — J02.9 SORE THROAT: Primary | ICD-10-CM

## 2024-05-10 DIAGNOSIS — M51.36 DDD (DEGENERATIVE DISC DISEASE), LUMBAR: ICD-10-CM

## 2024-05-10 DIAGNOSIS — M54.16 LUMBAR RADICULOPATHY: ICD-10-CM

## 2024-05-10 LAB
EXPIRATION DATE: NORMAL
INTERNAL CONTROL: NORMAL
Lab: NORMAL
S PYO AG THROAT QL: NEGATIVE

## 2024-05-10 PROCEDURE — 3078F DIAST BP <80 MM HG: CPT | Performed by: FAMILY MEDICINE

## 2024-05-10 PROCEDURE — 72148 MRI LUMBAR SPINE W/O DYE: CPT

## 2024-05-10 PROCEDURE — 87880 STREP A ASSAY W/OPTIC: CPT | Performed by: FAMILY MEDICINE

## 2024-05-10 PROCEDURE — 1126F AMNT PAIN NOTED NONE PRSNT: CPT | Performed by: FAMILY MEDICINE

## 2024-05-10 PROCEDURE — 3074F SYST BP LT 130 MM HG: CPT | Performed by: FAMILY MEDICINE

## 2024-05-10 PROCEDURE — 99213 OFFICE O/P EST LOW 20 MIN: CPT | Performed by: FAMILY MEDICINE

## 2024-06-04 ENCOUNTER — OFFICE VISIT (OUTPATIENT)
Dept: FAMILY MEDICINE CLINIC | Facility: CLINIC | Age: 59
End: 2024-06-04
Payer: COMMERCIAL

## 2024-06-04 VITALS
BODY MASS INDEX: 27.82 KG/M2 | HEIGHT: 63 IN | SYSTOLIC BLOOD PRESSURE: 134 MMHG | DIASTOLIC BLOOD PRESSURE: 76 MMHG | HEART RATE: 84 BPM | WEIGHT: 157 LBS | TEMPERATURE: 96.4 F | OXYGEN SATURATION: 98 %

## 2024-06-04 DIAGNOSIS — L98.9 SKIN LESION OF BACK: ICD-10-CM

## 2024-06-04 DIAGNOSIS — F41.8 SITUATIONAL ANXIETY: ICD-10-CM

## 2024-06-04 DIAGNOSIS — F43.9 SITUATIONAL STRESS: Primary | ICD-10-CM

## 2024-06-04 PROCEDURE — 1160F RVW MEDS BY RX/DR IN RCRD: CPT | Performed by: NURSE PRACTITIONER

## 2024-06-04 PROCEDURE — 1126F AMNT PAIN NOTED NONE PRSNT: CPT | Performed by: NURSE PRACTITIONER

## 2024-06-04 PROCEDURE — 99214 OFFICE O/P EST MOD 30 MIN: CPT | Performed by: NURSE PRACTITIONER

## 2024-06-04 PROCEDURE — 3075F SYST BP GE 130 - 139MM HG: CPT | Performed by: NURSE PRACTITIONER

## 2024-06-04 PROCEDURE — 3078F DIAST BP <80 MM HG: CPT | Performed by: NURSE PRACTITIONER

## 2024-06-04 PROCEDURE — 1159F MED LIST DOCD IN RCRD: CPT | Performed by: NURSE PRACTITIONER

## 2024-06-04 RX ORDER — ESCITALOPRAM OXALATE 10 MG/1
10 TABLET ORAL DAILY
Qty: 30 TABLET | Refills: 0 | Status: SHIPPED | OUTPATIENT
Start: 2024-06-04

## 2024-06-27 ENCOUNTER — TELEPHONE (OUTPATIENT)
Dept: SLEEP MEDICINE | Facility: HOSPITAL | Age: 59
End: 2024-06-27
Payer: COMMERCIAL

## 2024-06-27 NOTE — TELEPHONE ENCOUNTER
Left message for patient to schedule annual follow up visit at [facility] at [phone], option 1 to schedule.

## 2024-07-03 ENCOUNTER — OFFICE VISIT (OUTPATIENT)
Dept: FAMILY MEDICINE CLINIC | Facility: CLINIC | Age: 59
End: 2024-07-03
Payer: COMMERCIAL

## 2024-07-03 VITALS
HEART RATE: 101 BPM | SYSTOLIC BLOOD PRESSURE: 136 MMHG | WEIGHT: 155 LBS | HEIGHT: 63 IN | TEMPERATURE: 97.1 F | BODY MASS INDEX: 27.46 KG/M2 | DIASTOLIC BLOOD PRESSURE: 72 MMHG | OXYGEN SATURATION: 95 %

## 2024-07-03 DIAGNOSIS — F41.8 SITUATIONAL ANXIETY: ICD-10-CM

## 2024-07-03 DIAGNOSIS — M25.461 SWELLING OF RIGHT KNEE JOINT: ICD-10-CM

## 2024-07-03 DIAGNOSIS — F43.9 SITUATIONAL STRESS: ICD-10-CM

## 2024-07-03 PROCEDURE — 99214 OFFICE O/P EST MOD 30 MIN: CPT | Performed by: NURSE PRACTITIONER

## 2024-07-03 PROCEDURE — 1159F MED LIST DOCD IN RCRD: CPT | Performed by: NURSE PRACTITIONER

## 2024-07-03 PROCEDURE — 1160F RVW MEDS BY RX/DR IN RCRD: CPT | Performed by: NURSE PRACTITIONER

## 2024-07-03 PROCEDURE — 3078F DIAST BP <80 MM HG: CPT | Performed by: NURSE PRACTITIONER

## 2024-07-03 PROCEDURE — 1126F AMNT PAIN NOTED NONE PRSNT: CPT | Performed by: NURSE PRACTITIONER

## 2024-07-03 PROCEDURE — 3075F SYST BP GE 130 - 139MM HG: CPT | Performed by: NURSE PRACTITIONER

## 2024-07-03 RX ORDER — MELOXICAM 15 MG/1
15 TABLET ORAL DAILY PRN
Qty: 30 TABLET | Refills: 0 | Status: SHIPPED | OUTPATIENT
Start: 2024-07-03

## 2024-07-03 RX ORDER — ESCITALOPRAM OXALATE 10 MG/1
10 TABLET ORAL DAILY
Qty: 30 TABLET | Refills: 3 | Status: SHIPPED | OUTPATIENT
Start: 2024-07-03

## 2024-07-03 NOTE — PROGRESS NOTES
Answers submitted by the patient for this visit:  Other (Submitted on 6/26/2024)  Please describe your symptoms.: Follow up appointment  Have you had these symptoms before?: Yes  How long have you been having these symptoms?: Greater than 2 weeks  Primary Reason for Visit (Submitted on 6/26/2024)  What is the primary reason for your visit?: Other  Chief Complaint  Stress (Medication review) and Fall (She fell last week and her Right Leg is swollen.)    Subjective            Valentina Shruti Stevens presents to [facility]  History of Present Illness  1) pt is here for the medication follow-up and refills on the situational stress medication that we started her on which was Lexapro 10 mg and patient is reporting that-is  working good and no SE no issues and then no SI/HI--and doing much better at work    2) then also reports fell again same side--as in May--when saw a different provider--and they did the right knee xrays--and showed OA and tricompartmental changes of the knee-then--and reports the last time she was seen they gave her mobic 15 mg daily x 1 month and worked well and most of the swelling is already went down she still able to ambulate full range of motion it does still have some tenderness in the x-rays did show as above noted      PHQ-2 Total Score:    PHQ-9 Total Score:      Past Medical History:   Diagnosis Date    Allergic     Drug allergies    Arthritis     Asthma     Asthma, intrinsic     CTS (carpal tunnel syndrome)     Depression     Difficulty walking     Diverticulitis of colon     Fatty (change of) liver, not elsewhere classified     GERD (gastroesophageal reflux disease)     Hearing loss     Hyperlipemia     Hypertension     Hypothyroidism     Low back pain     Lumbar radiculopathy 02/15/2022    Memory loss     Menopause     Migraine     Murmur     Osteopenia     Osteoporosis     Pneumonia     Primary central sleep apnea     Scoliosis     Seizures     last when 10  years old    Sleep apnea, obstructive     Visual impairment     Vitamin D deficiency        Allergies   Allergen Reactions    Penicillins Shortness Of Breath and Rash    Sulfa Antibiotics Hives    Cephalexin Rash    Indomethacin Rash        Past Surgical History:   Procedure Laterality Date    COLONOSCOPY  2018    glenna    COLONOSCOPY N/A 10/17/2023    Procedure: COLONOSCOPY WITH COLD SNARE POLYPECTOMY;  Surgeon: Yossi Stephens MD;  Location: [facility] END[facility]COPY;  Service: Gastroenterology;  Laterality: N/A;  DIVERTICULOSIS, COLON POLYP    EPIDURAL BLOCK      ESSURE TUBAL LIGATION      SKIN BIOPSY      THYMECTOMY      TUBAL ABDOMINAL LIGATION      TUMOR REMOVAL      in between heart and lungs        Social History     Tobacco Use    Smoking status: Never     Passive exposure: Never    Smokeless tobacco: Never   Vaping Use    Vaping status: Never Used   Substance Use Topics    Alcohol use: Never    Drug use: Never       Family History   Problem Relation Age of Onset    Hypertension Father     Osteoporosis Father     Alcohol abuse Father     Skin cancer Father     Thyroid cancer Mother     Migraines Mother     Stroke Mother     Skin cancer Mother     Cancer Mother     Skin cancer Brother     Dementia Sister     Migraines Sister     Migraines Sister     Uterine cancer Maternal Grandmother     Colon cancer Neg Hx     Breast cancer Neg Hx     Prostate cancer Neg Hx     Clotting disorder Neg Hx         Health Maintenance Due   Topic Date Due    COVID-19 Vaccine (6 - 2023-24 season) 09/01/2023        Current Outpatient Medications on File Prior to Visit   Medication Sig    albuterol sulfate  (90 Base) MCG/ACT inhaler Inhale 2 puffs Every 4 (Four) Hours As Needed for Wheezing.    Calcium + Vitamin D3 600-10 MG-MCG tablet TAKE 1 TABLET BY MOUTH TWICE DAILY    Fremanezumab-vfrm (Ajovy) 225 MG/1.5ML solution auto-injector Inject 225 mg under the skin into the appropriate area as directed Every 30 (Thirty) Days.     HYDROcodone-acetaminophen (NORCO) 5-325 MG per tablet Take 1 tablet by mouth 2 (Two) Times a Day As Needed.    ibandronate (BONIVA) 150 MG tablet Take 1 tablet by mouth Every 30 (Thirty) Days.    levothyroxine (SYNTHROID, LEVOTHROID) 75 MCG tablet Take 1 tablet by mouth Daily.    lisinopril (PRINIVIL,ZESTRIL) 10 MG tablet Take 1 tablet by mouth 2 (Two) Times a Day.    multivitamin with minerals tablet tablet Take 1 tablet by mouth Daily.    pramipexole (MIRAPEX) 0.5 MG tablet Take 1 tablet by mouth Every Night.    Pulmicort Flexhaler 90 MCG/ACT inhaler INHALE ONE PUFF BY MOUTH TWICE DAILY. **RINSE MOUTH AFTER USE**    rizatriptan (MAXALT) 10 MG tablet Take 1 tablet by mouth 1 (One) Time As Needed.    vitamin E 100 UNIT capsule Take 1 capsule by mouth Daily.    [DISCONTINUED] escitalopram (Lexapro) 10 MG tablet Take 1 tablet by mouth Daily.    [DISCONTINUED] meloxicam (MOBIC) 15 MG tablet Take 1 tablet by mouth Daily.     No current facility-administered medications on file prior to visit.       Immunization History   Administered Date(s) Administered    31-influenza Vac Quardvalent Preservativ 11/05/2015, 11/09/2016, 10/19/2021, 09/30/2022    COVID-19 (PFIZER) Purple Cap Monovalent 05/27/2021, 06/17/2021, 06/17/2021    COVID-19 (UNSPECIFIED) 05/27/2021, 06/17/2021    Flu Vaccine Intradermal Quad 18-64YR 09/01/2023    Fluzone (or Fluarix & Flulaval for VFC) >6mos 10/19/2021, 09/30/2022    Hepatitis A 05/17/2018, 12/20/2018    Influenza Seasonal Injectable 10/10/2014    Influenza, Unspecified 10/19/2020, 10/19/2020, 09/01/2023    Pneumococcal Conjugate 20-Valent (PCV20) 08/09/2023    Pneumococcal Polysaccharide (PPSV23) 05/10/2022    Shingrix 11/12/2021, 01/21/2022    Tdap 03/20/2018, 10/19/2020    Zoster, Unspecified 11/12/2021, 01/21/2022       Review of Systems   Musculoskeletal:  Positive for arthralgias and joint swelling. Negative for gait problem.   Psychiatric/Behavioral:  Negative for decreased  "concentration, self-injury and suicidal ideas. The patient is nervous/anxious.         Objective     /72   Pulse 101   Temp 97.1 °F (36.2 °C) (Temporal)   Ht 160 cm (63\")   Wt 70.3 kg (155 lb)   SpO2 95%   BMI 27.46 kg/m²       Physical Exam  Vitals and nursing note reviewed. Exam conducted with a chaperone present (Has her 3 grandchildren with her 2 and strollers and 1 age 6).   Constitutional:       Appearance: Normal appearance.   HENT:      Head: Normocephalic.      Right Ear: External ear normal.      Left Ear: External ear normal.      Nose: Nose normal.      Mouth/Throat:      Mouth: Mucous membranes are moist.   Eyes:      Pupils: Pupils are equal, round, and reactive to light.   Cardiovascular:      Rate and Rhythm: Normal rate and regular rhythm.      Heart sounds: Normal heart sounds.   Pulmonary:      Effort: Pulmonary effort is normal.      Breath sounds: Normal breath sounds.   Abdominal:      Palpations: Abdomen is soft.   Musculoskeletal:         General: Tenderness and signs of injury present.      Cervical back: Normal range of motion.      Right knee: Bony tenderness and crepitus present. No swelling. Normal range of motion. Tenderness present.   Skin:     General: Skin is warm and dry.   Neurological:      Mental Status: She is alert and oriented to person, place, and time.   Psychiatric:         Mood and Affect: Mood normal.         Behavior: Behavior normal.         Thought Content: Thought content normal.         Judgment: Judgment normal.         Result Review :     The following data was reviewed by: DEMAR Eden on 07/03/2024:                 XR Knee 1 or 2 View Right (In Office) (05/06/2024 09:25)      Assessment and Plan      Diagnoses and all orders for this visit:    1. Situational stress  -     escitalopram (Lexapro) 10 MG tablet; Take 1 tablet by mouth Daily.  Dispense: 30 tablet; Refill: 3    2. Situational anxiety  -     escitalopram (Lexapro) 10 MG tablet; " Take 1 tablet by mouth Daily.  Dispense: 30 tablet; Refill: 3    3. Swelling of right knee joint  -     meloxicam (MOBIC) 15 MG tablet; Take 1 tablet by mouth Daily As Needed for Moderate Pain or Mild Pain.  Dispense: 30 tablet; Refill: 0    Refills on the Lexapro 10 mg daily for the next 4 months patient will follow-up if needed prior to that or when refills are due    Also refilled the meloxicam 15 mg that she can just use as needed as she said it worked very effectively in the past and she already feels as though it is improving quite a bit from when the fall happened last week        Follow Up     Return if symptoms worsen or fail to improve.    Patient was given instructions and counseling regarding her condition or for health maintenance advice. Please see specific information pulled into the AVS if appropriate.            Valentina Stevens  reports that she has never smoked. She has never been exposed to tobacco smoke. She has never used smokeless tobacco. I have educated her on the risk of diseases from using tobacco products such as cancer, COPD, and heart disease.

## 2024-08-06 ENCOUNTER — PATIENT MESSAGE (OUTPATIENT)
Dept: FAMILY MEDICINE CLINIC | Facility: CLINIC | Age: 59
End: 2024-08-06
Payer: COMMERCIAL

## 2024-08-06 NOTE — TELEPHONE ENCOUNTER
From: Valentina Stevens  To: Sera Loza  Sent: 8/6/2024 7:26 AM EDT  Subject: Medicine     Is there some kind of medicine I could take over-the-counter for sinus congestion?

## 2024-08-07 ENCOUNTER — OFFICE VISIT (OUTPATIENT)
Dept: FAMILY MEDICINE CLINIC | Facility: CLINIC | Age: 59
End: 2024-08-07
Payer: COMMERCIAL

## 2024-08-07 VITALS
SYSTOLIC BLOOD PRESSURE: 126 MMHG | BODY MASS INDEX: 26.57 KG/M2 | DIASTOLIC BLOOD PRESSURE: 68 MMHG | WEIGHT: 150 LBS | OXYGEN SATURATION: 96 % | HEART RATE: 68 BPM

## 2024-08-07 DIAGNOSIS — R09.81 CHRONIC NASAL CONGESTION: Primary | ICD-10-CM

## 2024-08-07 DIAGNOSIS — U07.1 COVID-19 VIRUS INFECTION: ICD-10-CM

## 2024-08-07 LAB
EXPIRATION DATE: ABNORMAL
FLUAV AG UPPER RESP QL IA.RAPID: NOT DETECTED
FLUBV AG UPPER RESP QL IA.RAPID: NOT DETECTED
INTERNAL CONTROL: ABNORMAL
Lab: ABNORMAL
SARS-COV-2 AG UPPER RESP QL IA.RAPID: DETECTED

## 2024-08-07 PROCEDURE — 99213 OFFICE O/P EST LOW 20 MIN: CPT | Performed by: FAMILY MEDICINE

## 2024-08-07 PROCEDURE — 87428 SARSCOV & INF VIR A&B AG IA: CPT | Performed by: FAMILY MEDICINE

## 2024-08-07 PROCEDURE — 3078F DIAST BP <80 MM HG: CPT | Performed by: FAMILY MEDICINE

## 2024-08-07 PROCEDURE — 1160F RVW MEDS BY RX/DR IN RCRD: CPT | Performed by: FAMILY MEDICINE

## 2024-08-07 PROCEDURE — 3074F SYST BP LT 130 MM HG: CPT | Performed by: FAMILY MEDICINE

## 2024-08-07 PROCEDURE — 1126F AMNT PAIN NOTED NONE PRSNT: CPT | Performed by: FAMILY MEDICINE

## 2024-08-07 PROCEDURE — 1159F MED LIST DOCD IN RCRD: CPT | Performed by: FAMILY MEDICINE

## 2024-08-07 RX ORDER — ESCITALOPRAM OXALATE 10 MG/1
1 TABLET ORAL DAILY
COMMUNITY

## 2024-08-07 NOTE — PROGRESS NOTES
Chief Complaint  Cough (3-4days), Shortness of Breath (Has asthma ), and Nasal Congestion    Subjective      Valentina Stevens is a 58 y.o. female who presents to [facility]     Cough, shortness of breath, and nasal congestion going on for about 3 to 4 days.  She has a diagnosis of asthma. No fever or chills, no sore throat, no new body aches. Cough is sometimes productive.    Objective   Vital Signs:   Vitals:    08/07/24 1247   BP: 126/68   Pulse: 68   SpO2: 96%   Weight: 68 kg (150 lb)     Body mass index is 26.57 kg/m².    Wt Readings from Last 3 Encounters:   08/07/24 68 kg (150 lb)   07/03/24 70.3 kg (155 lb)   06/04/24 71.2 kg (157 lb)     BP Readings from Last 3 Encounters:   08/07/24 126/68   07/03/24 136/72   06/04/24 134/76       Health Maintenance   Topic Date Due    COVID-19 Vaccine (6 - 2023-24 season) 09/01/2023    INFLUENZA VACCINE  08/01/2024    ANNUAL PHYSICAL  09/11/2024    LIPID PANEL  03/11/2025    BMI FOLLOWUP  04/01/2025    MAMMOGRAM  01/22/2026    DXA SCAN  04/19/2026    PAP SMEAR  10/30/2026    COLORECTAL CANCER SCREENING  10/17/2028    TDAP/TD VACCINES (3 - Td or Tdap) 10/19/2030    HEPATITIS C SCREENING  Completed    Pneumococcal Vaccine 0-64  Completed    ZOSTER VACCINE  Completed       Physical Exam  Vitals and nursing note reviewed.   Constitutional:       General: She is not in acute distress.     Appearance: Normal appearance.   HENT:      Head: Normocephalic and atraumatic.      Nose: Congestion present.      Mouth/Throat:      Pharynx: No oropharyngeal exudate or posterior oropharyngeal erythema.   Cardiovascular:      Rate and Rhythm: Normal rate and regular rhythm.      Heart sounds: No murmur heard.  Pulmonary:      Effort: Pulmonary effort is normal. No respiratory distress.      Breath sounds: Normal breath sounds. No wheezing.   Skin:     General: Skin is warm and dry.   Neurological:      Mental Status: She is alert.   Psychiatric:          Mood and Affect: Mood normal.         Behavior: Behavior normal.          Result Review :  The following data was reviewed by: Trent Boyd MD on 08/07/2024:       Lab Results   Lab 08/07/24  1310   SARS ANTIGEN Detected*      Lab Results   Lab 08/07/24  1310   INFLUENZA A ANTIGEN QASIM Not Detected                Lab Results   Lab 08/07/24  1310   INFLUENZA B ANTIGEN QASIM Not Detected                                         Procedures          Assessment & Plan  Chronic nasal congestion    COVID-19 virus infection  Reviewed covid isolation guidelines. Offered paxlovid to patient. Continue supportive care such as hydration and rest. Can use over the counter medications such as an antihistamine or cough suppressant to help with symptoms.     Orders Placed This Encounter   Procedures    POCT SARS-CoV-2 Antigen QASIM + Flu     New Medications Ordered This Visit   Medications    Nirmatrelvir & Ritonavir, 300mg/100mg, (PAXLOVID)     Sig: Take 3 tablets by mouth 2 (Two) Times a Day for 5 days.     Dispense:  30 tablet     Refill:  0     DO NOT use in patients < 12 years or in patients with eGFR < 30. Dose adjustment to 2 tablets BID required for patients with eGFR >/= 30 - < 60.     Order Specific Question:   I have reviewed the patient's medication list prior to prescribing Paxlovid due to the risk of serious adverse reactions secondary to drug interactions. I will consult with pharmacy, if needed     Answer:   Patient's medication list reviewed                    FOLLOW UP  Return if symptoms worsen or fail to improve.  Patient was given instructions and counseling regarding her condition or for health maintenance advice. Please see specific information pulled into the AVS if appropriate.       Trent Boyd MD  08/07/24  13:21 EDT    CURRENT & DISCONTINUED MEDICATIONS  Current Outpatient Medications   Medication Instructions    Ajovy 225 mg, Subcutaneous, Every 30 Days    albuterol sulfate  (90 Base)  MCG/ACT inhaler 2 puffs, Inhalation, Every 4 Hours PRN    Calcium + Vitamin D3 600-10 MG-MCG tablet TAKE 1 TABLET BY MOUTH TWICE DAILY    escitalopram (LEXAPRO) 10 MG tablet 1 tablet, Oral, Daily    escitalopram (LEXAPRO) 10 mg, Oral, Daily    HYDROcodone-acetaminophen (NORCO) 5-325 MG per tablet 1 tablet, Oral, 2 Times Daily PRN    ibandronate (BONIVA) 150 mg, Oral, Every 30 Days    levothyroxine (SYNTHROID, LEVOTHROID) 75 mcg, Oral, Daily    lisinopril (PRINIVIL,ZESTRIL) 10 mg, Oral, 2 Times Daily    meloxicam (MOBIC) 15 mg, Oral, Daily PRN    multivitamin with minerals tablet tablet 1 tablet, Oral, Daily    Nirmatrelvir & Ritonavir, 300mg/100mg, (PAXLOVID) 3 tablets, Oral, 2 Times Daily    pramipexole (MIRAPEX) 0.5 mg, Oral, Nightly    Pulmicort Flexhaler 90 MCG/ACT inhaler INHALE ONE PUFF BY MOUTH TWICE DAILY. **RINSE MOUTH AFTER USE**    rizatriptan (MAXALT) 10 mg, Oral, Once As Needed    vitamin E 100 Units, Oral, Daily       There are no discontinued medications.

## 2024-08-15 DIAGNOSIS — Z87.39 HISTORY OF OSTEOPOROSIS: ICD-10-CM

## 2024-08-15 DIAGNOSIS — E03.9 HYPOTHYROIDISM, UNSPECIFIED TYPE: ICD-10-CM

## 2024-08-15 DIAGNOSIS — M85.80 OSTEOPENIA, UNSPECIFIED LOCATION: ICD-10-CM

## 2024-08-15 DIAGNOSIS — I10 HYPERTENSION, UNSPECIFIED TYPE: ICD-10-CM

## 2024-08-15 RX ORDER — LEVOTHYROXINE SODIUM 0.07 MG/1
75 TABLET ORAL DAILY
Qty: 30 TABLET | Refills: 0 | Status: SHIPPED | OUTPATIENT
Start: 2024-08-15

## 2024-08-15 RX ORDER — LISINOPRIL 10 MG/1
10 TABLET ORAL DAILY
Qty: 30 TABLET | Refills: 0 | Status: SHIPPED | OUTPATIENT
Start: 2024-08-15

## 2024-08-15 RX ORDER — BUDESONIDE 90 UG/1
AEROSOL, POWDER RESPIRATORY (INHALATION)
Qty: 1 EACH | Refills: 0 | Status: SHIPPED | OUTPATIENT
Start: 2024-08-15

## 2024-08-15 RX ORDER — RIZATRIPTAN BENZOATE 10 MG/1
TABLET ORAL
Qty: 9 TABLET | Refills: 11 | OUTPATIENT
Start: 2024-08-15

## 2024-08-15 RX ORDER — IBANDRONATE SODIUM 150 MG/1
150 TABLET, FILM COATED ORAL
Qty: 1 TABLET | Refills: 0 | Status: SHIPPED | OUTPATIENT
Start: 2024-08-15

## 2024-08-15 RX ORDER — FREMANEZUMAB-VFRM 225 MG/1.5ML
225 INJECTION SUBCUTANEOUS
Qty: 4.5 ML | Refills: 11 | OUTPATIENT
Start: 2024-08-15

## 2024-09-09 ENCOUNTER — OFFICE VISIT (OUTPATIENT)
Dept: FAMILY MEDICINE CLINIC | Facility: CLINIC | Age: 59
End: 2024-09-09
Payer: COMMERCIAL

## 2024-09-09 VITALS
BODY MASS INDEX: 27.71 KG/M2 | OXYGEN SATURATION: 98 % | TEMPERATURE: 98 F | WEIGHT: 156.4 LBS | HEART RATE: 88 BPM | SYSTOLIC BLOOD PRESSURE: 122 MMHG | DIASTOLIC BLOOD PRESSURE: 70 MMHG

## 2024-09-09 DIAGNOSIS — M47.816 LUMBAR SPONDYLOSIS: ICD-10-CM

## 2024-09-09 DIAGNOSIS — R26.2 DIFFICULTY WALKING: ICD-10-CM

## 2024-09-09 DIAGNOSIS — R29.6 FREQUENT FALLS: ICD-10-CM

## 2024-09-09 DIAGNOSIS — G25.81 RLS (RESTLESS LEGS SYNDROME): ICD-10-CM

## 2024-09-09 DIAGNOSIS — R13.10 DYSPHAGIA, UNSPECIFIED TYPE: ICD-10-CM

## 2024-09-09 DIAGNOSIS — I10 HYPERTENSION, UNSPECIFIED TYPE: Primary | ICD-10-CM

## 2024-09-09 DIAGNOSIS — E55.9 VITAMIN D DEFICIENCY: ICD-10-CM

## 2024-09-09 DIAGNOSIS — M54.16 LUMBAR RADICULOPATHY: ICD-10-CM

## 2024-09-09 DIAGNOSIS — E03.9 HYPOTHYROIDISM, UNSPECIFIED TYPE: ICD-10-CM

## 2024-09-09 DIAGNOSIS — G56.02 LEFT CARPAL TUNNEL SYNDROME: ICD-10-CM

## 2024-09-09 DIAGNOSIS — M81.0 AGE-RELATED OSTEOPOROSIS WITHOUT CURRENT PATHOLOGICAL FRACTURE: ICD-10-CM

## 2024-09-09 DIAGNOSIS — M51.36 DDD (DEGENERATIVE DISC DISEASE), LUMBAR: ICD-10-CM

## 2024-09-09 PROCEDURE — 3074F SYST BP LT 130 MM HG: CPT | Performed by: NURSE PRACTITIONER

## 2024-09-09 PROCEDURE — 1160F RVW MEDS BY RX/DR IN RCRD: CPT | Performed by: NURSE PRACTITIONER

## 2024-09-09 PROCEDURE — 1159F MED LIST DOCD IN RCRD: CPT | Performed by: NURSE PRACTITIONER

## 2024-09-09 PROCEDURE — 1125F AMNT PAIN NOTED PAIN PRSNT: CPT | Performed by: NURSE PRACTITIONER

## 2024-09-09 PROCEDURE — 3078F DIAST BP <80 MM HG: CPT | Performed by: NURSE PRACTITIONER

## 2024-09-09 PROCEDURE — 99214 OFFICE O/P EST MOD 30 MIN: CPT | Performed by: NURSE PRACTITIONER

## 2024-09-09 RX ORDER — LEVOTHYROXINE SODIUM 75 UG/1
75 TABLET ORAL DAILY
Qty: 30 TABLET | Refills: 5 | Status: SHIPPED | OUTPATIENT
Start: 2024-09-09

## 2024-09-09 RX ORDER — LISINOPRIL 10 MG/1
10 TABLET ORAL DAILY
Qty: 30 TABLET | Refills: 5 | Status: SHIPPED | OUTPATIENT
Start: 2024-09-09

## 2024-09-09 RX ORDER — PRAMIPEXOLE DIHYDROCHLORIDE 0.5 MG/1
0.5 TABLET ORAL NIGHTLY
Qty: 30 TABLET | Refills: 5 | Status: SHIPPED | OUTPATIENT
Start: 2024-09-09

## 2024-09-09 RX ORDER — IBANDRONATE SODIUM 150 MG/1
150 TABLET, FILM COATED ORAL
Qty: 1 TABLET | Refills: 11 | Status: SHIPPED | OUTPATIENT
Start: 2024-09-09

## 2024-09-09 NOTE — PROGRESS NOTES
Chief Complaint  Hypothyroidism, Hypertension, Anxiety, and Wrist Pain (Left. )    Subjective            Valentina Shruti Stevens presents to [facility]  History of Present Illness  Patient is here today for medication refills on the chronic comorbid conditions and will need labs ordered as well    -- Hypertension: Tolerating medication well with no side effects no issues reported denies chest pain shortness of breath syncopal episodes dizziness or lightheadedness overall stable    --Hypothyroidism: Tolerating medication well with no side effects no issues reported no hair or nail skin issues reported overall stable    -- Restless leg syndrome: Tolerating medication well with no side effects no issues reported receiving good efficacy overall stable    -- History of osteoporosis and osteopenia: Tolerating medication well with no side effects no issues reported overall stable    _____________________________________    And then also patient thinks it may be carpal tunnel related but she is having persistent left wrist pain that is worsening--Hx of the carpal tunnel and needs referral renewed --DR Oliveira--and pt already wears the braces bilat at night     ______________________________________    Also needs a paper filled out for the social security office and it was in reference to any assistive devices that were prescribed and the patient reports that she told them that none have been prescribed but they still gave her the paperwork to be filled out:    Then also pt reports for approx 2 yrs now uses a cane to ambulate R/T the easy falls with  her lumbar radicular s/s--and she purchased this on her own--     Then also uses the walker at the grocery with continuous walking for approx 30 min and then son will shop with her and he pushes the cart and helps with the shopping    Then also has the scooter at home--was her husbands--and he's   in [employer]-and if pt is walking long distances like gong to  the zoo--will use the scooter--and pt started doing this after going one time and her lower back was in severe pain and then BLE were numbness and pain--and she could hardly ambulate--and she is under the care of pain mgt as well    Answers submitted by the patient for this visit:  Other (Submitted on 9/2/2024)  Please describe your symptoms.: To get meds refilled  Have you had these symptoms before?: No  How long have you been having these symptoms?: 1-4 days  Primary Reason for Visit (Submitted on 9/2/2024)  What is the primary reason for your visit?: Other      PHQ-2 Total Score:    PHQ-9 Total Score:      Past Medical History:   Diagnosis Date    Allergic     Drug allergies    Arthritis     Asthma     Asthma, intrinsic     CTS (carpal tunnel syndrome)     Depression     Difficulty walking     Diverticulitis of colon     Fatty (change of) liver, not elsewhere classified     GERD (gastroesophageal reflux disease)     Hearing loss     Hyperlipemia     Hypertension     Hypothyroidism     Low back pain     Lumbar radiculopathy 02/15/2022    Memory loss     Menopause     Migraine     Murmur     Osteopenia     Osteoporosis     Pneumonia     Primary central sleep apnea     Scoliosis     Seizures     last when 10 years old    Sleep apnea, obstructive     Visual impairment     Vitamin D deficiency        Allergies   Allergen Reactions    Penicillins Shortness Of Breath and Rash    Sulfa Antibiotics Hives    Cephalexin Rash    Indomethacin Rash        Past Surgical History:   Procedure Laterality Date    COLONOSCOPY  2018    glenna    COLONOSCOPY N/A 10/17/2023    Procedure: COLONOSCOPY WITH COLD SNARE POLYPECTOMY;  Surgeon: Yossi Stephens MD;  Location: formerly [facility];  Service: Gastroenterology;  Laterality: N/A;  DIVERTICULOSIS, COLON POLYP    EPIDURAL BLOCK      ESSURE TUBAL LIGATION      SKIN BIOPSY      THYMECTOMY      TUBAL ABDOMINAL LIGATION      TUMOR REMOVAL      in between heart and lungs         Social History     Tobacco Use    Smoking status: Never     Passive exposure: Never    Smokeless tobacco: Never   Vaping Use    Vaping status: Never Used   Substance Use Topics    Alcohol use: Never    Drug use: Never       Family History   Problem Relation Age of Onset    Hypertension Father     Osteoporosis Father     Alcohol abuse Father     Skin cancer Father     Thyroid cancer Mother     Migraines Mother     Stroke Mother     Skin cancer Mother     Cancer Mother     Skin cancer Brother     Dementia Sister     Migraines Sister     Migraines Sister     Uterine cancer Maternal Grandmother     Colon cancer Neg Hx     Breast cancer Neg Hx     Prostate cancer Neg Hx     Clotting disorder Neg Hx         Health Maintenance Due   Topic Date Due    COVID-19 Vaccine (6 - 2023-24 season) 09/01/2024    INFLUENZA VACCINE  08/01/2024        Current Outpatient Medications on File Prior to Visit   Medication Sig    albuterol sulfate  (90 Base) MCG/ACT inhaler Inhale 2 puffs Every 4 (Four) Hours As Needed for Wheezing.    budesonide (Pulmicort Flexhaler) 90 MCG/ACT inhaler INHALE ONE PUFF BY MOUTH TWICE DAILY. **RINSE MOUTH AFTER USE**    Calcium + Vitamin D3 600-10 MG-MCG tablet TAKE 1 TABLET BY MOUTH TWICE DAILY    escitalopram (Lexapro) 10 MG tablet Take 1 tablet by mouth Daily.    escitalopram (LEXAPRO) 10 MG tablet Take 1 tablet by mouth Daily.    Fremanezumab-vfrm (Ajovy) 225 MG/1.5ML solution auto-injector Inject 225 mg under the skin into the appropriate area as directed Every 30 (Thirty) Days.    HYDROcodone-acetaminophen (NORCO) 5-325 MG per tablet Take 1 tablet by mouth 2 (Two) Times a Day As Needed.    meloxicam (MOBIC) 15 MG tablet Take 1 tablet by mouth Daily As Needed for Moderate Pain or Mild Pain.    multivitamin with minerals tablet tablet Take 1 tablet by mouth Daily.    rizatriptan (MAXALT) 10 MG tablet Take 1 tablet by mouth 1 (One) Time As Needed.    vitamin E 100 UNIT capsule Take 1 capsule by  mouth Daily.    [DISCONTINUED] ibandronate (BONIVA) 150 MG tablet Take 1 tablet by mouth Every 30 (Thirty) Days.    [DISCONTINUED] levothyroxine (SYNTHROID, LEVOTHROID) 75 MCG tablet Take 1 tablet by mouth Daily.    [DISCONTINUED] lisinopril (PRINIVIL,ZESTRIL) 10 MG tablet TAKE 1 TABLET BY MOUTH EVERY DAY    [DISCONTINUED] pramipexole (MIRAPEX) 0.5 MG tablet Take 1 tablet by mouth Every Night.     No current facility-administered medications on file prior to visit.       Immunization History   Administered Date(s) Administered    31-influenza Vac Quardvalent Preservativ 11/05/2015, 11/09/2016, 10/19/2021, 09/30/2022    COVID-19 (PFIZER) Purple Cap Monovalent 05/27/2021, 06/17/2021, 06/17/2021    COVID-19 (UNSPECIFIED) 05/27/2021, 06/17/2021    Flu Vaccine Intradermal Quad 18-64YR 09/01/2023    Fluzone (or Fluarix & Flulaval for VFC) >6mos 10/19/2021, 09/30/2022    Hepatitis A 05/17/2018, 12/20/2018    Influenza Seasonal Injectable 10/10/2014    Influenza, Unspecified 10/19/2020, 10/19/2020, 09/01/2023    Pneumococcal Conjugate 20-Valent (PCV20) 08/09/2023    Pneumococcal Polysaccharide (PPSV23) 05/10/2022    Shingrix 11/12/2021, 01/21/2022    Tdap 03/20/2018, 10/19/2020    Zoster, Unspecified 11/12/2021, 01/21/2022       Review of Systems   Constitutional:  Negative for fatigue.   HENT:  Positive for trouble swallowing.         Just a little pocket in the throat and had the EGD and the pt sees gastro    Eyes:  Negative for blurred vision and double vision.   Respiratory:  Negative for shortness of breath.    Cardiovascular:  Negative for chest pain.   Gastrointestinal:  Negative for abdominal pain and blood in stool.   Endocrine: Negative for polydipsia, polyphagia and polyuria.   Genitourinary:  Negative for dysuria and vaginal bleeding.   Musculoskeletal:  Positive for back pain.        Balance issues with the lumbar spine--and uses a cane to ambulate    Neurological:  Negative for dizziness, seizures, syncope and  light-headedness.   Hematological:  Negative for adenopathy.   Psychiatric/Behavioral:  Negative for self-injury and suicidal ideas.         Objective     /70 (BP Location: Left arm, Patient Position: Sitting)   Pulse 88   Temp 98 °F (36.7 °C) (Temporal)   Wt 70.9 kg (156 lb 6.4 oz)   SpO2 98%   BMI 27.71 kg/m²       Physical Exam  Vitals and nursing note reviewed.   Constitutional:       Appearance: Normal appearance.   HENT:      Head: Normocephalic.      Right Ear: External ear normal.      Left Ear: External ear normal.      Nose: Nose normal.      Mouth/Throat:      Mouth: Mucous membranes are moist.   Eyes:      Pupils: Pupils are equal, round, and reactive to light.   Cardiovascular:      Rate and Rhythm: Normal rate and regular rhythm.      Heart sounds: Normal heart sounds.   Pulmonary:      Effort: Pulmonary effort is normal.      Breath sounds: Normal breath sounds.   Abdominal:      Palpations: Abdomen is soft.   Musculoskeletal:         General: Tenderness present. No signs of injury.      Left wrist: Tenderness and bony tenderness present. Decreased range of motion.        Arms:       Cervical back: Tenderness, bony tenderness and crepitus present. Pain with movement present.      Lumbar back: Tenderness and bony tenderness present. Decreased range of motion. Positive right straight leg raise test and positive left straight leg raise test.        Back:       Comments: Ambulates with cane    Skin:     General: Skin is warm and dry.   Neurological:      Mental Status: She is alert and oriented to person, place, and time.   Psychiatric:         Mood and Affect: Mood normal.         Behavior: Behavior normal.         Thought Content: Thought content normal.         Judgment: Judgment normal.         Result Review :                           Assessment and Plan      Diagnoses and all orders for this visit:    1. Hypertension, unspecified type (Primary)  -     lisinopril (PRINIVIL,ZESTRIL) 10 MG  tablet; Take 1 tablet by mouth Daily.  Dispense: 30 tablet; Refill: 5  -     CBC & Differential  -     Comprehensive Metabolic Panel  -     Lipid Panel  -     TSH Rfx On Abnormal To Free T4  -     Urinalysis With Culture If Indicated -    2. Hypothyroidism, unspecified type  -     levothyroxine (SYNTHROID, LEVOTHROID) 75 MCG tablet; Take 1 tablet by mouth Daily.  Dispense: 30 tablet; Refill: 5  -     TSH Rfx On Abnormal To Free T4    3. RLS (restless legs syndrome)  -     pramipexole (MIRAPEX) 0.5 MG tablet; Take 1 tablet by mouth Every Night.  Dispense: 30 tablet; Refill: 5  -     CBC & Differential  -     Comprehensive Metabolic Panel  -     TSH Rfx On Abnormal To Free T4    4. Age-related osteoporosis without current pathological fracture  -     ibandronate (BONIVA) 150 MG tablet; Take 1 tablet by mouth Every 30 (Thirty) Days.  Dispense: 1 tablet; Refill: 11  -     Comprehensive Metabolic Panel    5. Vitamin D deficiency  -     Vitamin D,25-Hydroxy    6. Dysphagia, unspecified type  -     Ambulatory Referral to Gastroenterology    7. Left carpal tunnel syndrome  -     Ambulatory Referral to Orthopedic Surgery    8. DDD (degenerative disc disease), lumbar    9. Lumbar radiculopathy    10. Lumbar spondylosis    11. Difficulty walking    12. Frequent falls    Dx codes 9-12 are for the paperwork I filled out for pt today --see scanned in paperwork and this is for the Social Security office and I did write out to the side that none of these assistive devices have been prescribed but patient already had the scooter in the home for her  and he is in shelter and she has been using it whenever they go on long trips where she can do a large amount of walking like going to the zoo with her family and then utilizes the walker in the home as well as at the grocery and then also uses the cane all the time except for at work and at work she holds onto the countertops    Refills of the meds as noted above    And labs ordered  as notes above          Follow Up     Return in about 6 months (around 3/9/2025), or if symptoms worsen or fail to improve, for Recheck, fasting labs and refills.    Patient was given instructions and counseling regarding her condition or for health maintenance advice. Please see specific information pulled into the AVS if appropriate.            Valentina Stevens  reports that she has never smoked. She has never been exposed to tobacco smoke. She has never used smokeless tobacco. I have educated her on the risk of diseases from using tobacco products such as cancer, COPD, and heart disease.

## 2024-09-11 ENCOUNTER — OFFICE VISIT (OUTPATIENT)
Dept: NEUROLOGY | Facility: CLINIC | Age: 59
End: 2024-09-11
Payer: COMMERCIAL

## 2024-09-11 ENCOUNTER — CLINICAL SUPPORT (OUTPATIENT)
Dept: FAMILY MEDICINE CLINIC | Facility: CLINIC | Age: 59
End: 2024-09-11
Payer: COMMERCIAL

## 2024-09-11 VITALS
SYSTOLIC BLOOD PRESSURE: 125 MMHG | DIASTOLIC BLOOD PRESSURE: 65 MMHG | HEART RATE: 85 BPM | BODY MASS INDEX: 27.96 KG/M2 | HEIGHT: 63 IN | WEIGHT: 157.8 LBS

## 2024-09-11 DIAGNOSIS — G43.719 INTRACTABLE CHRONIC MIGRAINE WITHOUT AURA AND WITHOUT STATUS MIGRAINOSUS: Primary | ICD-10-CM

## 2024-09-11 DIAGNOSIS — E78.2 MIXED HYPERLIPIDEMIA: ICD-10-CM

## 2024-09-11 DIAGNOSIS — E03.9 HYPOTHYROIDISM, UNSPECIFIED TYPE: Primary | ICD-10-CM

## 2024-09-11 DIAGNOSIS — I10 HYPERTENSION, UNSPECIFIED TYPE: ICD-10-CM

## 2024-09-11 DIAGNOSIS — M19.90 ARTHRITIS: ICD-10-CM

## 2024-09-11 LAB
25(OH)D3 SERPL-MCNC: 33.7 NG/ML (ref 30–100)
ALBUMIN SERPL-MCNC: 4.4 G/DL (ref 3.5–5.2)
ALBUMIN/GLOB SERPL: 1.5 G/DL
ALP SERPL-CCNC: 46 U/L (ref 39–117)
ALT SERPL W P-5'-P-CCNC: 40 U/L (ref 1–33)
ANION GAP SERPL CALCULATED.3IONS-SCNC: 11.3 MMOL/L (ref 5–15)
AST SERPL-CCNC: 31 U/L (ref 1–32)
BASOPHILS # BLD AUTO: 0.07 10*3/MM3 (ref 0–0.2)
BASOPHILS NFR BLD AUTO: 1 % (ref 0–1.5)
BILIRUB SERPL-MCNC: 0.4 MG/DL (ref 0–1.2)
BILIRUB UR QL STRIP: NEGATIVE
BUN SERPL-MCNC: 13 MG/DL (ref 8–23)
BUN/CREAT SERPL: 16.3 (ref 7–25)
CALCIUM SPEC-SCNC: 9.6 MG/DL (ref 8.6–10.5)
CHLORIDE SERPL-SCNC: 103 MMOL/L (ref 98–107)
CHOLEST SERPL-MCNC: 184 MG/DL (ref 0–200)
CLARITY UR: CLEAR
CO2 SERPL-SCNC: 24.7 MMOL/L (ref 22–29)
COLOR UR: YELLOW
CREAT SERPL-MCNC: 0.8 MG/DL (ref 0.57–1)
DEPRECATED RDW RBC AUTO: 41 FL (ref 37–54)
EGFRCR SERPLBLD CKD-EPI 2021: 84.5 ML/MIN/1.73
EOSINOPHIL # BLD AUTO: 0.16 10*3/MM3 (ref 0–0.4)
EOSINOPHIL NFR BLD AUTO: 2.3 % (ref 0.3–6.2)
ERYTHROCYTE [DISTWIDTH] IN BLOOD BY AUTOMATED COUNT: 13.5 % (ref 12.3–15.4)
GLOBULIN UR ELPH-MCNC: 3 GM/DL
GLUCOSE SERPL-MCNC: 94 MG/DL (ref 65–99)
GLUCOSE UR STRIP-MCNC: NEGATIVE MG/DL
HCT VFR BLD AUTO: 46.4 % (ref 34–46.6)
HDLC SERPL-MCNC: 36 MG/DL (ref 40–60)
HGB BLD-MCNC: 15.3 G/DL (ref 12–15.9)
HGB UR QL STRIP.AUTO: NEGATIVE
HOLD SPECIMEN: NORMAL
IMM GRANULOCYTES # BLD AUTO: 0.02 10*3/MM3 (ref 0–0.05)
IMM GRANULOCYTES NFR BLD AUTO: 0.3 % (ref 0–0.5)
KETONES UR QL STRIP: NEGATIVE
LDLC SERPL CALC-MCNC: 101 MG/DL (ref 0–100)
LDLC/HDLC SERPL: 2.57 {RATIO}
LEUKOCYTE ESTERASE UR QL STRIP.AUTO: NEGATIVE
LYMPHOCYTES # BLD AUTO: 2.81 10*3/MM3 (ref 0.7–3.1)
LYMPHOCYTES NFR BLD AUTO: 39.9 % (ref 19.6–45.3)
MCH RBC QN AUTO: 28.2 PG (ref 26.6–33)
MCHC RBC AUTO-ENTMCNC: 33 G/DL (ref 31.5–35.7)
MCV RBC AUTO: 85.5 FL (ref 79–97)
MONOCYTES # BLD AUTO: 0.72 10*3/MM3 (ref 0.1–0.9)
MONOCYTES NFR BLD AUTO: 10.2 % (ref 5–12)
NEUTROPHILS NFR BLD AUTO: 3.27 10*3/MM3 (ref 1.7–7)
NEUTROPHILS NFR BLD AUTO: 46.3 % (ref 42.7–76)
NITRITE UR QL STRIP: NEGATIVE
NRBC BLD AUTO-RTO: 0 /100 WBC (ref 0–0.2)
PH UR STRIP.AUTO: 7 [PH] (ref 5–8)
PLATELET # BLD AUTO: 257 10*3/MM3 (ref 140–450)
PMV BLD AUTO: 11.3 FL (ref 6–12)
POTASSIUM SERPL-SCNC: 3.9 MMOL/L (ref 3.5–5.2)
PROT SERPL-MCNC: 7.4 G/DL (ref 6–8.5)
PROT UR QL STRIP: NEGATIVE
RBC # BLD AUTO: 5.43 10*6/MM3 (ref 3.77–5.28)
SODIUM SERPL-SCNC: 139 MMOL/L (ref 136–145)
SP GR UR STRIP: 1.02 (ref 1–1.03)
TRIGL SERPL-MCNC: 277 MG/DL (ref 0–150)
TSH SERPL DL<=0.05 MIU/L-ACNC: 2.05 UIU/ML (ref 0.27–4.2)
UROBILINOGEN UR QL STRIP: NORMAL
VLDLC SERPL-MCNC: 47 MG/DL (ref 5–40)
WBC NRBC COR # BLD AUTO: 7.05 10*3/MM3 (ref 3.4–10.8)

## 2024-09-11 PROCEDURE — 80053 COMPREHEN METABOLIC PANEL: CPT | Performed by: NURSE PRACTITIONER

## 2024-09-11 PROCEDURE — 80061 LIPID PANEL: CPT | Performed by: NURSE PRACTITIONER

## 2024-09-11 PROCEDURE — 36415 COLL VENOUS BLD VENIPUNCTURE: CPT | Performed by: NURSE PRACTITIONER

## 2024-09-11 PROCEDURE — 85025 COMPLETE CBC W/AUTO DIFF WBC: CPT | Performed by: NURSE PRACTITIONER

## 2024-09-11 PROCEDURE — 82306 VITAMIN D 25 HYDROXY: CPT | Performed by: NURSE PRACTITIONER

## 2024-09-11 PROCEDURE — 81003 URINALYSIS AUTO W/O SCOPE: CPT | Performed by: NURSE PRACTITIONER

## 2024-09-11 PROCEDURE — 84443 ASSAY THYROID STIM HORMONE: CPT | Performed by: NURSE PRACTITIONER

## 2024-09-11 NOTE — PROGRESS NOTES
Venipuncture Blood Specimen Collection  Venipuncture performed in left arm  by Crystal Angel with good hemostasis. Patient tolerated the procedure well without complications.   09/11/24   Crystal Angel
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
## 2024-09-12 ENCOUNTER — PATIENT MESSAGE (OUTPATIENT)
Dept: FAMILY MEDICINE CLINIC | Facility: CLINIC | Age: 59
End: 2024-09-12
Payer: COMMERCIAL

## 2024-09-12 DIAGNOSIS — R79.89 ELEVATED LFTS: Primary | ICD-10-CM

## 2024-09-12 RX ORDER — RIZATRIPTAN BENZOATE 10 MG/1
10 TABLET ORAL ONCE AS NEEDED
Qty: 9 TABLET | Refills: 11 | Status: SHIPPED | OUTPATIENT
Start: 2024-09-12

## 2024-09-12 RX ORDER — FREMANEZUMAB-VFRM 225 MG/1.5ML
225 INJECTION SUBCUTANEOUS
Qty: 4.5 ML | Refills: 3 | Status: SHIPPED | OUTPATIENT
Start: 2024-09-12

## 2024-09-12 NOTE — PROGRESS NOTES
Please mail letter to patient stating    Valentina the comprehensive panel shows normal glucose and normal electrolytes and normal kidney function test and all of the liver function tests were normal with the exception of a slightly elevated ALT at 40 and it should be 33 or less and I looked back in your labs and I do not see where we have ever done the liver work up-and that would include more blood work that is more specialized towards the  liver and then also a liver ultrasound--and if you would like for me to go ahead and proceed with ordering these let me know and I will place the orders    Cholesterol panel shows triglycerides elevated at 277 and it should be less than 150 when fasting and the HDL was 36 and it should be 40 or greater and the LDL was 101 and should be less than 100 when fasting and your blood counts and your urinalysis was normal and the vitamin D was normal and the thyroid levels were normal

## 2024-09-12 NOTE — TELEPHONE ENCOUNTER
From: Valentina Stevens  To: Sera Loza  Sent: 9/12/2024 8:09 AM EDT  Subject: Test     Yes, you can go ahead and order more test because of my blood work I have a question. I went to [facility] last night because my right leg is swollen and painful saying I have edema But my daughter is worried that I may have a blood clot due to some of the lab results. They did bloodwork too, so I’m not sure what to do go for another test or what

## 2024-09-16 RX ORDER — BUDESONIDE 90 UG/1
AEROSOL, POWDER RESPIRATORY (INHALATION)
Qty: 1 EACH | Refills: 0 | Status: SHIPPED | OUTPATIENT
Start: 2024-09-16

## 2024-09-25 ENCOUNTER — PATIENT MESSAGE (OUTPATIENT)
Dept: FAMILY MEDICINE CLINIC | Facility: CLINIC | Age: 59
End: 2024-09-25
Payer: COMMERCIAL

## 2024-09-26 ENCOUNTER — PATIENT MESSAGE (OUTPATIENT)
Dept: FAMILY MEDICINE CLINIC | Facility: CLINIC | Age: 59
End: 2024-09-26
Payer: COMMERCIAL

## 2024-09-26 ENCOUNTER — HOSPITAL ENCOUNTER (OUTPATIENT)
Dept: ULTRASOUND IMAGING | Facility: HOSPITAL | Age: 59
Discharge: HOME | End: 2024-09-26
Admitting: NURSE PRACTITIONER
Payer: COMMERCIAL

## 2024-09-26 ENCOUNTER — OFFICE VISIT (OUTPATIENT)
Dept: NEUROSURGERY | Facility: CLINIC | Age: 59
End: 2024-09-26
Payer: COMMERCIAL

## 2024-09-26 VITALS
DIASTOLIC BLOOD PRESSURE: 80 MMHG | BODY MASS INDEX: 27.66 KG/M2 | SYSTOLIC BLOOD PRESSURE: 139 MMHG | WEIGHT: 156.1 LBS | HEIGHT: 63 IN

## 2024-09-26 DIAGNOSIS — K76.9 LIVER LESION: Primary | ICD-10-CM

## 2024-09-26 DIAGNOSIS — R79.89 ELEVATED LFTS: ICD-10-CM

## 2024-09-26 DIAGNOSIS — M47.816 LUMBAR SPONDYLOSIS: Primary | ICD-10-CM

## 2024-09-26 PROCEDURE — 76705 ECHO EXAM OF ABDOMEN: CPT

## 2024-10-01 ENCOUNTER — OFFICE VISIT (OUTPATIENT)
Dept: PULMONOLOGY | Facility: CLINIC | Age: 59
End: 2024-10-01
Payer: COMMERCIAL

## 2024-10-01 VITALS
TEMPERATURE: 98.2 F | HEIGHT: 63 IN | RESPIRATION RATE: 16 BRPM | WEIGHT: 158 LBS | SYSTOLIC BLOOD PRESSURE: 113 MMHG | DIASTOLIC BLOOD PRESSURE: 73 MMHG | BODY MASS INDEX: 28 KG/M2 | OXYGEN SATURATION: 95 % | HEART RATE: 73 BPM

## 2024-10-01 DIAGNOSIS — J45.20 MILD INTERMITTENT ASTHMA, UNSPECIFIED WHETHER COMPLICATED: Primary | ICD-10-CM

## 2024-10-01 DIAGNOSIS — G47.33 OSA ON CPAP: ICD-10-CM

## 2024-10-01 PROCEDURE — 90471 IMMUNIZATION ADMIN: CPT | Performed by: INTERNAL MEDICINE

## 2024-10-01 PROCEDURE — 3074F SYST BP LT 130 MM HG: CPT | Performed by: INTERNAL MEDICINE

## 2024-10-01 PROCEDURE — 99213 OFFICE O/P EST LOW 20 MIN: CPT | Performed by: INTERNAL MEDICINE

## 2024-10-01 PROCEDURE — 1160F RVW MEDS BY RX/DR IN RCRD: CPT | Performed by: INTERNAL MEDICINE

## 2024-10-01 PROCEDURE — 3078F DIAST BP <80 MM HG: CPT | Performed by: INTERNAL MEDICINE

## 2024-10-01 PROCEDURE — 1159F MED LIST DOCD IN RCRD: CPT | Performed by: INTERNAL MEDICINE

## 2024-10-01 PROCEDURE — 90656 IIV3 VACC NO PRSV 0.5 ML IM: CPT | Performed by: INTERNAL MEDICINE

## 2024-10-01 NOTE — PROGRESS NOTES
Pulmonary Office Follow-up    Subjective     Valentina Stevens is seen today at the office for   Chief Complaint   Patient presents with    Follow-up     6 month follow up    Sleep Apnea    Asthma         HPI  Valentina Stevens is a 58 y.o. female with a PMH significant for bronchial asthma and obstructive sleep apnea presents for follow-up patient has been doing well on her medications she denies any significant cough wheeze or expectoration and has been fairly active she denies any nocturnal wheezing      Tobacco use history:  Never smoker      Patient Active Problem List   Diagnosis    DDD (degenerative disc disease), lumbar    Hearing loss    Mixed hyperlipidemia    Hypertension    Hypothyroidism    Intractable chronic migraine without aura and without status migrainosus    Menopause    Low back pain    Arthritis    Osteoporosis    RLS (restless legs syndrome)    Memory loss    Arthritis of right acromioclavicular joint    Tendinitis of shoulder, right    Fatty (change of) liver, not elsewhere classified    Scoliosis, unspecified    Lumbar radiculopathy    Scoliosis    CTS (carpal tunnel syndrome)    GERD (gastroesophageal reflux disease)    DEVENDRA on CPAP    Primary central sleep apnea    Difficulty walking    Vitamin D deficiency    Murmur    DDD (degenerative disc disease), cervical    History of learning disability    Lumbar spondylosis    Cervical polyp    Body mass index (BMI) of 27.0-27.9 in adult    Frequent falls    Chronic idiopathic constipation    History of diverticulitis    Diverticula of colon    Altered bowel habits    Pain disorder associated with psychological factors    Osteopenia    History of osteoporosis    Cervical spondylolysis       Review of Systems  Review of Systems   Respiratory:  Positive for shortness of breath.    All other systems reviewed and are negative.    As described in the HPI. Otherwise, remainder of ROS (14 systems) were negative.    Medications, Allergies, Social, and  Family Histories reviewed as per EMR.    Result Review :            Objective     Vitals:    10/01/24 1037   BP: 113/73   Pulse: 73   Resp: 16   Temp: 98.2 °F (36.8 °C)   SpO2: 95%         10/01/24  1037   Weight: 71.7 kg (158 lb)       Physical Exam  Vitals and nursing note reviewed.   Constitutional:       Appearance: Normal appearance.   HENT:      Head: Normocephalic and atraumatic.      Nose: Nose normal.      Mouth/Throat:      Mouth: Mucous membranes are moist.      Pharynx: Oropharynx is clear.   Eyes:      Extraocular Movements: Extraocular movements intact.      Conjunctiva/sclera: Conjunctivae normal.      Pupils: Pupils are equal, round, and reactive to light.   Cardiovascular:      Rate and Rhythm: Normal rate and regular rhythm.      Pulses: Normal pulses.      Heart sounds: Normal heart sounds.   Pulmonary:      Effort: Pulmonary effort is normal.      Breath sounds: Normal breath sounds.   Abdominal:      General: Abdomen is flat. Bowel sounds are normal.      Palpations: Abdomen is soft.   Musculoskeletal:         General: Normal range of motion.      Cervical back: Normal range of motion and neck supple.   Skin:     General: Skin is warm.      Capillary Refill: Capillary refill takes 2 to 3 seconds.   Neurological:      General: No focal deficit present.      Mental Status: She is alert and oriented to person, place, and time.   Psychiatric:         Mood and Affect: Mood normal.         Behavior: Behavior normal.         US Liver    Result Date: 9/26/2024  1. Hepatic steatosis, suspect moderate to marked severity. 2. Nonspecific vague relatively hypoechoic liver lesion probably representing benign fatty sparing. This could be confirmed on nonemergent abdominal MRI with IV contrast. Electronically Signed: Jaylan Fabian MD  9/26/2024 6:00 PM EDT  Workstation ID: [id number]      Assessment & Plan     Diagnoses and all orders for this visit:    1. Mild intermittent asthma, unspecified whether  complicated (Primary)    2. DEVENDRA on CPAP    Other orders  -     Fluzone >6mos ([id number])         Discussion/ Recommendations:   Patient is advised to continue her Pulmicort twice daily  Albuterol inhaler as needed  Continue CPAP  Will order flu shot  Regular exercise  Vaccinations discussed and recommended             Return in about 4 months (around 2/1/2025).          This document has been electronically signed by Flo Simon MD on October 1, 2024 10:44 EDT

## 2024-10-02 ENCOUNTER — OFFICE VISIT (OUTPATIENT)
Dept: ORTHOPEDIC SURGERY | Facility: CLINIC | Age: 59
End: 2024-10-02
Payer: COMMERCIAL

## 2024-10-02 VITALS
WEIGHT: 158 LBS | HEIGHT: 63 IN | SYSTOLIC BLOOD PRESSURE: 119 MMHG | BODY MASS INDEX: 28 KG/M2 | HEART RATE: 61 BPM | OXYGEN SATURATION: 96 % | DIASTOLIC BLOOD PRESSURE: 67 MMHG

## 2024-10-02 DIAGNOSIS — M79.642 LEFT HAND PAIN: ICD-10-CM

## 2024-10-02 DIAGNOSIS — R20.0 NUMBNESS OF LEFT HAND: Primary | ICD-10-CM

## 2024-10-02 NOTE — PROGRESS NOTES
"Chief Complaint  Initial Evaluation of the Left Hand     Subjective      Valentina Stevens presents to [facility] for initial evaluation of the left hand. She has numbness and tingling of the first three digits.  She notes some locking of the fingers.  She has decrease  and [facility] tasks.  She had numbness while driving long distances.     Allergies   Allergen Reactions    Penicillins Shortness Of Breath and Rash    Sulfa Antibiotics Hives    Cephalexin Rash    Indomethacin Rash        Social History     Socioeconomic History    Marital status:     Number of children: 2   Tobacco Use    Smoking status: Never     Passive exposure: Never    Smokeless tobacco: Never   Vaping Use    Vaping status: Never Used   Substance and Sexual Activity    Alcohol use: Never    Drug use: Never    Sexual activity: Not Currently     Partners: Male     Birth control/protection: Other     Comment: Tubes tided        I reviewed the patient's chief complaint, history of present illness, review of systems, past medical history, surgical history, family history, social history, medications, and allergy list.     Review of Systems     Constitutional: Denies fevers, chills, weight loss  Cardiovascular: Denies chest pain, shortness of breath  Skin: Denies rashes, acute skin changes  Neurologic: Denies headache, loss of consciousness        Vital Signs:   /67   Pulse 61   Ht 160 cm (63\")   Wt 71.7 kg (158 lb)   SpO2 96%   BMI 27.99 kg/m²          Physical Exam  General: Alert. No acute distress    Ortho Exam        LEFT HAND Negative Compression testing/ Mildly  Positive Tinels. NegativeFinkelsteins. Negative Lcark's testing. Negative CMC grind testing. Mildly Positive Phalens. Full ROM of the hand, fingers, elbow and wrist. Negative Triggering of the digit. Sensation grossly intact to light touch, median, radial and ulnar nerve. Positive AIN, PIN and ulnar nerve motor function intact. Axillary " nerve intact. Positive pulses.        Procedures        Imaging Results (Most Recent)       None             Result Review :               Assessment and Plan     Diagnoses and all orders for this visit:    1. Numbness of left hand (Primary)    2. Left hand pain        Discussed the treatment plan with the patient.     EMG of the left hand.          Call or return if worsening symptoms.    Follow Up     After EMG of the left hand.       Patient was given instructions and counseling regarding her condition or for health maintenance advice. Please see specific information pulled into the AVS if appropriate.     Scribed for Bill Oliveira MD by Cher Christopher MA.  10/02/24   10:54 EDT    I have personally performed the services described in this document as scribed by the above individual and it is both accurate and complete. Bill Oliveira MD 10/02/24

## 2024-10-14 ENCOUNTER — CLINICAL SUPPORT (OUTPATIENT)
Dept: FAMILY MEDICINE CLINIC | Facility: CLINIC | Age: 59
End: 2024-10-14
Payer: COMMERCIAL

## 2024-10-14 DIAGNOSIS — R19.4 ALTERED BOWEL HABITS: Primary | ICD-10-CM

## 2024-10-14 DIAGNOSIS — M19.90 ARTHRITIS: ICD-10-CM

## 2024-10-14 DIAGNOSIS — M81.6 LOCALIZED OSTEOPOROSIS WITHOUT CURRENT PATHOLOGICAL FRACTURE: ICD-10-CM

## 2024-10-14 DIAGNOSIS — I10 HYPERTENSION, UNSPECIFIED TYPE: ICD-10-CM

## 2024-10-14 LAB
CERULOPLASMIN SERPL-MCNC: 20 MG/DL (ref 19–39)
FERRITIN SERPL-MCNC: 129 NG/ML (ref 13–150)
HAV IGM SERPL QL IA: NORMAL
HBV CORE IGM SERPL QL IA: NORMAL
HBV SURFACE AG SERPL QL IA: NORMAL
HCV AB SER QL: NORMAL
IRON 24H UR-MRATE: 85 MCG/DL (ref 37–145)
IRON SATN MFR SERPL: 22 % (ref 20–50)
TIBC SERPL-MCNC: 390 MCG/DL (ref 298–536)
TRANSFERRIN SERPL-MCNC: 262 MG/DL (ref 200–360)

## 2024-10-14 PROCEDURE — 80074 ACUTE HEPATITIS PANEL: CPT | Performed by: NURSE PRACTITIONER

## 2024-10-14 PROCEDURE — 83540 ASSAY OF IRON: CPT | Performed by: NURSE PRACTITIONER

## 2024-10-14 PROCEDURE — 36415 COLL VENOUS BLD VENIPUNCTURE: CPT | Performed by: NURSE PRACTITIONER

## 2024-10-14 PROCEDURE — 82390 ASSAY OF CERULOPLASMIN: CPT | Performed by: NURSE PRACTITIONER

## 2024-10-14 PROCEDURE — 86015 ACTIN ANTIBODY EACH: CPT | Performed by: NURSE PRACTITIONER

## 2024-10-14 PROCEDURE — 86038 ANTINUCLEAR ANTIBODIES: CPT | Performed by: NURSE PRACTITIONER

## 2024-10-14 PROCEDURE — 86381 MITOCHONDRIAL ANTIBODY EACH: CPT | Performed by: NURSE PRACTITIONER

## 2024-10-14 PROCEDURE — 82103 ALPHA-1-ANTITRYPSIN TOTAL: CPT | Performed by: NURSE PRACTITIONER

## 2024-10-14 PROCEDURE — 82104 ALPHA-1-ANTITRYPSIN PHENO: CPT | Performed by: NURSE PRACTITIONER

## 2024-10-14 PROCEDURE — 84466 ASSAY OF TRANSFERRIN: CPT | Performed by: NURSE PRACTITIONER

## 2024-10-14 PROCEDURE — 82728 ASSAY OF FERRITIN: CPT | Performed by: NURSE PRACTITIONER

## 2024-10-14 NOTE — PROGRESS NOTES
..  Venipuncture Blood Specimen Collection  Venipuncture performed in LT arm by Debbie Buitrago MA with good hemostasis. Patient tolerated the procedure well without complications.   10/14/24   Debbie Buitrago MA

## 2024-10-15 DIAGNOSIS — E55.9 VITAMIN D DEFICIENCY: ICD-10-CM

## 2024-10-15 LAB
ANA SER QL: NEGATIVE
MITOCHONDRIA M2 IGG SER-ACNC: <20 UNITS (ref 0–20)
SMA IGG SER-ACNC: 5 UNITS (ref 0–19)

## 2024-10-15 RX ORDER — BUDESONIDE 90 UG/1
AEROSOL, POWDER RESPIRATORY (INHALATION)
Qty: 1 EACH | Refills: 2 | Status: SHIPPED | OUTPATIENT
Start: 2024-10-15

## 2024-10-15 RX ORDER — CALCIUM CARBONATE/VITAMIN D3 600 MG-10
1 TABLET ORAL 2 TIMES DAILY
Qty: 60 TABLET | Refills: 5 | Status: SHIPPED | OUTPATIENT
Start: 2024-10-15

## 2024-10-18 LAB
A1AT PHENOTYP SERPL IFE: NORMAL
A1AT SERPL-MCNC: 128 MG/DL (ref 101–187)

## 2024-11-04 ENCOUNTER — OFFICE VISIT (OUTPATIENT)
Dept: FAMILY MEDICINE CLINIC | Facility: CLINIC | Age: 59
End: 2024-11-04
Payer: COMMERCIAL

## 2024-11-04 VITALS
WEIGHT: 158.5 LBS | DIASTOLIC BLOOD PRESSURE: 60 MMHG | BODY MASS INDEX: 28.08 KG/M2 | SYSTOLIC BLOOD PRESSURE: 120 MMHG | HEART RATE: 99 BPM | OXYGEN SATURATION: 98 %

## 2024-11-04 DIAGNOSIS — S99.912A INJURY OF LEFT ANKLE, INITIAL ENCOUNTER: Primary | ICD-10-CM

## 2024-11-04 DIAGNOSIS — R22.9 SKIN NODULE: ICD-10-CM

## 2024-11-04 PROBLEM — J45.909 ASTHMA: Status: ACTIVE | Noted: 2023-10-31

## 2024-11-04 PROBLEM — F41.9 ANXIETY: Status: ACTIVE | Noted: 2024-11-04

## 2024-11-04 PROCEDURE — 1125F AMNT PAIN NOTED PAIN PRSNT: CPT | Performed by: FAMILY MEDICINE

## 2024-11-04 PROCEDURE — 3074F SYST BP LT 130 MM HG: CPT | Performed by: FAMILY MEDICINE

## 2024-11-04 PROCEDURE — 99213 OFFICE O/P EST LOW 20 MIN: CPT | Performed by: FAMILY MEDICINE

## 2024-11-04 PROCEDURE — 3078F DIAST BP <80 MM HG: CPT | Performed by: FAMILY MEDICINE

## 2024-11-04 NOTE — PROGRESS NOTES
Chief Complaint    Ankle Pain (Fell 1 week ago and left ankle pain./Knot under right under arm noticed last night.)    Subjective      Valentina Stevens presents to [facility]    1.) LEFT ANKLE PAIN : Onset-1 week ago after a fall.  Unclear mechanism of injury.  Location of symptoms-lateral aspect of left ankle joint.  Patient is able to ambulate with no pain.  Reports tenderness with palpation of the area.  She has utilized an ace with no significant relief of tenderness.    2.) NODULE OF RIGHT AXILLARY REGION : Noticed overnight.  No tenderness with palpation.  Concerned due to family history of lymphoma - mother.    Objective     Vital Signs:     /60   Pulse 99   Wt 71.9 kg (158 lb 8 oz)   SpO2 98%   BMI 28.08 kg/m²       Physical Exam  Vitals reviewed.   Constitutional:       General: She is not in acute distress.     Appearance: Normal appearance. She is well-developed.   HENT:      Head: Normocephalic and atraumatic.      Right Ear: Hearing and external ear normal.      Left Ear: Hearing and external ear normal.      Nose: Nose normal.   Eyes:      General: Lids are normal.         Right eye: No discharge.         Left eye: No discharge.      Conjunctiva/sclera: Conjunctivae normal.   Pulmonary:      Effort: Pulmonary effort is normal.   Abdominal:      General: There is no distension.   Musculoskeletal:         General: No swelling.      Cervical back: Neck supple.      Comments: Examination of the left ankle : Significant only for tenderness with palpation of posterior inferior aspect of lateral malleolus.  No effusion.   Skin:     Comments: Nontender to approximate small blueberry-sized nodule noted of right axillary region.   Neurological:      Mental Status: She is alert. Mental status is at baseline.   Psychiatric:         Mood and Affect: Mood and affect normal.         Thought Content: Thought content normal.     Assessment and Plan     Diagnoses and all  orders for this visit:    1. Injury of left ankle, initial encounter (Primary)  Comments:  Suspect a soft tissue injury. Recommend topical NSAID, pt has diclofenac at home. Also recommend relative rest, ice, activity as tolerated. F/u if no relief.    2. Skin nodule  Comments:  Suspect a cyst versus a lymph node. Rec for watchful waiting at this time. Constitutional symptoms and signs discussed with patient.  If any issues, call ofc.    Follow Up : PRN    Patient was given instructions and counseling regarding her condition or for health maintenance advice. Please see specific information pulled into the AVS if appropriate.

## 2024-11-11 ENCOUNTER — HOSPITAL ENCOUNTER (OUTPATIENT)
Dept: MRI IMAGING | Facility: HOSPITAL | Age: 59
Discharge: HOME | End: 2024-11-11
Payer: COMMERCIAL

## 2024-11-11 ENCOUNTER — HOSPITAL ENCOUNTER (OUTPATIENT)
Facility: HOSPITAL | Age: 59
Discharge: HOME | End: 2024-11-11
Payer: COMMERCIAL

## 2024-11-11 DIAGNOSIS — R20.0 NUMBNESS OF LEFT HAND: ICD-10-CM

## 2024-11-11 DIAGNOSIS — K76.9 LIVER LESION: ICD-10-CM

## 2024-11-11 DIAGNOSIS — F41.8 SITUATIONAL ANXIETY: ICD-10-CM

## 2024-11-11 DIAGNOSIS — F43.9 SITUATIONAL STRESS: ICD-10-CM

## 2024-11-11 PROCEDURE — 95909 NRV CNDJ TST 5-6 STUDIES: CPT | Performed by: PHYSICAL THERAPIST

## 2024-11-11 PROCEDURE — 74183 MRI ABD W/O CNTR FLWD CNTR: CPT

## 2024-11-11 PROCEDURE — A9577 INJ MULTIHANCE: HCPCS | Performed by: NURSE PRACTITIONER

## 2024-11-11 PROCEDURE — 0 GADOBENATE DIMEGLUMINE 529 MG/ML SOLUTION: Performed by: NURSE PRACTITIONER

## 2024-11-11 PROCEDURE — 95909 NRV CNDJ TST 5-6 STUDIES: CPT

## 2024-11-11 PROCEDURE — 95886 MUSC TEST DONE W/N TEST COMP: CPT

## 2024-11-11 PROCEDURE — 95886 MUSC TEST DONE W/N TEST COMP: CPT | Performed by: PHYSICAL THERAPIST

## 2024-11-11 RX ORDER — ESCITALOPRAM OXALATE 10 MG/1
10 TABLET ORAL DAILY
Qty: 30 TABLET | Refills: 0 | Status: SHIPPED | OUTPATIENT
Start: 2024-11-11 | End: 2024-11-13 | Stop reason: SDUPTHER

## 2024-11-11 RX ADMIN — GADOBENATE DIMEGLUMINE 15 ML: 529 INJECTION, SOLUTION INTRAVENOUS at 09:43

## 2024-11-12 NOTE — PROGRESS NOTES
Please mail letter to patient stating     Valentina the MRI of the abdomen with and without contrast because of the prior seen liver lesion on the ultrasound shows moderately severe hepatic stenosis which is fatty liver disease with areas of benign focal fatty sparing which explains the findings that were on the ultrasound there are no suspicious or concerning liver lesions so the main thing is you need to be seeing gastroenterology on a regular basis regarding the hepatic stenosis because over time fatty liver disease can go into fibrotic liver disease and then over time can go into cirrhotic liver disease

## 2024-11-13 ENCOUNTER — OFFICE VISIT (OUTPATIENT)
Dept: FAMILY MEDICINE CLINIC | Facility: CLINIC | Age: 59
End: 2024-11-13
Payer: COMMERCIAL

## 2024-11-13 VITALS
DIASTOLIC BLOOD PRESSURE: 80 MMHG | HEIGHT: 63 IN | TEMPERATURE: 97.3 F | BODY MASS INDEX: 28.17 KG/M2 | OXYGEN SATURATION: 99 % | HEART RATE: 89 BPM | WEIGHT: 159 LBS | SYSTOLIC BLOOD PRESSURE: 118 MMHG

## 2024-11-13 DIAGNOSIS — F43.9 SITUATIONAL STRESS: ICD-10-CM

## 2024-11-13 DIAGNOSIS — Z01.419 WELL WOMAN EXAM WITH ROUTINE GYNECOLOGICAL EXAM: Primary | ICD-10-CM

## 2024-11-13 DIAGNOSIS — F41.8 SITUATIONAL ANXIETY: ICD-10-CM

## 2024-11-13 DIAGNOSIS — R31.9 HEMATURIA, UNSPECIFIED TYPE: ICD-10-CM

## 2024-11-13 DIAGNOSIS — R22.31 MASS OF RIGHT AXILLA: ICD-10-CM

## 2024-11-13 LAB
BILIRUB BLD-MCNC: NEGATIVE MG/DL
CLARITY, POC: CLEAR
COLOR UR: YELLOW
EXPIRATION DATE: ABNORMAL
GLUCOSE UR STRIP-MCNC: NEGATIVE MG/DL
KETONES UR QL: NEGATIVE
LEUKOCYTE EST, POC: ABNORMAL
Lab: ABNORMAL
NITRITE UR-MCNC: NEGATIVE MG/ML
PH UR: 7 [PH] (ref 5–8)
PROT UR STRIP-MCNC: NEGATIVE MG/DL
RBC # UR STRIP: ABNORMAL /UL
SP GR UR: 1.01 (ref 1–1.03)
UROBILINOGEN UR QL: ABNORMAL

## 2024-11-13 PROCEDURE — 81003 URINALYSIS AUTO W/O SCOPE: CPT | Performed by: NURSE PRACTITIONER

## 2024-11-13 PROCEDURE — G0123 SCREEN CERV/VAG THIN LAYER: HCPCS | Performed by: NURSE PRACTITIONER

## 2024-11-13 PROCEDURE — 1125F AMNT PAIN NOTED PAIN PRSNT: CPT | Performed by: NURSE PRACTITIONER

## 2024-11-13 PROCEDURE — 3079F DIAST BP 80-89 MM HG: CPT | Performed by: NURSE PRACTITIONER

## 2024-11-13 PROCEDURE — 1160F RVW MEDS BY RX/DR IN RCRD: CPT | Performed by: NURSE PRACTITIONER

## 2024-11-13 PROCEDURE — 3074F SYST BP LT 130 MM HG: CPT | Performed by: NURSE PRACTITIONER

## 2024-11-13 PROCEDURE — 87624 HPV HI-RISK TYP POOLED RSLT: CPT | Performed by: NURSE PRACTITIONER

## 2024-11-13 PROCEDURE — 1159F MED LIST DOCD IN RCRD: CPT | Performed by: NURSE PRACTITIONER

## 2024-11-13 PROCEDURE — 99396 PREV VISIT EST AGE 40-64: CPT | Performed by: NURSE PRACTITIONER

## 2024-11-13 PROCEDURE — 87086 URINE CULTURE/COLONY COUNT: CPT | Performed by: NURSE PRACTITIONER

## 2024-11-13 RX ORDER — ESCITALOPRAM OXALATE 10 MG/1
10 TABLET ORAL DAILY
Qty: 30 TABLET | Refills: 3 | Status: SHIPPED | OUTPATIENT
Start: 2024-11-13

## 2024-11-13 NOTE — PROGRESS NOTES
Answers submitted by the patient for this visit:  Other (Submitted on 11/12/2024)  Please describe your symptoms.: None yearly check up  Have you had these symptoms before?: No  How long have you been having these symptoms?: 1-2 weeks  Primary Reason for Visit (Submitted on 11/12/2024)  What is the primary reason for your visit?: Problem Not Listed  Chief Complaint  Gynecologic Exam (Lump under RT armpit )    Subjective            Valentina Shruit Stevens presents to [facility]  History of Present Illness  Patient is here today for annual wellness and she reports that she found a lump under the right axilla area and she would like to go ahead and proceed with an annual Pap smear and her mammogram as well-and she actually only noticed this right axillary lump just a few days ago    Also needs refill on Lexapro stable no SI/HI somehow the refills got missed in September      PHQ-2 Total Score:    PHQ-9 Total Score:      Past Medical History:   Diagnosis Date    Abnormal ECG     Allergic     Drug allergies    Arthritis     Arthritis of back     Asthma     Asthma 10/31/2023    Last Assessment & Plan:     Condition: stable        Reviewed trigger avoidance and reviewed proper use of inhalers and rescue medications. Reviewed concerning signs/symptoms and ER precautions.          Follow up in: three months    Asthma, intrinsic     CTS (carpal tunnel syndrome)     Depression     Difficulty walking     Diverticulitis of colon     Fatty (change of) liver, not elsewhere classified     GERD (gastroesophageal reflux disease)     Hearing loss     Hyperlipemia     Hypertension     Hypothyroidism     Knee swelling     Low back pain     Lumbar radiculopathy 02/15/2022    Memory loss     Menopause     Migraine     Murmur     Osteopenia     Osteoporosis     Pneumonia     Primary central sleep apnea     Scoliosis     Seizures     last when 10 years old    Sleep apnea, obstructive     Visual impairment      Vitamin D deficiency        Allergies   Allergen Reactions    Penicillins Shortness Of Breath and Rash    Sulfa Antibiotics Hives    Cephalexin Rash    Indomethacin Rash        Past Surgical History:   Procedure Laterality Date    COLONOSCOPY  2018    glenna    COLONOSCOPY N/A 10/17/2023    Procedure: COLONOSCOPY WITH COLD SNARE POLYPECTOMY;  Surgeon: Yossi Stephens MD;  Location: [facility];  Service: Gastroenterology;  Laterality: N/A;  DIVERTICULOSIS, COLON POLYP    EPIDURAL BLOCK      ESSURE TUBAL LIGATION      SKIN BIOPSY      THYMECTOMY      TUBAL ABDOMINAL LIGATION      TUMOR REMOVAL      in between heart and lungs        Social History     Tobacco Use    Smoking status: Never     Passive exposure: Never    Smokeless tobacco: Never   Vaping Use    Vaping status: Never Used   Substance Use Topics    Alcohol use: Never    Drug use: Never       Family History   Problem Relation Age of Onset    Hypertension Father     Osteoporosis Father     Alcohol abuse Father     Skin cancer Father     Thyroid cancer Mother     Migraines Mother     Stroke Mother     Skin cancer Mother     Cancer Mother     Skin cancer Brother     Dementia Sister     Migraines Sister     Migraines Sister     Uterine cancer Maternal Grandmother     Colon cancer Neg Hx     Breast cancer Neg Hx     Prostate cancer Neg Hx     Clotting disorder Neg Hx         Health Maintenance Due   Topic Date Due    ANNUAL PHYSICAL  09/11/2024        Current Outpatient Medications on File Prior to Visit   Medication Sig    albuterol sulfate  (90 Base) MCG/ACT inhaler Inhale 2 puffs Every 4 (Four) Hours As Needed for Wheezing.    budesonide (Pulmicort Flexhaler) 90 MCG/ACT inhaler INHALE ONE PUFF BY MOUTH TWICE DAILY. **RINSE MOUTH AFTER USE**    calcium carb-cholecalciferol (Calcium + Vitamin D3) 600-10 MG-MCG tablet per tablet Take 1 tablet by mouth 2 (Two) Times a Day.    Fremanezumab-vfrm (Ajovy) 225 MG/1.5ML solution auto-injector Inject  225 mg under the skin into the appropriate area as directed Every 30 (Thirty) Days.    HYDROcodone-acetaminophen (NORCO) 5-325 MG per tablet Take 1 tablet by mouth 2 (Two) Times a Day As Needed.    ibandronate (BONIVA) 150 MG tablet Take 1 tablet by mouth Every 30 (Thirty) Days.    levothyroxine (SYNTHROID, LEVOTHROID) 75 MCG tablet Take 1 tablet by mouth Daily.    lisinopril (PRINIVIL,ZESTRIL) 10 MG tablet Take 1 tablet by mouth Daily.    meloxicam (MOBIC) 15 MG tablet Take 1 tablet by mouth Daily As Needed for Moderate Pain or Mild Pain.    multivitamin with minerals tablet tablet Take 1 tablet by mouth Daily.    pramipexole (MIRAPEX) 0.5 MG tablet Take 1 tablet by mouth Every Night.    rizatriptan (MAXALT) 10 MG tablet Take 1 tablet by mouth 1 (One) Time As Needed for Migraine. 1 tablet at HA onset, may repeat in 2 hrs once if needed    vitamin E 100 UNIT capsule Take 1 capsule by mouth Daily.    [DISCONTINUED] escitalopram (LEXAPRO) 10 MG tablet TAKE 1 TABLET BY MOUTH EVERY DAY     No current facility-administered medications on file prior to visit.       Immunization History   Administered Date(s) Administered    31-influenza Vac Quardvalent Preservativ 11/05/2015, 11/09/2016, 10/19/2021, 09/30/2022    COVID-19 (PFIZER) Purple Cap Monovalent 05/27/2021, 06/17/2021, 06/17/2021    COVID-19 (UNSPECIFIED) 05/27/2021, 06/17/2021    Flu Vaccine Intradermal Quad 18-64YR 09/01/2023    Fluzone  >6mos 10/01/2024    Fluzone (or Fluarix & Flulaval for VFC) >6mos 10/19/2021, 09/30/2022    Hepatitis A 05/17/2018, 12/20/2018    Influenza Seasonal Injectable 10/10/2014    Influenza, Unspecified 10/19/2020, 10/19/2020, 09/01/2023    Pneumococcal Conjugate 20-Valent (PCV20) 08/09/2023    Pneumococcal Polysaccharide (PPSV23) 05/10/2022    Shingrix 11/12/2021, 01/21/2022    Tdap 03/20/2018, 10/19/2020    Zoster, Unspecified 11/12/2021, 01/21/2022       Review of Systems   Respiratory:  Negative for shortness of breath.         Hx  "of asthma    Cardiovascular:  Negative for chest pain.   Gastrointestinal:  Negative for abdominal pain.   Genitourinary:  Positive for breast lump. Negative for breast discharge, breast pain, pelvic pain, vaginal bleeding and vaginal discharge.        Rt axilla area lump --noticed approx few days ago        Objective     /80   Pulse 89   Temp 97.3 °F (36.3 °C)   Ht 159.4 cm (62.75\")   Wt 72.1 kg (159 lb)   SpO2 99%   BMI 28.39 kg/m²       Physical Exam  Vitals and nursing note reviewed.   Constitutional:       Appearance: Normal appearance.   HENT:      Head: Normocephalic.      Right Ear: External ear normal.      Left Ear: External ear normal.      Nose: Nose normal.      Mouth/Throat:      Mouth: Mucous membranes are moist.   Eyes:      Pupils: Pupils are equal, round, and reactive to light.   Cardiovascular:      Rate and Rhythm: Normal rate and regular rhythm.      Heart sounds: Normal heart sounds.   Pulmonary:      Effort: Pulmonary effort is normal.      Breath sounds: Normal breath sounds.   Chest:   Breasts:     Zana Score is 5.      Right: No swelling, bleeding, inverted nipple, mass, nipple discharge, skin change or tenderness.      Left: No swelling, bleeding, inverted nipple, mass, nipple discharge, skin change or tenderness.       Abdominal:      Palpations: Abdomen is soft.      Tenderness: There is no abdominal tenderness.      Hernia: There is no hernia in the left inguinal area or right inguinal area.   Genitourinary:     General: Normal vulva.      Exam position: Lithotomy position.      Pubic Area: No rash or pubic lice.       Zana stage (genital): 5.      Labia:         Right: No rash, tenderness, lesion or injury.         Left: No rash, tenderness, lesion or injury.       Urethra: No prolapse, urethral pain, urethral swelling or urethral lesion.      Vagina: No signs of injury and foreign body. No vaginal discharge, erythema, tenderness, bleeding, lesions or prolapsed vaginal " walls.      Cervix: No cervical motion tenderness, discharge, friability, lesion, erythema, cervical bleeding or eversion.      Uterus: Not deviated, not enlarged, not fixed, not tender and no uterine prolapse.       Adnexa:         Right: No mass, tenderness or fullness.          Left: No mass, tenderness or fullness.        Comments: Pap done and tolerated well   Musculoskeletal:      Cervical back: Normal range of motion and neck supple.      Comments: Ambulates with cane   Lymphadenopathy:      Upper Body:      Right upper body: Axillary adenopathy present. No supraclavicular adenopathy.      Left upper body: No supraclavicular or axillary adenopathy.      Lower Body: No right inguinal adenopathy. No left inguinal adenopathy.   Skin:     General: Skin is warm and dry.   Neurological:      Mental Status: She is alert and oriented to person, place, and time.   Psychiatric:         Mood and Affect: Mood normal.         Behavior: Behavior normal.         Thought Content: Thought content normal.         Judgment: Judgment normal.         Result Review :                           Assessment and Plan      Diagnoses and all orders for this visit:    1. Well woman exam with routine gynecological exam (Primary)  -     POCT urinalysis dipstick, automated  -     IgP, Aptima HPV    2. Hematuria, unspecified type  -     Urine Culture - Urine, Urine, Clean Catch    3. Situational stress  -     escitalopram (LEXAPRO) 10 MG tablet; Take 1 tablet by mouth Daily.  Dispense: 30 tablet; Refill: 3    4. Situational anxiety  -     escitalopram (LEXAPRO) 10 MG tablet; Take 1 tablet by mouth Daily.  Dispense: 30 tablet; Refill: 3    5. Mass of right axilla  -     Cancel: Mammo Diagnostic Bilateral With CAD; Future  -     US Axilla Right; Future  -     Mammo Diagnostic Digital Tomosynthesis Bilateral With CAD; Future    Was only trace blood and trace leukocytes we will send out a culture to make sure and then I did do the refill on her  Lexapro and then proceeded with Pap smear and ordered the diagnostic mammogram with the axillary ultrasound        Follow Up     Return in about 4 months (around 3/3/2025), or if symptoms worsen or fail to improve, for Recheck, fasting labs and refills.    Patient was given instructions and counseling regarding her condition or for health maintenance advice. Please see specific information pulled into the AVS if appropriate.            Valentina Shruti Luisnino  reports that she has never smoked. She has never been exposed to tobacco smoke. She has never used smokeless tobacco. I have educated her on the risk of diseases from using tobacco products such as cancer, COPD, and heart disease.

## 2024-11-14 NOTE — PROGRESS NOTES
Please mail letter to patient stating    Valentina the urine culture shows no bacterial growth and if anything changes we will let you know if an antibiotic is indicated

## 2024-11-15 LAB — BACTERIA SPEC AEROBE CULT: NO GROWTH

## 2024-11-20 ENCOUNTER — OFFICE VISIT (OUTPATIENT)
Dept: ORTHOPEDIC SURGERY | Facility: CLINIC | Age: 59
End: 2024-11-20
Payer: COMMERCIAL

## 2024-11-20 VITALS
WEIGHT: 159 LBS | HEIGHT: 63 IN | BODY MASS INDEX: 28.17 KG/M2 | SYSTOLIC BLOOD PRESSURE: 135 MMHG | DIASTOLIC BLOOD PRESSURE: 72 MMHG | OXYGEN SATURATION: 96 % | HEART RATE: 62 BPM

## 2024-11-20 DIAGNOSIS — G56.02 CARPAL TUNNEL SYNDROME ON LEFT: ICD-10-CM

## 2024-11-20 DIAGNOSIS — R20.0 NUMBNESS OF LEFT HAND: Primary | ICD-10-CM

## 2024-11-20 RX ORDER — LIDOCAINE HYDROCHLORIDE 10 MG/ML
1 INJECTION, SOLUTION INFILTRATION; PERINEURAL
Status: COMPLETED | OUTPATIENT
Start: 2024-11-20 | End: 2024-11-20

## 2024-11-20 RX ORDER — TRIAMCINOLONE ACETONIDE 40 MG/ML
40 INJECTION, SUSPENSION INTRA-ARTICULAR; INTRAMUSCULAR
Status: COMPLETED | OUTPATIENT
Start: 2024-11-20 | End: 2024-11-20

## 2024-11-20 RX ADMIN — TRIAMCINOLONE ACETONIDE 40 MG: 40 INJECTION, SUSPENSION INTRA-ARTICULAR; INTRAMUSCULAR at 11:18

## 2024-11-20 RX ADMIN — LIDOCAINE HYDROCHLORIDE 1 ML: 10 INJECTION, SOLUTION INFILTRATION; PERINEURAL at 11:18

## 2024-11-20 NOTE — PROGRESS NOTES
"Chief Complaint  Follow-up of the Left Hand     Subjective      Valentina Stevens presents to [facility] for follow up of the left hand.  She had an EMG and is here to review.  She has numbness and tingling of the whole hand. She notes some locking of the fingers. She has decrease  and [facility] tasks. She had numbness while driving long distances. She has difficulty at work.      Allergies   Allergen Reactions    Penicillins Shortness Of Breath and Rash    Sulfa Antibiotics Hives    Cephalexin Rash    Indomethacin Rash        Social History     Socioeconomic History    Marital status:     Number of children: 2   Tobacco Use    Smoking status: Never     Passive exposure: Never    Smokeless tobacco: Never   Vaping Use    Vaping status: Never Used   Substance and Sexual Activity    Alcohol use: Never    Drug use: Never    Sexual activity: Not Currently     Partners: Male     Birth control/protection: Other     Comment: Tubes tided        I reviewed the patient's chief complaint, history of present illness, review of systems, past medical history, surgical history, family history, social history, medications, and allergy list.     Review of Systems     Constitutional: Denies fevers, chills, weight loss  Cardiovascular: Denies chest pain, shortness of breath  Skin: Denies rashes, acute skin changes  Neurologic: Denies headache, loss of consciousness        Vital Signs:   /72   Pulse 62   Ht 159.4 cm (62.75\")   Wt 72.1 kg (159 lb)   SpO2 96%   BMI 28.39 kg/m²          Physical Exam  General: Alert. No acute distress    Ortho Exam        LEFT HAND Negative Compression testing/ Mildly  Positive Tinels. NegativeFinkelsteins. Negative Clark's testing. Negative [facility]MC grind testing. Mildly Positive Phalens. Full ROM of the hand, fingers, elbow and wrist. Negative Triggering of the digit. Sensation grossly intact to light touch, median, radial and ulnar nerve. Positive AIN, PIN and " ulnar nerve motor function intact. Axillary nerve intact. Positive pulses.           Medium carpal tunnel  Date/Time: 11/20/2024 11:18 AM  Consent given by: patient  Site marked: site marked  Timeout: Immediately prior to procedure a time out was called to verify the correct patient, procedure, equipment, support staff and site/side marked as required   Procedure Details  Location: wrist (left carpal tunnel) -   Preparation: Patient was prepped and draped in the usual sterile fashion  Needle size: 23 G  Medications administered: 1 mL lidocaine 1 %; 40 mg triamcinolone acetonide 40 MG/ML  Patient tolerance: patient tolerated the procedure well with no immediate complications    This injection documentation was Scribed for Bill Oliveira MD by Dot Cobb MA.  11/20/24   11:19 EST        Imaging Results (Most Recent)       None             Result Review :         IMPRESSION: Normal study of the left upper extremity. No electrophysiologic evidence of a left cervical radiculopathy, left brachial plexopathy, focal neuropathy, polyneuropathic changes, myopathy, or motor neuron pathology at this time.    EMG & Nerve Conduction Test    Result Date: 11/11/2024  Narrative: See attached EMG/NCS report. Electronically signed by Michael Pace PT, 11/11/24, 2:41 PM EST.             Assessment and Plan     Diagnoses and all orders for this visit:    1. Numbness of left hand (Primary)    2. Carpal tunnel syndrome on left        Discussed the treatment plan with the patient. I reviewed the EMG with the patient.      Continue braces.      Discussed the risks and benefits of conservative measures. The patient expressed understanding and wished to proceed with a left carpal tunnel injection.  She tolerated the injection well.       Call or return if worsening symptoms.    Follow Up     PRN      Patient was given instructions and counseling regarding her condition or for health maintenance advice. Please see specific  information pulled into the AVS if appropriate.     Scribed for Bill Oliveira MD by Cher Christopher MA.  11/20/24   10:31 EST    I have personally performed the services described in this document as scribed by the above individual and it is both accurate and complete. Bill Oliveira MD 11/20/24

## 2024-11-21 LAB
CYTOLOGIST CVX/VAG CYTO: NORMAL
CYTOLOGY CVX/VAG DOC CYTO: NORMAL
CYTOLOGY CVX/VAG DOC THIN PREP: NORMAL
DX ICD CODE: NORMAL
HPV I/H RISK 4 DNA CVX QL PROBE+SIG AMP: NEGATIVE
Lab: NORMAL
Lab: NORMAL
OTHER STN SPEC: NORMAL
STAT OF ADQ CVX/VAG CYTO-IMP: NORMAL

## 2024-12-05 ENCOUNTER — OFFICE VISIT (OUTPATIENT)
Dept: FAMILY MEDICINE CLINIC | Facility: CLINIC | Age: 59
End: 2024-12-05
Payer: COMMERCIAL

## 2024-12-05 VITALS
OXYGEN SATURATION: 97 % | DIASTOLIC BLOOD PRESSURE: 76 MMHG | TEMPERATURE: 97.3 F | BODY MASS INDEX: 28.52 KG/M2 | HEART RATE: 90 BPM | SYSTOLIC BLOOD PRESSURE: 130 MMHG | WEIGHT: 155 LBS | HEIGHT: 62 IN

## 2024-12-05 DIAGNOSIS — J02.9 SORE THROAT: ICD-10-CM

## 2024-12-05 DIAGNOSIS — J02.0 STREP PHARYNGITIS: Primary | ICD-10-CM

## 2024-12-05 DIAGNOSIS — R50.9 FEVER, UNSPECIFIED FEVER CAUSE: ICD-10-CM

## 2024-12-05 DIAGNOSIS — J06.9 UPPER RESPIRATORY TRACT INFECTION, UNSPECIFIED TYPE: ICD-10-CM

## 2024-12-05 LAB
B PARAPERT DNA SPEC QL NAA+PROBE: NOT DETECTED
B PERT DNA SPEC QL NAA+PROBE: NOT DETECTED
C PNEUM DNA NPH QL NAA+NON-PROBE: NOT DETECTED
EXPIRATION DATE: ABNORMAL
EXPIRATION DATE: NORMAL
FLUAV AG UPPER RESP QL IA.RAPID: NOT DETECTED
FLUAV SUBTYP SPEC NAA+PROBE: NOT DETECTED
FLUBV AG UPPER RESP QL IA.RAPID: NOT DETECTED
FLUBV RNA ISLT QL NAA+PROBE: NOT DETECTED
HADV DNA SPEC NAA+PROBE: NOT DETECTED
HCOV 229E RNA SPEC QL NAA+PROBE: NOT DETECTED
HCOV HKU1 RNA SPEC QL NAA+PROBE: NOT DETECTED
HCOV NL63 RNA SPEC QL NAA+PROBE: NOT DETECTED
HCOV OC43 RNA SPEC QL NAA+PROBE: NOT DETECTED
HMPV RNA NPH QL NAA+NON-PROBE: NOT DETECTED
HPIV1 RNA ISLT QL NAA+PROBE: NOT DETECTED
HPIV2 RNA SPEC QL NAA+PROBE: NOT DETECTED
HPIV3 RNA NPH QL NAA+PROBE: NOT DETECTED
HPIV4 P GENE NPH QL NAA+PROBE: NOT DETECTED
INTERNAL CONTROL: ABNORMAL
INTERNAL CONTROL: NORMAL
Lab: ABNORMAL
Lab: NORMAL
M PNEUMO IGG SER IA-ACNC: NOT DETECTED
RHINOVIRUS RNA SPEC NAA+PROBE: NOT DETECTED
RSV RNA NPH QL NAA+NON-PROBE: NOT DETECTED
S PYO AG THROAT QL: POSITIVE
SARS-COV-2 AG UPPER RESP QL IA.RAPID: NOT DETECTED
SARS-COV-2 RNA RESP QL NAA+PROBE: NOT DETECTED

## 2024-12-05 PROCEDURE — 0202U NFCT DS 22 TRGT SARS-COV-2: CPT | Performed by: NURSE PRACTITIONER

## 2024-12-05 RX ORDER — AZITHROMYCIN 250 MG/1
TABLET, FILM COATED ORAL
Qty: 6 TABLET | Refills: 0 | Status: SHIPPED | OUTPATIENT
Start: 2024-12-05

## 2024-12-05 RX ORDER — METHYLPREDNISOLONE 4 MG/1
TABLET ORAL
Qty: 21 EACH | Refills: 0 | Status: SHIPPED | OUTPATIENT
Start: 2024-12-05

## 2024-12-05 RX ORDER — BENZONATATE 100 MG/1
100 CAPSULE ORAL 3 TIMES DAILY PRN
Qty: 30 CAPSULE | Refills: 0 | Status: SHIPPED | OUTPATIENT
Start: 2024-12-05

## 2024-12-05 NOTE — PROGRESS NOTES
Chief Complaint  Fever (Last night/Symptoms for 3 days/Granddaughter had FluA/), Cough, and Sore Throat    Subjective            Valentina Stevens presents to [facility]  History of Present Illness  Pt is reporting s/s started 3 days ago and then granddaughter with (+) flu A and then pt with cold and sore throat and flu like s/s--    Did take some liquid coricidin HBP last night   Fever   Associated symptoms include coughing, nausea and a sore throat. Pertinent negatives include no abdominal pain, chest pain, diarrhea, ear pain, vomiting or wheezing.   Cough  Associated symptoms include a fever and a sore throat. Pertinent negatives include no chest pain, chills, ear pain, postnasal drip, rhinorrhea, shortness of breath or wheezing.   Sore Throat   Associated symptoms include coughing. Pertinent negatives include no abdominal pain, diarrhea, ear pain, shortness of breath or vomiting.       PHQ-2 Total Score:    PHQ-9 Total Score:      Past Medical History:   Diagnosis Date    Abnormal ECG     Allergic     Drug allergies    Arthritis     Arthritis of back     Asthma     Asthma 10/31/2023    Last Assessment & Plan:     Condition: stable        Reviewed trigger avoidance and reviewed proper use of inhalers and rescue medications. Reviewed concerning signs/symptoms and ER precautions.          Follow up in: three months    Asthma, intrinsic     CTS (carpal tunnel syndrome)     Depression     Difficulty walking     Diverticulitis of colon     Fatty (change of) liver, not elsewhere classified     GERD (gastroesophageal reflux disease)     Hearing loss     Hyperlipemia     Hypertension     Hypothyroidism     Knee swelling     Low back pain     Lumbar radiculopathy 02/15/2022    Memory loss     Menopause     Migraine     Murmur     Osteopenia     Osteoporosis     Pneumonia     Primary central sleep apnea     Scoliosis     Seizures     last when 10 years old    Sleep apnea, obstructive      Visual impairment     Vitamin D deficiency        Allergies   Allergen Reactions    Penicillins Shortness Of Breath and Rash    Sulfa Antibiotics Hives    Cephalexin Rash    Indomethacin Rash        Past Surgical History:   Procedure Laterality Date    COLONOSCOPY  2018    glenna    COLONOSCOPY N/A 10/17/2023    Procedure: COLONOSCOPY WITH COLD SNARE POLYPECTOMY;  Surgeon: Yossi Stephens MD;  Location: [facility] END[facility];  Service: Gastroenterology;  Laterality: N/A;  DIVERTICULOSIS, COLON POLYP    EPIDURAL BLOCK      ESSURE TUBAL LIGATION      SKIN BIOPSY      THYMECTOMY      TUBAL ABDOMINAL LIGATION      TUMOR REMOVAL      in between heart and lungs        Social History     Tobacco Use    Smoking status: Never     Passive exposure: Never    Smokeless tobacco: Never   Vaping Use    Vaping status: Never Used   Substance Use Topics    Alcohol use: Never    Drug use: Never       Family History   Problem Relation Age of Onset    Hypertension Father     Osteoporosis Father     Alcohol abuse Father     Skin cancer Father     Thyroid cancer Mother     Migraines Mother     Stroke Mother     Skin cancer Mother     Cancer Mother     Skin cancer Brother     Dementia Sister     Migraines Sister     Migraines Sister     Uterine cancer Maternal Grandmother     Colon cancer Neg Hx     Breast cancer Neg Hx     Prostate cancer Neg Hx     Clotting disorder Neg Hx         Health Maintenance Due   Topic Date Due    ANNUAL PHYSICAL  09/11/2024        Current Outpatient Medications on File Prior to Visit   Medication Sig    albuterol sulfate  (90 Base) MCG/ACT inhaler Inhale 2 puffs Every 4 (Four) Hours As Needed for Wheezing.    budesonide (Pulmicort Flexhaler) 90 MCG/ACT inhaler INHALE ONE PUFF BY MOUTH TWICE DAILY. **RINSE MOUTH AFTER USE**    calcium carb-cholecalciferol (Calcium + Vitamin D3) 600-10 MG-MCG tablet per tablet Take 1 tablet by mouth 2 (Two) Times a Day.    escitalopram (LEXAPRO) 10 MG tablet Take 1  tablet by mouth Daily.    Fremanezumab-vfrm (Ajovy) 225 MG/1.5ML solution auto-injector Inject 225 mg under the skin into the appropriate area as directed Every 30 (Thirty) Days.    HYDROcodone-acetaminophen (NORCO) 5-325 MG per tablet Take 1 tablet by mouth 2 (Two) Times a Day As Needed.    ibandronate (BONIVA) 150 MG tablet Take 1 tablet by mouth Every 30 (Thirty) Days.    levothyroxine (SYNTHROID, LEVOTHROID) 75 MCG tablet Take 1 tablet by mouth Daily.    lisinopril (PRINIVIL,ZESTRIL) 10 MG tablet Take 1 tablet by mouth Daily.    meloxicam (MOBIC) 15 MG tablet Take 1 tablet by mouth Daily As Needed for Moderate Pain or Mild Pain.    multivitamin with minerals tablet tablet Take 1 tablet by mouth Daily.    pramipexole (MIRAPEX) 0.5 MG tablet Take 1 tablet by mouth Every Night.    rizatriptan (MAXALT) 10 MG tablet Take 1 tablet by mouth 1 (One) Time As Needed for Migraine. 1 tablet at HA onset, may repeat in 2 hrs once if needed    vitamin E 100 UNIT capsule Take 1 capsule by mouth Daily.     No current facility-administered medications on file prior to visit.       Immunization History   Administered Date(s) Administered    31-influenza Vac Quardvalent Preservativ 11/05/2015, 11/09/2016, 10/19/2021, 09/30/2022    COVID-19 (PFIZER) Purple Cap Monovalent 05/27/2021, 06/17/2021, 06/17/2021    COVID-19 (UNSPECIFIED) 05/27/2021, 06/17/2021    Flu Vaccine Intradermal Quad 18-64YR 09/01/2023    Fluzone  >6mos 10/01/2024    Fluzone (or Fluarix & Flulaval for VFC) >6mos 10/19/2021, 09/30/2022    Hepatitis A 05/17/2018, 12/20/2018    Influenza Seasonal Injectable 10/10/2014    Influenza, Unspecified 10/19/2020, 10/19/2020, 09/01/2023    Pneumococcal Conjugate 20-Valent (PCV20) 08/09/2023    Pneumococcal Polysaccharide (PPSV23) 05/10/2022    Shingrix 11/12/2021, 01/21/2022    Tdap 03/20/2018, 10/19/2020    Zoster, Unspecified 11/12/2021, 01/21/2022       Review of Systems   Constitutional:  Positive for fatigue and fever.  "Negative for chills.   HENT:  Positive for sore throat. Negative for ear pain, postnasal drip, rhinorrhea and sinus pressure.    Respiratory:  Positive for cough. Negative for shortness of breath and wheezing.         Sputum production    Cardiovascular:  Negative for chest pain.   Gastrointestinal:  Positive for nausea. Negative for abdominal pain, diarrhea and vomiting.   Musculoskeletal:  Negative for arthralgias.        Left posterior hip    Neurological:  Negative for dizziness, syncope, light-headedness and headache.        Objective     /76 (BP Location: Left arm)   Pulse 90   Temp 97.3 °F (36.3 °C) (Temporal)   Ht 157.5 cm (62\")   Wt 70.3 kg (155 lb)   SpO2 97%   BMI 28.35 kg/m²       Physical Exam  Vitals and nursing note reviewed.   Constitutional:       Appearance: Normal appearance.   HENT:      Head: Normocephalic.      Right Ear: External ear normal.      Left Ear: External ear normal.      Nose: Nose normal.      Mouth/Throat:      Mouth: Mucous membranes are moist.      Pharynx: Posterior oropharyngeal erythema present. No oropharyngeal exudate.   Eyes:      Pupils: Pupils are equal, round, and reactive to light.   Cardiovascular:      Rate and Rhythm: Normal rate and regular rhythm.      Heart sounds: Normal heart sounds.   Pulmonary:      Effort: Pulmonary effort is normal.      Breath sounds: Normal breath sounds.      Comments: Croupy cough   Abdominal:      Palpations: Abdomen is soft.      Tenderness: There is no abdominal tenderness.   Musculoskeletal:         General: Normal range of motion.      Cervical back: Normal range of motion and neck supple.   Skin:     General: Skin is warm and dry.   Neurological:      Mental Status: She is alert and oriented to person, place, and time.   Psychiatric:         Mood and Affect: Mood normal.         Behavior: Behavior normal.         Thought Content: Thought content normal.         Judgment: Judgment normal.         Result Review :     The " following data was reviewed by: DEMAR Eden on 12/05/2024:        Strep          12/5/2024    16:12   Common Labs   POC Strep A, Molecular Positive    POCT SARS-CoV-2 Antigen QASIM + Flu (12/05/2024 16:12)  POCT rapid strep A (12/05/2024 16:12)                  Assessment and Plan      Diagnoses and all orders for this visit:    1. Strep pharyngitis (Primary)  -     Respiratory Panel PCR w/COVID-19(SARS-CoV-2) BELLE/SHAY/HERBERT/PAD/COR/PETER In-House, NP Swab in UTM/VTM, 2 HR TAT - Swab, Nasopharynx  -     azithromycin (Zithromax Z-Pablo) 250 MG tablet; Take 2 tablets by mouth on day 1, then 1 tablet daily on days 2-5  Dispense: 6 tablet; Refill: 0  -     methylPREDNISolone (MEDROL) 4 MG dose pack; Take as directed on package instructions.  Dispense: 21 each; Refill: 0  -     benzonatate (Tessalon Perles) 100 MG capsule; Take 1 capsule by mouth 3 (Three) Times a Day As Needed for Cough.  Dispense: 30 capsule; Refill: 0    2. Upper respiratory tract infection, unspecified type  -     Respiratory Panel PCR w/COVID-19(SARS-CoV-2) BELLE/SHAY/HERBERT/PAD/COR/PETER In-House, NP Swab in UTM/VTM, 2 HR TAT - Swab, Nasopharynx  -     azithromycin (Zithromax Z-Pablo) 250 MG tablet; Take 2 tablets by mouth on day 1, then 1 tablet daily on days 2-5  Dispense: 6 tablet; Refill: 0  -     methylPREDNISolone (MEDROL) 4 MG dose pack; Take as directed on package instructions.  Dispense: 21 each; Refill: 0  -     benzonatate (Tessalon Perles) 100 MG capsule; Take 1 capsule by mouth 3 (Three) Times a Day As Needed for Cough.  Dispense: 30 capsule; Refill: 0    3. Sore throat  -     POCT rapid strep A  -     Respiratory Panel PCR w/COVID-19(SARS-CoV-2) BELLE/SHAY/HERBERT/PAD/COR/PETER In-House, NP Swab in UTM/VTM, 2 HR TAT - Swab, Nasopharynx  -     azithromycin (Zithromax Z-Pablo) 250 MG tablet; Take 2 tablets by mouth on day 1, then 1 tablet daily on days 2-5  Dispense: 6 tablet; Refill: 0  -     methylPREDNISolone (MEDROL) 4 MG dose pack; Take as  directed on package instructions.  Dispense: 21 each; Refill: 0  -     benzonatate (Tessalon Perles) 100 MG capsule; Take 1 capsule by mouth 3 (Three) Times a Day As Needed for Cough.  Dispense: 30 capsule; Refill: 0    4. Fever, unspecified fever cause  -     POCT SARS-CoV-2 Antigen QASIM + Flu  -     Respiratory Panel PCR w/COVID-19(SARS-CoV-2) BELLE/SHAY/HERBERT/PAD/COR/PETER In-House, NP Swab in UTM/[facility], 2 HR TAT - Swab, Nasopharynx  -     azithromycin (Zithromax Z-Pablo) 250 MG tablet; Take 2 tablets by mouth on day 1, then 1 tablet daily on days 2-5  Dispense: 6 tablet; Refill: 0  -     methylPREDNISolone (MEDROL) 4 MG dose pack; Take as directed on package instructions.  Dispense: 21 each; Refill: 0  -     benzonatate (Tessalon Perles) 100 MG capsule; Take 1 capsule by mouth 3 (Three) Times a Day As Needed for Cough.  Dispense: 30 capsule; Refill: 0            Follow Up     Return if symptoms worsen or fail to improve.    Patient was given instructions and counseling regarding her condition or for health maintenance advice. Please see specific information pulled into the AVS if appropriate.            Valentina Stevens  reports that she has never smoked. She has never been exposed to tobacco smoke. She has never used smokeless tobacco. I have educated her on the risk of diseases from using tobacco products such as cancer, COPD, and heart disease.     .

## 2024-12-10 ENCOUNTER — HOSPITAL ENCOUNTER (OUTPATIENT)
Dept: ULTRASOUND IMAGING | Facility: HOSPITAL | Age: 59
Discharge: HOME | End: 2024-12-10
Payer: COMMERCIAL

## 2024-12-10 ENCOUNTER — HOSPITAL ENCOUNTER (OUTPATIENT)
Dept: MAMMOGRAPHY | Facility: HOSPITAL | Age: 59
Discharge: HOME | End: 2024-12-10
Payer: COMMERCIAL

## 2024-12-10 DIAGNOSIS — R22.31 MASS OF RIGHT AXILLA: ICD-10-CM

## 2024-12-10 DIAGNOSIS — Z12.31 SCREENING MAMMOGRAM FOR BREAST CANCER: Primary | ICD-10-CM

## 2024-12-10 PROCEDURE — G0279 TOMOSYNTHESIS, MAMMO: HCPCS

## 2024-12-10 PROCEDURE — 77065 DX MAMMO INCL CAD UNI: CPT

## 2024-12-10 PROCEDURE — 76882 US LMTD JT/FCL EVL NVASC XTR: CPT

## 2024-12-10 NOTE — PROGRESS NOTES
Please mail letter to patient stating    Valentina, the Mammogram of the right breast with right axilla ultrasound shows no mammographic findings to indicate anything that appears to be a right breast malignancy and then there was a single benign lymph node in the right axilla and there is no suspicious masses or soft tissue collections and it just tested manage this clinically if anything persists follow-up with me regarding this and then it does say that you are screening mammogram for the left breast would be due in January so I will go ahead and order that for you

## 2024-12-11 NOTE — PROGRESS NOTES
[facility]  Cardiology progress Note    Patient Name: Valentina Stevens  : 1965    CHIEF COMPLAINT  Hypertension        Subjective   Subjective     HISTORY OF PRESENT ILLNESS    Valentina Stevens is a 59 y.o. female with history of hypertension and shortness of breath.  No chest pain    REVIEW OF SYSTEMS    Constitutional:    No fever, no weight loss  Skin:     No rash  Otolaryngeal:    No difficulty swallowing  Cardiovascular: See HPI.  Pulmonary:    No cough, no sputum production    Personal History     Social History:    reports that she has never smoked. She has never been exposed to tobacco smoke. She has never used smokeless tobacco. She reports that she does not drink alcohol and does not use drugs.    Home Medications:  Current Outpatient Medications on File Prior to Visit   Medication Sig    albuterol sulfate  (90 Base) MCG/ACT inhaler Inhale 2 puffs Every 4 (Four) Hours As Needed for Wheezing.    azithromycin (Zithromax Z-Pablo) 250 MG tablet Take 2 tablets by mouth on day 1, then 1 tablet daily on days 2-5    benzonatate (Tessalon Perles) 100 MG capsule Take 1 capsule by mouth 3 (Three) Times a Day As Needed for Cough.    budesonide (Pulmicort Flexhaler) 90 MCG/ACT inhaler INHALE ONE PUFF BY MOUTH TWICE DAILY. **RINSE MOUTH AFTER USE**    calcium carb-cholecalciferol (Calcium + Vitamin D3) 600-10 MG-MCG tablet per tablet Take 1 tablet by mouth 2 (Two) Times a Day.    escitalopram (LEXAPRO) 10 MG tablet Take 1 tablet by mouth Daily.    Fremanezumab-vfrm (Ajovy) 225 MG/1.5ML solution auto-injector Inject 225 mg under the skin into the appropriate area as directed Every 30 (Thirty) Days.    HYDROcodone-acetaminophen (NORCO) 5-325 MG per tablet Take 1 tablet by mouth 2 (Two) Times a Day As Needed.    ibandronate (BONIVA) 150 MG tablet Take 1 tablet by mouth Every 30 (Thirty) Days.    levothyroxine (SYNTHROID, LEVOTHROID) 75 MCG tablet Take 1 tablet by mouth Daily.    lisinopril  (PRINIVIL,ZESTRIL) 10 MG tablet Take 1 tablet by mouth Daily.    meloxicam (MOBIC) 15 MG tablet Take 1 tablet by mouth Daily As Needed for Moderate Pain or Mild Pain.    multivitamin with minerals tablet tablet Take 1 tablet by mouth Daily.    pramipexole (MIRAPEX) 0.5 MG tablet Take 1 tablet by mouth Every Night.    rizatriptan (MAXALT) 10 MG tablet Take 1 tablet by mouth 1 (One) Time As Needed for Migraine. 1 tablet at HA onset, may repeat in 2 hrs once if needed    vitamin E 100 UNIT capsule Take 1 capsule by mouth Daily.    [DISCONTINUED] methylPREDNISolone (MEDROL) 4 MG dose pack Take as directed on package instructions.     No current facility-administered medications on file prior to visit.       Past Medical History:   Diagnosis Date    Abnormal ECG     Allergic     Drug allergies    Arthritis     Arthritis of back     Asthma     Asthma 10/31/2023    Last Assessment & Plan:     Condition: stable        Reviewed trigger avoidance and reviewed proper use of inhalers and rescue medications. Reviewed concerning signs/symptoms and ER precautions.          Follow up in: three months    Asthma, intrinsic     CTS (carpal tunnel syndrome)     Depression     Difficulty walking     Diverticulitis of colon     Fatty (change of) liver, not elsewhere classified     GERD (gastroesophageal reflux disease)     Hearing loss     Hyperlipemia     Hypertension     Hypothyroidism     Knee swelling     Low back pain     Lumbar radiculopathy 02/15/2022    Memory loss     Menopause     Migraine     Murmur     Osteopenia     Osteoporosis     Pneumonia     Primary central sleep apnea     Scoliosis     Seizures     last when 10 years old    Sleep apnea, obstructive     Visual impairment     Vitamin D deficiency        Allergies:  Allergies   Allergen Reactions    Penicillins Shortness Of Breath and Rash    Sulfa Antibiotics Hives    Cephalexin Rash    Indomethacin Rash       Objective    Objective       Vitals:   Heart Rate:  [57-64]  57  BP: (121-143)/(72-75) 121/72  Body mass index is 171.65 kg/m².     PHYSICAL EXAM:    General Appearance:   well developed  well nourished  HENT:   oropharynx moist  lips not cyanotic  Neck:  thyroid not enlarged  supple  Respiratory:  no respiratory distress  normal breath sounds  no rales  Cardiovascular:  no jugular venous distention  regular rhythm  apical impulse normal  S1 normal, S2 normal  no S3, no S4   no murmur  no rub, no thrill  carotid pulses normal; no bruit  pedal pulses normal  lower extremity edema: none    Skin:   warm, dry  Psychiatric:  judgement and insight appropriate  normal mood and affect        Result Review:  I have personally reviewed the available results from  [x]  Laboratory  [x]  EKG  [x]  Cardiology  [x]  Medications  [x]  Old records  []  Other:     Procedures  Lab Results   Component Value Date    CHOL 184 09/11/2024    CHOL 186 03/11/2024    CHOL 179 09/11/2023     Lab Results   Component Value Date    TRIG 277 (H) 09/11/2024    TRIG 256 (H) 03/11/2024    TRIG 230 (H) 09/11/2023     Lab Results   Component Value Date    HDL 36 (L) 09/11/2024    HDL 41 03/11/2024    HDL 36 (L) 09/11/2023     Lab Results   Component Value Date     (H) 09/11/2024     (H) 03/11/2024     (H) 09/11/2023     Lab Results   Component Value Date    VLDL 47 (H) 09/11/2024    VLDL 44 (H) 03/11/2024    VLDL 40 09/11/2023     Results for orders placed during the hospital encounter of 02/27/24    Adult Transthoracic Echo Complete W/ Cont if Necessary Per Protocol    Interpretation Summary  Normal left ventricular systolic function.  No significant valve abnormalities noted.     Impression/Plan:  1.  Essential hypertension controlled: Continue lisinopril 10 mg twice a day.  Monitor blood pressure regularly.  2.  Shortness of breath stable: Normal echo           Stefano Ackerman MD   12/13/24   10:29 EST

## 2024-12-13 ENCOUNTER — OFFICE VISIT (OUTPATIENT)
Dept: CARDIOLOGY | Facility: CLINIC | Age: 59
End: 2024-12-13
Payer: COMMERCIAL

## 2024-12-13 VITALS
HEIGHT: 55 IN | BODY MASS INDEX: 35.87 KG/M2 | WEIGHT: 155 LBS | HEART RATE: 57 BPM | DIASTOLIC BLOOD PRESSURE: 72 MMHG | SYSTOLIC BLOOD PRESSURE: 121 MMHG

## 2024-12-13 DIAGNOSIS — I10 HYPERTENSION, ESSENTIAL: Primary | ICD-10-CM

## 2024-12-13 DIAGNOSIS — R06.02 SHORTNESS OF BREATH: ICD-10-CM

## 2024-12-13 PROCEDURE — 1159F MED LIST DOCD IN RCRD: CPT | Performed by: SPECIALIST

## 2024-12-13 PROCEDURE — 3078F DIAST BP <80 MM HG: CPT | Performed by: SPECIALIST

## 2024-12-13 PROCEDURE — 99213 OFFICE O/P EST LOW 20 MIN: CPT | Performed by: SPECIALIST

## 2024-12-13 PROCEDURE — 3074F SYST BP LT 130 MM HG: CPT | Performed by: SPECIALIST

## 2024-12-13 PROCEDURE — 1160F RVW MEDS BY RX/DR IN RCRD: CPT | Performed by: SPECIALIST

## 2024-12-17 ENCOUNTER — PATIENT MESSAGE (OUTPATIENT)
Dept: FAMILY MEDICINE CLINIC | Facility: CLINIC | Age: 59
End: 2024-12-17
Payer: COMMERCIAL

## 2025-01-07 DIAGNOSIS — J45.909 ASTHMA, UNSPECIFIED ASTHMA SEVERITY, UNSPECIFIED WHETHER COMPLICATED, UNSPECIFIED WHETHER PERSISTENT: Primary | ICD-10-CM

## 2025-01-07 RX ORDER — BUDESONIDE 90 UG/1
AEROSOL, POWDER RESPIRATORY (INHALATION)
Qty: 1 EACH | Refills: 2 | Status: SHIPPED | OUTPATIENT
Start: 2025-01-07

## 2025-01-09 ENCOUNTER — OFFICE VISIT (OUTPATIENT)
Dept: FAMILY MEDICINE CLINIC | Facility: CLINIC | Age: 60
End: 2025-01-09
Payer: COMMERCIAL

## 2025-01-09 ENCOUNTER — PRIOR AUTHORIZATION (OUTPATIENT)
Dept: NEUROLOGY | Facility: CLINIC | Age: 60
End: 2025-01-09
Payer: COMMERCIAL

## 2025-01-09 VITALS
HEIGHT: 62 IN | DIASTOLIC BLOOD PRESSURE: 72 MMHG | OXYGEN SATURATION: 95 % | TEMPERATURE: 96.6 F | BODY MASS INDEX: 29.26 KG/M2 | SYSTOLIC BLOOD PRESSURE: 128 MMHG | WEIGHT: 159 LBS | HEART RATE: 91 BPM

## 2025-01-09 DIAGNOSIS — M54.6 ACUTE BILATERAL THORACIC BACK PAIN: ICD-10-CM
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
## 2025-01-09 DIAGNOSIS — M50.30 DDD (DEGENERATIVE DISC DISEASE), CERVICAL: ICD-10-CM

## 2025-01-09 DIAGNOSIS — M51.369 DEGENERATION OF INTERVERTEBRAL DISC OF LUMBAR REGION, UNSPECIFIED WHETHER PAIN PRESENT: ICD-10-CM

## 2025-01-09 DIAGNOSIS — M54.2 NECK PAIN: ICD-10-CM

## 2025-01-09 RX ORDER — PREDNISONE 20 MG/1
20 TABLET ORAL DAILY
Qty: 5 TABLET | Refills: 0 | Status: SHIPPED | OUTPATIENT
Start: 2025-01-09 | End: 2025-01-14

## 2025-01-09 RX ORDER — CYCLOBENZAPRINE HCL 10 MG
10 TABLET ORAL 3 TIMES DAILY PRN
Qty: 30 TABLET | Refills: 0 | Status: CANCELLED | OUTPATIENT
Start: 2025-01-09

## 2025-01-09 NOTE — PROGRESS NOTES
Chief Complaint  Back Pain and Neck Pain (Yesterday she slipped on the ice and fell.  She hit the side rail that steps into the vehicle.  Her back and lower neck is hurting. )    Subjective            Valentina Stevens presents to [facility]  History of Present Illness  Patient is here today for an acute visit regarding falling yesterday      History of Present Illness  The patient is a 59-year-old female who presents for evaluation of back and neck pain.    She experienced a fall on ice while exiting her vehicle yesterday, resulting in an impact to her upper back on the running board of the truck. The pain in her back was so severe that she did not notice if her neck was hit. She reports pain in the thoracic spine and neck, with the right side of her back being more painful than the left. She also reports neck pain. She rates her pain as an 8 out of 10. She has previously used muscle relaxants and steroids for pain management. She has a history of degenerative disc disease affecting the lumbar, thoracic, and cervical regions of her spine. She reports no head injury, loss of consciousness, nausea, vomiting, or numbness. She has previously used Zanaflex, which she tolerated well. The pain is exacerbated by movement, including turning, bending, reaching, and rolling, as well as when she lies flat on her back. She is currently on meloxicam and hydrocodone for pain management.    She had a headache for a while, but it has resolved.    She wants to know if all her vaccines are up to date.    Supplemental Information  She had the worst COVID-19 infection after she had the vaccine, which turned into COVID-19 pneumonia.    MEDICATIONS  Meloxicam, hydrocodone, Zanaflex (past).    IMMUNIZATIONS  She has received her influenza vaccine, 3 doses of the COVID-19 vaccine, both pneumococcal vaccines, both shingles vaccines, tetanus vaccine in 2020, and both doses of the hepatitis A vaccine.   Patient was asking if she needed anything further      PHQ-2 Total Score: 0  PHQ-9 Total Score:      Past Medical History:   Diagnosis Date    Abnormal ECG     Allergic     Drug allergies    Arthritis     Arthritis of back     Asthma     Asthma 10/31/2023    Last Assessment & Plan:     Condition: stable        Reviewed trigger avoidance and reviewed proper use of inhalers and rescue medications. Reviewed concerning signs/symptoms and ER precautions.          Follow up in: three months    Asthma, intrinsic     CTS (carpal tunnel syndrome)     Depression     Difficulty walking     Diverticulitis of colon     Fatty (change of) liver, not elsewhere classified     GERD (gastroesophageal reflux disease)     Hearing loss     Hyperlipemia     Hypertension     Hypothyroidism     Knee swelling     Low back pain     Lumbar radiculopathy 02/15/2022    Memory loss     Menopause     Migraine     Murmur     Osteopenia     Osteoporosis     Pneumonia     Primary central sleep apnea     Scoliosis     Seizures     last when 10 years old    Sleep apnea, obstructive     Visual impairment     Vitamin D deficiency        Allergies   Allergen Reactions    Penicillins Shortness Of Breath and Rash    Sulfa Antibiotics Hives    Cephalexin Rash    Indomethacin Rash        Past Surgical History:   Procedure Laterality Date    COLONOSCOPY  2018    glenna    COLONOSCOPY N/A 10/17/2023    Procedure: COLONOSCOPY WITH COLD SNARE POLYPECTOMY;  Surgeon: Yossi Stephens MD;  Location: [facility] ENDOSCOPY;  Service: Gastroenterology;  Laterality: N/A;  DIVERTICULOSIS, COLON POLYP    EPIDURAL BLOCK      ESSURE TUBAL LIGATION      SKIN BIOPSY      THYMECTOMY      TUBAL ABDOMINAL LIGATION      TUMOR REMOVAL      in between heart and lungs        Social History     Tobacco Use    Smoking status: Never     Passive exposure: Never    Smokeless tobacco: Never   Vaping Use    Vaping status: Never Used   Substance Use Topics    Alcohol use: Never    Drug  use: Never       Family History   Problem Relation Age of Onset    Thyroid cancer Mother     Migraines Mother     Stroke Mother     Skin cancer Mother     Cancer Mother     Hypertension Father     Osteoporosis Father     Alcohol abuse Father     Skin cancer Father     Dementia Sister     Migraines Sister     Migraines Sister     Skin cancer Brother     Ovarian cancer Maternal Grandmother     Uterine cancer Maternal Grandmother     Colon cancer Neg Hx     Breast cancer Neg Hx     Prostate cancer Neg Hx     Clotting disorder Neg Hx         Health Maintenance Due   Topic Date Due    ANNUAL PHYSICAL  09/11/2024        Current Outpatient Medications on File Prior to Visit   Medication Sig    albuterol sulfate  (90 Base) MCG/ACT inhaler Inhale 2 puffs Every 4 (Four) Hours As Needed for Wheezing.    azithromycin (Zithromax Z-Pablo) 250 MG tablet Take 2 tablets by mouth on day 1, then 1 tablet daily on days 2-5    benzonatate (Tessalon Perles) 100 MG capsule Take 1 capsule by mouth 3 (Three) Times a Day As Needed for Cough.    calcium carb-cholecalciferol (Calcium + Vitamin D3) 600-10 MG-MCG tablet per tablet Take 1 tablet by mouth 2 (Two) Times a Day.    escitalopram (LEXAPRO) 10 MG tablet Take 1 tablet by mouth Daily.    Fremanezumab-vfrm (Ajovy) 225 MG/1.5ML solution auto-injector Inject 225 mg under the skin into the appropriate area as directed Every 30 (Thirty) Days.    HYDROcodone-acetaminophen (NORCO) 5-325 MG per tablet Take 1 tablet by mouth 2 (Two) Times a Day As Needed.    ibandronate (BONIVA) 150 MG tablet Take 1 tablet by mouth Every 30 (Thirty) Days.    levothyroxine (SYNTHROID, LEVOTHROID) 75 MCG tablet Take 1 tablet by mouth Daily.    lisinopril (PRINIVIL,ZESTRIL) 10 MG tablet Take 1 tablet by mouth Daily.    meloxicam (MOBIC) 15 MG tablet Take 1 tablet by mouth Daily As Needed for Moderate Pain or Mild Pain.    multivitamin with minerals tablet tablet Take 1 tablet by mouth Daily.    pramipexole  "(MIRAPEX) 0.5 MG tablet Take 1 tablet by mouth Every Night.    Pulmicort Flexhaler 90 MCG/ACT inhaler INHALE ONE PUFF BY MOUTH TWICE DAILY. **RINSE MOUTH AFTER USE**    rizatriptan (MAXALT) 10 MG tablet Take 1 tablet by mouth 1 (One) Time As Needed for Migraine. 1 tablet at HA onset, may repeat in 2 hrs once if needed    vitamin E 100 UNIT capsule Take 1 capsule by mouth Daily.     No current facility-administered medications on file prior to visit.       Immunization History   Administered Date(s) Administered    31-influenza Vac Quardvalent Preservativ 11/05/2015, 11/09/2016, 10/19/2021, 09/30/2022    COVID-19 (PFIZER) Purple Cap Monovalent 05/27/2021, 06/17/2021, 06/17/2021    COVID-19 (UNSPECIFIED) 05/27/2021, 06/17/2021    Flu Vaccine Intradermal Quad 18-64YR 09/01/2023    Fluzone  >6mos 10/01/2024    Fluzone (or Fluarix & Flulaval for VFC) >6mos 10/19/2021, 09/30/2022    Hepatitis A 05/17/2018, 12/20/2018    Influenza Seasonal Injectable 10/10/2014    Influenza, Unspecified 10/19/2020, 10/19/2020, 09/01/2023    Pneumococcal Conjugate 20-Valent (PCV20) 08/09/2023    Pneumococcal Polysaccharide (PPSV23) 05/10/2022    Shingrix 11/12/2021, 01/21/2022    Tdap 03/20/2018, 10/19/2020    Zoster, Unspecified 11/12/2021, 01/21/2022       Review of Systems   Gastrointestinal:  Negative for nausea and vomiting.   Musculoskeletal:  Positive for arthralgias, back pain and neck stiffness.   Neurological:  Negative for dizziness, syncope, light-headedness and numbness.        Objective     /72   Pulse 91   Temp 96.6 °F (35.9 °C) (Temporal)   Ht 157.5 cm (62\")   Wt 72.1 kg (159 lb)   SpO2 95%   BMI 29.08 kg/m²       Physical Exam  Vitals and nursing note reviewed.   Constitutional:       Appearance: Normal appearance.   HENT:      Head: Normocephalic.      Right Ear: External ear normal.      Left Ear: External ear normal.      Nose: Nose normal.      Mouth/Throat:      Mouth: Mucous membranes are moist.   Eyes:    "   Pupils: Pupils are equal, round, and reactive to light.   Cardiovascular:      Rate and Rhythm: Normal rate and regular rhythm.      Heart sounds: Normal heart sounds.   Pulmonary:      Effort: Pulmonary effort is normal.      Breath sounds: Normal breath sounds.   Abdominal:      Palpations: Abdomen is soft.   Musculoskeletal:         General: Tenderness and signs of injury present.      Cervical back: Neck supple. Tenderness and bony tenderness present.      Thoracic back: Spasms, tenderness and bony tenderness present. Decreased range of motion.        Back:    Skin:     General: Skin is warm and dry.   Neurological:      Mental Status: She is alert and oriented to person, place, and time.   Psychiatric:         Mood and Affect: Mood normal.         Behavior: Behavior normal.         Thought Content: Thought content normal.         Judgment: Judgment normal.         Result Review :     The following data was reviewed by: DEMAR Eden on 01/09/2025:             XR Spine Cervical 2 or 3 View (01/09/2025 09:46)  XR Spine Thoracic 3 View (In Office) (01/09/2025 09:45)  Results  Imaging  X-ray of the cervical spine shows degenerative changes, more pronounced in the cervical spine. X-ray of the thoracic spine shows mild degenerative changes but no acute traumatic injury, compression, or fracture.  Findings:  Cervical spine: Normal cervical alignment. No evidence of fracture or compression deformity. There is mild to moderate spondylosis throughout. There is mild facet arthropathy throughout. The prevertebral soft tissues appear within normal limits.     Thoracic spine: Normal thoracic spine alignment. Vertebral body height is well maintained throughout  without evidence of fracture. There is mild spondylosis throughout. No focal soft tissue abnormalities identified.     IMPRESSION:  Impression:  1.No acute traumatic injury identified.  2.Degenerative changes most pronounced in the cervical spine.         Electronically Signed: Chad Mccoy MD    1/9/2025 10:15 AM EST              Assessment and Plan      Diagnoses and all orders for this visit:    1. Fall on ice (Primary)  -     XR Spine Thoracic 3 View (In Office)  -     XR Spine Cervical 2 or 3 View  -     predniSONE (DELTASONE) 20 MG tablet; Take 1 tablet by mouth Daily for 5 days.  Dispense: 5 tablet; Refill: 0  -     tiZANidine (Zanaflex) 4 MG tablet; Take 1 tablet by mouth Every 8 (Eight) Hours As Needed for Muscle Spasms.  Dispense: 30 tablet; Refill: 0    2. Neck pain  -     XR Spine Cervical 2 or 3 View  -     predniSONE (DELTASONE) 20 MG tablet; Take 1 tablet by mouth Daily for 5 days.  Dispense: 5 tablet; Refill: 0  -     tiZANidine (Zanaflex) 4 MG tablet; Take 1 tablet by mouth Every 8 (Eight) Hours As Needed for Muscle Spasms.  Dispense: 30 tablet; Refill: 0    3. Acute bilateral thoracic back pain  -     XR Spine Thoracic 3 View (In Office)  -     predniSONE (DELTASONE) 20 MG tablet; Take 1 tablet by mouth Daily for 5 days.  Dispense: 5 tablet; Refill: 0  -     tiZANidine (Zanaflex) 4 MG tablet; Take 1 tablet by mouth Every 8 (Eight) Hours As Needed for Muscle Spasms.  Dispense: 30 tablet; Refill: 0    4. DDD (degenerative disc disease), cervical  Comments:  exacerbated from the fall    5. Degeneration of intervertebral disc of lumbar region, unspecified whether pain present  Comments:  exacerbated from the fall      We will send in prednisone 20 mg daily for 5 days to be taken with food and then the Zanaflex as needed and caution patient regarding side effects to watch for should follow-up should anything persist or worsen x-rays were negative for any acute traumatic injury    Assessment & Plan  1. Back pain.  She fell on ice yesterday, resulting in back pain. The pain is rated at 8 out of 10 and is present across the thoracic spine, with more intensity on the right side. She has a contusion and bruising in the lower thoracic and upper lumbar  regions. X-rays of the thoracic and cervical spine show degenerative changes but no acute traumatic injury, compression, or fracture. A prescription for prednisone once daily and Zanaflex has been provided to manage the pain. She is advised to continue using meloxicam and hydrocodone as needed.    2. Neck pain.  She reports neck pain following the fall. X-rays of the cervical spine show degenerative changes but no acute traumatic injury, compression, or fracture. A prescription for prednisone once daily and Zanaflex has been provided to manage the pain. She is advised to continue using meloxicam and hydrocodone as needed.    3. Health maintenance.  She has received her influenza vaccine, 3 doses of the COVID-19 vaccine, both pneumococcal vaccines, both shingles vaccines, tetanus vaccine in 2020, and both doses of the hepatitis A vaccine.  And upon review only other thing would have been if she wanted another COVID-vaccine and she declined          Follow Up     Return if symptoms worsen or fail to improve.    Patient was given instructions and counseling regarding her condition or for health maintenance advice. Please see specific information pulled into the AVS if appropriate.            Valentina Stevens  reports that she has never smoked. She has never been exposed to tobacco smoke. She has never used smokeless tobacco. I have educated her on the risk of diseases from using tobacco products such as cancer, COPD, and heart disease.                Patient or patient representative verbalized consent for the use of Ambient Listening during the visit with  DEMAR Eden for chart documentation. 1/9/2025  09:40 EST

## 2025-01-09 NOTE — TELEPHONE ENCOUNTER
PA submitted through CM  Approved today by [place] [employer] 2017  The request has been approved. The authorization is effective from 01/09/2025 to 01/09/2026, as long as the member is enrolled in their current health plan. A written notification letter will follow with additional details.  Authorization Expiration Date: 1/8/2026

## 2025-01-16 ENCOUNTER — OFFICE VISIT (OUTPATIENT)
Dept: FAMILY MEDICINE CLINIC | Facility: CLINIC | Age: 60
End: 2025-01-16
Payer: COMMERCIAL

## 2025-01-16 VITALS
TEMPERATURE: 97.7 F | DIASTOLIC BLOOD PRESSURE: 72 MMHG | HEART RATE: 78 BPM | WEIGHT: 158 LBS | OXYGEN SATURATION: 95 % | HEIGHT: 62 IN | SYSTOLIC BLOOD PRESSURE: 122 MMHG | BODY MASS INDEX: 29.08 KG/M2

## 2025-01-16 DIAGNOSIS — S29.011S CHEST WALL MUSCLE STRAIN, SEQUELA: ICD-10-CM

## 2025-01-16 DIAGNOSIS — S23.429D: ICD-10-CM
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
## 2025-01-16 DIAGNOSIS — Z09 ENCOUNTER FOR EXAMINATION FOLLOWING TREATMENT AT HOSPITAL: Primary | ICD-10-CM

## 2025-01-16 NOTE — PROGRESS NOTES
Chief Complaint  Chest Injury (Patient fell on ice for the second time.  Went to [facility].  This was three days a go.  She has a knot on her chest and it hurts. )    Subjective            Valentina Shruti Stevens presents to [facility]  History of Present Illness    History of Present Illness  The patient is a 59-year-old female who presents for follow-up of chest pain.    She experienced a fall on ice 3 days ago, landing on her buttocks but subsequently developing chest pain. She sought immediate medical attention at the [facility] ER, where she was diagnosed with a pulled muscle. The onset of her chest pain was reported as with the fall and then she went for eval approximately 5 hours post-fall. She reports the presence of a knot in her chest or may just be the way her chest is made and because of the tenderness and soreness she may never have noticed this-- which she did not notice prior to the incident. She describes the pain as more akin to soreness and tenderness, localized to the upper sternum. She reports no systemic symptoms such as fever or chills. She also reports no respiratory distress, dizziness, lightheadedness, syncope, nausea, vomiting, or dysphagia. She has been using Robaxin, with 2 doses remaining, and reports some relief. She also has tizanidine at home. She has not yet started her prednisone course. She reports full range of motion in her shoulder and no tenderness in the scapula. She expresses concern about potential bruising from the fall. She reports that her back pain has resolved since the last fall. Certain movements, such as mopping or sweeping, exacerbate her pain of the upper chest wall /sternum area, which radiates to the upper shoulder. Lifting heavy objects also triggers pain in the sternum and posterior shoulder/scapula area. She was administered Toradol both intravenously and orally, along with Robaxin 750 mg.  And reports that she was  more concerned as to whether she could use heat or ice or both alternatingly.    MEDICATIONS  Current: Robaxin, ketorolac, prednisone, tizanidine      PHQ-2 Total Score:    PHQ-9 Total Score:      Past Medical History:   Diagnosis Date    Abnormal ECG     Allergic     Drug allergies    Arthritis     Arthritis of back     Asthma     Asthma 10/31/2023    Last Assessment & Plan:     Condition: stable        Reviewed trigger avoidance and reviewed proper use of inhalers and rescue medications. Reviewed concerning signs/symptoms and ER precautions.          Follow up in: three months    Asthma, intrinsic     CTS (carpal tunnel syndrome)     Depression     Difficulty walking     Diverticulitis of colon     Fatty (change of) liver, not elsewhere classified     GERD (gastroesophageal reflux disease)     Hearing loss     Hyperlipemia     Hypertension     Hypothyroidism     Knee swelling     Low back pain     Lumbar radiculopathy 02/15/2022    Memory loss     Menopause     Migraine     Murmur     Osteopenia     Osteoporosis     Pneumonia     Primary central sleep apnea     Scoliosis     Seizures     last when 10 years old    Sleep apnea, obstructive     Visual impairment     Vitamin D deficiency        Allergies   Allergen Reactions    Penicillins Shortness Of Breath and Rash    Sulfa Antibiotics Hives    Cephalexin Rash    Indomethacin Rash        Past Surgical History:   Procedure Laterality Date    COLONOSCOPY  2018    glenna    COLONOSCOPY N/A 10/17/2023    Procedure: COLONOSCOPY WITH COLD SNARE POLYPECTOMY;  Surgeon: Yossi Stephens MD;  Location: [facility] ENDOSCOPY;  Service: Gastroenterology;  Laterality: N/A;  DIVERTICULOSIS, COLON POLYP    EPIDURAL BLOCK      ESSURE TUBAL LIGATION      SKIN BIOPSY      THYMECTOMY      TUBAL ABDOMINAL LIGATION      TUMOR REMOVAL      in between heart and lungs        Social History     Tobacco Use    Smoking status: Never     Passive exposure: Never    Smokeless tobacco: Never    Vaping Use    Vaping status: Never Used   Substance Use Topics    Alcohol use: Never    Drug use: Never       Family History   Problem Relation Age of Onset    Thyroid cancer Mother     Migraines Mother     Stroke Mother     Skin cancer Mother     Cancer Mother     Hypertension Father     Osteoporosis Father     Alcohol abuse Father     Skin cancer Father     Dementia Sister     Migraines Sister     Migraines Sister     Skin cancer Brother     Ovarian cancer Maternal Grandmother     Uterine cancer Maternal Grandmother     Colon cancer Neg Hx     Breast cancer Neg Hx     Prostate cancer Neg Hx     Clotting disorder Neg Hx         Health Maintenance Due   Topic Date Due    COVID-19 Vaccine (6 - 2024-25 season) 09/01/2024    ANNUAL PHYSICAL  09/11/2024        Current Outpatient Medications on File Prior to Visit   Medication Sig    albuterol sulfate  (90 Base) MCG/ACT inhaler Inhale 2 puffs Every 4 (Four) Hours As Needed for Wheezing.    calcium carb-cholecalciferol (Calcium + Vitamin D3) 600-10 MG-MCG tablet per tablet Take 1 tablet by mouth 2 (Two) Times a Day.    escitalopram (LEXAPRO) 10 MG tablet Take 1 tablet by mouth Daily.    Fremanezumab-vfrm (Ajovy) 225 MG/1.5ML solution auto-injector Inject 225 mg under the skin into the appropriate area as directed Every 30 (Thirty) Days.    HYDROcodone-acetaminophen (NORCO) 5-325 MG per tablet Take 1 tablet by mouth 2 (Two) Times a Day As Needed.    ibandronate (BONIVA) 150 MG tablet Take 1 tablet by mouth Every 30 (Thirty) Days.    levothyroxine (SYNTHROID, LEVOTHROID) 75 MCG tablet Take 1 tablet by mouth Daily.    lisinopril (PRINIVIL,ZESTRIL) 10 MG tablet Take 1 tablet by mouth Daily.    meloxicam (MOBIC) 15 MG tablet Take 1 tablet by mouth Daily As Needed for Moderate Pain or Mild Pain.    multivitamin with minerals tablet tablet Take 1 tablet by mouth Daily.    pramipexole (MIRAPEX) 0.5 MG tablet Take 1 tablet by mouth Every Night.    Pulmicort Flexhaler 90  MCG/ACT inhaler INHALE ONE PUFF BY MOUTH TWICE DAILY. **RINSE MOUTH AFTER USE**    rizatriptan (MAXALT) 10 MG tablet Take 1 tablet by mouth 1 (One) Time As Needed for Migraine. 1 tablet at HA onset, may repeat in 2 hrs once if needed    tiZANidine (Zanaflex) 4 MG tablet Take 1 tablet by mouth Every 8 (Eight) Hours As Needed for Muscle Spasms.    vitamin E 100 UNIT capsule Take 1 capsule by mouth Daily.    [DISCONTINUED] azithromycin (Zithromax Z-Pablo) 250 MG tablet Take 2 tablets by mouth on day 1, then 1 tablet daily on days 2-5    [DISCONTINUED] benzonatate (Tessalon Perles) 100 MG capsule Take 1 capsule by mouth 3 (Three) Times a Day As Needed for Cough.     No current facility-administered medications on file prior to visit.       Immunization History   Administered Date(s) Administered    31-influenza Vac Quardvalent Preservativ 11/05/2015, 11/09/2016, 10/19/2021, 09/30/2022    COVID-19 (PFIZER) Purple Cap Monovalent 05/27/2021, 06/17/2021, 06/17/2021    COVID-19 (UNSPECIFIED) 05/27/2021, 06/17/2021    Flu Vaccine Intradermal Quad 18-64YR 09/01/2023    Fluzone  >6mos 10/01/2024    Fluzone (or Fluarix & Flulaval for VFC) >6mos 10/19/2021, 09/30/2022    Hepatitis A 05/17/2018, 12/20/2018    Influenza Seasonal Injectable 10/10/2014    Influenza, Unspecified 10/19/2020, 10/19/2020, 09/01/2023    Pneumococcal Conjugate 20-Valent (PCV20) 08/09/2023    Pneumococcal Polysaccharide (PPSV23) 05/10/2022    Shingrix 11/12/2021, 01/21/2022    Tdap 03/20/2018, 10/19/2020    Zoster, Unspecified 11/12/2021, 01/21/2022       Review of Systems   Constitutional:  Negative for chills and fever.   HENT:  Negative for trouble swallowing.    Respiratory:  Negative for shortness of breath.    Cardiovascular:  Positive for chest pain.        Soreness and tenderness to the top aspect of the sternum    Gastrointestinal:  Negative for nausea and vomiting.   Musculoskeletal:  Positive for arthralgias.        Scapula area right side hurts  "with mopping and then also certain movement picking up heavy things with make the sternum and the posterior scapula area hurt    Neurological:  Negative for dizziness, syncope and light-headedness.   Psychiatric/Behavioral:  Negative for suicidal ideas.         Objective     /72   Pulse 78   Temp 97.7 °F (36.5 °C) (Temporal)   Ht 157.5 cm (62\")   Wt 71.7 kg (158 lb)   SpO2 95%   BMI 28.90 kg/m²       Physical Exam  Vitals and nursing note reviewed.   Constitutional:       Appearance: Normal appearance.   HENT:      Head: Normocephalic.      Right Ear: External ear normal.      Left Ear: External ear normal.      Nose: Nose normal.      Mouth/Throat:      Mouth: Mucous membranes are moist.   Eyes:      Pupils: Pupils are equal, round, and reactive to light.   Cardiovascular:      Rate and Rhythm: Normal rate and regular rhythm.      Heart sounds: Normal heart sounds.   Pulmonary:      Effort: Pulmonary effort is normal.      Breath sounds: Normal breath sounds.   Chest:       Abdominal:      Palpations: Abdomen is soft.   Musculoskeletal:         General: Tenderness and signs of injury present.        Arms:       Cervical back: Normal range of motion.   Skin:     General: Skin is warm and dry.   Neurological:      Mental Status: She is alert and oriented to person, place, and time.   Psychiatric:         Mood and Affect: Mood normal.         Behavior: Behavior normal.         Thought Content: Thought content normal.         Judgment: Judgment normal.          Result Review :     The following data was reviewed by: DEMAR Eden on 01/16/2025:           EMERGENCY DEPART/[facility] - SCAN - CHEST PAIN, [facility], 01/14/2025 (01/14/2025)     Results  Laboratory Studies  Troponin and cardiac markers were negative.    Imaging  Chest x-ray was normal.               Assessment and Plan      Diagnoses and all orders for this visit:    1. Encounter for examination following treatment at hospital " (Primary)    2. Fall, subsequent encounter    3. Sprain of sternum, subsequent encounter    4. Chest wall muscle strain, sequela          Assessment & Plan  1. Sternal injury.  The patient experienced a fall on the ice 3 days ago, resulting in chest pain. She was evaluated at the [facility] ER, where cardiac markers were negative, and a chest x-ray was normal. The diagnosis was noncardiac chest pain due to a pulled muscle. She was treated with Toradol and Robaxin (methocarbamol) 750 mg. She reports persistent soreness and tenderness in the upper sternum area, exacerbated by activities such as mopping, sweeping, and lifting heavy objects. She has full range of motion in the shoulder and no tenderness in the scapula area. She is advised to alternate between heat and ice applications and to utilize Epsom salt soaks. She should continue her current pain management regimen, including the use of muscle relaxers and steroids. Once her supply of Robaxin is depleted, she may switch to tizanidine, which she already has at home. If symptoms persist or worsen, a follow-up appointment will be necessary, potentially involving a repeat x-ray or CT scan of the chest.          Follow Up     Return if symptoms worsen or fail to improve.    Patient was given instructions and counseling regarding her condition or for health maintenance advice. Please see specific information pulled into the AVS if appropriate.            Valentina Stevens  reports that she has never smoked. She has never been exposed to tobacco smoke. She has never used smokeless tobacco. I have educated her on the risk of diseases from using tobacco products such as cancer, COPD, and heart disease.                Patient or patient representative verbalized consent for the use of Ambient Listening during the visit with  DEMAR Eden for chart documentation. 1/16/2025  15:46 EST

## 2025-02-03 ENCOUNTER — OFFICE VISIT (OUTPATIENT)
Dept: GASTROENTEROLOGY | Facility: CLINIC | Age: 60
End: 2025-02-03
Payer: COMMERCIAL

## 2025-02-03 VITALS
OXYGEN SATURATION: 100 % | BODY MASS INDEX: 29.06 KG/M2 | DIASTOLIC BLOOD PRESSURE: 57 MMHG | SYSTOLIC BLOOD PRESSURE: 121 MMHG | HEIGHT: 62 IN | WEIGHT: 157.9 LBS

## 2025-02-03 DIAGNOSIS — K21.9 GASTROESOPHAGEAL REFLUX DISEASE, UNSPECIFIED WHETHER ESOPHAGITIS PRESENT: Primary | ICD-10-CM

## 2025-02-03 DIAGNOSIS — R13.12 OROPHARYNGEAL DYSPHAGIA: ICD-10-CM

## 2025-02-03 PROCEDURE — 3078F DIAST BP <80 MM HG: CPT | Performed by: NURSE PRACTITIONER

## 2025-02-03 PROCEDURE — 3074F SYST BP LT 130 MM HG: CPT | Performed by: NURSE PRACTITIONER

## 2025-02-03 PROCEDURE — 1159F MED LIST DOCD IN RCRD: CPT | Performed by: NURSE PRACTITIONER

## 2025-02-03 PROCEDURE — 99214 OFFICE O/P EST MOD 30 MIN: CPT | Performed by: NURSE PRACTITIONER

## 2025-02-03 PROCEDURE — 1160F RVW MEDS BY RX/DR IN RCRD: CPT | Performed by: NURSE PRACTITIONER

## 2025-02-03 RX ORDER — METHOCARBAMOL 750 MG/1
1 TABLET, FILM COATED ORAL 3 TIMES DAILY
COMMUNITY
Start: 2025-01-14 | End: 2025-02-03

## 2025-02-03 RX ORDER — FAMOTIDINE 40 MG/1
40 TABLET, FILM COATED ORAL NIGHTLY PRN
Qty: 30 TABLET | Refills: 3 | Status: SHIPPED | OUTPATIENT
Start: 2025-02-03

## 2025-02-03 NOTE — PATIENT INSTRUCTIONS
GERD in Adults: What to Know    Gastroesophageal reflux (AUGIE) is when acid from your stomach flows up into your esophagus. Your esophagus is the part of your body that moves food from your mouth to your stomach. Normally, food goes down and stays in your stomach to be digested. But with AUGIE, food and stomach acid may go back up.  You may have a disease called gastroesophageal reflux disease (GERD) if the reflux:  Happens often.  Causes very bad symptoms.  Makes your esophagus sore and swollen.  Over time, GERD can make small holes called ulcers in the lining of your esophagus.  What are the causes?  GERD is caused by a problem with the muscle between your esophagus and stomach. This muscle is called the lower esophageal sphincter (LES). When it's weak or not normal, it doesn't close like it should. This means food and stomach acid can go back up into your esophagus.  The muscle can be weak if:  You smoke or use products with tobacco in them.  You're pregnant.  You have a type of hernia called a hiatal hernia.  You eat certain foods and drinks. These include:  Alcohol.  Coffee.  Chocolate.  Onions.  Peppermint.  What increases the risk?  Being overweight.  Having a disease that affects your connective tissue.  Taking NSAIDs, such as ibuprofen.  What are the signs or symptoms?  Heartburn.  Trouble swallowing.  Pain when you swallow.  The feeling of having a lump in your throat.  A bitter taste in your mouth.  Bad breath.  Having an upset or bloated stomach.  Burping.  Chest pain. Other conditions can also cause chest pain. Make sure you see your health care provider if you have chest pain.  Wheezing. This is when you make high-pitched whistling sounds when you breathe, most often when you breathe out.  A long-term cough or a cough at night.  How is this diagnosed?  GERD may be diagnosed based on your medical history and a physical exam. You may also have tests. These may include:  An endoscopy. This test looks at your  stomach and esophagus with a small camera.  A barium swallow test. This shows the shape and size of your esophagus and how well it's working.  Tests of your esophagus to check for:  Acid levels.  Pressure.  How is this treated?  Treatment may depend on how bad your symptoms are. It may include:  Changes to your diet and daily life.  Medicines.  Surgery.  Follow these instructions at home:  Eating and drinking  Follow an eating plan as told by your provider.  You may need to avoid certain foods and drinks. These may include:  Coffee and tea, with or without caffeine.  Alcohol.  Energy drinks and sports drinks.  Fizzy drinks or sodas.  Chocolate and cocoa.  Peppermint and mint flavorings.  Garlic and onions.  Horseradish.  Spicy and acidic foods. These include:  Peppers.  Chili powder and de souza powder.  Vinegar.  Hot sauces and BBQ sauce.  Citrus fruits and juices. These include:  Oranges.  Ramiro.  Limes.  Tomato-based foods. These include:  Red sauce and pizza with red sauce.  Chili.  Salsa.  Fried and fatty foods. These include:  Donuts.  French fries.  Potato chips.  High-fat dressings.  High-fat meats. These include:  Hot dogs and sausage.  Rib eye steak.  Ham and haskins.  High-fat dairy items. These include:  Whole milk.  Butter.  Cream cheese.  Eat small meals often. Avoid eating big meals.  Avoid drinking lots of liquid with your meals.  Try not to eat meals during the 2-3 hours before bedtime.  Try not to lie down right after you eat.  Do not exercise right after you eat.  Lifestyle    If you're overweight, lose an amount of weight that's healthy for you. Ask your provider about a safe weight loss goal.  Do not smoke, vape, or use nicotine or tobacco.  Wear loose clothes. Do not wear things that are tight around your waist.  When you sleep, try:  Raising the head of your bed about 6 inches (15 cm). You can use a wedge to do this.  Lying down on your left side.  Try to lower your stress. If you need help doing  this, ask your provider.  General instructions  Take your medicines only as told.  Do not take aspirin or ibuprofen unless you're told to.  Watch for any changes in your symptoms.  Do not bend over if it makes your symptoms worse.  Contact a health care provider if:  You have new symptoms.  You have trouble:  Drinking.  Swallowing.  Eating.  It hurts to swallow.  You have wheezing.  You have a cough that won't go away.  Your voice is hoarse.  Your symptoms don't get better with treatment.  Get help right away if:  You have pain all of a sudden in your:  Arm.  Neck.  Jaw.  Teeth.  Back.  You feel sweaty, dizzy, or light-headed all of a sudden.  You faint.  You have chest pain or shortness of breath.  You vomit and the vomit is:  Green, yellow, or black.  Looks like blood or coffee grounds.  Your poop is red, bloody, or black.  These symptoms may be an emergency. Call 911 right away.  Do not wait to see if the symptoms will go away.  Do not drive yourself to the hospital.  This information is not intended to replace advice given to you by your health care provider. Make sure you discuss any questions you have with your health care provider.  Document Revised: 10/29/2024 Document Reviewed: 05/15/2024  [employer] Patient Education © 2024 [employer]Dysphagia    Dysphagia is trouble swallowing. This condition occurs when solids and liquids stick in a person's throat on the way down to the stomach, or when food takes longer to get to the stomach than usual.  You may have problems swallowing food, liquids, or both. You may also have pain while trying to swallow. It may take you more time and effort to swallow something.  What are the causes?  This condition may be caused by:  Muscle problems. These may make it difficult for you to move food and liquids through the esophagus, which is the tube that connects your mouth to your stomach.  Blockages. You may have ulcers, scar tissue, or inflammation that blocks the normal  passage of food and liquids. Causes of these problems include:  Acid reflux from your stomach into your esophagus (gastroesophageal reflux).  Infections.  Radiation treatment for cancer.  Medicines taken without enough fluids to wash them down into your stomach.  Stroke. This can affect the nerves and make it difficult to swallow.  Nerve problems. These prevent signals from being sent to the muscles of your esophagus to squeeze (contract) and move what you swallow down to your stomach.  Globus pharyngeus. This is a common problem that involves a feeling like something is stuck in your throat or a sense of trouble with swallowing, even though nothing is wrong with the swallowing passages.  Certain conditions, such as cerebral palsy or Parkinson's disease.  What are the signs or symptoms?  Common symptoms of this condition include:  A feeling that solids or liquids are stuck in your throat on the way down to the stomach.  Pain while swallowing.  Coughing or gagging while trying to swallow.  Other symptoms include:  Food moving back from your stomach to your mouth (regurgitation).  Noises coming from your throat.  Chest discomfort when swallowing.  A feeling of fullness when swallowing.  Drooling, especially when the throat is blocked.  Heartburn.  How is this diagnosed?  This condition may be diagnosed by:  Barium swallow X-ray. In this test, you will swallow a white liquid that sticks to the inside of your esophagus. X-ray images are then taken.  Endoscopy. In this test, a flexible telescope is inserted down your throat to look at your esophagus and your stomach.  CT scans or an MRI.  How is this treated?  Treatment for dysphagia depends on the cause of this condition:  If the dysphagia is caused by acid reflux or infection, medicines may be used. These may include antibiotics or heartburn medicines.  If the dysphagia is caused by problems with the muscles, swallowing therapy may be used to help you strengthen your  swallowing muscles. You may have to do specific exercises to strengthen the muscles or stretch them.  If the dysphagia is caused by a blockage or mass, procedures to remove the blockage may be done. You may need surgery and a feeding tube.  You may need to make diet changes. Ask your health care provider for specific instructions.  Follow these instructions at home:  Medicines  Take over-the-counter and prescription medicines only as told by your health care provider.  If you were prescribed an antibiotic medicine, take it as told by your health care provider. Do not stop taking the antibiotic even if you start to feel better.  Eating and drinking    Make any diet changes as told by your health care provider.  Work with a diet and nutrition specialist (dietitian) to create an eating plan that will help you get the nutrients you need in order to stay healthy.  Eat soft foods that are easier to swallow.  Cut your food into small pieces and eat slowly. Take small bites.  Eat and drink only when you are sitting upright.  Do not drink alcohol or caffeine. If you need help quitting, ask your health care provider.  General instructions  Check your weight every day to make sure you are not losing weight.  Do not use any products that contain nicotine or tobacco. These products include cigarettes, chewing tobacco, and vaping devices, such as e-cigarettes. If you need help quitting, ask your health care provider.  Keep all follow-up visits. This is important.  Contact a health care provider if:  You lose weight because you cannot swallow.  You cough when you drink liquids.  You cough up partially digested food.  Get help right away if:  You cannot swallow your saliva.  You have shortness of breath, a fever, or both.  Your voice is hoarse and you have trouble swallowing.  These symptoms may represent a serious problem that is an emergency. Do not wait to see if the symptoms will go away. Get medical help right away. Call your  local emergency services (911 in the [place] Do not drive yourself to the hospital.  Summary  Dysphagia is trouble swallowing. This condition occurs when solids and liquids stick in a person's throat on the way down to the stomach. You may cough or gag while trying to swallow.  Dysphagia has many possible causes.  Treatment for dysphagia depends on the cause of the condition.  Keep all follow-up visits. This is important.  This information is not intended to replace advice given to you by your health care provider. Make sure you discuss any questions you have with your health care provider.  Document Revised: 08/06/2021 Document Reviewed: 08/07/2021  [employer] Patient Education © 2024 [employer]

## 2025-02-03 NOTE — H&P (VIEW-ONLY)
Chief Complaint        Difficulty Swallowing (Solids and liquids ) and Heartburn    Patient or patient representative verbalized consent for the use of Ambient Listening during the visit with  DEMAR Nieto for chart documentation. 2/3/2025  12:04 EST    History of Present Illness      Valentina Stevens is a 59 y.o. female who presents to [facility] as a new patient   History of Present Illness  The patient is a 59-year-old female who presents for evaluation of dysphagia.    She has been experiencing dysphagia, predominantly with solid foods, for the past 2 to 3 years. She also reports occasional difficulty swallowing pills. A previous swallow study conducted in 2022 revealed esophageal stasis. She has not undergone an upper endoscopy to date. She experiences heartburn and reflux symptoms once or twice a month, which she manages without medication. She is not currently on any treatment for acid reflux. She is agreeable to taking Pepcid if her symptoms are severe. She is not experiencing any abdominal pain, hematochezia, or melena. She is not on any anticoagulant therapy.    Supplemental Information  She has discontinued meloxicam due to insurance coverage issues. She takes hydrocodone as prescribed.    MEDICATIONS  Discontinued: Meloxicam.          Results       Result Review :   The following data was reviewed by: DEMAR Nieto on 02/03/2025     CMP          3/11/2024    10:59 4/19/2024    10:42 9/11/2024    07:57   CMP   Glucose 90   94    BUN 9   13    Creatinine 0.73  0.80  0.80    EGFR 94.3  84.5  84.5    Sodium 140   139    Potassium 4.3   3.9    Chloride 103   103    Calcium 9.7   9.6    Total Protein 7.6   7.4    Albumin 4.6   4.4    Globulin 3.0   3.0    Total Bilirubin 0.6   0.4    Alkaline Phosphatase 44   46    AST (SGOT) 32   31    ALT (SGPT) 36   40    Albumin/Globulin Ratio 1.5   1.5    BUN/Creatinine Ratio 12.3   16.3    Anion Gap 10.0   11.3      CBC           3/11/2024    10:59 9/11/2024    07:57   CBC   WBC 7.07  7.05    RBC 5.47  5.43    Hemoglobin 15.5  15.3    Hematocrit 46.0  46.4    MCV 84.1  85.5    MCH 28.3  28.2    MCHC 33.7  33.0    RDW 13.2  13.5    Platelets 297  257      CBC w/diff          3/11/2024    10:59 9/11/2024    07:57   CBC w/Diff   WBC 7.07  7.05    RBC 5.47  5.43    Hemoglobin 15.5  15.3    Hematocrit 46.0  46.4    MCV 84.1  85.5    MCH 28.3  28.2    MCHC 33.7  33.0    RDW 13.2  13.5    Platelets 297  257    Neutrophil Rel % 54.7  46.3    Immature Granulocyte Rel % 0.1  0.3    Lymphocyte Rel % 34.8  39.9    Monocyte Rel % 7.2  10.2    Eosinophil Rel % 2.4  2.3    Basophil Rel % 0.8  1.0        Iron Profile   Iron   Date Value Ref Range Status   10/14/2024 85 37 - 145 mcg/dL Final     TIBC   Date Value Ref Range Status   10/14/2024 390 298 - 536 mcg/dL Final     Iron Saturation (TSAT)   Date Value Ref Range Status   10/14/2024 22 20 - 50 % Final     Transferrin   Date Value Ref Range Status   10/14/2024 262 200 - 360 mg/dL Final     Ferritin   Ferritin   Date Value Ref Range Status   10/14/2024 129.00 13.00 - 150.00 ng/mL Final            Results  Imaging  Swallow study in 2022 showed esophageal stasis.        Past Medical History       Past Medical History:   Diagnosis Date    Abnormal ECG     Allergic     Drug allergies    Arthritis     Arthritis of back     Asthma     Asthma 10/31/2023    Last Assessment & Plan:     Condition: stable        Reviewed trigger avoidance and reviewed proper use of inhalers and rescue medications. Reviewed concerning signs/symptoms and ER precautions.          Follow up in: three months    Asthma, intrinsic     CTS (carpal tunnel syndrome)     Depression     Difficulty walking     Diverticulitis of colon     Fatty (change of) liver, not elsewhere classified     GERD (gastroesophageal reflux disease)     Hearing loss     Hyperlipemia     Hypertension     Hypothyroidism     Knee swelling     Low back pain      Lumbar radiculopathy 02/15/2022    Memory loss     Menopause     Migraine     Murmur     Osteopenia     Osteoporosis     Pneumonia     Primary central sleep apnea     Scoliosis     Seizures     last when 10 years old    Sleep apnea, obstructive     Visual impairment     Vitamin D deficiency        Past Surgical History:   Procedure Laterality Date    COLONOSCOPY  2018    glenna    COLONOSCOPY N/A 10/17/2023    Procedure: COLONOSCOPY WITH COLD SNARE POLYPECTOMY;  Surgeon: Yossi Stephens MD;  Location: [facility] ENDOSCOPY;  Service: Gastroenterology;  Laterality: N/A;  DIVERTICULOSIS, COLON POLYP    EPIDURAL BLOCK      ESSURE TUBAL LIGATION      SKIN BIOPSY      THYMECTOMY      TUBAL ABDOMINAL LIGATION      TUMOR REMOVAL      in between heart and lungs    UPPER GASTROINTESTINAL ENDOSCOPY           Current Outpatient Medications:     albuterol sulfate  (90 Base) MCG/ACT inhaler, Inhale 2 puffs Every 4 (Four) Hours As Needed for Wheezing., Disp: 18 g, Rfl: 5    calcium carb-cholecalciferol (Calcium + Vitamin D3) 600-10 MG-MCG tablet per tablet, Take 1 tablet by mouth 2 (Two) Times a Day., Disp: 60 tablet, Rfl: 5    escitalopram (LEXAPRO) 10 MG tablet, Take 1 tablet by mouth Daily., Disp: 30 tablet, Rfl: 3    Fremanezumab-vfrm (Ajovy) 225 MG/1.5ML solution auto-injector, Inject 225 mg under the skin into the appropriate area as directed Every 30 (Thirty) Days., Disp: 4.5 mL, Rfl: 3    HYDROcodone-acetaminophen (NORCO) 5-325 MG per tablet, Take 1 tablet by mouth 2 (Two) Times a Day As Needed., Disp: , Rfl:     ibandronate (BONIVA) 150 MG tablet, Take 1 tablet by mouth Every 30 (Thirty) Days., Disp: 1 tablet, Rfl: 11    levothyroxine (SYNTHROID, LEVOTHROID) 75 MCG tablet, Take 1 tablet by mouth Daily., Disp: 30 tablet, Rfl: 5    lisinopril (PRINIVIL,ZESTRIL) 10 MG tablet, Take 1 tablet by mouth Daily., Disp: 30 tablet, Rfl: 5    meloxicam (MOBIC) 15 MG tablet, Take 1 tablet by mouth Daily As Needed for  "Moderate Pain or Mild Pain., Disp: 30 tablet, Rfl: 0    multivitamin with minerals tablet tablet, Take 1 tablet by mouth Daily., Disp: , Rfl:     pramipexole (MIRAPEX) 0.5 MG tablet, Take 1 tablet by mouth Every Night., Disp: 30 tablet, Rfl: 5    Pulmicort Flexhaler 90 MCG/ACT inhaler, INHALE ONE PUFF BY MOUTH TWICE DAILY. **RINSE MOUTH AFTER USE**, Disp: 1 each, Rfl: 2    rizatriptan (MAXALT) 10 MG tablet, Take 1 tablet by mouth 1 (One) Time As Needed for Migraine. 1 tablet at HA onset, may repeat in 2 hrs once if needed, Disp: 9 tablet, Rfl: 11    vitamin E 100 UNIT capsule, Take 1 capsule by mouth Daily., Disp: , Rfl:     famotidine (Pepcid) 40 MG tablet, Take 1 tablet by mouth At Night As Needed for Heartburn., Disp: 30 tablet, Rfl: 3    methocarbamol (ROBAXIN) 750 MG tablet, Take 1 tablet by mouth 3 times a day. (Patient not taking: Reported on 2/3/2025), Disp: , Rfl:     tiZANidine (Zanaflex) 4 MG tablet, Take 1 tablet by mouth Every 8 (Eight) Hours As Needed for Muscle Spasms., Disp: 30 tablet, Rfl: 0     Allergies   Allergen Reactions    Penicillins Shortness Of Breath and Rash    Sulfa Antibiotics Hives    Cephalexin Rash    Indomethacin Rash       Family History   Problem Relation Age of Onset    Thyroid cancer Mother     Migraines Mother     Stroke Mother     Skin cancer Mother     Cancer Mother     Hypertension Father     Osteoporosis Father     Alcohol abuse Father     Skin cancer Father     Dementia Sister     Migraines Sister     Migraines Sister     Skin cancer Brother     Ovarian cancer Maternal Grandmother     Uterine cancer Maternal Grandmother     Colon cancer Neg Hx     Breast cancer Neg Hx     Prostate cancer Neg Hx     Clotting disorder Neg Hx         Social History     Social History Narrative    Not on file       Objective       Objective     Vital Signs:   /57 (BP Location: Left arm, Patient Position: Sitting, Cuff Size: Adult)   Ht 157.5 cm (62.01\")   Wt 71.6 kg (157 lb 14.4 oz)   " SpO2 100%   BMI 28.87 kg/m²     Body mass index is 28.87 kg/m².    Physical Exam  Constitutional:       Appearance: Normal appearance.   Pulmonary:      Effort: Pulmonary effort is normal.   Neurological:      General: No focal deficit present.      Mental Status: She is alert and oriented to person, place, and time.   Psychiatric:         Mood and Affect: Mood normal.         Behavior: Behavior normal.         Physical Exam                 Assessment & Plan          Assessment and Plan    Diagnoses and all orders for this visit:    1. Gastroesophageal reflux disease, unspecified whether esophagitis present (Primary)  -     Case Request; Standing  -     Follow Anesthesia Guidelines / Protocol; Future  -     Verify NPO; Standing  -     Follow Anesthesia Guidelines / Protocol; Standing  -     Case Request    2. Oropharyngeal dysphagia  -     Case Request; Standing  -     Follow Anesthesia Guidelines / Protocol; Future  -     Verify NPO; Standing  -     Follow Anesthesia Guidelines / Protocol; Standing  -     Case Request    Other orders  -     famotidine (Pepcid) 40 MG tablet; Take 1 tablet by mouth At Night As Needed for Heartburn.  Dispense: 30 tablet; Refill: 3          Assessment & Plan  1. Dysphagia.  She reports trouble swallowing solids, liquids, and pills for the past 2 to 3 years. A swallow study in 2022 showed esophageal stasis. She experiences heartburn and reflux once or twice a month but does not take any medication for it. An upper endoscopy (EGD) will be scheduled to evaluate the esophagus, stomach, and small intestines. The potential risks associated with EGD, including sedation, bleeding, perforation, and infection, have been discussed. A prescription for Pepcid 20 mg has been sent to the pharmacy to use as needed for reflux symptoms.    PROCEDURE  Colonoscopy in October 2023 with polyp removal. Repeat 2028.           Follow Up       Follow Up   Return for Follow up after endoscopy in  office.  Patient was given instructions and counseling regarding her condition or for health maintenance advice. Please see specific information pulled into the AVS if appropriate.

## 2025-02-03 NOTE — PROGRESS NOTES
Chief Complaint        Difficulty Swallowing (Solids and liquids ) and Heartburn    Patient or patient representative verbalized consent for the use of Ambient Listening during the visit with  DEMAR Nieto for chart documentation. 2/3/2025  12:04 EST    History of Present Illness      Valentina Stevens is a 59 y.o. female who presents to [facility] as a new patient   History of Present Illness  The patient is a 59-year-old female who presents for evaluation of dysphagia.    She has been experiencing dysphagia, predominantly with solid foods, for the past 2 to 3 years. She also reports occasional difficulty swallowing pills. A previous swallow study conducted in 2022 revealed esophageal stasis. She has not undergone an upper endoscopy to date. She experiences heartburn and reflux symptoms once or twice a month, which she manages without medication. She is not currently on any treatment for acid reflux. She is agreeable to taking Pepcid if her symptoms are severe. She is not experiencing any abdominal pain, hematochezia, or melena. She is not on any anticoagulant therapy.    Supplemental Information  She has discontinued meloxicam due to insurance coverage issues. She takes hydrocodone as prescribed.    MEDICATIONS  Discontinued: Meloxicam.          Results       Result Review :   The following data was reviewed by: DEMAR Nieto on 02/03/2025     CMP          3/11/2024    10:59 4/19/2024    10:42 9/11/2024    07:57   CMP   Glucose 90   94    BUN 9   13    Creatinine 0.73  0.80  0.80    EGFR 94.3  84.5  84.5    Sodium 140   139    Potassium 4.3   3.9    Chloride 103   103    Calcium 9.7   9.6    Total Protein 7.6   7.4    Albumin 4.6   4.4    Globulin 3.0   3.0    Total Bilirubin 0.6   0.4    Alkaline Phosphatase 44   46    AST (SGOT) 32   31    ALT (SGPT) 36   40    Albumin/Globulin Ratio 1.5   1.5    BUN/Creatinine Ratio 12.3   16.3    Anion Gap 10.0   11.3      CBC           3/11/2024    10:59 9/11/2024    07:57   CBC   WBC 7.07  7.05    RBC 5.47  5.43    Hemoglobin 15.5  15.3    Hematocrit 46.0  46.4    MCV 84.1  85.5    MCH 28.3  28.2    MCHC 33.7  33.0    RDW 13.2  13.5    Platelets 297  257      CBC w/diff          3/11/2024    10:59 9/11/2024    07:57   CBC w/Diff   WBC 7.07  7.05    RBC 5.47  5.43    Hemoglobin 15.5  15.3    Hematocrit 46.0  46.4    MCV 84.1  85.5    MCH 28.3  28.2    MCHC 33.7  33.0    RDW 13.2  13.5    Platelets 297  257    Neutrophil Rel % 54.7  46.3    Immature Granulocyte Rel % 0.1  0.3    Lymphocyte Rel % 34.8  39.9    Monocyte Rel % 7.2  10.2    Eosinophil Rel % 2.4  2.3    Basophil Rel % 0.8  1.0        Iron Profile   Iron   Date Value Ref Range Status   10/14/2024 85 37 - 145 mcg/dL Final     TIBC   Date Value Ref Range Status   10/14/2024 390 298 - 536 mcg/dL Final     Iron Saturation (TSAT)   Date Value Ref Range Status   10/14/2024 22 20 - 50 % Final     Transferrin   Date Value Ref Range Status   10/14/2024 262 200 - 360 mg/dL Final     Ferritin   Ferritin   Date Value Ref Range Status   10/14/2024 129.00 13.00 - 150.00 ng/mL Final            Results  Imaging  Swallow study in 2022 showed esophageal stasis.        Past Medical History       Past Medical History:   Diagnosis Date    Abnormal ECG     Allergic     Drug allergies    Arthritis     Arthritis of back     Asthma     Asthma 10/31/2023    Last Assessment & Plan:     Condition: stable        Reviewed trigger avoidance and reviewed proper use of inhalers and rescue medications. Reviewed concerning signs/symptoms and ER precautions.          Follow up in: three months    Asthma, intrinsic     CTS (carpal tunnel syndrome)     Depression     Difficulty walking     Diverticulitis of colon     Fatty (change of) liver, not elsewhere classified     GERD (gastroesophageal reflux disease)     Hearing loss     Hyperlipemia     Hypertension     Hypothyroidism     Knee swelling     Low back pain      Lumbar radiculopathy 02/15/2022    Memory loss     Menopause     Migraine     Murmur     Osteopenia     Osteoporosis     Pneumonia     Primary central sleep apnea     Scoliosis     Seizures     last when 10 years old    Sleep apnea, obstructive     Visual impairment     Vitamin D deficiency        Past Surgical History:   Procedure Laterality Date    COLONOSCOPY  2018    glenna    COLONOSCOPY N/A 10/17/2023    Procedure: COLONOSCOPY WITH COLD SNARE POLYPECTOMY;  Surgeon: Yossi Stephens MD;  Location: [facility] ENDOSCOPY;  Service: Gastroenterology;  Laterality: N/A;  DIVERTICULOSIS, COLON POLYP    EPIDURAL BLOCK      ESSURE TUBAL LIGATION      SKIN BIOPSY      THYMECTOMY      TUBAL ABDOMINAL LIGATION      TUMOR REMOVAL      in between heart and lungs    UPPER GASTROINTESTINAL ENDOSCOPY           Current Outpatient Medications:     albuterol sulfate  (90 Base) MCG/ACT inhaler, Inhale 2 puffs Every 4 (Four) Hours As Needed for Wheezing., Disp: 18 g, Rfl: 5    calcium carb-cholecalciferol (Calcium + Vitamin D3) 600-10 MG-MCG tablet per tablet, Take 1 tablet by mouth 2 (Two) Times a Day., Disp: 60 tablet, Rfl: 5    escitalopram (LEXAPRO) 10 MG tablet, Take 1 tablet by mouth Daily., Disp: 30 tablet, Rfl: 3    Fremanezumab-vfrm (Ajovy) 225 MG/1.5ML solution auto-injector, Inject 225 mg under the skin into the appropriate area as directed Every 30 (Thirty) Days., Disp: 4.5 mL, Rfl: 3    HYDROcodone-acetaminophen (NORCO) 5-325 MG per tablet, Take 1 tablet by mouth 2 (Two) Times a Day As Needed., Disp: , Rfl:     ibandronate (BONIVA) 150 MG tablet, Take 1 tablet by mouth Every 30 (Thirty) Days., Disp: 1 tablet, Rfl: 11    levothyroxine (SYNTHROID, LEVOTHROID) 75 MCG tablet, Take 1 tablet by mouth Daily., Disp: 30 tablet, Rfl: 5    lisinopril (PRINIVIL,ZESTRIL) 10 MG tablet, Take 1 tablet by mouth Daily., Disp: 30 tablet, Rfl: 5    meloxicam (MOBIC) 15 MG tablet, Take 1 tablet by mouth Daily As Needed for  "Moderate Pain or Mild Pain., Disp: 30 tablet, Rfl: 0    multivitamin with minerals tablet tablet, Take 1 tablet by mouth Daily., Disp: , Rfl:     pramipexole (MIRAPEX) 0.5 MG tablet, Take 1 tablet by mouth Every Night., Disp: 30 tablet, Rfl: 5    Pulmicort Flexhaler 90 MCG/ACT inhaler, INHALE ONE PUFF BY MOUTH TWICE DAILY. **RINSE MOUTH AFTER USE**, Disp: 1 each, Rfl: 2    rizatriptan (MAXALT) 10 MG tablet, Take 1 tablet by mouth 1 (One) Time As Needed for Migraine. 1 tablet at HA onset, may repeat in 2 hrs once if needed, Disp: 9 tablet, Rfl: 11    vitamin E 100 UNIT capsule, Take 1 capsule by mouth Daily., Disp: , Rfl:     famotidine (Pepcid) 40 MG tablet, Take 1 tablet by mouth At Night As Needed for Heartburn., Disp: 30 tablet, Rfl: 3    methocarbamol (ROBAXIN) 750 MG tablet, Take 1 tablet by mouth 3 times a day. (Patient not taking: Reported on 2/3/2025), Disp: , Rfl:     tiZANidine (Zanaflex) 4 MG tablet, Take 1 tablet by mouth Every 8 (Eight) Hours As Needed for Muscle Spasms., Disp: 30 tablet, Rfl: 0     Allergies   Allergen Reactions    Penicillins Shortness Of Breath and Rash    Sulfa Antibiotics Hives    Cephalexin Rash    Indomethacin Rash       Family History   Problem Relation Age of Onset    Thyroid cancer Mother     Migraines Mother     Stroke Mother     Skin cancer Mother     Cancer Mother     Hypertension Father     Osteoporosis Father     Alcohol abuse Father     Skin cancer Father     Dementia Sister     Migraines Sister     Migraines Sister     Skin cancer Brother     Ovarian cancer Maternal Grandmother     Uterine cancer Maternal Grandmother     Colon cancer Neg Hx     Breast cancer Neg Hx     Prostate cancer Neg Hx     Clotting disorder Neg Hx         Social History     Social History Narrative    Not on file       Objective       Objective     Vital Signs:   /57 (BP Location: Left arm, Patient Position: Sitting, Cuff Size: Adult)   Ht 157.5 cm (62.01\")   Wt 71.6 kg (157 lb 14.4 oz)   " SpO2 100%   BMI 28.87 kg/m²     Body mass index is 28.87 kg/m².    Physical Exam  Constitutional:       Appearance: Normal appearance.   Pulmonary:      Effort: Pulmonary effort is normal.   Neurological:      General: No focal deficit present.      Mental Status: She is alert and oriented to person, place, and time.   Psychiatric:         Mood and Affect: Mood normal.         Behavior: Behavior normal.         Physical Exam                 Assessment & Plan          Assessment and Plan    Diagnoses and all orders for this visit:    1. Gastroesophageal reflux disease, unspecified whether esophagitis present (Primary)  -     Case Request; Standing  -     Follow Anesthesia Guidelines / Protocol; Future  -     Verify NPO; Standing  -     Follow Anesthesia Guidelines / Protocol; Standing  -     Case Request    2. Oropharyngeal dysphagia  -     Case Request; Standing  -     Follow Anesthesia Guidelines / Protocol; Future  -     Verify NPO; Standing  -     Follow Anesthesia Guidelines / Protocol; Standing  -     Case Request    Other orders  -     famotidine (Pepcid) 40 MG tablet; Take 1 tablet by mouth At Night As Needed for Heartburn.  Dispense: 30 tablet; Refill: 3          Assessment & Plan  1. Dysphagia.  She reports trouble swallowing solids, liquids, and pills for the past 2 to 3 years. A swallow study in 2022 showed esophageal stasis. She experiences heartburn and reflux once or twice a month but does not take any medication for it. An upper endoscopy (EGD) will be scheduled to evaluate the esophagus, stomach, and small intestines. The potential risks associated with EGD, including sedation, bleeding, perforation, and infection, have been discussed. A prescription for Pepcid 20 mg has been sent to the pharmacy to use as needed for reflux symptoms.    PROCEDURE  Colonoscopy in October 2023 with polyp removal. Repeat 2028.           Follow Up       Follow Up   Return for Follow up after endoscopy in  office.  Patient was given instructions and counseling regarding her condition or for health maintenance advice. Please see specific information pulled into the AVS if appropriate.

## 2025-02-05 NOTE — PRE-PROCEDURE INSTRUCTIONS
"Called pt in regards to procedure on 2/11/25.Pt told to arrive at 1030 at entrance \"C\" and explained that this is an arrival time, not the procedure time. Expect to be here for 3 to 4 hours.     Must have  over the age of 18 to drive home. May have two visitors; however, children under the age of 12 must stay in waiting room with an adult.     Educated on diet/NPO. Pt was told that nothing can be placed in mouth two hours prior to arrival including gum, mints, cigarettes. Meds can be taken as usual but must be taken two hours prior to arrival. Diabetic medications, blood thinners, and diuretics must be held the night prior and the day of procedure. Weight-loss injections with weekly doses must be held a week prior. Instructed to hold Mobic the morning of.     Pt verbalized understanding and instructed to call back for any questions or concerns.  "

## 2025-02-07 ENCOUNTER — PATIENT ROUNDING (BHMG ONLY) (OUTPATIENT)
Dept: GASTROENTEROLOGY | Facility: CLINIC | Age: 60
End: 2025-02-07
Payer: COMMERCIAL

## 2025-02-07 NOTE — PROGRESS NOTES
2/7/2025      Hello, may I speak with Valentina Stevens     My name is Laurie. I am calling from [facility] [place]. I show that you had a recent visit with DEMAR Nieto.    Before we get started may I verify your date of birth? 1965    I am calling to officially welcome you to our practice and ask about your recent visit. Is this a good time to talk?   LVM ok per BRINA    Tell me about your visit with us. What things went well?    We strive to ensure that we protect your safety and privacy. Is there anything we could have done to improve this during your visit?        We're always looking for ways to make our patients' experiences even better. Do you have recommendations on ways we may improve?    Overall were you satisfied with your first visit to our practice?    I appreciate you taking the time to speak with me today. Is there anything else I can do for you?    I am glad to hear that you had a very good visit and I appreciate you taking the time to provide feedback on this call. We would greatly appreciate you filling out a survey if you receive one in the mail, email or text. This is a great opportunity to provide any additional feedback that you may think of after this call as well.       Thank you, and have a great day.

## 2025-02-10 ENCOUNTER — ANESTHESIA EVENT (OUTPATIENT)
Dept: GASTROENTEROLOGY | Facility: HOSPITAL | Age: 60
End: 2025-02-10
Payer: COMMERCIAL

## 2025-02-10 RX ORDER — SODIUM CHLORIDE, SODIUM LACTATE, POTASSIUM CHLORIDE, CALCIUM CHLORIDE 600; 310; 30; 20 MG/100ML; MG/100ML; MG/100ML; MG/100ML
30 INJECTION, SOLUTION INTRAVENOUS CONTINUOUS
Status: CANCELLED | OUTPATIENT
Start: 2025-02-11 | End: 2025-02-11

## 2025-02-10 NOTE — ANESTHESIA PREPROCEDURE EVALUATION
Anesthesia Evaluation     Patient summary reviewed and Nursing notes reviewed   NPO Solid Status: > 8 hours  NPO Liquid Status: > 8 hours           Airway   Mallampati: I  TM distance: <3 FB  Neck ROM: full  No difficulty expected  Dental    (+) poor dentition        Pulmonary - normal exam    breath sounds clear to auscultation  (+) asthma (mild, intermittent),sleep apnea on CPAP  Cardiovascular - normal exam  Exercise tolerance: good (4-7 METS)    ECG reviewed  Rhythm: regular  Rate: normal    (+) hypertension well controlled, valvular problems/murmurs murmur, hyperlipidemia      Neuro/Psych  (+) seizures (one time at age 10 yrs old, - no meds) well controlled, headaches, numbness, psychiatric history Anxiety and Depression  GI/Hepatic/Renal/Endo    (+) obesity, GERD well controlled, liver disease fatty liver disease, thyroid problem hypothyroidism    Musculoskeletal     Abdominal    Substance History - negative use     OB/GYN negative ob/gyn ROS         Other   arthritis,     ROS/Med Hx Other: Stress test 2/27/24  ·  Myocardial perfusion imaging indicates a normal myocardial perfusion study with no evidence of ischemia. Impressions are consistent with a low risk study.  ·  Left ventricular ejection fraction is hyperdynamic (Calculated EF > 70%).  ·  Findings consistent with a normal ECG stress test.    EKG 10/29/23: HR 73,   Sinus rhythm  Prolonged WA interval  No previous ECG available for comparison    ECHO 02/27/24: EF 66%,   Normal left ventricular systolic function.  No significant valve abnormalities noted.                    Anesthesia Plan    ASA 3     general   total IV anesthesia  (Total IV Anesthesia    Patient understands anesthesia not responsible for dental damage.      Discussed risks with pt including aspiration, allergic reactions, apnea, advanced airway placement. Pt verbalized understanding. All questions answered.     )  intravenous induction     Anesthetic plan, risks, benefits, and  alternatives have been provided, discussed and informed consent has been obtained with: patient.  Pre-procedure education provided  Plan discussed with CRNA.      CODE STATUS:

## 2025-02-11 ENCOUNTER — HOSPITAL ENCOUNTER (OUTPATIENT)
Facility: HOSPITAL | Age: 60
Setting detail: HOSPITAL OUTPATIENT SURGERY
Discharge: HOME | End: 2025-02-11
Attending: INTERNAL MEDICINE | Admitting: INTERNAL MEDICINE
Payer: COMMERCIAL

## 2025-02-11 ENCOUNTER — ANESTHESIA (OUTPATIENT)
Dept: GASTROENTEROLOGY | Facility: HOSPITAL | Age: 60
End: 2025-02-11
Payer: COMMERCIAL

## 2025-02-11 VITALS
DIASTOLIC BLOOD PRESSURE: 71 MMHG | OXYGEN SATURATION: 95 % | WEIGHT: 159.17 LBS | RESPIRATION RATE: 19 BRPM | HEART RATE: 70 BPM | SYSTOLIC BLOOD PRESSURE: 126 MMHG | TEMPERATURE: 97.7 F | BODY MASS INDEX: 29.11 KG/M2

## 2025-02-11 DIAGNOSIS — R13.12 OROPHARYNGEAL DYSPHAGIA: ICD-10-CM

## 2025-02-11 DIAGNOSIS — K21.9 GASTROESOPHAGEAL REFLUX DISEASE, UNSPECIFIED WHETHER ESOPHAGITIS PRESENT: ICD-10-CM

## 2025-02-11 PROCEDURE — 88305 TISSUE EXAM BY PATHOLOGIST: CPT | Performed by: INTERNAL MEDICINE

## 2025-02-11 PROCEDURE — 43239 EGD BIOPSY SINGLE/MULTIPLE: CPT | Performed by: INTERNAL MEDICINE

## 2025-02-11 PROCEDURE — 25010000002 LIDOCAINE PF 2% 2 % SOLUTION: Performed by: NURSE ANESTHETIST, CERTIFIED REGISTERED

## 2025-02-11 PROCEDURE — 25810000003 LACTATED RINGERS PER 1000 ML

## 2025-02-11 PROCEDURE — 25010000002 PROPOFOL 10 MG/ML EMULSION: Performed by: NURSE ANESTHETIST, CERTIFIED REGISTERED

## 2025-02-11 RX ORDER — PANTOPRAZOLE SODIUM 40 MG/1
40 TABLET, DELAYED RELEASE ORAL DAILY
Qty: 90 TABLET | Refills: 3 | Status: SHIPPED | OUTPATIENT
Start: 2025-02-11

## 2025-02-11 RX ORDER — SODIUM CHLORIDE, SODIUM LACTATE, POTASSIUM CHLORIDE, CALCIUM CHLORIDE 600; 310; 30; 20 MG/100ML; MG/100ML; MG/100ML; MG/100ML
1000 INJECTION, SOLUTION INTRAVENOUS CONTINUOUS
Status: DISCONTINUED | OUTPATIENT
Start: 2025-02-11 | End: 2025-02-11 | Stop reason: HOSPADM

## 2025-02-11 RX ORDER — PROPOFOL 10 MG/ML
VIAL (ML) INTRAVENOUS AS NEEDED
Status: DISCONTINUED | OUTPATIENT
Start: 2025-02-11 | End: 2025-02-11 | Stop reason: SURG

## 2025-02-11 RX ORDER — LIDOCAINE HYDROCHLORIDE 20 MG/ML
INJECTION, SOLUTION EPIDURAL; INFILTRATION; INTRACAUDAL; PERINEURAL AS NEEDED
Status: DISCONTINUED | OUTPATIENT
Start: 2025-02-11 | End: 2025-02-11 | Stop reason: SURG

## 2025-02-11 RX ADMIN — LIDOCAINE HYDROCHLORIDE 80 MG: 20 INJECTION, SOLUTION INTRAVENOUS at 11:07

## 2025-02-11 RX ADMIN — SODIUM CHLORIDE, POTASSIUM CHLORIDE, SODIUM LACTATE AND CALCIUM CHLORIDE 1000 ML: 600; 310; 30; 20 INJECTION, SOLUTION INTRAVENOUS at 10:44

## 2025-02-11 RX ADMIN — PROPOFOL 70 MG: 10 INJECTION, EMULSION INTRAVENOUS at 11:07

## 2025-02-11 RX ADMIN — PROPOFOL 200 MCG/KG/MIN: 10 INJECTION, EMULSION INTRAVENOUS at 11:07

## 2025-02-11 NOTE — ANESTHESIA POSTPROCEDURE EVALUATION
Patient: Valentina Stevens    Procedure Summary       Date: 02/11/25 Room / Location: [facility] 2 / [facility]    Anesthesia Start: 1104 Anesthesia Stop: 1122    Procedure: ESOPHAGOGASTRODUODENOSCOPY WITH BIOPSIES Diagnosis:       Gastroesophageal reflux disease, unspecified whether esophagitis present      Oropharyngeal dysphagia      (Gastroesophageal reflux disease, unspecified whether esophagitis present [K21.9])      (Oropharyngeal dysphagia [R13.12])    Surgeons: Yossi Stephens MD Provider: Rosamaria Presley CRNA    Anesthesia Type: general ASA Status: 3            Anesthesia Type: general    Vitals  Vitals Value Taken Time   /71 02/11/25 1137   Temp 36.5 °C (97.7 °F) 02/11/25 1136   Pulse 72 02/11/25 1138   Resp 19 02/11/25 1136   SpO2 97 % 02/11/25 1138   Vitals shown include unfiled device data.        Post Anesthesia Care and Evaluation    Post-procedure mental status: acceptable.  Pain management: satisfactory to patient    Airway patency: patent  Anesthetic complications: No anesthetic complications    Cardiovascular status: acceptable  Respiratory status: acceptable    Comments: Per chart review

## 2025-02-17 ENCOUNTER — OFFICE VISIT (OUTPATIENT)
Dept: PULMONOLOGY | Facility: CLINIC | Age: 60
End: 2025-02-17
Payer: COMMERCIAL

## 2025-02-17 VITALS
RESPIRATION RATE: 16 BRPM | TEMPERATURE: 98.2 F | WEIGHT: 156 LBS | BODY MASS INDEX: 28.71 KG/M2 | HEART RATE: 111 BPM | SYSTOLIC BLOOD PRESSURE: 112 MMHG | OXYGEN SATURATION: 95 % | HEIGHT: 62 IN | DIASTOLIC BLOOD PRESSURE: 70 MMHG

## 2025-02-17 DIAGNOSIS — G47.33 OSA ON CPAP: ICD-10-CM

## 2025-02-17 DIAGNOSIS — J45.20 MILD INTERMITTENT ASTHMA, UNSPECIFIED WHETHER COMPLICATED: Primary | ICD-10-CM

## 2025-02-17 PROCEDURE — 3078F DIAST BP <80 MM HG: CPT | Performed by: INTERNAL MEDICINE

## 2025-02-17 PROCEDURE — 1159F MED LIST DOCD IN RCRD: CPT | Performed by: INTERNAL MEDICINE

## 2025-02-17 PROCEDURE — 1160F RVW MEDS BY RX/DR IN RCRD: CPT | Performed by: INTERNAL MEDICINE

## 2025-02-17 PROCEDURE — 99213 OFFICE O/P EST LOW 20 MIN: CPT | Performed by: INTERNAL MEDICINE

## 2025-02-17 PROCEDURE — 3074F SYST BP LT 130 MM HG: CPT | Performed by: INTERNAL MEDICINE

## 2025-02-17 RX ORDER — METHOCARBAMOL 750 MG/1
1 TABLET, FILM COATED ORAL 3 TIMES DAILY
COMMUNITY

## 2025-02-17 RX ORDER — PREDNISONE 20 MG/1
1 TABLET ORAL DAILY
COMMUNITY

## 2025-02-17 NOTE — PROGRESS NOTES
Pulmonary Office Follow-up    Subjective     Valentina Stevens is seen today at the office for   Chief Complaint   Patient presents with    Follow-up     4 month    Sleep Apnea    Asthma    Shortness of Breath         HPI  Valentina Stevens is a 59 y.o. female with a PMH significant for bronchial asthma and obstructive sleep apnea presents for follow-up patient has been doing well on present medications she denies any chest pain fever hemoptysis or significant expectoration      Tobacco use history:  Never smoker      Patient Active Problem List   Diagnosis    DDD (degenerative disc disease), lumbar    Hearing loss    Mixed hyperlipidemia    Hypertension    Hypothyroidism    Intractable chronic migraine without aura and without status migrainosus    Menopause    Low back pain    Arthritis    Osteoporosis    RLS (restless legs syndrome)    Memory loss    Arthritis of right acromioclavicular joint    Tendinitis of shoulder, right    Fatty (change of) liver, not elsewhere classified    Scoliosis, unspecified    Lumbar radiculopathy    Scoliosis    CTS (carpal tunnel syndrome)    GERD (gastroesophageal reflux disease)    DEVENDRA on CPAP    Primary central sleep apnea    Difficulty walking    Vitamin D deficiency    Murmur    DDD (degenerative disc disease), cervical    History of learning disability    Lumbar spondylosis    Cervical polyp    Body mass index (BMI) of 27.0-27.9 in adult    Frequent falls    Chronic idiopathic constipation    History of diverticulitis    Diverticula of colon    Altered bowel habits    Pain disorder associated with psychological factors    Osteopenia    History of osteoporosis    Cervical spondylolysis    Anxiety    Asthma    Oropharyngeal dysphagia       Review of Systems  Review of Systems   All other systems reviewed and are negative.    As described in the HPI. Otherwise, remainder of ROS (14 systems) were negative.    Medications, Allergies, Social, and Family Histories reviewed as per  EMR.    Result Review :            Objective     Vitals:    02/17/25 1052   BP: 112/70   Pulse: 111   Resp: 16   Temp: 98.2 °F (36.8 °C)   SpO2: 95%         02/17/25  1052   Weight: 70.8 kg (156 lb)       Physical Exam  Vitals and nursing note reviewed.   Constitutional:       Appearance: Normal appearance.   HENT:      Head: Normocephalic and atraumatic.      Nose: Nose normal.      Mouth/Throat:      Pharynx: Oropharynx is clear.   Eyes:      Extraocular Movements: Extraocular movements intact.      Conjunctiva/sclera: Conjunctivae normal.      Pupils: Pupils are equal, round, and reactive to light.   Cardiovascular:      Rate and Rhythm: Normal rate and regular rhythm.      Pulses: Normal pulses.      Heart sounds: Normal heart sounds.   Pulmonary:      Effort: Pulmonary effort is normal.      Breath sounds: Normal breath sounds.   Musculoskeletal:         General: Normal range of motion.      Cervical back: Normal range of motion and neck supple.   Skin:     General: Skin is warm.      Capillary Refill: Capillary refill takes 2 to 3 seconds.   Neurological:      Mental Status: She is alert and oriented to person, place, and time.   Psychiatric:         Mood and Affect: Mood normal.         Behavior: Behavior normal.         XR Spine Thoracic 3 View (In Office)    Result Date: 1/9/2025  Impression: 1.No acute traumatic injury identified. 2.Degenerative changes most pronounced in the cervical spine. Electronically Signed: Chad Mccoy MD  1/9/2025 10:15 AM EST  Workstation ID: [id number]    XR Spine Cervical 2 or 3 View    Result Date: 1/9/2025  Impression: 1.No acute traumatic injury identified. 2.Degenerative changes most pronounced in the cervical spine. Electronically Signed: Chad Mccoy MD  1/9/2025 10:15 AM EST  Workstation ID: [id number]    US Axilla Right    Result Date: 12/10/2024  1. No mammographic findings to indicate right breast malignancy. 2. There is a single benign lymph node in the right axilla as  described. 3. I would recommend clinical management of the right axillary palpable lump. The patient should be scheduled for her screening mammogram of the left breast on 1/2025.    TISSUE DENSITY:  There are scattered areas of fibroglandular density.  BI-RADS ASSESSMENT: BI-RADS 2. Benign findings.    Note: It has been reported that there is approximately a 15% false negative in mammography. Therefore, management of a palpable abnormality should not be deferred because of a negative mammogram.    Electronically Signed ByKody Etienne MD On:12/10/2024 9:47 AM      Mammo Diagnostic Digital Tomosynthesis Right With CAD    Result Date: 12/10/2024  1. No mammographic findings to indicate right breast malignancy. 2. There is a single benign lymph node in the right axilla as described. 3. I would recommend clinical management of the right axillary palpable lump. The patient should be scheduled for her screening mammogram of the left breast on 1/2025.    TISSUE DENSITY:  There are scattered areas of fibroglandular density.  BI-RADS ASSESSMENT: BI-RADS 2. Benign findings.    Note: It has been reported that there is approximately a 15% false negative in mammography. Therefore, management of a palpable abnormality should not be deferred because of a negative mammogram.    Electronically Signed ByKody Etienne MD On:12/10/2024 9:47 AM        Assessment & Plan     Diagnoses and all orders for this visit:    1. Mild intermittent asthma, unspecified whether complicated (Primary)    2. DEVENDRA on CPAP         Discussion/ Recommendations:   Patient is advised to continue her present medications  Continue albuterol inhaler as needed  Pulmicort Flexhaler twice daily  Advised compliance with CPAP  Regular exercise  Vaccinations discussed and recommended             Return in about 4 months (around 6/17/2025).          This document has been electronically signed by Flo Simon MD on February 17, 2025 11:02 EST

## 2025-02-25 DIAGNOSIS — R13.12 OROPHARYNGEAL DYSPHAGIA: Primary | ICD-10-CM

## 2025-03-10 DIAGNOSIS — G25.81 RLS (RESTLESS LEGS SYNDROME): ICD-10-CM

## 2025-03-10 DIAGNOSIS — I10 HYPERTENSION, UNSPECIFIED TYPE: ICD-10-CM

## 2025-03-10 DIAGNOSIS — E03.9 HYPOTHYROIDISM, UNSPECIFIED TYPE: ICD-10-CM

## 2025-03-10 RX ORDER — LEVOTHYROXINE SODIUM 75 UG/1
75 TABLET ORAL DAILY
Qty: 30 TABLET | Refills: 0 | Status: SHIPPED | OUTPATIENT
Start: 2025-03-10 | End: 2025-03-13 | Stop reason: SDUPTHER

## 2025-03-10 RX ORDER — LISINOPRIL 10 MG/1
10 TABLET ORAL DAILY
Qty: 30 TABLET | Refills: 0 | Status: SHIPPED | OUTPATIENT
Start: 2025-03-10 | End: 2025-03-13 | Stop reason: SDUPTHER

## 2025-03-10 RX ORDER — PRAMIPEXOLE DIHYDROCHLORIDE 0.5 MG/1
0.5 TABLET ORAL NIGHTLY
Qty: 30 TABLET | Refills: 0 | OUTPATIENT
Start: 2025-03-10

## 2025-03-10 NOTE — TELEPHONE ENCOUNTER
Has appoint 3/10/25--courtesy refill     --but regarding the mirapex was: The original prescription was discontinued on 2/11/2025 by Nadia Kahn RN for the following reason: *Therapy completed. Renewing this prescription may not be appropriate.

## 2025-03-13 ENCOUNTER — OFFICE VISIT (OUTPATIENT)
Dept: FAMILY MEDICINE CLINIC | Facility: CLINIC | Age: 60
End: 2025-03-13
Payer: COMMERCIAL

## 2025-03-13 VITALS
BODY MASS INDEX: 27.97 KG/M2 | TEMPERATURE: 97.8 F | HEART RATE: 88 BPM | HEIGHT: 62 IN | SYSTOLIC BLOOD PRESSURE: 116 MMHG | WEIGHT: 152 LBS | OXYGEN SATURATION: 98 % | DIASTOLIC BLOOD PRESSURE: 64 MMHG

## 2025-03-13 DIAGNOSIS — R53.83 FATIGUE, UNSPECIFIED TYPE: ICD-10-CM

## 2025-03-13 DIAGNOSIS — E55.9 VITAMIN D DEFICIENCY: ICD-10-CM

## 2025-03-13 DIAGNOSIS — F41.8 SITUATIONAL ANXIETY: ICD-10-CM

## 2025-03-13 DIAGNOSIS — J45.909 MILD ASTHMA, UNSPECIFIED WHETHER COMPLICATED, UNSPECIFIED WHETHER PERSISTENT: ICD-10-CM

## 2025-03-13 DIAGNOSIS — R73.09 ELEVATED GLUCOSE: ICD-10-CM

## 2025-03-13 DIAGNOSIS — M25.461 SWELLING OF RIGHT KNEE JOINT: ICD-10-CM

## 2025-03-13 DIAGNOSIS — R07.81 RIB PAIN ON RIGHT SIDE: ICD-10-CM

## 2025-03-13 DIAGNOSIS — I10 HYPERTENSION, UNSPECIFIED TYPE: ICD-10-CM

## 2025-03-13 DIAGNOSIS — Z00.00 ANNUAL PHYSICAL EXAM: Primary | ICD-10-CM

## 2025-03-13 DIAGNOSIS — F50.89 PICA: ICD-10-CM

## 2025-03-13 DIAGNOSIS — E03.9 HYPOTHYROIDISM, UNSPECIFIED TYPE: ICD-10-CM

## 2025-03-13 PROBLEM — F43.9 SITUATIONAL STRESS: Status: ACTIVE | Noted: 2025-03-13

## 2025-03-13 LAB
25(OH)D3 SERPL-MCNC: 34.6 NG/ML (ref 30–100)
ALBUMIN SERPL-MCNC: 4.2 G/DL (ref 3.5–5.2)
ALBUMIN/GLOB SERPL: 1.3 G/DL
ALP SERPL-CCNC: 73 U/L (ref 39–117)
ALT SERPL W P-5'-P-CCNC: 40 U/L (ref 1–33)
ANION GAP SERPL CALCULATED.3IONS-SCNC: 10.2 MMOL/L (ref 5–15)
AST SERPL-CCNC: 35 U/L (ref 1–32)
BASOPHILS # BLD AUTO: 0.07 10*3/MM3 (ref 0–0.2)
BASOPHILS NFR BLD AUTO: 1 % (ref 0–1.5)
BILIRUB SERPL-MCNC: 0.6 MG/DL (ref 0–1.2)
BILIRUB UR QL STRIP: NEGATIVE
BUN SERPL-MCNC: 8 MG/DL (ref 8–23)
BUN/CREAT SERPL: 9.5 (ref 7–25)
CALCIUM SPEC-SCNC: 9.9 MG/DL (ref 8.6–10.5)
CHLORIDE SERPL-SCNC: 104 MMOL/L (ref 98–107)
CHOLEST SERPL-MCNC: 193 MG/DL (ref 0–200)
CLARITY UR: CLEAR
CO2 SERPL-SCNC: 25.8 MMOL/L (ref 22–29)
COLOR UR: YELLOW
CREAT SERPL-MCNC: 0.84 MG/DL (ref 0.57–1)
DEPRECATED RDW RBC AUTO: 39.9 FL (ref 37–54)
EGFRCR SERPLBLD CKD-EPI 2021: 79.2 ML/MIN/1.73
EOSINOPHIL # BLD AUTO: 0.16 10*3/MM3 (ref 0–0.4)
EOSINOPHIL NFR BLD AUTO: 2.2 % (ref 0.3–6.2)
ERYTHROCYTE [DISTWIDTH] IN BLOOD BY AUTOMATED COUNT: 12.9 % (ref 12.3–15.4)
FERRITIN SERPL-MCNC: 148 NG/ML (ref 13–150)
FOLATE SERPL-MCNC: >20 NG/ML (ref 4.78–24.2)
GLOBULIN UR ELPH-MCNC: 3.2 GM/DL
GLUCOSE SERPL-MCNC: 82 MG/DL (ref 65–99)
GLUCOSE UR STRIP-MCNC: NEGATIVE MG/DL
HBA1C MFR BLD: 5.7 % (ref 4.8–5.6)
HCT VFR BLD AUTO: 46.5 % (ref 34–46.6)
HDLC SERPL-MCNC: 43 MG/DL (ref 40–60)
HGB BLD-MCNC: 15.8 G/DL (ref 12–15.9)
HGB UR QL STRIP.AUTO: NEGATIVE
IMM GRANULOCYTES # BLD AUTO: 0.02 10*3/MM3 (ref 0–0.05)
IMM GRANULOCYTES NFR BLD AUTO: 0.3 % (ref 0–0.5)
IRON 24H UR-MRATE: 64 MCG/DL (ref 37–145)
IRON SATN MFR SERPL: 16 % (ref 20–50)
KETONES UR QL STRIP: NEGATIVE
LDLC SERPL CALC-MCNC: 116 MG/DL (ref 0–100)
LDLC/HDLC SERPL: 2.59 {RATIO}
LEUKOCYTE ESTERASE UR QL STRIP.AUTO: NEGATIVE
LYMPHOCYTES # BLD AUTO: 2.5 10*3/MM3 (ref 0.7–3.1)
LYMPHOCYTES NFR BLD AUTO: 34.7 % (ref 19.6–45.3)
MCH RBC QN AUTO: 28.8 PG (ref 26.6–33)
MCHC RBC AUTO-ENTMCNC: 34 G/DL (ref 31.5–35.7)
MCV RBC AUTO: 84.9 FL (ref 79–97)
MONOCYTES # BLD AUTO: 0.66 10*3/MM3 (ref 0.1–0.9)
MONOCYTES NFR BLD AUTO: 9.2 % (ref 5–12)
NEUTROPHILS NFR BLD AUTO: 3.8 10*3/MM3 (ref 1.7–7)
NEUTROPHILS NFR BLD AUTO: 52.6 % (ref 42.7–76)
NITRITE UR QL STRIP: NEGATIVE
NRBC BLD AUTO-RTO: 0 /100 WBC (ref 0–0.2)
PH UR STRIP.AUTO: 8.5 [PH] (ref 5–8)
PLATELET # BLD AUTO: 290 10*3/MM3 (ref 140–450)
PMV BLD AUTO: 11.2 FL (ref 6–12)
POTASSIUM SERPL-SCNC: 4.1 MMOL/L (ref 3.5–5.2)
PROT SERPL-MCNC: 7.4 G/DL (ref 6–8.5)
PROT UR QL STRIP: NEGATIVE
RBC # BLD AUTO: 5.48 10*6/MM3 (ref 3.77–5.28)
SODIUM SERPL-SCNC: 140 MMOL/L (ref 136–145)
SP GR UR STRIP: 1.01 (ref 1–1.03)
TIBC SERPL-MCNC: 390 MCG/DL (ref 298–536)
TRANSFERRIN SERPL-MCNC: 262 MG/DL (ref 200–360)
TRIGL SERPL-MCNC: 193 MG/DL (ref 0–150)
TSH SERPL DL<=0.05 MIU/L-ACNC: 0.41 UIU/ML (ref 0.27–4.2)
UROBILINOGEN UR QL STRIP: ABNORMAL
VIT B12 BLD-MCNC: 610 PG/ML (ref 211–946)
VLDLC SERPL-MCNC: 34 MG/DL (ref 5–40)
WBC NRBC COR # BLD AUTO: 7.21 10*3/MM3 (ref 3.4–10.8)

## 2025-03-13 PROCEDURE — 1160F RVW MEDS BY RX/DR IN RCRD: CPT | Performed by: NURSE PRACTITIONER

## 2025-03-13 PROCEDURE — 81003 URINALYSIS AUTO W/O SCOPE: CPT | Performed by: NURSE PRACTITIONER

## 2025-03-13 PROCEDURE — 84466 ASSAY OF TRANSFERRIN: CPT | Performed by: NURSE PRACTITIONER

## 2025-03-13 PROCEDURE — 80061 LIPID PANEL: CPT | Performed by: NURSE PRACTITIONER

## 2025-03-13 PROCEDURE — 83540 ASSAY OF IRON: CPT | Performed by: NURSE PRACTITIONER

## 2025-03-13 PROCEDURE — 99396 PREV VISIT EST AGE 40-64: CPT | Performed by: NURSE PRACTITIONER

## 2025-03-13 PROCEDURE — 82306 VITAMIN D 25 HYDROXY: CPT | Performed by: NURSE PRACTITIONER

## 2025-03-13 PROCEDURE — 80053 COMPREHEN METABOLIC PANEL: CPT | Performed by: NURSE PRACTITIONER

## 2025-03-13 PROCEDURE — 85025 COMPLETE CBC W/AUTO DIFF WBC: CPT | Performed by: NURSE PRACTITIONER

## 2025-03-13 PROCEDURE — 1125F AMNT PAIN NOTED PAIN PRSNT: CPT | Performed by: NURSE PRACTITIONER

## 2025-03-13 PROCEDURE — 82728 ASSAY OF FERRITIN: CPT | Performed by: NURSE PRACTITIONER

## 2025-03-13 PROCEDURE — 82746 ASSAY OF FOLIC ACID SERUM: CPT | Performed by: NURSE PRACTITIONER

## 2025-03-13 PROCEDURE — 84443 ASSAY THYROID STIM HORMONE: CPT | Performed by: NURSE PRACTITIONER

## 2025-03-13 PROCEDURE — 82607 VITAMIN B-12: CPT | Performed by: NURSE PRACTITIONER

## 2025-03-13 PROCEDURE — 3078F DIAST BP <80 MM HG: CPT | Performed by: NURSE PRACTITIONER

## 2025-03-13 PROCEDURE — 83036 HEMOGLOBIN GLYCOSYLATED A1C: CPT | Performed by: NURSE PRACTITIONER

## 2025-03-13 PROCEDURE — 1159F MED LIST DOCD IN RCRD: CPT | Performed by: NURSE PRACTITIONER

## 2025-03-13 PROCEDURE — 36415 COLL VENOUS BLD VENIPUNCTURE: CPT | Performed by: NURSE PRACTITIONER

## 2025-03-13 PROCEDURE — 3074F SYST BP LT 130 MM HG: CPT | Performed by: NURSE PRACTITIONER

## 2025-03-13 RX ORDER — BUDESONIDE 90 UG/1
1 AEROSOL, POWDER RESPIRATORY (INHALATION)
Qty: 1 EACH | Refills: 5 | Status: SHIPPED | OUTPATIENT
Start: 2025-03-13

## 2025-03-13 RX ORDER — MELOXICAM 15 MG/1
15 TABLET ORAL DAILY PRN
Qty: 30 TABLET | Refills: 2 | Status: SHIPPED | OUTPATIENT
Start: 2025-03-13

## 2025-03-13 RX ORDER — ESCITALOPRAM OXALATE 10 MG/1
10 TABLET ORAL DAILY
Qty: 30 TABLET | Refills: 5 | Status: SHIPPED | OUTPATIENT
Start: 2025-03-13

## 2025-03-13 RX ORDER — ALBUTEROL SULFATE 90 UG/1
2 INHALANT RESPIRATORY (INHALATION) EVERY 4 HOURS PRN
Qty: 18 G | Refills: 5 | Status: SHIPPED | OUTPATIENT
Start: 2025-03-13

## 2025-03-13 RX ORDER — LEVOTHYROXINE SODIUM 75 UG/1
75 TABLET ORAL DAILY
Qty: 30 TABLET | Refills: 5 | Status: SHIPPED | OUTPATIENT
Start: 2025-03-13

## 2025-03-13 RX ORDER — LISINOPRIL 10 MG/1
10 TABLET ORAL DAILY
Qty: 30 TABLET | Refills: 5 | Status: SHIPPED | OUTPATIENT
Start: 2025-03-13

## 2025-03-13 NOTE — PROGRESS NOTES
Venipuncture Blood Specimen Collection  Venipuncture performed in left arm  by Crystal Angel with good hemostasis. Patient tolerated the procedure well without complications.   03/13/25   Crystal Angel

## 2025-03-13 NOTE — PROGRESS NOTES
Chief Complaint  Annual Exam    Subjective            Valentina Stevens presents to [facility]  History of Present Illness    History of Present Illness  The patient is a 59-year-old female who presents for a physical exam today without Pap smear.    History of GERD-she has discontinued the use of famotidine, which was previously prescribed for her reflux condition. Her gastroenterologist has since provided an alternative medication. She reports that her reflux symptoms are currently well-managed. She reports no abdominal pain or blood in the stool.    History of chronic back issues-she has also ceased taking Robaxin due to its potent effects. She continues to take meloxicam as needed and has completed a course of steroids. She has discontinued the use of Zanaflex and Robaxin, which were previously prescribed during a hospital stay. She is not seeking a refill of meloxicam at this time.    Hypothyroidism: She is currently on levothyroxine and requires a refill.  Tolerating medication well no side effects no issues reported    Osteoporosis: She is also taking Boniva, with a supply that should last until September 2025.     Hypertension: Denies chest pain syncopal episodes any changes in her normal level of shortness of breath tolerating medication well no side effects no issues reported she is on lisinopril and needs a refill.    Depression: Denies all SI/HI no side effects no issues reported overall stable and vastly improved-she is on Lexapro, which she reports as effective. She is seeing a counselor and therapist. She reports feeling well and does not have any suicidal ideation or self-injurious behavior. She reports some weight loss and attributes it to her depression, which she feels is improving.    Asthma: She is using Pulmicort and requires a refill of her albuterol inhaler, which she typically uses during the summer months when her symptoms worsen. She reports experiencing  shortness of breath due to her asthma. She reports no chest pain, coughing, wheezing, or congestion. She reports feeling less tired than usual.    No personal history of diabetes but sister does have-and patient reports that she has been monitoring her blood glucose levels at home, with one reading as high as 200 and another at 250. She has not been consistent with her monitoring recently. She has a logbook for recording her blood glucose levels before and after meals but has not been diligent in maintaining it. She often skips breakfast due to time constraints. She reports no symptoms of hypoglycemia such as excessive thirst, hunger, or urination.    She reports no dizziness, lightheadedness, seizures, or fainting. She reports no painful urination or vaginal bleeding. She reports no blurred or double vision.    She has been experiencing pain in her right rib area when taking deep breaths for the past 2 to 3 weeks. She reports no associated injury. She reports no tenderness in the upper epigastric area or ribs.    She is scheduled for a swallow test on 03/18/2025 due to dysphagia.    Supplemental Information  She has been approved for disability benefits. She has a craving for ice and suspects she may have low iron levels. She confirms that she is not pregnant.    FAMILY HISTORY  Her sister is a diabetic.    MEDICATIONS  Discontinued: famotidine, Robaxin, Zanaflex, temazepam  Current: meloxicam, levothyroxine, Boniva, Lexapro, lisinopril, Pulmicort, albuterol inhaler, hydrocodone      PHQ-2 Total Score:      PHQ-9 Total Score:        Past Medical History:   Diagnosis Date    Abnormal ECG     Allergic     Drug allergies    Arthritis     Arthritis of back     Arthritis of neck     Asthma     Asthma 10/31/2023    Asthma, intrinsic     CTS (carpal tunnel syndrome)     Depression     Difficulty walking     Diverticulitis of colon     Fatty (change of) liver, not elsewhere classified     GERD (gastroesophageal reflux  disease)     Hearing loss     Hyperlipemia     Hypertension     Hypothyroidism     Injury of back     Knee swelling     Low back pain     Lumbar radiculopathy 02/15/2022    Memory loss     Menopause     Migraine     Murmur     Osteopenia     Osteoporosis     Pneumonia     Primary central sleep apnea     Scoliosis     Seizures     last when 10 years old    Shortness of breath     Sleep apnea, obstructive     Visual impairment     Vitamin D deficiency        Allergies   Allergen Reactions    Penicillins Shortness Of Breath and Rash    Sulfa Antibiotics Hives    Cephalexin Rash    Indomethacin Rash        Past Surgical History:   Procedure Laterality Date    COLONOSCOPY  2018    glenna    COLONOSCOPY N/A 10/17/2023    Procedure: COLONOSCOPY WITH COLD SNARE POLYPECTOMY;  Surgeon: Yossi Stephens MD;  Location: [facility];  Service: Gastroenterology;  Laterality: N/A;  DIVERTICULOSIS, COLON POLYP    ENDOSCOPY N/A 2/11/2025    Procedure: ESOPHAGOGASTRODUODENOSCOPY WITH BIOPSIES;  Surgeon: Yossi Stephens MD;  Location: [facility] END[facility];  Service: Gastroenterology;  Laterality: N/A;  HIATAL HERNIA    EPIDURAL BLOCK      ESSURE TUBAL LIGATION      SKIN BIOPSY      THYMECTOMY      TUBAL ABDOMINAL LIGATION      TUMOR REMOVAL      in between heart and lungs    UPPER GASTROINTESTINAL ENDOSCOPY          Social History     Tobacco Use    Smoking status: Never     Passive exposure: Never    Smokeless tobacco: Never   Vaping Use    Vaping status: Never Used   Substance Use Topics    Alcohol use: Never    Drug use: Never       Family History   Problem Relation Age of Onset    Thyroid cancer Mother     Migraines Mother     Stroke Mother     Skin cancer Mother     Cancer Mother     Heart attack Mother     Hypertension Father     Osteoporosis Father     Alcohol abuse Father     Skin cancer Father     Dementia Sister     Migraines Sister     Migraines Sister     Skin cancer Brother     Ovarian cancer Maternal  Grandmother     Uterine cancer Maternal Grandmother     Migraines Sister     Alcohol abuse Brother     Cancer Brother     Alcohol abuse Brother     Cancer Brother         Skin cancer    Colon cancer Neg Hx     Breast cancer Neg Hx     Prostate cancer Neg Hx     Clotting disorder Neg Hx         Health Maintenance Due   Topic Date Due    ANNUAL PHYSICAL  09/11/2024        Current Outpatient Medications on File Prior to Visit   Medication Sig    calcium carb-cholecalciferol (Calcium + Vitamin D3) 600-10 MG-MCG tablet per tablet Take 1 tablet by mouth 2 (Two) Times a Day.    Fremanezumab-vfrm (Ajovy) 225 MG/1.5ML solution auto-injector Inject 225 mg under the skin into the appropriate area as directed Every 30 (Thirty) Days.    HYDROcodone-acetaminophen (NORCO) 5-325 MG per tablet Take 1 tablet by mouth 2 (Two) Times a Day As Needed.    ibandronate (BONIVA) 150 MG tablet Take 1 tablet by mouth Every 30 (Thirty) Days.    multivitamin with minerals tablet tablet Take 1 tablet by mouth Daily.    pantoprazole (PROTONIX) 40 MG EC tablet Take 1 tablet by mouth Daily.    rizatriptan (MAXALT) 10 MG tablet Take 1 tablet by mouth 1 (One) Time As Needed for Migraine. 1 tablet at HA onset, may repeat in 2 hrs once if needed    vitamin E 100 UNIT capsule Take 1 capsule by mouth Daily.    [DISCONTINUED] albuterol sulfate  (90 Base) MCG/ACT inhaler Inhale 2 puffs Every 4 (Four) Hours As Needed for Wheezing.    [DISCONTINUED] escitalopram (LEXAPRO) 10 MG tablet Take 1 tablet by mouth Daily.    [DISCONTINUED] levothyroxine (SYNTHROID, LEVOTHROID) 75 MCG tablet TAKE 1 TABLET BY MOUTH EVERY DAY    [DISCONTINUED] lisinopril (PRINIVIL,ZESTRIL) 10 MG tablet TAKE 1 TABLET BY MOUTH EVERY DAY    [DISCONTINUED] meloxicam (MOBIC) 15 MG tablet Take 1 tablet by mouth Daily As Needed for Moderate Pain or Mild Pain.    [DISCONTINUED] Pulmicort Flexhaler 90 MCG/ACT inhaler INHALE ONE PUFF BY MOUTH TWICE DAILY. **RINSE MOUTH AFTER USE**     [DISCONTINUED] famotidine (Pepcid) 40 MG tablet Take 1 tablet by mouth At Night As Needed for Heartburn.    [DISCONTINUED] methocarbamol (ROBAXIN) 750 MG tablet Take 1 tablet by mouth 3 (Three) Times a Day.    [DISCONTINUED] predniSONE (DELTASONE) 20 MG tablet Take 1 tablet by mouth Daily. (Patient not taking: Reported on 2/17/2025)    [DISCONTINUED] tiZANidine (ZANAFLEX) 4 MG tablet Take 1 tablet by mouth Every 8 (Eight) Hours As Needed.     No current facility-administered medications on file prior to visit.       Immunization History   Administered Date(s) Administered    31-influenza Vac Quardvalent Preservativ 11/05/2015, 11/09/2016, 10/19/2021, 09/30/2022    COVID-19 (PFIZER) Purple Cap Monovalent 05/27/2021, 06/17/2021, 06/17/2021    COVID-19 (UNSPECIFIED) 05/27/2021, 06/17/2021    Flu Vaccine Intradermal Quad 18-64YR 09/01/2023    Fluzone  >6mos 10/01/2024    Fluzone (or Fluarix & Flulaval for VFC) >6mos 10/19/2021, 09/30/2022    Hepatitis A 05/17/2018, 12/20/2018    Influenza Seasonal Injectable 10/10/2014    Influenza, Unspecified 10/19/2020, 10/19/2020, 09/01/2023    Pneumococcal Conjugate 20-Valent (PCV20) 08/09/2023    Pneumococcal Polysaccharide (PPSV23) 05/10/2022    Shingrix 11/12/2021, 01/21/2022    Tdap 03/20/2018, 10/19/2020    Zoster, Unspecified 11/12/2021, 01/21/2022       Review of Systems   Constitutional:  Positive for fatigue. Negative for unexpected weight gain and unexpected weight loss.   HENT:  Positive for trouble swallowing. Negative for congestion.         Goes for the swallow test soon    Eyes:  Negative for blurred vision and double vision.   Respiratory:  Positive for shortness of breath. Negative for cough and wheezing.         With the asthma at times --some rib pain with deep breath    Cardiovascular:  Negative for chest pain.   Gastrointestinal:  Positive for GERD. Negative for abdominal pain and blood in stool.   Endocrine: Negative for polydipsia, polyphagia and polyuria.  "  Genitourinary:  Negative for dysuria and vaginal bleeding.   Musculoskeletal:  Positive for back pain.   Neurological:  Negative for dizziness, seizures, syncope and light-headedness.   Hematological: Negative.    Psychiatric/Behavioral:  Positive for depressed mood. Negative for self-injury and suicidal ideas.         Objective     /64 (BP Location: Right arm)   Pulse 88   Temp 97.8 °F (36.6 °C) (Temporal)   Ht 157.5 cm (62\")   Wt 68.9 kg (152 lb)   SpO2 98%   BMI 27.80 kg/m²       Physical Exam  Constitutional:       Appearance: Normal appearance.   HENT:      Head: Normocephalic.      Right Ear: External ear normal.      Left Ear: External ear normal.      Nose: Nose normal.      Mouth/Throat:      Mouth: Mucous membranes are moist.   Eyes:      Pupils: Pupils are equal, round, and reactive to light.   Cardiovascular:      Rate and Rhythm: Normal rate and regular rhythm.      Heart sounds: Normal heart sounds.   Pulmonary:      Effort: Pulmonary effort is normal.      Breath sounds: Normal breath sounds.   Chest:      Chest wall: No tenderness.   Abdominal:      Palpations: Abdomen is soft.      Tenderness: There is no abdominal tenderness.   Musculoskeletal:      Cervical back: Normal range of motion and neck supple.   Skin:     General: Skin is warm and dry.   Neurological:      Mental Status: She is alert and oriented to person, place, and time.   Psychiatric:         Mood and Affect: Mood normal.         Behavior: Behavior normal.         Thought Content: Thought content normal.         Judgment: Judgment normal.         Result Review :                    Results                 Assessment and Plan      Diagnoses and all orders for this visit:    1. Annual physical exam (Primary)  -     CBC & Differential  -     Comprehensive Metabolic Panel  -     Lipid Panel  -     TSH Rfx On Abnormal To Free T4  -     Urinalysis With Culture If Indicated -  -     Vitamin D,25-Hydroxy  -     Vitamin B12 & " Folate  -     Hemoglobin A1c    2. Hypertension, unspecified type  -     lisinopril (PRINIVIL,ZESTRIL) 10 MG tablet; Take 1 tablet by mouth Daily.  Dispense: 30 tablet; Refill: 5  -     CBC & Differential  -     Comprehensive Metabolic Panel  -     Lipid Panel  -     TSH Rfx On Abnormal To Free T4  -     Urinalysis With Culture If Indicated -    3. Elevated glucose  -     Hemoglobin A1c    4. Hypothyroidism, unspecified type  -     levothyroxine (SYNTHROID, LEVOTHROID) 75 MCG tablet; Take 1 tablet by mouth Daily.  Dispense: 30 tablet; Refill: 5  -     Lipid Panel    5. Mild asthma, unspecified whether complicated, unspecified whether persistent  -     budesonide (Pulmicort Flexhaler) 90 MCG/ACT inhaler; Inhale 1 puff 2 (Two) Times a Day. Rinse mouth after use  Dispense: 1 each; Refill: 5  -     albuterol sulfate  (90 Base) MCG/ACT inhaler; Inhale 2 puffs Every 4 (Four) Hours As Needed for Wheezing.  Dispense: 18 g; Refill: 5    6. Situational anxiety  -     escitalopram (LEXAPRO) 10 MG tablet; Take 1 tablet by mouth Daily.  Dispense: 30 tablet; Refill: 5    7. Vitamin D deficiency  -     Vitamin D,25-Hydroxy    8. Swelling of right knee joint  -     meloxicam (MOBIC) 15 MG tablet; Take 1 tablet by mouth Daily As Needed for Moderate Pain or Mild Pain.  Dispense: 30 tablet; Refill: 2    9. Fatigue, unspecified type  -     Vitamin B12 & Folate  -     Iron Profile  -     Ferritin    10. Pica  -     Iron Profile  -     Ferritin    11. Rib pain on right side  -     XR Chest PA & Lateral (In Office)    Medication refills as noted above and labs as noted above and x-ray of the right rib pain on the lateral aspect with deep breath done today      Assessment & Plan  1. Health maintenance.  She is due for a colon cancer screening in 2028. A comprehensive set of laboratory tests will be conducted today.    2. Medication management.  Famotidine, Robaxin, Zanaflex, and temazepam will be removed from her medication list as  she is no longer taking them. Refills for levothyroxine, Lexapro, lisinopril, Pulmicort, albuterol inhaler, and meloxicam have been provided. She is advised to take hydrocodone only as needed for severe pain.    3. Depression.  She reports that Lexapro is working well and she is also seeing a counselor. Refill for Lexapro has been provided.    4. Asthma.  She uses Pulmicort regularly and albuterol inhaler as needed, especially during the summer. Refills for Pulmicort and albuterol inhaler have been provided.    5. Elevated glucose.  She reported occasional elevated blood sugar levels, with one instance reaching 250. An A1c test will be conducted to further evaluate her glucose levels.    6. Rib pain.  She reports rib pain on the right side when taking a deep breath, lasting for 2 to 3 weeks, nontender, with no injury. A chest x-ray will be ordered to investigate the cause of the rib pain.    7. Dysphagia.  She is scheduled for a swallow test on 03/18/2025.          Follow Up     Return in about 6 months (around 9/13/2025), or if symptoms worsen or fail to improve, for Recheck, fasting labs and refills.    Patient was given instructions and counseling regarding her condition or for health maintenance advice. Please see specific information pulled into the AVS if appropriate.            Valentina Stevens  reports that she has never smoked. She has never been exposed to tobacco smoke. She has never used smokeless tobacco. I have educated her on the risk of diseases from using tobacco products such as cancer, COPD, and heart disease.              Patient or patient representative verbalized consent for the use of Ambient Listening during the visit with  DEMAR Eden for chart documentation. 3/13/2025  11:45 EDT

## 2025-03-14 ENCOUNTER — RESULTS FOLLOW-UP (OUTPATIENT)
Dept: FAMILY MEDICINE CLINIC | Facility: CLINIC | Age: 60
End: 2025-03-14
Payer: COMMERCIAL

## 2025-03-14 NOTE — PROGRESS NOTES
Please mail letter to patient stating    Valentina, comprehensive panel shows normal glucose and normal electrolytes and normal kidney function tests and the liver function test are still modestly elevated with the ALT at 40 same as last time and normal is 33 and the AST modestly elevated at 35 and should be 32 or less and I know you have a history of fatty liver disease-and I know you also see gastroenterology regarding this    Cholesterol panel has improved the triglycerides have dropped from 277 down to 193 goal is to be 150 or less and the HDL has improved it was low at 36 and now is up to 43 in the normal range and then the LDL is still modestly elevated at 116 and it should be less than 100 when fasting    And then your total iron and transferrin and total iron-binding capacity were all normal and the iron saturation was just slight borderline at 16% and normal is 20% and all of the blood counts were good and your ferritin level was excellent so no iron supplementation is needed and thyroid levels are normal and your vitamin D was normal and your vitamin B12 and folic acid was normal and the urinalysis was good    And then lastly you are just a slight borderline prediabetic at 5.7% and normal is 5.6% or less

## 2025-03-14 NOTE — LETTER
Valentina Stevens  [address] [place] [address]    March 14, 2025     Dear Ms. Luisnino:    Valentina, comprehensive panel shows normal glucose and normal electrolytes and normal kidney function tests and the liver function test are still modestly elevated with the ALT at 40 same as last time and normal is 33 and the AST modestly elevated at 35 and should be 32 or less and I know you have a history of fatty liver disease-and I know you also see gastroenterology regarding this     Cholesterol panel has improved the triglycerides have dropped from 277 down to 193 goal is to be 150 or less and the HDL has improved it was low at 36 and now is up to 43 in the normal range and then the LDL is still modestly elevated at 116 and it should be less than 100 when fasting     And then your total iron and transferrin and total iron-binding capacity were all normal and the iron saturation was just slight borderline at 16% and normal is 20% and all of the blood counts were good and your ferritin level was excellent so no iron supplementation is needed and thyroid levels are normal and your vitamin D was normal and your vitamin B12 and folic acid was normal and the urinalysis was good     And then lastly you are just a slight borderline prediabetic at 5.7% and normal is 5.6% or les    Below are the results from your recent visit:    Resulted Orders   Comprehensive Metabolic Panel   Result Value Ref Range    Glucose 82 65 - 99 mg/dL    BUN 8 8 - 23 mg/dL    Creatinine 0.84 0.57 - 1.00 mg/dL    Sodium 140 136 - 145 mmol/L    Potassium 4.1 3.5 - 5.2 mmol/L    Chloride 104 98 - 107 mmol/L    CO2 25.8 22.0 - 29.0 mmol/L    Calcium 9.9 8.6 - 10.5 mg/dL    Total Protein 7.4 6.0 - 8.5 g/dL    Albumin 4.2 3.5 - 5.2 g/dL    ALT (SGPT) 40 (H) 1 - 33 U/L    AST (SGOT) 35 (H) 1 - 32 U/L    Alkaline Phosphatase 73 39 - 117 U/L    Total Bilirubin 0.6 0.0 - 1.2 mg/dL    Globulin 3.2 gm/dL    A/G Ratio 1.3 g/dL    BUN/Creatinine Ratio 9.5 7.0 -  25.0    Anion Gap 10.2 5.0 - 15.0 mmol/L    eGFR 79.2 >60.0 mL/min/1.73   Lipid Panel   Result Value Ref Range    Total Cholesterol 193 0 - 200 mg/dL    Triglycerides 193 (H) 0 - 150 mg/dL    HDL Cholesterol 43 40 - 60 mg/dL    LDL Cholesterol  116 (H) 0 - 100 mg/dL    VLDL Cholesterol 34 5 - 40 mg/dL    LDL/HDL Ratio 2.59    TSH Rfx On Abnormal To Free T4   Result Value Ref Range    TSH 0.407 0.270 - 4.200 uIU/mL   Vitamin D,25-Hydroxy   Result Value Ref Range    25 Hydroxy, Vitamin D 34.6 30.0 - 100.0 ng/ml   Vitamin B12 & Folate   Result Value Ref Range    Folate >20.00 4.78 - 24.20 ng/mL    Vitamin B-12 610 211 - 946 pg/mL   Hemoglobin A1c   Result Value Ref Range    Hemoglobin A1C 5.70 (H) 4.80 - 5.60 %   Iron Profile   Result Value Ref Range    Iron 64 37 - 145 mcg/dL    Iron Saturation (TSAT) 16 (L) 20 - 50 %    Transferrin 262 200 - 360 mg/dL    TIBC 390 298 - 536 mcg/dL   Ferritin   Result Value Ref Range    Ferritin 148.00 13.00 - 150.00 ng/mL   CBC Auto Differential   Result Value Ref Range    WBC 7.21 3.40 - 10.80 10*3/mm3    RBC 5.48 (H) 3.77 - 5.28 10*6/mm3    Hemoglobin 15.8 12.0 - 15.9 g/dL    Hematocrit 46.5 34.0 - 46.6 %    MCV 84.9 79.0 - 97.0 fL    MCH 28.8 26.6 - 33.0 pg    MCHC 34.0 31.5 - 35.7 g/dL    RDW 12.9 12.3 - 15.4 %    RDW-SD 39.9 37.0 - 54.0 fl    MPV 11.2 6.0 - 12.0 fL    Platelets 290 140 - 450 10*3/mm3    Neutrophil % 52.6 42.7 - 76.0 %    Lymphocyte % 34.7 19.6 - 45.3 %    Monocyte % 9.2 5.0 - 12.0 %    Eosinophil % 2.2 0.3 - 6.2 %    Basophil % 1.0 0.0 - 1.5 %    Immature Grans % 0.3 0.0 - 0.5 %    Neutrophils, Absolute 3.80 1.70 - 7.00 10*3/mm3    Lymphocytes, Absolute 2.50 0.70 - 3.10 10*3/mm3    Monocytes, Absolute 0.66 0.10 - 0.90 10*3/mm3    Eosinophils, Absolute 0.16 0.00 - 0.40 10*3/mm3    Basophils, Absolute 0.07 0.00 - 0.20 10*3/mm3    Immature Grans, Absolute 0.02 0.00 - 0.05 10*3/mm3    nRBC 0.0 0.0 - 0.2 /100 WBC   Urinalysis With Culture If Indicated - Urine, Clean  Catch   Result Value Ref Range    Color, UA Yellow Yellow, Straw    Appearance, UA Clear Clear    pH, UA 8.5 (H) 5.0 - 8.0    Specific Gravity, UA 1.010 1.005 - 1.030    Glucose, UA Negative Negative    Ketones, UA Negative Negative    Bilirubin, UA Negative Negative    Blood, UA Negative Negative    Protein, UA Negative Negative    Leuk Esterase, UA Negative Negative    Nitrite, UA Negative Negative    Urobilinogen, UA 0.2 E.U./dL 0.2 - 1.0 E.U./dL         If you have any questions or concerns, please don't hesitate to call.         Sincerely,        DEMAR Eden

## 2025-03-15 NOTE — PROGRESS NOTES
Please mail results letter regarding this new updated chest x-ray finding    Valentina your chest x-ray shows no acute cardiopulmonary process no pneumonia no pleural fluid and then there was just a comment about stable mild bilateral lower lobe scarring and apparently has been there before so nothing has changed and nothing new

## 2025-03-18 ENCOUNTER — HOSPITAL ENCOUNTER (OUTPATIENT)
Facility: HOSPITAL | Age: 60
Setting detail: THERAPIES SERIES
Discharge: HOME | End: 2025-03-18
Payer: COMMERCIAL

## 2025-03-18 DIAGNOSIS — R13.12 OROPHARYNGEAL DYSPHAGIA: Primary | ICD-10-CM

## 2025-03-18 PROCEDURE — 92610 EVALUATE SWALLOWING FUNCTION: CPT | Performed by: SPEECH-LANGUAGE PATHOLOGIST
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
## 2025-03-20 DIAGNOSIS — R13.12 OROPHARYNGEAL DYSPHAGIA: Primary | ICD-10-CM

## 2025-03-29 ENCOUNTER — APPOINTMENT (OUTPATIENT)
Dept: GENERAL RADIOLOGY | Facility: HOSPITAL | Age: 60
End: 2025-03-29
Payer: COMMERCIAL

## 2025-03-29 ENCOUNTER — HOSPITAL ENCOUNTER (EMERGENCY)
Facility: HOSPITAL | Age: 60
Discharge: HOME | End: 2025-03-30
Attending: EMERGENCY MEDICINE | Admitting: EMERGENCY MEDICINE
Payer: COMMERCIAL

## 2025-03-29 VITALS
DIASTOLIC BLOOD PRESSURE: 70 MMHG | WEIGHT: 158.51 LBS | BODY MASS INDEX: 28.09 KG/M2 | HEART RATE: 76 BPM | OXYGEN SATURATION: 100 % | SYSTOLIC BLOOD PRESSURE: 130 MMHG | HEIGHT: 63 IN | RESPIRATION RATE: 20 BRPM | TEMPERATURE: 98.1 F

## 2025-03-29 DIAGNOSIS — S80.811A ABRASION OF RIGHT LOWER EXTREMITY, INITIAL ENCOUNTER: ICD-10-CM

## 2025-03-29 DIAGNOSIS — S80.12XA CONTUSION OF LEFT LEG, INITIAL ENCOUNTER: Primary | ICD-10-CM

## 2025-03-29 PROCEDURE — 99283 EMERGENCY DEPT VISIT LOW MDM: CPT

## 2025-03-29 PROCEDURE — 73130 X-RAY EXAM OF HAND: CPT

## 2025-03-29 PROCEDURE — 73562 X-RAY EXAM OF KNEE 3: CPT

## 2025-03-30 ENCOUNTER — APPOINTMENT (OUTPATIENT)
Dept: GENERAL RADIOLOGY | Facility: HOSPITAL | Age: 60
End: 2025-03-30
Payer: COMMERCIAL

## 2025-03-30 PROCEDURE — 73590 X-RAY EXAM OF LOWER LEG: CPT

## 2025-03-30 RX ORDER — GINSENG 100 MG
1 CAPSULE ORAL ONCE
Status: COMPLETED | OUTPATIENT
Start: 2025-03-30 | End: 2025-03-30

## 2025-03-30 RX ORDER — ACETAMINOPHEN 325 MG/1
975 TABLET ORAL ONCE
Status: DISCONTINUED | OUTPATIENT
Start: 2025-03-30 | End: 2025-03-30 | Stop reason: HOSPADM

## 2025-03-30 RX ADMIN — BACITRACIN 0.9 G: 500 OINTMENT TOPICAL at 01:01

## 2025-03-30 NOTE — DISCHARGE INSTRUCTIONS
All your x-rays are negative for any fractures or dislocations.  Please apply ice to the area of swelling to the left lower leg 4-5 times throughout the day for 15 to 20 minutes.  Take Tylenol/Motrin as needed and follow-up with your PCP as needed

## 2025-03-30 NOTE — ED PROVIDER NOTES
Time: 12:34 AM EDT  Date of encounter:  3/29/2025  Independent Historian/Clinical History and Information was obtained by:   Patient    History is limited by: N/A    Chief Complaint   Patient presents with    Fall    Hand Injury         History of Present Illness:  Patient is a 59 y.o. year old female who presents to the emergency department for evaluation of fall.  Patient states she was out in her yard working.  She states her son moved a tire and she did not realize it was there and tripped over and fell.  She states she has pain to her right palm and abrasion to her right lower extremity and left knee and tib-fib pain and swelling.  Denies hitting her head.  No ice or analgesics PTA.    Patient Care Team  Primary Care Provider: Sera Loza APRN    Past Medical History:     Allergies   Allergen Reactions    Penicillins Shortness Of Breath and Rash    Sulfa Antibiotics Hives    Cephalexin Rash    Indomethacin Rash     Past Medical History:   Diagnosis Date    Abnormal ECG     Allergic     Drug allergies    Arthritis     Arthritis of back     Arthritis of neck     Asthma     Asthma 10/31/2023    Asthma, intrinsic     CTS (carpal tunnel syndrome)     Depression     Difficulty walking     Diverticulitis of colon     Fatty (change of) liver, not elsewhere classified     GERD (gastroesophageal reflux disease)     Hearing loss     Hyperlipemia     Hypertension     Hypothyroidism     Injury of back     Knee swelling     Low back pain     Lumbar radiculopathy 02/15/2022    Memory loss     Menopause     Migraine     Murmur     Osteopenia     Osteoporosis     Pneumonia     Primary central sleep apnea     Scoliosis     Seizures     last when 10 years old    Shortness of breath     Sleep apnea, obstructive     Visual impairment     Vitamin D deficiency      Past Surgical History:   Procedure Laterality Date    COLONOSCOPY  2018    glenna    COLONOSCOPY N/A 10/17/2023    Procedure: COLONOSCOPY WITH COLD SNARE  POLYPECTOMY;  Surgeon: Yossi Stephens MD;  Location: [facility] ENDOSCOPY;  Service: Gastroenterology;  Laterality: N/A;  DIVERTICULOSIS, COLON POLYP    ENDOSCOPY N/A 2/11/2025    Procedure: ESOPHAGOGASTRODUODENOSCOPY WITH BIOPSIES;  Surgeon: Yossi Stephens MD;  Location: [facility] ENDOSCOPY;  Service: Gastroenterology;  Laterality: N/A;  HIATAL HERNIA    EPIDURAL BLOCK      ESSURE TUBAL LIGATION      SKIN BIOPSY      THYMECTOMY      TUBAL ABDOMINAL LIGATION      TUMOR REMOVAL      in between heart and lungs    UPPER GASTROINTESTINAL ENDOSCOPY       Family History   Problem Relation Age of Onset    Thyroid cancer Mother     Migraines Mother     Stroke Mother     Skin cancer Mother     Cancer Mother     Heart attack Mother     Hypertension Father     Osteoporosis Father     Alcohol abuse Father     Skin cancer Father     Dementia Sister     Migraines Sister     Migraines Sister     Skin cancer Brother     Ovarian cancer Maternal Grandmother     Uterine cancer Maternal Grandmother     Migraines Sister     Alcohol abuse Brother     Cancer Brother     Alcohol abuse Brother     Cancer Brother         Skin cancer    Colon cancer Neg Hx     Breast cancer Neg Hx     Prostate cancer Neg Hx     Clotting disorder Neg Hx        Home Medications:  Prior to Admission medications    Medication Sig Start Date End Date Taking? Authorizing Provider   albuterol sulfate  (90 Base) MCG/ACT inhaler Inhale 2 puffs Every 4 (Four) Hours As Needed for Wheezing. 3/13/25   Sera Loza APRN   budesonide (Pulmicort Flexhaler) 90 MCG/ACT inhaler Inhale 1 puff 2 (Two) Times a Day. Rinse mouth after use 3/13/25   Sera Loza APRN   calcium carb-cholecalciferol (Calcium + Vitamin D3) 600-10 MG-MCG tablet per tablet Take 1 tablet by mouth 2 (Two) Times a Day. 10/15/24   Sera Loza APRN   escitalopram (LEXAPRO) 10 MG tablet Take 1 tablet by mouth Daily. 3/13/25   Sera Loza APRN    Fremanezumab-vfrm (Ajovy) 225 MG/1.5ML solution auto-injector Inject 225 mg under the skin into the appropriate area as directed Every 30 (Thirty) Days. 9/12/24   Lizzy Lindo APRN   HYDROcodone-acetaminophen (NORCO) 5-325 MG per tablet Take 1 tablet by mouth 2 (Two) Times a Day As Needed. 6/4/21   Thaddeus Carter MD   ibandronate (BONIVA) 150 MG tablet Take 1 tablet by mouth Every 30 (Thirty) Days. 9/9/24   Sera Loza APRN   levothyroxine (SYNTHROID, LEVOTHROID) 75 MCG tablet Take 1 tablet by mouth Daily. 3/13/25   Sera Loza APRN   lisinopril (PRINIVIL,ZESTRIL) 10 MG tablet Take 1 tablet by mouth Daily. 3/13/25   Sera Loza APRN   meloxicam (MOBIC) 15 MG tablet Take 1 tablet by mouth Daily As Needed for Moderate Pain or Mild Pain. 3/13/25   Sera Loza APRN   multivitamin with minerals tablet tablet Take 1 tablet by mouth Daily.    Thaddeus Carter MD   pantoprazole (PROTONIX) 40 MG EC tablet Take 1 tablet by mouth Daily. 2/11/25   Yossi Stephens MD   rizatriptan (MAXALT) 10 MG tablet Take 1 tablet by mouth 1 (One) Time As Needed for Migraine. 1 tablet at HA onset, may repeat in 2 hrs once if needed 9/12/24   Lizzy Lindo APRN   vitamin E 100 UNIT capsule Take 1 capsule by mouth Daily.    ProviderThaddeus MD        Social History:   Social History     Tobacco Use    Smoking status: Never     Passive exposure: Never    Smokeless tobacco: Never   Vaping Use    Vaping status: Never Used   Substance Use Topics    Alcohol use: Never    Drug use: Never         Review of Systems:  Review of Systems   Constitutional: Negative.    HENT: Negative.     Eyes: Negative.    Respiratory: Negative.     Cardiovascular: Negative.    Gastrointestinal: Negative.    Endocrine: Negative.    Genitourinary: Negative.    Musculoskeletal:  Positive for arthralgias.   Skin:  Positive for color change and wound.   Allergic/Immunologic: Negative.    Neurological:  "Negative.    Hematological: Negative.    Psychiatric/Behavioral: Negative.          Physical Exam:  /70 (BP Location: Left arm, Patient Position: Sitting)   Pulse 76   Temp 98.1 °F (36.7 °C) (Oral)   Resp 20   Ht 160 cm (63\")   Wt 71.9 kg (158 lb 8.2 oz)   SpO2 100%   BMI 28.08 kg/m²         Physical Exam  Vitals and nursing note reviewed.   Constitutional:       Appearance: Normal appearance.   HENT:      Head: Normocephalic and atraumatic.      Nose: Nose normal.      Mouth/Throat:      Mouth: Mucous membranes are moist.   Eyes:      Extraocular Movements: Extraocular movements intact.      Conjunctiva/sclera: Conjunctivae normal.      Pupils: Pupils are equal, round, and reactive to light.   Cardiovascular:      Rate and Rhythm: Normal rate and regular rhythm.      Heart sounds: Normal heart sounds.   Pulmonary:      Effort: Pulmonary effort is normal.      Breath sounds: Normal breath sounds.   Musculoskeletal:         General: Swelling and tenderness present. Normal range of motion.      Right hand: Normal. No lacerations or tenderness. Normal range of motion. Normal capillary refill. Normal pulse.      Left hand: Normal.      Cervical back: Normal range of motion and neck supple.      Right knee: Normal. Normal range of motion. No tenderness.      Left knee: Normal. Normal range of motion. No tenderness.      Right ankle: Normal.      Left ankle: Normal.      Right foot: Normal. Normal capillary refill. Normal pulse.      Left foot: Normal. Normal capillary refill. Normal pulse.        Legs:    Skin:     General: Skin is warm and dry.      Findings: Bruising present.   Neurological:      General: No focal deficit present.      Mental Status: She is alert and oriented to person, place, and time.   Psychiatric:         Mood and Affect: Mood normal.         Behavior: Behavior normal.                      Medical Decision Making:      Comorbidities that affect care:    Osteopenia, Asthma    External " Notes reviewed:    Previous Clinic Note: Office visit with PCP 3/13/2025      The following orders were placed and all results were independently analyzed by me:  Orders Placed This Encounter   Procedures    XR Hand 3+ View Right    XR Knee 3 View Left    XR Tibia Fibula 2 View Left    Please clean the wounds and apply bacitracin ointment and rewrap  Misc Nursing Order (Specify)       Medications Given in the Emergency Department:  Medications   acetaminophen (TYLENOL) tablet 975 mg (975 mg Oral Not Given 3/30/25 0109)   bacitracin 500 UNIT/GM ointment 0.9 g (0.9 g Topical Given 3/30/25 0101)        ED Course:    The patient was initially evaluated in the triage area where orders were placed. The patient was later dispositioned by Xu Lynn PA-C.      The patient was advised to stay for completion of workup which includes but is not limited to communication of labs and radiological results, reassessment and plan. The patient was advised that leaving prior to disposition by a provider could result in critical findings that are not communicated to the patient.          Labs:    Lab Results (last 24 hours)       ** No results found for the last 24 hours. **             Imaging:    XR Tibia Fibula 2 View Left  Result Date: 3/30/2025  XR TIBIA FIBULA 2 VW LEFT Date of Exam: 3/30/2025 12:58 AM EDT Indication: fall/contusion Comparison: Knee series 3/29/2025 Findings: There is soft tissue swelling in the anterior proximal lower leg. There is degenerative disease in the knee. Chronic calcifications are noted adjacent to the tip of the distal fibula at the ankle. No acute fracture or malalignment. No radiopaque foreign body.     1.Soft tissue swelling in the anterior proximal lower leg. 2.No acute fracture or malalignment. Electronically Signed: Eduardo Campos MD  3/30/2025 1:05 AM EDT  Workstation ID: [id number]    XR Hand 3+ View Right  Result Date: 3/29/2025  XR HAND 3+ VW RIGHT Date of Exam: 3/29/2025 11:19 PM  EDT Indication: Pain after fall. Comparison: Right wrist 11/30/2020 Findings: No acute fracture or dislocation. No bone erosion or destruction. No radiopaque foreign body.     Negative hand. Electronically Signed: Eduardo Campos MD  3/29/2025 11:43 PM EDT  Workstation ID: [id number]    XR Knee 3 View Left  Result Date: 3/29/2025  XR KNEE 3 VW LEFT Date of Exam: 3/29/2025 11:25 PM EDT Indication: Pain after fall. Comparison: 10/5/2022 Findings: No fracture or dislocation. No bone erosion or destruction. No joint effusion. There is mild osteoarthritis     No fracture or dislocation. Electronically Signed: Eduardo Campos MD  3/29/2025 11:37 PM EDT  Workstation ID: [id number]        Differential Diagnosis and Discussion:      Extremity Pain: Differential diagnosis includes but is not limited to soft tissue sprain, tendonitis, tendon injury, dislocation, fracture, deep vein thrombosis, arterial insufficiency, osteoarthritis, bursitis, and ligamentous damage.  Orthopedic Injuries: Differential diagnosis includes but is not limited to fractures, soft tissue injuries, dislocations, contusions, ligamentous injuries, tendon injuries, nerve injuries, compartment syndrome, bursitis, and vascular injuries.    PROCEDURES:    X-ray were performed in the emergency department and all X-ray impressions were independently interpreted by me.    No orders to display        Procedures    MDM     Amount and/or Complexity of Data Reviewed  Tests in the radiology section of CPT®: reviewed                     Patient Care Considerations:    NARCOTICS: I considered prescribing opiate pain medication as an outpatient, however all x-rays negative for any fractures or dislocations      Consultants/Shared Management Plan:    None    Social Determinants of Health:    Patient is independent, reliable, and has access to care.       Disposition and Care Coordination:    Discharged: The patient is suitable and stable for discharge with no need for  consideration of admission.    I have explained the patient´s condition, diagnoses and treatment plan based on the information available to me at this time. I have answered questions and addressed any concerns. The patient has a good  understanding of the patient´s diagnosis, condition, and treatment plan as can be expected at this point. The vital signs have been stable. The patient´s condition is stable and appropriate for discharge from the emergency department.      The patient will pursue further outpatient evaluation with the primary care physician or other designated or consulting physician as outlined in the discharge instructions. They are agreeable to this plan of care and follow-up instructions have been explained in detail. The patient has received these instructions in written format and has expressed an understanding of the discharge instructions. The patient is aware that any significant change in condition or worsening of symptoms should prompt an immediate return to this or the closest emergency department or call to 911.    Final diagnoses:   Contusion of left leg, initial encounter   Abrasion of right lower extremity, initial encounter        ED Disposition       ED Disposition   Discharge    Condition   Stable    Comment   --               This medical record created using voice recognition software.             Xu Lynn PA-C  03/30/25 0109

## 2025-04-01 ENCOUNTER — OFFICE VISIT (OUTPATIENT)
Dept: FAMILY MEDICINE CLINIC | Facility: CLINIC | Age: 60
End: 2025-04-01
Payer: COMMERCIAL

## 2025-04-01 VITALS
HEIGHT: 63 IN | WEIGHT: 158 LBS | BODY MASS INDEX: 28 KG/M2 | TEMPERATURE: 97.7 F | DIASTOLIC BLOOD PRESSURE: 72 MMHG | OXYGEN SATURATION: 97 % | SYSTOLIC BLOOD PRESSURE: 120 MMHG | HEART RATE: 82 BPM

## 2025-04-01 DIAGNOSIS — S80.02XA TRAUMATIC HEMATOMA OF LEFT KNEE, INITIAL ENCOUNTER: Primary | ICD-10-CM

## 2025-04-01 NOTE — PROGRESS NOTES
"Chief Complaint  Fall (Patient is follow up from a fall on Saturday.  She went to the ER. )    Subjective      Valentina Stevens is a 59 y.o. female who presents to [facility]       History of Present Illness  The patient is a 59-year-old female who presents for an ER follow-up after a fall.    She reports a progressive increase in the size of her bruise  on her left knee, which she attributes to a recent fall where she tripped over a tire. She expresses concern about the potential for further enlargement of the bruise and the persistent cold sensation in her foot. She has been applying ice to the affected area, but notes that it does not seem to cool down, although her feet do become cold. The pain is not exacerbated by touch but intensifies upon deeper palpation. Her sister reports that she is the one who really encouraged the patient to come today due to the increased swelling and warmth of the area last night.  Her sister also notes an improvement in the condition today compared to the previous night.        Objective   Vital Signs:   Vitals:    04/01/25 1016   BP: 120/72   Pulse: 82   Temp: 97.7 °F (36.5 °C)   TempSrc: Temporal   SpO2: 97%   Weight: 71.7 kg (158 lb)   Height: 160 cm (63\")   PainSc: 0-No pain     Body mass index is 27.99 kg/m².    Wt Readings from Last 3 Encounters:   04/01/25 71.7 kg (158 lb)   03/29/25 71.9 kg (158 lb 8.2 oz)   03/13/25 68.9 kg (152 lb)     BP Readings from Last 3 Encounters:   04/01/25 120/72   03/29/25 130/70   03/13/25 116/64       Health Maintenance   Topic Date Due    COVID-19 Vaccine (6 - 2024-25 season) 05/09/2025 (Originally 9/1/2024)    INFLUENZA VACCINE  07/01/2025    ANNUAL PHYSICAL  03/13/2026    LIPID PANEL  03/13/2026    MAMMOGRAM  12/10/2026    PAP SMEAR  11/13/2027    COLORECTAL CANCER SCREENING  10/17/2028    TDAP/TD VACCINES (3 - Td or Tdap) 10/19/2030    HEPATITIS C SCREENING  Completed    Pneumococcal Vaccine 50+  Completed "    ZOSTER VACCINE  Completed       Physical Exam  HENT:      Head: Normocephalic and atraumatic.      Right Ear: External ear normal.      Left Ear: External ear normal.   Pulmonary:      Effort: Pulmonary effort is normal.   Musculoskeletal:      Comments: Slightly decreased motion at the knee of the left leg secondary to pain and hematoma formation; both lower extremities are neurovascularly intact   Skin:     Findings: Bruising present.      Comments: Large hematoma present just below the left knee that does move medially but does not cross the joint  line   Neurological:      Mental Status: She is alert.          Result Review :  The following data was reviewed by: Susana Mccann DO on 04/01/2025:         Procedures          ASSESSMENT/PLAN  Diagnoses and all orders for this visit:    1. Traumatic hematoma of left knee, initial encounter (Primary)          Assessment & Plan  1. Post-fall hematoma.  She presents with a significant hematoma resulting from a recent fall.  Despite this, her pulses remain strong, indicating that the blood is not exerting pressure on any vessels, thus posing no risk to the limb. The warmth of the area can be attributed to the influx of blood, which is inherently warm. There are no signs of infection, and the condition is expected to resolve spontaneously as the body reabsorbs the blood. It was explained that the hematoma may slightly increase in size before it begins to diminish. She was reassured that the use of arnica for bruises is safe and can be applied to the affected area.             FOLLOW UP  Return if symptoms worsen or fail to improve.  Patient was given instructions and counseling regarding her condition or for health maintenance advice. Please see specific information pulled into the AVS if appropriate.       Susana Mccann DO  04/02/25  08:15 EDT    Patient or patient representative verbalized consent for the use of Ambient Listening during the visit with   Susana Mccann DO for chart documentation. 4/2/2025  08:14 EDT

## 2025-04-02 ENCOUNTER — PATIENT MESSAGE (OUTPATIENT)
Dept: FAMILY MEDICINE CLINIC | Facility: CLINIC | Age: 60
End: 2025-04-02
Payer: COMMERCIAL

## 2025-04-07 DIAGNOSIS — E55.9 VITAMIN D DEFICIENCY: ICD-10-CM

## 2025-04-07 DIAGNOSIS — G25.81 RLS (RESTLESS LEGS SYNDROME): ICD-10-CM

## 2025-04-07 RX ORDER — CALCIUM CARBONATE/VITAMIN D3 600 MG-10
1 TABLET ORAL 2 TIMES DAILY
Qty: 60 TABLET | Refills: 5 | Status: SHIPPED | OUTPATIENT
Start: 2025-04-07

## 2025-04-07 RX ORDER — PRAMIPEXOLE DIHYDROCHLORIDE 0.5 MG/1
0.5 TABLET ORAL NIGHTLY
Qty: 30 TABLET | Refills: 5 | Status: SHIPPED | OUTPATIENT
Start: 2025-04-07

## 2025-04-20 ENCOUNTER — PATIENT MESSAGE (OUTPATIENT)
Dept: FAMILY MEDICINE CLINIC | Facility: CLINIC | Age: 60
End: 2025-04-20
Payer: MEDICARE

## 2025-04-22 ENCOUNTER — APPOINTMENT (OUTPATIENT)
Facility: HOSPITAL | Age: 60
End: 2025-04-22
Payer: MEDICARE

## 2025-05-05 ENCOUNTER — HOSPITAL ENCOUNTER (OUTPATIENT)
Dept: GENERAL RADIOLOGY | Facility: HOSPITAL | Age: 60
Discharge: HOME | End: 2025-05-05
Admitting: NURSE PRACTITIONER
Payer: MEDICARE

## 2025-05-05 DIAGNOSIS — R13.12 OROPHARYNGEAL DYSPHAGIA: ICD-10-CM

## 2025-05-05 PROCEDURE — 92611 MOTION FLUOROSCOPY/SWALLOW: CPT

## 2025-05-05 PROCEDURE — 74230 X-RAY XM SWLNG FUNCJ C+: CPT

## 2025-05-05 RX ADMIN — BARIUM SULFATE 55 ML: 0.81 POWDER, FOR SUSPENSION ORAL at 10:25

## 2025-05-05 RX ADMIN — BARIUM SULFATE 30 ML: 400 PASTE ORAL at 10:25

## 2025-05-05 RX ADMIN — BARIUM SULFATE 50 ML: 400 SUSPENSION ORAL at 10:25

## 2025-05-05 NOTE — MBS/VFSS/FEES
Outpatient  - Speech Language Pathology   Swallow  Modified Barium Swallow Study  ZIA Eubanks     Patient Name: Valentina Stevens  : 1965  MRN: [id number]  Today's Date: 2025               Admit Date: 2025    Visit Dx:     ICD-10-CM ICD-9-CM   1. Oropharyngeal dysphagia  R13.12 787.22     Patient Active Problem List   Diagnosis    DDD (degenerative disc disease), lumbar    Hearing loss    Mixed hyperlipidemia    Hypertension    Hypothyroidism    Intractable chronic migraine without aura and without status migrainosus    Menopause    Low back pain    Arthritis    Osteoporosis    RLS (restless legs syndrome)    Memory loss    Arthritis of right acromioclavicular joint    Tendinitis of shoulder, right    Fatty (change of) liver, not elsewhere classified    Scoliosis, unspecified    Lumbar radiculopathy    Scoliosis    CTS (carpal tunnel syndrome)    GERD (gastroesophageal reflux disease)    DEVENDRA on CPAP    Primary central sleep apnea    Difficulty walking    Vitamin D deficiency    Murmur    DDD (degenerative disc disease), cervical    History of learning disability    Lumbar spondylosis    Cervical polyp    Body mass index (BMI) of 27.0-27.9 in adult    Frequent falls    Chronic idiopathic constipation    History of diverticulitis    Diverticula of colon    Altered bowel habits    Pain disorder associated with psychological factors    Osteopenia    History of osteoporosis    Cervical spondylolysis    Situational anxiety    Asthma    Oropharyngeal dysphagia    Situational stress    Swelling of right knee joint     Past Medical History:   Diagnosis Date    Abnormal ECG     Allergic     Drug allergies    Arthritis     Arthritis of back     Arthritis of neck     Asthma     Asthma 10/31/2023    Asthma, intrinsic     CTS (carpal tunnel syndrome)     Depression     Difficulty walking     Diverticulitis of colon     Fatty (change of) liver, not elsewhere classified     GERD (gastroesophageal reflux disease)      Hearing loss     Hyperlipemia     Hypertension     Hypothyroidism     Injury of back     Knee swelling     Low back pain     Lumbar radiculopathy 02/15/2022    Memory loss     Menopause     Migraine     Murmur     Osteopenia     Osteoporosis     Pneumonia     Primary central sleep apnea     Scoliosis     Seizures     last when 10 years old    Shortness of breath     Sleep apnea, obstructive     Visual impairment     Vitamin D deficiency      Past Surgical History:   Procedure Laterality Date    COLONOSCOPY  2018    glenna    COLONOSCOPY N/A 10/17/2023    Procedure: COLONOSCOPY WITH COLD SNARE POLYPECTOMY;  Surgeon: Yossi Stephens MD;  Location: [facility] ENDOSCOPY;  Service: Gastroenterology;  Laterality: N/A;  DIVERTICULOSIS, COLON POLYP    ENDOSCOPY N/A 2/11/2025    Procedure: ESOPHAGOGASTRODUODENOSCOPY WITH BIOPSIES;  Surgeon: Yossi Stephens MD;  Location: [facility] ENDOSCOPY;  Service: Gastroenterology;  Laterality: N/A;  HIATAL HERNIA    EPIDURAL BLOCK      ESSURE TUBAL LIGATION      SKIN BIOPSY      THYMECTOMY      TUBAL ABDOMINAL LIGATION      TUMOR REMOVAL      in between heart and lungs    UPPER GASTROINTESTINAL ENDOSCOPY           MODIFIED BARIUM SWALLOW STUDY: SPEECH PATHOLOGY REPORT        DATE OF SERVICE: 5/5/2025    PERTINENT INFORMATION:  Ms. Agarwal is a 59year old female with diagnosis of dysphagia.  Patient states complaint of getting choked on solids and liquids.    She was referred for an MBSS by Dr. Sandoval to rule out aspiration as well as to determine appropriate treatment plan for this patient.      PROCEDURE:    Ms. Agarwal was alert and cooperative.  The patient was viewed in the lateral plane.  The following Ba consistencies were administered: Thin liquids, nectar thick liquids, barium mixed with applesauce, barium paste, barium mixed with cracker. The following compensatory swallowing strategies were performed: Bolus modification, cyclic ingestion.    RESULTS:    1. Nectar liquid by  spoon with spill over base of tongue, swallow completed.  2. Nectar liquid by cup with spill over base of tongue, swallow completed.  3. Thin liquid by cup with spillage to the pharynx, swallow completed.  4. Purée with swallow completed.  5. Pudding with swallow completed.  6. Solid results with chewing followed by swallow completed.  7. Thin liquid via straw with spillage to the pharynx, swallow completed with transient laryngeal penetration.  Second trial of thin liquid by straw with patient demonstrated sequential swallows.  Irregular bolus flow noted through the cricopharyngeal area, this did not appear to impede the bolus.      IMPRESSIONS:    Ms. Agarwal demonstrated oropharyngeal swallow which is grossly within functional limits.  Minimal delay of swallow noted.  No aspiration was observed during the study.    FUNCTIONAL DEFICIT: Patient scored level 7 of 7 on Functional Communication Measures for swallowing indicating a 0% limitation in function for current status, goal status, and discharge status.      RECOMMENDATIONS:   1.  Diet of regular solids cut small, thin liquid.  2.  Positioning fully upright for all p.o. intake and 30 minutes following.  3.  Alternate small bites and small sips of solids and liquids at a slow rate.      Yes, patient/responsible party agrees with the plan of care and has been informed of all alternatives, risks and benefits.    Thank you for this referral.                                                                                            EDUCATION  The patient has been educated in the following areas:   Modified Diet Instruction.                Time Calculation:    Time Calculation- SLP       Row Name 05/05/25 1119             Time Calculation- SLP    SLP Received On 05/05/25  -TB         Untimed Charges    SLP Eval/Re-eval  ST Motion Fluoro Eval Swallow - 58792  -TB      93147-RI Motion Fluoro Eval Swallow Minutes 90  -TB         Total Minutes    Untimed Charges Total  Minutes 90  -TB       Total Minutes 90  -TB                User Key  (r) = Recorded By, (t) = Taken By, (c) = Cosigned By      Initials Name Provider Type    Sherrell Guardado SLP Speech and Language Pathologist                    Therapy Charges for Today       Code Description Service Date Service Provider Modifiers Qty    13793234825 HC ST MOTION FLUORO EVAL SWALLOW 6 5/5/2025 Sherrell Barnett SLP GN 1                 CHUCKIE Whittington  5/5/2025

## 2025-05-07 ENCOUNTER — HOSPITAL ENCOUNTER (OUTPATIENT)
Facility: HOSPITAL | Age: 60
Setting detail: THERAPIES SERIES
Discharge: HOME | End: 2025-05-07
Payer: MEDICARE

## 2025-05-07 DIAGNOSIS — R13.12 OROPHARYNGEAL DYSPHAGIA: Primary | ICD-10-CM

## 2025-05-07 PROCEDURE — 92526 ORAL FUNCTION THERAPY: CPT | Performed by: SPEECH-LANGUAGE PATHOLOGIST

## 2025-05-07 NOTE — THERAPY DISCHARGE NOTE
Outpatient Adult Speech Language Therapy           Treatment Note and Discharge Note    Patient: Valentina Stevens                                                                                     Visit Date: 2025  :     1965    Referring practitioner:    DEMAR Nieto  Date of Initial Visit:          Type: THERAPY  Episode: Oropharyngeal dysphagia (R13.12    Patient seen for 2 sessions    Visit Diagnoses:    ICD-10-CM ICD-9-CM   1. Oropharyngeal dysphagia  R13.12 787.22     SUBJECTIVE   Patient is here today to be educated on MBSS results, recommend a diet, teach use of compensatory strategies if needed and give patient a prophylactic plan of care to maintain the swallow.      Education:  Diet, signs and symptoms of aspiration, prophylactic HEP, use of compensatory strategies to decrease intermittent chances of coughing on thin liquids  OBJECTIVE   GOALS  GOALS ACTIVITY PERFORMED TRIALS COMPLETED LEVEL OF CUEING REQUIRED   1 Swallow limitation of 1-19% FCM 6/7  LTG 1: 8 weeks:  The patient will demonstrate 0% swallow limitation by achieving a score of 7/7 on the Functional Communication Measures for swallowing to increase safety of swallow to highest levels of functioning using compensatory strategies to reduce risk of aspiration.                              Goal met  25     STG 1a: 8 weeks Patient will accept 80% trials of thin liquids with min to no clinical signs and symptoms of aspiration to facilitate a safe swallow using compensatory strategies as needed. Thin liquids by cup 5/5 with no clinical signs and symptoms of aspiration Min assist.      Goal met  25   STG 1b: 8 weeks: Patient will accept 80% trials of  solids  with   minimal clinical signs and symptoms of residual or aspiration after the swallow  to facilitate a safe swallow and compensatory strategies as needed. Puree of applesauce  Mixed consistency of  diced peaches in nectar  Soft solid of Nutrigrain Bar    Solid of markus cracker 2/2    3/3    2/2  2/2 Min assist.  Patient given compensatory strategy of slight chin tuck to hold the thin liquid forward in mouth should she continue to have any difficulty again with thin liquids    -Goal Met  -25     STG 1c: 8 weeks:  Patient will be educated on signs and symptoms of aspiration, MBSS if ordered and diet with patient verbalizing understanding with moderate cueing.   Education Diet, MBSS results signs and symptoms of aspiration, prophylactic HEP, use of compensatory strategies to decrease intermittent chances of coughing on thin liquids.  Patient given time to ask questions and verbalize understanding.   Min assist.  Patient given a handout of HEP and all exercises were demonstrated with patient returning demonstration.      Goal met   25        ASSESSMENT/PLAN     ASSESSMENT: Patient requires the skills of a therapist to provide min assist to educate patient on MBSS, teach a prophylactic HEP to maintain swallow, and teach compensatory strategies to better coordinate swallow promoting increased swallow safety decreasing risk of aspiration.  Progress:  Patient met all goals and showed no clinical signs and symptoms of aspiration when meeting goals.   PLAN: discharge plan of care    SIGNATURE: CHUCKIE Maradiaga, [place] License #: [id number]  Electronically Signed on 2025                Outpatient Speech Language Pathology   Adult Dysphagia Discharge Note  OP [facility] [place]: [address] [place] [address]     Patient Name: Valentina Stevens  : 1965  MRN: [id number]  Today's Date: 2025         Visit Date: 2025    Referring Practitioner: DEMAR Nieto  Date of Initial Visit: Type: THERAPY  Episode: Oropharyngeal dysphagia (R13.12  Patient seen for 2    Visit Dx:    ICD-10-CM ICD-9-CM   1. Oropharyngeal dysphagia  R13.12 787.22       Patient's History: Swallow therapy    Reason for  Discharge: Patient has met all goals      Discharge Instructions:    Patient educated on MBSS and use of a slight chin tuck to better coordinate the swallow.  Patient given a prophylactic HEP to maintain swallow at highest levels of functioning.  Was given a handout of all exercises and all exercises were demonstrated with patient returning demonstration.  It was recommended patient continue a regular solids and thin liquid diet.  Patient instructed to request general evaluation if she notices  overt symptoms of aspiration.      Functional Communication Measures: Patient is rated on MARIA G's Functional Communication Measures at a level 7/7 with a 0% swallow limitation.  Swallow is within gross functional limits.      Assessment:  MBSS results completed [facility]  MODIFIED BARIUM SWALLOW STUDY: SPEECH PATHOLOGY REPORT           DATE OF SERVICE: 5/5/2025     PERTINENT INFORMATION:  Ms. Agarwal is a 59year old female with diagnosis of dysphagia.  Patient states complaint of getting choked on solids and liquids.     She was referred for an MBSS by Dr. Sandoval to rule out aspiration as well as to determine appropriate treatment plan for this patient.        PROCEDURE:     Ms. Agarwal was alert and cooperative.  The patient was viewed in the lateral plane.  The following Ba consistencies were administered: Thin liquids, nectar thick liquids, barium mixed with applesauce, barium paste, barium mixed with cracker. The following compensatory swallowing strategies were performed: Bolus modification, cyclic ingestion.     RESULTS:     1. Nectar liquid by spoon with spill over base of tongue, swallow completed.  2. Nectar liquid by cup with spill over base of tongue, swallow completed.  3. Thin liquid by cup with spillage to the pharynx, swallow completed.  4. Purée with swallow completed.  5. Pudding with swallow completed.  6. Solid results with chewing followed by swallow completed.  7. Thin liquid via straw with  spillage to the pharynx, swallow completed with transient laryngeal penetration.  Second trial of thin liquid by straw with patient demonstrated sequential swallows.  Irregular bolus flow noted through the cricopharyngeal area, this did not appear to impede the bolus.        IMPRESSIONS:     Ms. Agarwal demonstrated oropharyngeal swallow which is grossly within functional limits.  Minimal delay of swallow noted.  No aspiration was observed during the study.     FUNCTIONAL DEFICIT: Patient scored level 7 of 7 on Functional Communication Measures for swallowing indicating a 0% limitation in function for current status, goal status, and discharge status.        RECOMMENDATIONS:   1.  Diet of regular solids cut small, thin liquid.  2.  Positioning fully upright for all p.o. intake and 30 minutes following.  3.  Alternate small bites and small sips of solids and liquids at a slow rate.        Yes, patient/responsible party agrees with the plan of care and has been informed of all alternatives, risks and benefits.     Thank you for this referral.        See Outpatient Goals and treatment results above as patient has met all goals and was discharged        Speech Therapy Goals:    1 Swallow limitation of 1-19% FCM 6/7  LTG 1: 8 weeks:  The patient will demonstrate 0% swallow limitation by achieving a score of 7/7 on the Functional Communication Measures for swallowing to increase safety of swallow to highest levels of functioning using compensatory strategies to reduce risk of aspiration.                          STATUS:  Goal Met              STG 1a: 8 weeks Patient will accept 80% trials of thin liquids with min to no clinical signs and symptoms of aspiration to facilitate a safe swallow using compensatory strategies as needed.                          STATUS: Goal Met              STG 1b: 8 weeks: Patient will accept 80% trials of  solids  with   minimal clinical signs and symptoms of residual or aspiration after the  swallow  to facilitate a safe swallow and compensatory strategies as needed.                          STATUS: Goal Met              STG 1c: 8 weeks:  Patient will be educated on signs and symptoms of aspiration, MBSS if ordered and diet with patient verbalizing understanding with moderate cueing.                            STATUS: Goal Met              TREATMENT:  Swallowing Therapy to facilitate a safe swallow for all p.o. intake reducing risk of aspiration leading to pneumonia.      Recommendations: Discharge         SIGNATURE: CHUCKIE Maradiaga    Electronically signed 5/7/2025    [place] License: - [id number]           Adult Outpatient Rehabiliation

## 2025-05-08 DIAGNOSIS — Z12.31 SCREENING MAMMOGRAM FOR BREAST CANCER: Primary | ICD-10-CM

## 2025-06-17 NOTE — PROGRESS NOTES
Primary Care Provider  Sera Loza APRN     Referring Provider  No ref. provider found       Patient or patient representative verbalized consent for the use of Ambient Listening during the visit with  DEMAR Stark for chart documentation. 6/18/2025  09:29 EDT    Chief Complaint  Asthma, Sleep Apnea, Follow-up (4 month f/u- needs new order for CPap. ), and Shortness of Breath (With exertion )    Subjective          Valentina Stevens presents to [facility] PULMONARY & CRITICAL CARE MEDICINE  History of Present Illness  Valentina Stevens is a 59 y.o. female patient here for management of mild intermittent asthma, DEVENDRA on CPAP and shortness of breath.    Patient continues to use and benefit from her CPAP.    History of Present Illness  The patient presents for evaluation of shortness of breath and sleep apnea.    She reports intermittent episodes of dyspnea but has not required frequent use of her albuterol inhaler since discontinuing her employment. She attributes this improvement to the physical demands of her previous job, which included extensive lifting and running around. She also mentions frequent falls, necessitating the use of a cane for mobility.    She is currently under the care of Ms. Loza for her inhaler management but plans to transition to a new provider due to Ms. Loza's relocation. She has been compliant with her medication regimen, including Pulmicort, which she took this morning.    She is due for a new CPAP machine. She has been using her CPAP machine without any issues but has recently experienced increased fatigue and daytime somnolence, which she believes may be related to her unemployment. She cleaned her CPAP machine yesterday.           Her history of smoking is   Tobacco Use: Low Risk  (6/18/2025)    Patient History     Smoking Tobacco Use: Never     Smokeless Tobacco Use: Never     Passive Exposure: Never   .    Review of Systems   Constitutional:   Negative for chills, fatigue, fever, unexpected weight gain and unexpected weight loss.   HENT:  Congestion: Nasal.    Respiratory:  Positive for shortness of breath. Negative for apnea, cough and wheezing.         Negative for Hemoptysis     Cardiovascular:  Negative for chest pain, palpitations and leg swelling.   Skin:         Negative for cyanosis      Sleep: Negative for Excessive daytime sleepiness  Negative for morning headaches  Negative for Snoring    Family History   Problem Relation Age of Onset    Thyroid cancer Mother     Migraines Mother     Stroke Mother     Skin cancer Mother     Cancer Mother     Heart attack Mother     Hypertension Father     Osteoporosis Father     Alcohol abuse Father     Skin cancer Father     Dementia Sister     Migraines Sister     Migraines Sister     Skin cancer Brother     Ovarian cancer Maternal Grandmother     Uterine cancer Maternal Grandmother     Migraines Sister     Alcohol abuse Brother     Cancer Brother     Alcohol abuse Brother     Cancer Brother         Skin cancer    Colon cancer Neg Hx     Breast cancer Neg Hx     Prostate cancer Neg Hx     Clotting disorder Neg Hx         Social History     Socioeconomic History    Marital status:     Number of children: 2   Tobacco Use    Smoking status: Never     Passive exposure: Never    Smokeless tobacco: Never   Vaping Use    Vaping status: Never Used   Substance and Sexual Activity    Alcohol use: Never    Drug use: Never    Sexual activity: Not Currently     Partners: Male     Birth control/protection: Other     Comment: Tubes tided        Past Medical History:   Diagnosis Date    Abnormal ECG     Allergic     Drug allergies    Arthritis     Arthritis of back     Arthritis of neck     Asthma     Asthma 10/31/2023    Asthma, intrinsic     CTS (carpal tunnel syndrome)     Depression     Difficulty walking     Diverticulitis of colon     Fatty (change of) liver, not elsewhere classified     GERD (gastroesophageal  reflux disease)     Hearing loss     Hyperlipemia     Hypertension     Hypothyroidism     Injury of back     Knee swelling     Low back pain     Lumbar radiculopathy 02/15/2022    Memory loss     Menopause     Migraine     Murmur     Osteopenia     Osteoporosis     Pneumonia     Primary central sleep apnea     Scoliosis     Seizures     last when 10 years old    Shortness of breath     Sleep apnea, obstructive     Visual impairment     Vitamin D deficiency         Immunization History   Administered Date(s) Administered    31-influenza Vac Quardvalent Preservativ 11/05/2015, 11/09/2016, 10/19/2021, 09/30/2022    COVID-19 (PFIZER) Purple Cap Monovalent 05/27/2021, 06/17/2021, 06/17/2021    COVID-19 (UNSPECIFIED) 05/27/2021, 06/17/2021    Flu Vaccine Intradermal Quad 18-64YR 09/01/2023    Fluzone  >6mos 10/01/2024    Fluzone (or Fluarix & Flulaval for VFC) >6mos 10/19/2021, 09/30/2022    Hepatitis A 05/17/2018, 12/20/2018    Influenza Seasonal Injectable 10/10/2014    Influenza, Unspecified 10/19/2020, 10/19/2020, 09/01/2023    Pneumococcal Conjugate 20-Valent (PCV20) 08/09/2023    Pneumococcal Polysaccharide (PPSV23) 05/10/2022    Shingrix 11/12/2021, 01/21/2022    Tdap 03/20/2018, 10/19/2020    Zoster, Unspecified 11/12/2021, 01/21/2022         Allergies   Allergen Reactions    Penicillins Shortness Of Breath and Rash    Sulfa Antibiotics Hives    Cephalexin Rash    Indomethacin Rash          Current Outpatient Medications:     albuterol sulfate  (90 Base) MCG/ACT inhaler, Inhale 2 puffs Every 4 (Four) Hours As Needed for Wheezing., Disp: 18 g, Rfl: 5    budesonide (Pulmicort Flexhaler) 90 MCG/ACT inhaler, Inhale 1 puff 2 (Two) Times a Day. Rinse mouth after use, Disp: 1 each, Rfl: 5    calcium carb-cholecalciferol 600-10 MG-MCG tablet per tablet, TAKE 1 TABLET BY MOUTH TWICE DAILY, Disp: 60 tablet, Rfl: 5    escitalopram (LEXAPRO) 10 MG tablet, Take 1 tablet by mouth Daily., Disp: 30 tablet, Rfl: 5     famotidine (PEPCID) 40 MG tablet, TAKE 1 TABLET BY MOUTH EVERY DAY AT BEDTIME AS NEEDED FOR HEARTBURN, Disp: , Rfl:     Fremanezumab-vfrm (Ajovy) 225 MG/1.5ML solution auto-injector, Inject 225 mg under the skin into the appropriate area as directed Every 30 (Thirty) Days., Disp: 4.5 mL, Rfl: 3    HYDROcodone-acetaminophen (NORCO) 5-325 MG per tablet, Take 1 tablet by mouth 2 (Two) Times a Day As Needed., Disp: , Rfl:     ibandronate (BONIVA) 150 MG tablet, Take 1 tablet by mouth Every 30 (Thirty) Days., Disp: 1 tablet, Rfl: 11    levothyroxine (SYNTHROID, LEVOTHROID) 75 MCG tablet, Take 1 tablet by mouth Daily., Disp: 30 tablet, Rfl: 5    lisinopril (PRINIVIL,ZESTRIL) 10 MG tablet, Take 1 tablet by mouth Daily., Disp: 30 tablet, Rfl: 5    loratadine (CLARITIN) 10 MG tablet, , Disp: , Rfl:     meloxicam (MOBIC) 15 MG tablet, Take 1 tablet by mouth Daily As Needed for Moderate Pain or Mild Pain., Disp: 30 tablet, Rfl: 2    multivitamin with minerals tablet tablet, Take 1 tablet by mouth Daily., Disp: , Rfl:     pantoprazole (PROTONIX) 40 MG EC tablet, Take 1 tablet by mouth Daily., Disp: 90 tablet, Rfl: 3    pramipexole (MIRAPEX) 0.5 MG tablet, TAKE 1 TABLET BY MOUTH EVERY NIGHT, Disp: 30 tablet, Rfl: 5    rizatriptan (MAXALT) 10 MG tablet, Take 1 tablet by mouth 1 (One) Time As Needed for Migraine. 1 tablet at HA onset, may repeat in 2 hrs once if needed, Disp: 9 tablet, Rfl: 11    Sod Picosulfate-Mag Ox-Cit Acd (Clenpiq) 10-3.5-12 MG-GM -GM/175ML solution, , Disp: , Rfl:     tiZANidine (ZANAFLEX) 4 MG tablet, Take 1 tablet by mouth Every 8 (Eight) Hours As Needed for Muscle Spasms., Disp: , Rfl:     vitamin E 100 UNIT capsule, Take 1 capsule by mouth Daily., Disp: , Rfl:      Objective   Physical Exam  Constitutional:       General: She is not in acute distress.     Appearance: Normal appearance. She is normal weight.   HENT:      Right Ear: Hearing normal.      Left Ear: Hearing normal.      Nose: No nasal  "tenderness or congestion.      Mouth/Throat:      Mouth: Mucous membranes are moist. No oral lesions.   Eyes:      Extraocular Movements: Extraocular movements intact.      Pupils: Pupils are equal, round, and reactive to light.   Cardiovascular:      Rate and Rhythm: Normal rate and regular rhythm.      Pulses: Normal pulses.      Heart sounds: Normal heart sounds. No murmur heard.  Pulmonary:      Effort: Pulmonary effort is normal.      Breath sounds: Normal breath sounds. No wheezing, rhonchi or rales.   Musculoskeletal:      Right lower leg: No edema.      Left lower leg: No edema.   Skin:     General: Skin is warm and dry.      Findings: No lesion or rash.   Neurological:      General: No focal deficit present.      Mental Status: She is alert and oriented to person, place, and time.   Psychiatric:         Mood and Affect: Affect normal. Mood is not anxious or depressed.         Vital Signs:   /76 (BP Location: Right arm, Patient Position: Sitting, Cuff Size: Adult)   Pulse 67   Temp 97.6 °F (36.4 °C) (Oral)   Resp 16   Ht 160 cm (63\")   Wt 73 kg (161 lb)   SpO2 96% Comment: RA  BMI 28.52 kg/m²        Result Review :   The following data was reviewed by: DEMAR Stark on 06/18/2025:  CMP          9/11/2024    07:57 3/13/2025    11:51   CMP   Glucose 94  82    BUN 13  8    Creatinine 0.80  0.84    EGFR 84.5  79.2    Sodium 139  140    Potassium 3.9  4.1    Chloride 103  104    Calcium 9.6  9.9    Total Protein 7.4  7.4    Albumin 4.4  4.2    Globulin 3.0  3.2    Total Bilirubin 0.4  0.6    Alkaline Phosphatase 46  73    AST (SGOT) 31  35    ALT (SGPT) 40  40    Albumin/Globulin Ratio 1.5  1.3    BUN/Creatinine Ratio 16.3  9.5    Anion Gap 11.3  10.2      CBC w/diff          9/11/2024    07:57 3/13/2025    11:51   CBC w/Diff   WBC 7.05  7.21    RBC 5.43  5.48    Hemoglobin 15.3  15.8    Hematocrit 46.4  46.5    MCV 85.5  84.9    MCH 28.2  28.8    MCHC 33.0  34.0    RDW 13.5  12.9    Platelets " 257  290    Neutrophil Rel % 46.3  52.6    Immature Granulocyte Rel % 0.3  0.3    Lymphocyte Rel % 39.9  34.7    Monocyte Rel % 10.2  9.2    Eosinophil Rel % 2.3  2.2    Basophil Rel % 1.0  1.0      Data reviewed : Radiologic studies chest CT 12/5/2022, PFT 5/16/2022 and Dr. Simon last office note   Procedures        Assessment and Plan    Diagnoses and all orders for this visit:    1. Mild intermittent asthma without complication (Primary)  Comments:  continue Pulmicort Flexhaler  Orders:  -     budesonide (Pulmicort Flexhaler) 90 MCG/ACT inhaler; Inhale 1 puff 2 (Two) Times a Day. Rinse mouth after use  Dispense: 1 each; Refill: 5  -     albuterol sulfate  (90 Base) MCG/ACT inhaler; Inhale 2 puffs Every 4 (Four) Hours As Needed for Wheezing.  Dispense: 18 g; Refill: 5    2. DEVENDRA on CPAP  Comments:  continue CPAP  Orders:  -     PAP Therapy    3. Dyspnea on exertion  Comments:  albuterol as needed        Assessment & Plan  1. Asthma  Reports occasional shortness of breath but has not needed to use her albuterol inhaler frequently since quitting her job. Advised to continue using inhalers as prescribed. Refills for inhalers will be provided.    2. Sleep apnea.  Reports feeling tired and falling asleep during the day. [facility] obtain compliance data to check for any issues with the CPAP machine, such as leaks, that might be affecting sleep quality. Recently cleaned CPAP machine.          Follow Up   Return in about 6 months (around 12/18/2025) for Recheck.  Patient was given instructions and counseling regarding her condition or for health maintenance advice. Please see specific information pulled into the AVS if appropriate.

## 2025-06-18 ENCOUNTER — OFFICE VISIT (OUTPATIENT)
Dept: PULMONOLOGY | Facility: CLINIC | Age: 60
End: 2025-06-18
Payer: MEDICARE

## 2025-06-18 VITALS
TEMPERATURE: 97.6 F | HEART RATE: 67 BPM | HEIGHT: 63 IN | RESPIRATION RATE: 16 BRPM | SYSTOLIC BLOOD PRESSURE: 143 MMHG | WEIGHT: 161 LBS | OXYGEN SATURATION: 96 % | BODY MASS INDEX: 28.53 KG/M2 | DIASTOLIC BLOOD PRESSURE: 76 MMHG

## 2025-06-18 DIAGNOSIS — R06.09 DYSPNEA ON EXERTION: ICD-10-CM

## 2025-06-18 DIAGNOSIS — G47.33 OSA ON CPAP: Chronic | ICD-10-CM

## 2025-06-18 DIAGNOSIS — J45.20 MILD INTERMITTENT ASTHMA WITHOUT COMPLICATION: Primary | ICD-10-CM

## 2025-06-18 RX ORDER — FAMOTIDINE 40 MG/1
TABLET, FILM COATED ORAL
COMMUNITY
Start: 2025-05-23

## 2025-06-18 RX ORDER — LORATADINE 10 MG/1
TABLET ORAL
COMMUNITY

## 2025-06-18 RX ORDER — SODIUM PICOSULFATE, MAGNESIUM OXIDE, AND ANHYDROUS CITRIC ACID 12; 3.5; 1 G/175ML; G/175ML; MG/175ML
LIQUID ORAL
COMMUNITY

## 2025-06-18 RX ORDER — ALBUTEROL SULFATE 90 UG/1
2 INHALANT RESPIRATORY (INHALATION) EVERY 4 HOURS PRN
Qty: 18 G | Refills: 5 | Status: SHIPPED | OUTPATIENT
Start: 2025-06-18

## 2025-06-18 RX ORDER — BUDESONIDE 90 UG/1
1 AEROSOL, POWDER RESPIRATORY (INHALATION)
Qty: 1 EACH | Refills: 5 | Status: SHIPPED | OUTPATIENT
Start: 2025-06-18

## 2025-06-20 RX ORDER — FLUTICASONE FUROATE 100 UG/1
1 POWDER RESPIRATORY (INHALATION) DAILY
Qty: 30 EACH | Refills: 5 | Status: SHIPPED | OUTPATIENT
Start: 2025-06-20

## 2025-06-26 DIAGNOSIS — M25.461 SWELLING OF RIGHT KNEE JOINT: ICD-10-CM

## 2025-06-26 RX ORDER — FAMOTIDINE 40 MG/1
TABLET, FILM COATED ORAL
Qty: 30 TABLET | Refills: 0 | Status: SHIPPED | OUTPATIENT
Start: 2025-06-26

## 2025-06-26 RX ORDER — MELOXICAM 15 MG/1
TABLET ORAL
Qty: 30 TABLET | Refills: 0 | Status: SHIPPED | OUTPATIENT
Start: 2025-06-26

## 2025-07-09 ENCOUNTER — OFFICE VISIT (OUTPATIENT)
Dept: FAMILY MEDICINE CLINIC | Facility: CLINIC | Age: 60
End: 2025-07-09
Payer: MEDICARE

## 2025-07-09 VITALS
BODY MASS INDEX: 28.56 KG/M2 | TEMPERATURE: 98.4 F | SYSTOLIC BLOOD PRESSURE: 122 MMHG | OXYGEN SATURATION: 94 % | DIASTOLIC BLOOD PRESSURE: 78 MMHG | HEART RATE: 111 BPM | WEIGHT: 161.2 LBS | HEIGHT: 63 IN

## 2025-07-09 DIAGNOSIS — Z00.00 ENCOUNTER FOR MEDICARE ANNUAL WELLNESS EXAM: Primary | ICD-10-CM

## 2025-07-09 PROBLEM — G89.29 CHRONIC PAIN: Status: ACTIVE | Noted: 2025-03-05

## 2025-07-09 PROCEDURE — 1170F FXNL STATUS ASSESSED: CPT | Performed by: NURSE PRACTITIONER

## 2025-07-09 PROCEDURE — 3078F DIAST BP <80 MM HG: CPT | Performed by: NURSE PRACTITIONER

## 2025-07-09 PROCEDURE — 3074F SYST BP LT 130 MM HG: CPT | Performed by: NURSE PRACTITIONER

## 2025-07-09 PROCEDURE — 1125F AMNT PAIN NOTED PAIN PRSNT: CPT | Performed by: NURSE PRACTITIONER

## 2025-07-09 PROCEDURE — 1159F MED LIST DOCD IN RCRD: CPT | Performed by: NURSE PRACTITIONER

## 2025-07-09 PROCEDURE — 1160F RVW MEDS BY RX/DR IN RCRD: CPT | Performed by: NURSE PRACTITIONER

## 2025-07-09 PROCEDURE — G0439 PPPS, SUBSEQ VISIT: HCPCS | Performed by: NURSE PRACTITIONER

## 2025-07-09 NOTE — PROGRESS NOTES
Subjective   The ABCs of the Annual Wellness Visit  Medicare Wellness Visit      Valentina Stevens is a 59 y.o. patient who presents for a Medicare Wellness Visit.    The following portions of the patient's history were reviewed and   updated as appropriate: allergies, current medications, past family history, past medical history, past social history, past surgical history, and problem list.    Compared to one year ago, the patient's physical   health is the same.    Compared to one year ago, the patient's mental   health is better.    Recent Hospitalizations:  She was not admitted to the hospital during the last year.     Current Medical Providers:  Patient Care Team:  Sera Loza APRN as PCP - General (Nurse Practitioner)  Yossi Stephens MD as Consulting Physician (Gastroenterology)  Lupe Sandoval APRN as Nurse Practitioner (Nurse Practitioner)  Felipe Campoverde MD as Consulting Physician (Obstetrics and Gynecology)  Flo Simon MD as Consulting Physician (Pulmonary Disease)    Outpatient Medications Prior to Visit   Medication Sig Dispense Refill    albuterol sulfate  (90 Base) MCG/ACT inhaler Inhale 2 puffs Every 4 (Four) Hours As Needed for Wheezing. 18 g 5    budesonide (Pulmicort Flexhaler) 90 MCG/ACT inhaler Inhale 1 puff 2 (Two) Times a Day. Rinse mouth after use 1 each 5    calcium carb-cholecalciferol 600-10 MG-MCG tablet per tablet TAKE 1 TABLET BY MOUTH TWICE DAILY 60 tablet 5    escitalopram (LEXAPRO) 10 MG tablet Take 1 tablet by mouth Daily. 30 tablet 5    famotidine (PEPCID) 40 MG tablet TAKE 1 TABLET BY MOUTH EVERY DAY AT BEDTIME AS NEEDED FOR HEARTBURN 30 tablet 0    Fluticasone Furoate (Arnuity Ellipta) 100 MCG/ACT aerosol powder  Inhale 1 puff Daily. 30 each 5    Fremanezumab-vfrm (Ajovy) 225 MG/1.5ML solution auto-injector Inject 225 mg under the skin into the appropriate area as directed Every 30 (Thirty) Days. 4.5 mL 3    HYDROcodone-acetaminophen (NORCO)  5-325 MG per tablet Take 1 tablet by mouth 2 (Two) Times a Day As Needed.      ibandronate (BONIVA) 150 MG tablet Take 1 tablet by mouth Every 30 (Thirty) Days. 1 tablet 11    levothyroxine (SYNTHROID, LEVOTHROID) 75 MCG tablet Take 1 tablet by mouth Daily. 30 tablet 5    lisinopril (PRINIVIL,ZESTRIL) 10 MG tablet Take 1 tablet by mouth Daily. 30 tablet 5    loratadine (CLARITIN) 10 MG tablet       meloxicam (MOBIC) 15 MG tablet TAKE 1 TABLET BY MOUTH EVERY DAY AS NEEDED FOR PAIN 30 tablet 0    multivitamin with minerals tablet tablet Take 1 tablet by mouth Daily.      pantoprazole (PROTONIX) 40 MG EC tablet Take 1 tablet by mouth Daily. 90 tablet 3    pramipexole (MIRAPEX) 0.5 MG tablet TAKE 1 TABLET BY MOUTH EVERY NIGHT 30 tablet 5    tiZANidine (ZANAFLEX) 4 MG tablet Take 1 tablet by mouth Every 8 (Eight) Hours As Needed for Muscle Spasms.      vitamin E 100 UNIT capsule Take 1 capsule by mouth Daily.      rizatriptan (MAXALT) 10 MG tablet Take 1 tablet by mouth 1 (One) Time As Needed for Migraine. 1 tablet at HA onset, may repeat in 2 hrs once if needed (Patient not taking: Reported on 7/9/2025) 9 tablet 11    Sod Picosulfate-Mag Ox-Cit Acd (Clenpiq) 10-3.5-12 MG-GM -GM/175ML solution  (Patient not taking: Reported on 7/9/2025)       No facility-administered medications prior to visit.     Opioid medication/s are on active medication list.  and I have evaluated her active treatment plan and pain score trends (see table).  Vitals:    07/09/25 0927   PainSc: 2    PainLoc: Back     I have reviewed the chart for potential of high risk medication and harmful drug interactions in the elderly.        Aspirin is not on active medication list.  Aspirin use is not indicated based on review of current medical condition/s. Risk of harm outweighs potential benefits.  .    Patient Active Problem List   Diagnosis    Degeneration of lumbar intervertebral disc    Hearing loss    Mixed hyperlipidemia    Essential (primary)  "hypertension    Hypothyroidism    Intractable chronic migraine without aura and without status migrainosus    Menopause    Low back pain    Arthritis    Osteoporosis    RLS (restless legs syndrome)    Memory loss    Arthritis of right acromioclavicular joint    Tendinitis of shoulder, right    Fatty (change of) liver, not elsewhere classified    Scoliosis, unspecified    Lumbar radiculopathy    Scoliosis    CTS (carpal tunnel syndrome)    GERD (gastroesophageal reflux disease)    DEVENDRA on CPAP    Primary central sleep apnea    Difficulty walking    Vitamin D deficiency    Murmur    DDD (degenerative disc disease), cervical    History of learning disability    Lumbar spondylosis    Cervical polyp    Body mass index (BMI) of 27.0-27.9 in adult    Frequent falls    Chronic idiopathic constipation    History of diverticulitis    Diverticula of colon    Altered bowel habits    Pain disorder associated with psychological factors    Osteopenia    History of osteoporosis    Cervical spondylolysis    Situational anxiety    Asthma    Oropharyngeal dysphagia    Situational stress    Swelling of right knee joint    Chronic pain     Advance Care Planning Advance Directive is not on file.  ACP discussion was declined by the patient. Patient does not have an advance directive, declines further assistance.            Objective   Vitals:    07/09/25 0927   BP: 122/78   BP Location: Left arm   Patient Position: Sitting   Pulse: 111   Temp: 98.4 °F (36.9 °C)   SpO2: 94%   Weight: 73.1 kg (161 lb 3.2 oz)   Height: 160 cm (63\")   PainSc: 2    PainLoc: Back       Estimated body mass index is 28.56 kg/m² as calculated from the following:    Height as of this encounter: 160 cm (63\").    Weight as of this encounter: 73.1 kg (161 lb 3.2 oz).    BMI is >= 25 and <30. (Overweight) The following options were offered after discussion;: weight loss educational material (shared in after visit summary)           Does the patient have evidence of " cognitive impairment? No                                                                                                Health  Risk Assessment    Smoking Status:  Social History     Tobacco Use   Smoking Status Never    Passive exposure: Never   Smokeless Tobacco Never     Alcohol Consumption:  Social History     Substance and Sexual Activity   Alcohol Use Never       Fall Risk Screen  STEADI Fall Risk Assessment was completed, and patient is at MODERATE risk for falls. Assessment completed on:2025    Depression Screening   Little interest or pleasure in doing things? Not at all   Feeling down, depressed, or hopeless? Not at all   PHQ-2 Total Score 0      Health Habits and Functional and Cognitive Screenin/9/2025     9:00 AM   Functional & Cognitive Status   Do you have difficulty preparing food and eating? No   Do you have difficulty bathing yourself, getting dressed or grooming yourself? Yes   Do you have difficulty using the toilet? No   Do you have difficulty moving around from place to place? No   Do you have trouble with steps or getting out of a bed or a chair? No   Current Diet Unhealthy Diet   Dental Exam Up to date   Eye Exam Up to date   Exercise (times per week) 0 times per week   Current Exercises Include No Regular Exercise   Do you need help using the phone?  No   Are you deaf or do you have serious difficulty hearing?  Yes   Do you need help to go to places out of walking distance? No   Do you need help shopping? Yes   Do you need help preparing meals?  No   Do you need help with housework?  No   Do you need help with laundry? No   Do you need help taking your medications? No   Do you need help managing money? No   Do you ever drive or ride in a car without wearing a seat belt? Yes   Have you felt unusual fatigue (could be tiredness), stress, anger or loneliness in the last month? No   Who do you live with? Child   If you need help, do you have trouble finding someone available to you?  No   Have you been bothered in the last four weeks by sexual problems? No   Do you have difficulty concentrating, remembering or making decisions? Yes           Age-appropriate Screening Schedule:  Refer to the list below for future screening recommendations based on patient's age, sex and/or medical conditions. Orders for these recommended tests are listed in the plan section. The patient has been provided with a written plan.    Health Maintenance List  Health Maintenance   Topic Date Due    ANNUAL WELLNESS VISIT  Never done    COVID-19 Vaccine (6 - 2024-25 season) 01/09/2026 (Originally 9/1/2024)    INFLUENZA VACCINE  10/01/2025    LIPID PANEL  03/13/2026    MAMMOGRAM  12/10/2026    PAP SMEAR  11/13/2027    COLORECTAL CANCER SCREENING  10/17/2028    TDAP/TD VACCINES (3 - Td or Tdap) 10/19/2030    HEPATITIS C SCREENING  Completed    Pneumococcal Vaccine 50+  Completed    ZOSTER VACCINE  Completed                                                                                                                                                CMS Preventative Services Quick Reference  Risk Factors Identified During Encounter  Chronic Pain: established with pain management  Depression/Dysphoria: Current medication is effective, no change recommended  Fall Risk-High or Moderate: Discussed Fall Prevention in the home  Polypharmacy: Medication List reviewed and Medications are appropriate for patient    The above risks/problems have been discussed with the patient.  Pertinent information has been shared with the patient in the After Visit Summary.  An After Visit Summary and PPPS were made available to the patient.    Follow Up:   Next Medicare Wellness visit to be scheduled in 1 year.          Additional E&M Note during same encounter follows:  Patient has multiple medical problems which are significant and separately identifiable that require additional work above and beyond the Medicare Wellness Visit.      Chief  Complaint  Medicare Wellness-subsequent (Est new care with Ami from [place] )    Valentina Stevens is a 59 y.o. female who presents to [facility]     History of Present Illness  The patient is a 59-year-old female here for a Medicare wellness visit and to transfer care from Sera Loza.    She reports no changes in her allergies, medications, or medical history. She is currently on disability and has not been hospitalized in the past year. She does not take daily aspirin and does not have an advanced directive. She maintains a balanced diet but has been experiencing unwanted weight gain. She does not consume alcohol or use tobacco. She does not feel isolated, lonely, financially stressed, or overburdened as a caregiver. She also does not feel that she sits too much. She has difficulty shopping due to mobility issues and heavy lifting. She is due for a mammogram next year. She has had recent blood work done in 03/2025.    Her physical health remains unchanged from a year ago, but her mental health has improved with medication. She has not had suicidal thoughts for about a year.    She experiences shortness of breath, which she attributes to her asthma, and uses albuterol as needed.    She experiences chronic back pain and is under the care of a pain management specialist. She is at high risk for falls due to her back pain and reliance on a cane for mobility.    She recently had a swallow study and x-rays of her left lower leg, right hand, left knee, and chest. She had a fall resulting in a hematoma on her leg, which occasionally feels numb, but she can walk without difficulty. She had another fall over the winter where she landed on her back and buttocks, causing sharp chest pain. She was taken to the hospital where a heart attack was ruled out.    SOCIAL HISTORY  She does not report any alcohol or tobacco use.    FAMILY HISTORY  Her father had cataracts.    Objective   Vital  "Signs:   Vitals:    07/09/25 0927   BP: 122/78   BP Location: Left arm   Patient Position: Sitting   Pulse: 111   Temp: 98.4 °F (36.9 °C)   SpO2: 94%   Weight: 73.1 kg (161 lb 3.2 oz)   Height: 160 cm (63\")   PainSc: 2    PainLoc: Back       Wt Readings from Last 3 Encounters:   07/09/25 73.1 kg (161 lb 3.2 oz)   06/18/25 73 kg (161 lb)   04/01/25 71.7 kg (158 lb)     BP Readings from Last 3 Encounters:   07/09/25 122/78   06/18/25 143/76   04/01/25 120/72       Physical Exam  Vitals reviewed.   Constitutional:       General: She is not in acute distress.     Appearance: Normal appearance. She is well-developed.   HENT:      Head: Normocephalic and atraumatic.      Right Ear: External ear normal.      Left Ear: External ear normal.   Eyes:      Conjunctiva/sclera: Conjunctivae normal.      Pupils: Pupils are equal, round, and reactive to light.   Cardiovascular:      Rate and Rhythm: Normal rate and regular rhythm.      Heart sounds: Normal heart sounds.   Pulmonary:      Effort: Pulmonary effort is normal.      Breath sounds: Normal breath sounds.   Musculoskeletal:      Cervical back: Neck supple.      Right lower leg: No edema.      Left lower leg: No edema.   Skin:     General: Skin is warm and dry.   Neurological:      Mental Status: She is alert and oriented to person, place, and time.   Psychiatric:         Mood and Affect: Mood and affect normal.         Behavior: Behavior normal.         Thought Content: Thought content normal.         Judgment: Judgment normal.         Physical Exam      The following data was reviewed by DEMAR Jorgensen on 07/09/2025  Common Labs   Common labs          9/11/2024    07:57 3/13/2025    11:51   Common Labs   Glucose 94  82    BUN 13  8    Creatinine 0.80  0.84    Sodium 139  140    Potassium 3.9  4.1    Chloride 103  104    Calcium 9.6  9.9    Albumin 4.4  4.2    Total Bilirubin 0.4  0.6    Alkaline Phosphatase 46  73    AST (SGOT) 31  35    ALT (SGPT) 40  40    WBC " 7.05  7.21    Hemoglobin 15.3  15.8    Hematocrit 46.4  46.5    Platelets 257  290    Total Cholesterol 184  193    Triglycerides 277  193    HDL Cholesterol 36  43    LDL Cholesterol  101  116    Hemoglobin A1C  5.70      TSH   TSH          9/11/2024    07:57 3/13/2025    11:51   TSH   TSH 2.050  0.407        Results  Imaging   - X-ray of the left lower leg: Hematoma present, still healing.    FL Video Swallow With Speech Single Contrast  Result Date: 5/5/2025  Impression: 1. No tracheal aspiration 2. Transient laryngeal penetration with thin liquid barium via straw trials 3. Prominent cricopharyngeal bar Please consult speech pathology report for further details regarding the exam and for dietary recommendations. Report dictated by: Gila Nam  I have personally reviewed this case and agree with the findings above: Electronically Signed: Jaylan Tellez MD  5/5/2025 4:42 PM EDT  Workstation ID: [id number]    XR Tibia Fibula 2 View Left  Result Date: 3/30/2025  1.Soft tissue swelling in the anterior proximal lower leg. 2.No acute fracture or malalignment. Electronically Signed: Eduardo Campos MD  3/30/2025 1:05 AM EDT  Workstation ID: [id number]    XR Hand 3+ View Right  Result Date: 3/29/2025  Negative hand. Electronically Signed: Eduardo Camops MD  3/29/2025 11:43 PM EDT  Workstation ID: [id number]    XR Knee 3 View Left  Result Date: 3/29/2025  No fracture or dislocation. Electronically Signed: Eduardo Campos MD  3/29/2025 11:37 PM EDT  Workstation ID: [id number]    XR Chest PA & Lateral (In Office)  Result Date: 3/14/2025  Impression: No acute cardiopulmonary process. Electronically Signed: Luisa Montalvo MD  3/14/2025 12:59 PM EDT  Workstation ID: [id number]      Assessment & Plan   There are no diagnoses linked to this encounter.    Assessment & Plan  1. Medicare wellness visit.  - No changes in allergies or medications were reported.  - Current medications include albuterol, Pulmicort, calcium with D, Lexapro,  Pepcid, Arnuity, Ajovy, hydrocodone with acetaminophen, Boniva, Synthroid, lisinopril, Claritin, Mobic, a multivitamin, Protonix, Mirapex, Zanaflex, vitamin E, Maxalt, and magnesium oxide citrate.  - Advised to stand slowly and use a cane to prevent falls.  - No new refills are needed at this time.    2. Depression.  - Mental health has improved since starting medication.  - No suicidal thoughts for about a year.  - Discussed improvement in mental health and medication effectiveness.  - No changes in current treatment plan.    3. Asthma.  - Experiences shortness of breath, likely due to asthma.  - Uses albuterol as needed.  - Recent swallow study and chest x-ray were performed.  - Continue current asthma management with albuterol.    4. Chronic back pain.  - Chronic back pain managed by pain management.  - High risk for falls due to back pain and reliance on a cane.  - Discussed fall prevention strategies.  - Continue current pain management plan.    5. Hematoma.  - Hematoma on left lower leg from a fall, occasionally feels numb.  - Walking on the leg is fine, body is still breaking down the blood from the hematoma.  - Possible nerve damage from the fall is likely healing.  - No new treatment needed, monitor for changes.       BMI is >= 25 and <30. (Overweight) The following options were offered after discussion;: weight loss educational material (shared in after visit summary)       FOLLOW UP  Return in about 3 months (around 10/9/2025) for Refills and fasting labs.  Patient was given instructions and counseling regarding her condition or for health maintenance advice. Please see specific information pulled into the AVS if appropriate.     Patient or patient representative verbalized consent for the use of Ambient Listening during the visit with  DEMAR Jorgensen for chart documentation. 7/9/2025  09:45 EDT    DEMAR Jorgensen  07/09/25  13:34 EDT

## 2025-07-21 ENCOUNTER — TELEPHONE (OUTPATIENT)
Dept: PULMONOLOGY | Facility: CLINIC | Age: 60
End: 2025-07-21

## 2025-07-21 ENCOUNTER — TELEPHONE (OUTPATIENT)
Dept: NEUROLOGY | Facility: CLINIC | Age: 60
End: 2025-07-21
Payer: MEDICARE

## 2025-07-21 NOTE — TELEPHONE ENCOUNTER
Caller: Valentina Stevens    Relationship: Self    Best call back number: [phone]    What is the best time to reach you: ANY TIME    What was the call regarding: PT WAS CALLIN IN BECAUSE [facility] SENT HER A MESSAGE SAYING WE NEVER REPLIED TO THEIR PA REQUEST. ALSO SAID IT WAS DENIED NEEDING AN ALT MEDICATION, BUT MESSAGE DID NOT SAY WHICH MEDICATION.    PLEASE REVIEW AND ADVISE

## 2025-07-23 ENCOUNTER — PATIENT MESSAGE (OUTPATIENT)
Dept: NEUROLOGY | Facility: CLINIC | Age: 60
End: 2025-07-23
Payer: MEDICARE

## 2025-07-23 NOTE — TELEPHONE ENCOUNTER
Approved on July 22 by [employer] 2017  Your request has been approved  Effective Date: 4/1/2025  Authorization Expiration Date: 12/31/2025

## 2025-07-30 DIAGNOSIS — M25.461 SWELLING OF RIGHT KNEE JOINT: ICD-10-CM

## 2025-07-30 RX ORDER — MELOXICAM 15 MG/1
TABLET ORAL
Qty: 30 TABLET | Refills: 0 | Status: SHIPPED | OUTPATIENT
Start: 2025-07-30

## 2025-08-01 RX ORDER — FAMOTIDINE 40 MG/1
40 TABLET, FILM COATED ORAL NIGHTLY PRN
Qty: 30 TABLET | Refills: 0 | Status: SHIPPED | OUTPATIENT
Start: 2025-08-01

## 2025-08-22 DIAGNOSIS — I10 HYPERTENSION, UNSPECIFIED TYPE: ICD-10-CM

## 2025-08-22 RX ORDER — LISINOPRIL 10 MG/1
10 TABLET ORAL DAILY
Qty: 30 TABLET | Refills: 5 | Status: SHIPPED | OUTPATIENT
Start: 2025-08-22

## (undated) DEVICE — CONN JET HYDRA H20 AUXILIARY DISP

## (undated) DEVICE — THE STERILE LIGHT HANDLE COVER IS USED WITH STERIS SURGICAL LIGHTING AND VISUALIZATION SYSTEMS.

## (undated) DEVICE — SOL IRRG H2O PL/BG 1000ML STRL

## (undated) DEVICE — SOLIDIFIER LIQLOC PLS 1500CC BT

## (undated) DEVICE — LINER SURG CANSTR SXN S/RIGD 1500CC

## (undated) DEVICE — Device

## (undated) DEVICE — THE SINGLE USE ETRAP – POLYP TRAP IS USED FOR SUCTION RETRIEVAL OF ENDOSCOPICALLY REMOVED POLYPS.: Brand: ETRAP

## (undated) DEVICE — Device: Brand: DEFENDO AIR/WATER/SUCTION AND BIOPSY VALVE

## (undated) DEVICE — BLCK/BITE BLOX WO/DENTL/RIM W/STRAP 54F

## (undated) DEVICE — SINGLE-USE BIOPSY FORCEPS: Brand: RADIAL JAW 4

## (undated) DEVICE — SNAR POLYP CAPTIFLEX XS/OVL 11X2.4MM 240CM 1P/U